# Patient Record
Sex: FEMALE | Race: WHITE | HISPANIC OR LATINO | Employment: OTHER | URBAN - METROPOLITAN AREA
[De-identification: names, ages, dates, MRNs, and addresses within clinical notes are randomized per-mention and may not be internally consistent; named-entity substitution may affect disease eponyms.]

---

## 2017-02-27 DIAGNOSIS — I10 ESSENTIAL HYPERTENSION: ICD-10-CM

## 2017-02-27 RX ORDER — LISINOPRIL 2.5 MG/1
TABLET ORAL
Qty: 90 TABLET | Refills: 0 | Status: SHIPPED | OUTPATIENT
Start: 2017-02-27 | End: 2017-06-03 | Stop reason: SDUPTHER

## 2017-05-10 ENCOUNTER — OFFICE VISIT (OUTPATIENT)
Dept: FAMILY MEDICINE | Facility: CLINIC | Age: 67
End: 2017-05-10
Payer: MEDICARE

## 2017-05-10 ENCOUNTER — HOSPITAL ENCOUNTER (OUTPATIENT)
Dept: RADIOLOGY | Facility: HOSPITAL | Age: 67
Discharge: HOME | End: 2017-05-10
Attending: FAMILY MEDICINE
Payer: MEDICARE

## 2017-05-10 VITALS
OXYGEN SATURATION: 97 % | DIASTOLIC BLOOD PRESSURE: 66 MMHG | BODY MASS INDEX: 25.4 KG/M2 | TEMPERATURE: 98 F | HEIGHT: 62 IN | WEIGHT: 138 LBS | HEART RATE: 70 BPM | SYSTOLIC BLOOD PRESSURE: 114 MMHG

## 2017-05-10 DIAGNOSIS — I10 ESSENTIAL HYPERTENSION: Primary | ICD-10-CM

## 2017-05-10 DIAGNOSIS — Z01.818 PRE-OP EVALUATION: ICD-10-CM

## 2017-05-10 LAB
BILIRUB UR QL STRIP: NEGATIVE
CAOX CRY UR QL COMP ASSIST: NORMAL
CLARITY UR REFRACT.AUTO: CLEAR
COLOR UR AUTO: YELLOW
GLUCOSE UR QL STRIP: NEGATIVE
HGB UR QL STRIP: ABNORMAL
KETONES UR QL STRIP: NEGATIVE
LEUKOCYTE ESTERASE UR QL STRIP: ABNORMAL
MICROSCOPIC COMMENT: NORMAL
NITRITE UR QL STRIP: NEGATIVE
PH UR STRIP: 5 [PH] (ref 5–8)
PROT UR QL STRIP: NEGATIVE
RBC #/AREA URNS AUTO: 2 /HPF (ref 0–4)
SP GR UR STRIP: 1.02 (ref 1–1.03)
SQUAMOUS #/AREA URNS AUTO: 1 /HPF
URN SPEC COLLECT METH UR: ABNORMAL
UROBILINOGEN UR STRIP-ACNC: 2 EU/DL
WBC #/AREA URNS AUTO: 2 /HPF (ref 0–5)

## 2017-05-10 PROCEDURE — 99499 UNLISTED E&M SERVICE: CPT | Mod: S$PBB,,, | Performed by: FAMILY MEDICINE

## 2017-05-10 PROCEDURE — 99214 OFFICE O/P EST MOD 30 MIN: CPT | Mod: S$PBB,,, | Performed by: FAMILY MEDICINE

## 2017-05-10 PROCEDURE — 71020 XR CHEST PA AND LATERAL: CPT | Mod: TC,PO

## 2017-05-10 PROCEDURE — 99999 PR PBB SHADOW E&M-EST. PATIENT-LVL III: CPT | Mod: PBBFAC,,, | Performed by: FAMILY MEDICINE

## 2017-05-10 PROCEDURE — 93010 ELECTROCARDIOGRAM REPORT: CPT | Mod: ,,, | Performed by: INTERNAL MEDICINE

## 2017-05-10 PROCEDURE — 71020 XR CHEST PA AND LATERAL: CPT | Mod: 26,,, | Performed by: RADIOLOGY

## 2017-05-10 PROCEDURE — 93005 ELECTROCARDIOGRAM TRACING: CPT | Mod: PBBFAC,PO | Performed by: FAMILY MEDICINE

## 2017-05-10 RX ORDER — KETOROLAC TROMETHAMINE 5 MG/ML
SOLUTION OPHTHALMIC
COMMUNITY
Start: 2017-05-03 | End: 2018-04-06

## 2017-05-10 RX ORDER — DUREZOL 0.5 MG/ML
EMULSION OPHTHALMIC
COMMUNITY
Start: 2017-05-03 | End: 2017-11-27

## 2017-05-10 RX ORDER — CIPROFLOXACIN HYDROCHLORIDE 3 MG/ML
SOLUTION/ DROPS OPHTHALMIC
COMMUNITY
Start: 2017-05-03 | End: 2017-06-15

## 2017-05-10 NOTE — PATIENT INSTRUCTIONS
Renae ejercicio para tener un corazón más perlita  Usted podría estar preguntándose qué debe hacer para mejorar la britt de callahan corazón. Si piensa en hacer ejercicio va por buen lyle. No necesita volverse un atleta, kaveh sí hacer rock cierta cantidad de ejercicio rápido y con energía para ayudar a fortalecer el corazón. Si le starks diagnosticado rock enfermedad del corazón, callahan médico le puede recomendar ejercicios para ayudar a estabilizar callahan enfermedad. Para que el ejercicio se convierta en un hábito, escoja actividades que ayo seguras y que le diviertan.     Consulte con callahan proveedor de atención médica antes de comenzar un programa de ejercicios.   ¿Por qué hacer ejercicio?     Renae ejercicio con rock persona amiga. Cuando la actividad es divertida, tendrá más probabilidades de no abandonar.   Hacer ejercicio en forma regular le ofrece muchos beneficios para la britt, tales otilio los siguientes:  · Mejorar los niveles de colesterol en la kristy, lo que ayuda a evitar aún más los problemas del corazón.  · Bajar la presión arterial lo que ayuda a evitar los ataques al cerebro y al corazón.  · Controlar la diabetes o disminuir el riesgo de desarrollar la enfermedad.  · Mejorar el funcionamiento del corazón y los pulmones.  · Alcanzar y mantener un peso saludable.  · Formar músculos más rachele y flexibles para mejorar callahan actividad.  · Prevenir caídas y fracturas al retardar la pérdida de masa de los huesos (osteoporosis).  · Manejar mejor el estrés.  Sugerencias para hacer ejercicio  Acostúmbrese al ejercicio poco a poco: Al principio póngase metas fáciles, luego vaya aumentándolas.  Renae ejercicio la mayoría de los días de la semana: Trate de hacer actividad física de nivel moderado a intenso por un total de 150 minutos o más a la semana. Intente hacer 40 minutos, jen a cuatro veces a la semana. Para lograr los mejores resultados, la actividad debe durar un promedio de 40 minutos. Ejemplos de actividad física de  intensidad moderada son caminar rock ulca en 15 minutos, o trabajar en el jardín de 30 a 45 minutos.  Aumente el nivel de actividad diaria: Junto con callahan programa de ejercicios, trate de tener rock mayor actividad carmencita el día. Camine en vez de ir en automóvil. Renae más trabajos en la casa o en el jardín.  Escoja rock o más actividades que le gusten: Caminar es rock de las cosas más fáciles de hacer. También puede tratar de nadar, montar en bicicleta o lu rock clase de gimnasia.  Deje de hacer el ejercicio y llame a callahan médico si:  · Tiene dolor en el pecho o siente mareo.  · Siente ardor, opresión, o pesadez en el pecho, el abbey, los hombros, la espalda o los brazos.  · Siente rock gran falta de aire.  · Le aumenta el dolor en los músculos o en las articulaciones.  · Tiene palpitaciones o latidos cardíacos irregulares.   Date Last Reviewed: 3/7/2014  © 2072-3589 [employer]. [address] [place] [address]. Todos los derechos reservados. Esta información no pretende sustituir la atención médica profesional. Sólo callahan médico puede diagnosticar y tratar un problema de britt.

## 2017-05-10 NOTE — MR AVS SNAPSHOT
[facility]   [place]  [place] [address]  Phone: [phone]  Fax: [phone]                  Amrita Yoder   5/10/2017 11:00 AM   Office Visit    Descripción:  Female : 1950   Personal Médico:  Quentin Hoff MD   Departamento:  [facility]           Razón de la eloise     Pre-op Exam     Shoulder Pain           Diagnósticos de Esta Visita        Comentarios    Essential hypertension    -  Primario     Pre-op evaluation                Lista de tareas           Citas próximas        Personal Médico Departamento Tfno del dpto    5/10/2017 11:45 AM LIA KENNER [facility] [phone]    5/10/2017 1:30 PM [facility] XR1 300 LB LIMIT [facility] [phone]    6/15/2017 10:20 AM Quentin Hoff MD [facility] [phone]      Metas (5 Years of Data)     Ninguna      Follow-Up and Disposition     Return in about 6 months (around 11/10/2017), or if symptoms worsen or fail to improve.    Follow-up and Disposition History      [facility]     [facility]fermeras - Asistencia   Enfermeras registradas de [facility] pueden ayudarle a reservar rock eloise, proveer educación para la britt, asesoría clínica, y otros servicios de asesoramiento.   Llame para oleg servicio gratuito a [phone].             Medicamentos           Mensaje sobre Medicamentos     Verificar los cambios y / o adiciones a callahan régimen de medicación son los mismos que discutir con callahan médico. Si cualquiera de estos cambios o adiciones son incorrectos, por favor notifique a callahan proveedor de atención médica.             Verifique que la siguiente lista de medicamentos es rock representación exacta de los medicamentos que está tomando actualmente. Si no hay ningunos reportados, la lista puede estar en garcias. Si no es correcta, por favor póngase en contacto con callahan proveedor de atención médica. Lleve esta lista con  "usted en constantino de emergencia.           Medicamentos Actuales     atorvastatin (LIPITOR) 20 MG tablet Take 1 tablet (20 mg total) by mouth nightly.    ciprofloxacin HCl (CILOXAN) 0.3 % ophthalmic solution     DUREZOL 0.05 % Drop ophthalmic solution     ketorolac 0.5% (ACULAR) 0.5 % Drop     lisinopril (PRINIVIL,ZESTRIL) 2.5 MG tablet TAKE ONE TABLET BY MOUTH ONCE DAILY    PATADAY 0.2 % Drop     RESTASIS 0.05 % ophthalmic emulsion            Información de referencia clínica           Marisol signos vitales kip     PS Pulso Temperatura [place] Peso SpO2    114/66 (BP Location: Right arm, Patient Position: Sitting, BP Method: Manual) 70 98.2 °F (36.8 °C) (Oral) 5' 2" (1.575 m) 62.6 kg (138 lb 0.1 oz) 97%    BMI (IMC)                   25.24 kg/m2           Blood Pressure          Most Recent Value    BP  114/66      Alergias     A partir del:  5/10/2017        No Known Allergies      Vacunas     Administradas en la fecha de la visita:  5/10/2017        None      Orders Placed During Today's Visit      Órdenes normales de esta visita    EKG 12-lead     Urinalysis     Exámenes/Procedimientos futuros Se espera el Vence    CBC auto differential  5/10/2017 5/10/2018    Comprehensive metabolic panel  5/10/2017 8/8/2017    X-Ray Chest PA And Lateral  5/10/2017 5/10/2018      Instrucciones      Renae ejercicio para tener un corazón más perlita  Usted podría estar preguntándose qué debe hacer para mejorar la britt de callahan corazón. Si piensa en hacer ejercicio va por buen lyle. No necesita volverse un atleta, kaveh sí hacer rock cierta cantidad de ejercicio rápido y con energía para ayudar a fortalecer el corazón. Si le starks diagnosticado rock enfermedad del corazón, callahan médico le puede recomendar ejercicios para ayudar a estabilizar callahan enfermedad. Para que el ejercicio se convierta en un hábito, escoja actividades que ayo seguras y que le diviertan.     Consulte con callahan proveedor de atención médica antes de comenzar un programa de " ejercicios.   ¿Por qué hacer ejercicio?     Renae ejercicio con rock persona amiga. Cuando la actividad es divertida, tendrá más probabilidades de no abandonar.   Hacer ejercicio en forma regular le ofrece muchos beneficios para la britt, tales otilio los siguientes:  · Mejorar los niveles de colesterol en la kristy, lo que ayuda a evitar aún más los problemas del corazón.  · Bajar la presión arterial lo que ayuda a evitar los ataques al cerebro y al corazón.  · Controlar la diabetes o disminuir el riesgo de desarrollar la enfermedad.  · Mejorar el funcionamiento del corazón y los pulmones.  · Alcanzar y mantener un peso saludable.  · Formar músculos más rachele y flexibles para mejorar callahan actividad.  · Prevenir caídas y fracturas al retardar la pérdida de masa de los huesos (osteoporosis).  · Manejar mejor el estrés.  Sugerencias para hacer ejercicio  Acostúmbrese al ejercicio poco a poco: Al principio póngase metas fáciles, luego vaya aumentándolas.  Renae ejercicio la mayoría de los días de la semana: Trate de hacer actividad física de nivel moderado a intenso por un total de 150 minutos o más a la semana. Intente hacer 40 minutos, jen a cuatro veces a la semana. Para lograr los mejores resultados, la actividad debe durar un promedio de 40 minutos. Ejemplos de actividad física de intensidad moderada son caminar rock luca en 15 minutos, o trabajar en el jardín de 30 a 45 minutos.  Aumente el nivel de actividad diaria: Junto con callahan programa de ejercicios, trate de tener rock mayor actividad carmencita el día. Camine en vez de ir en automóvil. Renae más trabajos en la casa o en el jardín.  Escoja rock o más actividades que le gusten: Caminar es rock de las cosas más fáciles de hacer. También puede tratar de nadar, montar en bicicleta o lu rock clase de gimnasia.  Deje de hacer el ejercicio y llame a callahan médico si:  · Tiene dolor en el pecho o siente mareo.  · Siente ardor, opresión, o pesadez en el pecho, el abbey, los  hombros, la espalda o los brazos.  · Siente rock gran falta de aire.  · Le aumenta el dolor en los músculos o en las articulaciones.  · Tiene palpitaciones o latidos cardíacos irregulares.   Date Last Reviewed: 3/7/2014  © 1640-9542 [employer]. [address] [place] [address]. Todos los derechos reservados. Esta información no pretende sustituir la atención médica profesional. Sólo callahan médico puede diagnosticar y tratar un problema de britt.             Language Assistance Services     ATTENTION: Language assistance services are available, free of charge. Please call [phone].      ATENCIÓN: Si habla español, tiene a callahan disposición servicios gratuitos de asistencia lingüística. Llame al [phone].     CHÚ Ý: N?u b?n nói Ti?ng Vi?t, có các d?ch v? h? tr? ngôn ng? mi?n phí dành cho b?n. G?i s? [phone].         [facility] cumple con las leyes federales aplicables de derechos civiles y no discrimina por motivos de angela, color, origen nacional, edad, discapacidad, o sexo.                 Amrita Beba   5/10/2017 11:00 AM   Office Visit    Description:  Female : 1950   Provider:  Quentin Hoff MD   Department:  [facility]           Reason for Visit     Pre-op Exam     Shoulder Pain           Diagnoses this Visit        Comments    Essential hypertension    -  Primary     Pre-op evaluation                To Do List           Future Appointments        Provider Department Dept Phone    5/10/2017 11:45 AM MICHELLE FITZGERALD [facility] [phone]    5/10/2017 1:30 PM [facility] XR1 300 LB LIMIT [facility] [phone]    6/15/2017 10:20 AM Quentin Hoff MD [facility] [phone]      Goals     None      Follow-Up and Disposition     Return in about 6 months (around 11/10/2017), or if symptoms worsen or fail to improve.    Follow-up and Disposition History      [facility]   "   [facility] Nurse Care Line - 24/7 Assistance  Unless otherwise directed by your provider, please contact [facility], our nurse care line that is available for 24/7 assistance.     Registered nurses in the [facility] provide: appointment scheduling, clinical advisement, health education, and other advisory services.  Call: [phone] (toll free)               Medications           Message regarding Medications     Verify the changes and/or additions to your medication regime listed below are the same as discussed with your clinician today.  If any of these changes or additions are incorrect, please notify your healthcare provider.             Verify that the below list of medications is an accurate representation of the medications you are currently taking.  If none reported, the list may be blank. If incorrect, please contact your healthcare provider. Carry this list with you in case of emergency.           Current Medications     atorvastatin (LIPITOR) 20 MG tablet Take 1 tablet (20 mg total) by mouth nightly.    ciprofloxacin HCl (CILOXAN) 0.3 % ophthalmic solution     DUREZOL 0.05 % Drop ophthalmic solution     ketorolac 0.5% (ACULAR) 0.5 % Drop     lisinopril (PRINIVIL,ZESTRIL) 2.5 MG tablet TAKE ONE TABLET BY MOUTH ONCE DAILY    PATADAY 0.2 % Drop     RESTASIS 0.05 % ophthalmic emulsion            Clinical Reference Information           Your Vitals Were     BP Pulse Temp Height Weight SpO2    114/66 (BP Location: Right arm, Patient Position: Sitting, BP Method: Manual) 70 98.2 °F (36.8 °C) (Oral) 5' 2" (1.575 m) 62.6 kg (138 lb 0.1 oz) 97%    BMI                   25.24 kg/m2           Blood Pressure          Most Recent Value    BP  114/66      Allergies as of 5/10/2017     No Known Allergies      Immunizations Administered on Date of Encounter - 5/10/2017     None      Orders Placed During Today's Visit      Normal Orders This Visit    EKG 12-lead     Urinalysis     Future " Labs/Procedures Expected by Expires    CBC auto differential  5/10/2017 5/10/2018    Comprehensive metabolic panel  5/10/2017 8/8/2017    X-Ray Chest PA And Lateral  5/10/2017 5/10/2018      Instructions      Renae ejercicio para tener un corazón más perlita  Usted podría estar preguntándose qué debe hacer para mejorar la britt de callahan corazón. Si piensa en hacer ejercicio va por buen lyle. No necesita volverse un atleta, kaveh sí hacer rock cierta cantidad de ejercicio rápido y con energía para ayudar a fortalecer el corazón. Si le starks diagnosticado rock enfermedad del corazón, callahan médico le puede recomendar ejercicios para ayudar a estabilizar callahan enfermedad. Para que el ejercicio se convierta en un hábito, escoja actividades que ayo seguras y que le diviertan.     Consulte con callahan proveedor de atención médica antes de comenzar un programa de ejercicios.   ¿Por qué hacer ejercicio?     Renae ejercicio con rock persona amiga. Cuando la actividad es divertida, tendrá más probabilidades de no abandonar.   Hacer ejercicio en forma regular le ofrece muchos beneficios para la britt, tales otilio los siguientes:  · Mejorar los niveles de colesterol en la kristy, lo que ayuda a evitar aún más los problemas del corazón.  · Bajar la presión arterial lo que ayuda a evitar los ataques al cerebro y al corazón.  · Controlar la diabetes o disminuir el riesgo de desarrollar la enfermedad.  · Mejorar el funcionamiento del corazón y los pulmones.  · Alcanzar y mantener un peso saludable.  · Formar músculos más rachele y flexibles para mejorar callahan actividad.  · Prevenir caídas y fracturas al retardar la pérdida de masa de los huesos (osteoporosis).  · Manejar mejor el estrés.  Sugerencias para hacer ejercicio  Acostúmbrese al ejercicio poco a poco: Al principio póngase metas fáciles, luego vaya aumentándolas.  Renae ejercicio la mayoría de los días de la semana: Trate de hacer actividad física de nivel moderado a intenso por un total de 150  minutos o más a la semana. Intente hacer 40 minutos, jen a cuatro veces a la semana. Para lograr los mejores resultados, la actividad debe durar un promedio de 40 minutos. Ejemplos de actividad física de intensidad moderada son caminar rock luca en 15 minutos, o trabajar en el jardín de 30 a 45 minutos.  Aumente el nivel de actividad diaria: Junto con callahan programa de ejercicios, trate de tener rock mayor actividad carmencita el día. Camine en vez de ir en automóvil. Renae más trabajos en la casa o en el jardín.  Escoja rokc o más actividades que le gusten: Caminar es rock de las cosas más fáciles de hacer. También puede tratar de nadar, montar en bicicleta o lu rock clase de gimnasia.  Deje de hacer el ejercicio y llame a callahan médico si:  · Tiene dolor en el pecho o siente mareo.  · Siente ardor, opresión, o pesadez en el pecho, el abbey, los hombros, la espalda o los brazos.  · Siente rock gran falta de aire.  · Le aumenta el dolor en los músculos o en las articulaciones.  · Tiene palpitaciones o latidos cardíacos irregulares.   Date Last Reviewed: 3/7/2014  © 3439-9322 [employer]. [address] [place] [address], [place] [address]. Todos los derechos reservados. Esta información no pretende sustituir la atención médica profesional. Sólo callahan médico puede diagnosticar y tratar un problema de britt.             Language Assistance Services     ATTENTION: Language assistance services are available, free of charge. Please call [phone].      ATENCIÓN: Si habla español, tiene a callahan disposición servicios gratuitos de asistencia lingüística. Llame al [phone].     JUNE Ý: N?u b?n nói Ti?ng Vi?t, có các d?ch v? h? tr? ngôn ng? mi?n phí dành cho b?n. G?i s? [phone].         [facility] complies with applicable Federal civil rights laws and does not discriminate on the basis of race, color, national origin, age, disability, or sex.

## 2017-05-10 NOTE — PROGRESS NOTES
Subjective:       Patient ID: Amrita Yoder is a 66 y.o. female.    Chief Complaint: Pre-op Exam and Shoulder Pain    HPI Comments: 66 years old female who came to the clinic for a preoperative evaluation.  Blood pressure today is stable.  No chest pain palpitations orthopnea or PND.    Shoulder Pain        Review of Systems   Constitutional: Negative.    HENT: Negative.    Eyes: Negative.    Respiratory: Negative.    Cardiovascular: Negative.  Negative for chest pain, palpitations and leg swelling.   Gastrointestinal: Negative.    Genitourinary: Negative.    Musculoskeletal: Negative.    Skin: Negative.    Neurological: Negative.    Psychiatric/Behavioral: Negative.        Objective:      Physical Exam   Constitutional: She is oriented to person, place, and time. She appears well-developed and well-nourished. No distress.   HENT:   Head: Normocephalic and atraumatic.   Right Ear: External ear normal.   Left Ear: External ear normal.   Nose: Nose normal.   Mouth/Throat: Oropharynx is clear and moist. No oropharyngeal exudate.   Eyes: Conjunctivae and EOM are normal. Pupils are equal, round, and reactive to light. Right eye exhibits no discharge. Left eye exhibits no discharge. No scleral icterus.   Neck: Normal range of motion. Neck supple. No JVD present. No tracheal deviation present. No thyromegaly present.   Cardiovascular: Normal rate, regular rhythm, normal heart sounds and intact distal pulses.  Exam reveals no gallop and no friction rub.    No murmur heard.  Pulmonary/Chest: Effort normal and breath sounds normal. No stridor. No respiratory distress. She has no wheezes. She has no rales. She exhibits no tenderness.   Abdominal: Soft. Bowel sounds are normal. She exhibits no distension and no mass. There is no tenderness. There is no rebound and no guarding.   Musculoskeletal: Normal range of motion. She exhibits no edema or tenderness.   Lymphadenopathy:     She has no cervical adenopathy.   Neurological:  She is alert and oriented to person, place, and time. She has normal reflexes. No cranial nerve deficit. She exhibits normal muscle tone. Coordination normal.   Skin: Skin is warm and dry. No rash noted. She is not diaphoretic. No erythema. No pallor.   Psychiatric: She has a normal mood and affect. Her behavior is normal. Judgment and thought content normal.       Assessment:       1. Essential hypertension    2. Pre-op evaluation        Plan:         Amrita was seen today for pre-op exam and shoulder pain.    Diagnoses and all orders for this visit:    Essential hypertension  -     Comprehensive metabolic panel; Future  -     Cancel: Urinalysis; Future  -     CBC auto differential; Future  -     EKG 12-lead  -     Urinalysis    Pre-op evaluation  -     Comprehensive metabolic panel; Future  -     Cancel: Urinalysis; Future  -     CBC auto differential; Future  -     EKG 12-lead  -     X-Ray Chest PA And Lateral; Future  -     Urinalysis

## 2017-06-03 DIAGNOSIS — I10 ESSENTIAL HYPERTENSION: ICD-10-CM

## 2017-06-05 RX ORDER — LISINOPRIL 2.5 MG/1
TABLET ORAL
Qty: 90 TABLET | Refills: 0 | Status: SHIPPED | OUTPATIENT
Start: 2017-06-05 | End: 2017-10-16 | Stop reason: SDUPTHER

## 2017-06-15 ENCOUNTER — OFFICE VISIT (OUTPATIENT)
Dept: FAMILY MEDICINE | Facility: CLINIC | Age: 67
End: 2017-06-15
Payer: MEDICARE

## 2017-06-15 VITALS
HEIGHT: 62 IN | SYSTOLIC BLOOD PRESSURE: 120 MMHG | WEIGHT: 138.88 LBS | DIASTOLIC BLOOD PRESSURE: 64 MMHG | HEART RATE: 70 BPM | BODY MASS INDEX: 25.55 KG/M2

## 2017-06-15 DIAGNOSIS — J02.9 PHARYNGITIS, UNSPECIFIED ETIOLOGY: ICD-10-CM

## 2017-06-15 DIAGNOSIS — J06.9 UPPER RESPIRATORY TRACT INFECTION, UNSPECIFIED TYPE: ICD-10-CM

## 2017-06-15 DIAGNOSIS — R49.0 HOARSENESS: ICD-10-CM

## 2017-06-15 DIAGNOSIS — Z01.818 PREOP EXAMINATION: Primary | ICD-10-CM

## 2017-06-15 PROCEDURE — 1159F MED LIST DOCD IN RCRD: CPT | Mod: ,,, | Performed by: FAMILY MEDICINE

## 2017-06-15 PROCEDURE — 99213 OFFICE O/P EST LOW 20 MIN: CPT | Mod: PBBFAC,PO | Performed by: FAMILY MEDICINE

## 2017-06-15 PROCEDURE — 99214 OFFICE O/P EST MOD 30 MIN: CPT | Mod: S$PBB,,, | Performed by: FAMILY MEDICINE

## 2017-06-15 PROCEDURE — 96372 THER/PROPH/DIAG INJ SC/IM: CPT | Mod: PBBFAC,PO

## 2017-06-15 PROCEDURE — 1126F AMNT PAIN NOTED NONE PRSNT: CPT | Mod: ,,, | Performed by: FAMILY MEDICINE

## 2017-06-15 PROCEDURE — 99999 PR PBB SHADOW E&M-EST. PATIENT-LVL III: CPT | Mod: PBBFAC,,, | Performed by: FAMILY MEDICINE

## 2017-06-15 RX ORDER — PROMETHAZINE HYDROCHLORIDE AND DEXTROMETHORPHAN HYDROBROMIDE 6.25; 15 MG/5ML; MG/5ML
5 SYRUP ORAL 3 TIMES DAILY PRN
Qty: 180 ML | Refills: 0 | Status: SHIPPED | OUTPATIENT
Start: 2017-06-15 | End: 2017-06-15 | Stop reason: CLARIF

## 2017-06-15 RX ORDER — BENZONATATE 200 MG/1
200 CAPSULE ORAL EVERY 6 HOURS
Qty: 30 CAPSULE | Refills: 0 | Status: SHIPPED | OUTPATIENT
Start: 2017-06-15 | End: 2017-06-25

## 2017-06-15 RX ORDER — TRIAMCINOLONE ACETONIDE 40 MG/ML
40 INJECTION, SUSPENSION INTRA-ARTICULAR; INTRAMUSCULAR
Status: COMPLETED | OUTPATIENT
Start: 2017-06-15 | End: 2017-06-15

## 2017-06-15 RX ORDER — PREDNISONE 20 MG/1
20 TABLET ORAL DAILY
Qty: 5 TABLET | Refills: 0 | Status: SHIPPED | OUTPATIENT
Start: 2017-06-15 | End: 2017-06-20

## 2017-06-15 RX ADMIN — TRIAMCINOLONE ACETONIDE 40 MG: 40 INJECTION, SUSPENSION INTRA-ARTICULAR; INTRAMUSCULAR at 11:06

## 2017-06-15 NOTE — PROGRESS NOTES
Subjective:       Patient ID: Amrita Yoder is a 67 y.o. female.    Chief Complaint: Follow-up    67 years old female came to the clinic for preoperative evaluation for eye surgery.  Patient also with sore throat associated with cough for the last week after eating avocate with lime.  Patient with episodic hoarseness.  Patient also reports fever sometimes.        Review of Systems   Constitutional: Positive for fever. Negative for chills.   HENT: Positive for sore throat and voice change.    Eyes: Negative.    Respiratory: Negative.    Cardiovascular: Negative.  Negative for chest pain, palpitations and leg swelling.   Gastrointestinal: Negative.    Endocrine: Negative for cold intolerance, heat intolerance, polydipsia, polyphagia and polyuria.   Genitourinary: Negative.    Musculoskeletal: Negative.    Skin: Negative.    Neurological: Negative.    Psychiatric/Behavioral: Negative.        Objective:      Physical Exam   Constitutional: She is oriented to person, place, and time. She appears well-developed and well-nourished. No distress.   HENT:   Head: Normocephalic and atraumatic.   Right Ear: External ear normal.   Left Ear: External ear normal.   Nose: Nose normal.   Mouth/Throat: Posterior oropharyngeal erythema present. No oropharyngeal exudate.   Eyes: Conjunctivae and EOM are normal. Pupils are equal, round, and reactive to light. Right eye exhibits no discharge. Left eye exhibits no discharge. No scleral icterus.   Neck: Normal range of motion. Neck supple. No JVD present. No tracheal deviation present. No thyromegaly present.   Cardiovascular: Normal rate, regular rhythm, normal heart sounds and intact distal pulses.  Exam reveals no gallop and no friction rub.    No murmur heard.  Pulmonary/Chest: Effort normal and breath sounds normal. No stridor. No respiratory distress. She has no wheezes. She has no rales. She exhibits no tenderness.   Abdominal: Soft. Bowel sounds are normal. She exhibits no  distension and no mass. There is no tenderness. There is no rebound and no guarding.   Musculoskeletal: Normal range of motion. She exhibits no edema or tenderness.   Lymphadenopathy:     She has no cervical adenopathy.   Neurological: She is alert and oriented to person, place, and time. She has normal reflexes. No cranial nerve deficit. She exhibits normal muscle tone. Coordination normal.   Skin: Skin is warm and dry. No rash noted. She is not diaphoretic. No erythema. No pallor.   Psychiatric: She has a normal mood and affect. Her behavior is normal. Judgment and thought content normal.       Assessment:       1. Preop examination    2. Pharyngitis, unspecified etiology    3. Hoarseness        Plan:         Amrita was seen today for follow-up.    Diagnoses and all orders for this visit:    Preop examination    Pharyngitis, unspecified etiology  -     predniSONE (DELTASONE) 20 MG tablet; Take 1 tablet (20 mg total) by mouth once daily.  -     promethazine-dextromethorphan (PROMETHAZINE-DM) 6.25-15 mg/5 mL Syrp; Take 5 mLs by mouth 3 (three) times daily as needed.    Hoarseness  -     triamcinolone acetonide injection 40 mg; Inject 1 mL (40 mg total) into the muscle one time.    Upper respiratory tract infection, unspecified type     patient hemodynamically stable for surgical procedure after respiratory processes is gone.

## 2017-07-07 ENCOUNTER — TELEPHONE (OUTPATIENT)
Dept: OBSTETRICS AND GYNECOLOGY | Facility: CLINIC | Age: 67
End: 2017-07-07

## 2017-07-07 NOTE — TELEPHONE ENCOUNTER
Left voicemail informing new patient appt is not available until 07/21/17. We can add her to the wait list if she would like. Need to get a little more information. Why is patient requesting to be seen sooner.

## 2017-07-07 NOTE — TELEPHONE ENCOUNTER
----- Message from Libby Lantigua sent at 7/6/2017 12:30 PM CDT -----  Contact: Self/[phone]  Patient said she would like to be seen sooner than 7/20/17. She is available on 7/10/17 after 2 pm and 7/12/17  open all day. Please advise

## 2017-10-16 DIAGNOSIS — I10 ESSENTIAL HYPERTENSION: ICD-10-CM

## 2017-10-16 RX ORDER — LISINOPRIL 2.5 MG/1
2.5 TABLET ORAL DAILY
Qty: 90 TABLET | Refills: 1 | Status: SHIPPED | OUTPATIENT
Start: 2017-10-16 | End: 2017-11-30 | Stop reason: SDUPTHER

## 2017-11-13 ENCOUNTER — TELEPHONE (OUTPATIENT)
Dept: FAMILY MEDICINE | Facility: CLINIC | Age: 67
End: 2017-11-13

## 2017-11-13 NOTE — TELEPHONE ENCOUNTER
Spoke with pt schedule appt with Dr. Olvera as per pt as medical questions and concerns. Pt voices understanding.

## 2017-11-13 NOTE — TELEPHONE ENCOUNTER
----- Message from Shaneka Daniel sent at 11/13/2017  9:48 AM CST -----  Contact: self, [phone]  Patient requests to be seen on 11/20, states it needs to be with her doctor only and on that day only. States she has questions for her doctor and also wants to have the flu shot, and it can not be on Tuesdays or Thursdays. Please advise.

## 2017-11-27 ENCOUNTER — OFFICE VISIT (OUTPATIENT)
Dept: FAMILY MEDICINE | Facility: CLINIC | Age: 67
End: 2017-11-27
Payer: MEDICAID

## 2017-11-27 VITALS
HEART RATE: 73 BPM | HEIGHT: 62 IN | DIASTOLIC BLOOD PRESSURE: 76 MMHG | BODY MASS INDEX: 27.59 KG/M2 | WEIGHT: 149.94 LBS | SYSTOLIC BLOOD PRESSURE: 120 MMHG

## 2017-11-27 DIAGNOSIS — Z12.31 ENCOUNTER FOR SCREENING MAMMOGRAM FOR MALIGNANT NEOPLASM OF BREAST: ICD-10-CM

## 2017-11-27 DIAGNOSIS — Z23 NEED FOR PROPHYLACTIC VACCINATION AND INOCULATION AGAINST INFLUENZA: ICD-10-CM

## 2017-11-27 DIAGNOSIS — Z78.0 ASYMPTOMATIC MENOPAUSE: ICD-10-CM

## 2017-11-27 DIAGNOSIS — E78.5 DYSLIPIDEMIA: ICD-10-CM

## 2017-11-27 DIAGNOSIS — Z12.11 SCREEN FOR COLON CANCER: ICD-10-CM

## 2017-11-27 DIAGNOSIS — Z23 NEED FOR VACCINATION AGAINST STREPTOCOCCUS PNEUMONIAE: ICD-10-CM

## 2017-11-27 DIAGNOSIS — Z00.00 ANNUAL PHYSICAL EXAM: Primary | ICD-10-CM

## 2017-11-27 PROCEDURE — 90472 IMMUNIZATION ADMIN EACH ADD: CPT | Mod: PBBFAC,PO

## 2017-11-27 PROCEDURE — 99397 PER PM REEVAL EST PAT 65+ YR: CPT | Mod: S$PBB,,, | Performed by: FAMILY MEDICINE

## 2017-11-27 PROCEDURE — 99499 UNLISTED E&M SERVICE: CPT | Mod: S$PBB,,, | Performed by: FAMILY MEDICINE

## 2017-11-27 PROCEDURE — 90670 PCV13 VACCINE IM: CPT | Mod: PBBFAC,PO

## 2017-11-27 PROCEDURE — 99999 PR PBB SHADOW E&M-EST. PATIENT-LVL III: CPT | Mod: PBBFAC,,, | Performed by: FAMILY MEDICINE

## 2017-11-27 PROCEDURE — 99213 OFFICE O/P EST LOW 20 MIN: CPT | Mod: PBBFAC,PO,25 | Performed by: FAMILY MEDICINE

## 2017-11-27 PROCEDURE — 90662 IIV NO PRSV INCREASED AG IM: CPT | Mod: PBBFAC,PO

## 2017-11-27 NOTE — PROGRESS NOTES
Subjective:       Patient ID: Amrita Yoder is a 67 y.o. female.    Chief Complaint: Follow-up    67 years old female who came to the clinic for for her physical examination.  Patient with good compliance with medical regimen.  No recent cholesterol check.  No chest pain palpitations orthopnea or PND.  Blood pressure today stable.  Patient was advised to increase her physical activity at least 30 minutes 5 times a week.      Review of Systems   Constitutional: Negative.    HENT: Negative.    Eyes: Negative.    Respiratory: Negative.    Cardiovascular: Negative.  Negative for chest pain, palpitations and leg swelling.   Gastrointestinal: Negative.    Genitourinary: Negative.    Musculoskeletal: Negative.    Skin: Negative.    Neurological: Negative.    Psychiatric/Behavioral: Negative.        Objective:      Physical Exam   Constitutional: She is oriented to person, place, and time. She appears well-developed and well-nourished. No distress.   HENT:   Head: Normocephalic and atraumatic.   Right Ear: External ear normal.   Left Ear: External ear normal.   Nose: Nose normal.   Mouth/Throat: Oropharynx is clear and moist. No oropharyngeal exudate.   Eyes: Conjunctivae and EOM are normal. Pupils are equal, round, and reactive to light. Right eye exhibits no discharge. Left eye exhibits no discharge. No scleral icterus.   Neck: Normal range of motion. Neck supple. No JVD present. No tracheal deviation present. No thyromegaly present.   Cardiovascular: Normal rate, regular rhythm, normal heart sounds and intact distal pulses.  Exam reveals no gallop and no friction rub.    No murmur heard.  Pulmonary/Chest: Effort normal and breath sounds normal. No stridor. No respiratory distress. She has no wheezes. She has no rales. She exhibits no tenderness.   Abdominal: Soft. Bowel sounds are normal. She exhibits no distension and no mass. There is no tenderness. There is no rebound and no guarding.   Musculoskeletal: Normal range  of motion. She exhibits no edema or tenderness.   Lymphadenopathy:     She has no cervical adenopathy.   Neurological: She is alert and oriented to person, place, and time. She has normal reflexes. No cranial nerve deficit. She exhibits normal muscle tone. Coordination normal.   Skin: Skin is warm and dry. No rash noted. She is not diaphoretic. No erythema. No pallor.   Psychiatric: She has a normal mood and affect. Her behavior is normal. Judgment and thought content normal.       Assessment:       1. Annual physical exam    2. Encounter for screening mammogram for malignant neoplasm of breast    3. Need for prophylactic vaccination and inoculation against influenza    4. Need for vaccination against Streptococcus pneumoniae    5. Dyslipidemia    6. Screen for colon cancer    7. Asymptomatic menopause        Plan:         Amrita was seen today for follow-up.    Diagnoses and all orders for this visit:    Annual physical exam  -     Comprehensive metabolic panel; Future  -     Lipid panel; Future  -     Urinalysis; Future  -     TSH; Future  -     CBC auto differential; Future    Encounter for screening mammogram for malignant neoplasm of breast  -     Mammo Digital Screening Bilat with CAD; Future    Need for prophylactic vaccination and inoculation against influenza  -     Influenza - High Dose (65+) (PF) (IM)    Need for vaccination against Streptococcus pneumoniae  -     Pneumococcal Conjugate Vaccine (13 Valent) (IM)    Dyslipidemia  -     Comprehensive metabolic panel; Future  -     Lipid panel; Future  -     Urinalysis; Future  -     TSH; Future  -     CBC auto differential; Future    Screen for colon cancer  -     Case request GI: COLONOSCOPY  -     Fecal Immunochemical Test (iFOBT)    Asymptomatic menopause  -     DXA Bone Density Spine And Hip; Future

## 2017-11-27 NOTE — PATIENT INSTRUCTIONS
Ejercicios aeróbicos para un corazón perlita  El ejercicio, además de aumentar el nivel de energía y aliviar el estrés, fortalece el músculo del corazón, reduce la presión arterial y el nivel de colesterol, y quema calorías.  Elija un ejercicio aeróbico     Recuerde que hacer aunque sea algo de actividad es mejor que no hacer nada.   Elija rock actividad que jhonathan trabajar al corazón y a los pulmones. El ejercicio aeróbico puede mejorar la manera en que el corazón y otros músculos usan el oxígeno. Para que le resulte más divertido, jhonathan el ejercicio con un amigo y escoja rock actividad que le guste. He aquí algunas ideas:  · Caminar  · Nadar  · Montar en bicicleta  · Subir escaleras  · Bailar  · Trotar  Jhonathan ejercicio con regularidad  Si no valderrama estado haciendo ejercicio con regularidad, pregunte yao a callahan médico si puede hacerlo, y si le da callahan autorización empiece poco a poco. Siga estos consejos:  · Comience por hacer ejercicio jen veces por semana entre leticia y kaiden minutos cada vez.  · Cuando se sienta cómodo, agregue algunos minutos cada sesión.  · Siga aumentando la frecuencia y duración de las sesiones de ejercicio hasta llegar a jen o cuatro veces por semana. Cada sesión debería durar unos 40 minutos en promedio e incluir actividad física de moderada a intensa.  · Si le starks dado nitroglicerina, asegúrese de llevarla cuando hace ejercicio.  · Si tiene un ataque de angina de pecho mientras está haciendo ejercicio, pare el ejercicio, tome la nitroglicerina y llame al médico.  Date Last Reviewed: 6/2/2016  © 2516-2912 [employer], [employer]. [address] [place] [address], [place] [address]. Todos los derechos reservados. Esta información no pretende sustituir la atención médica profesional. Sólo callahan médico puede diagnosticar y tratar un problema de britt.

## 2017-11-30 DIAGNOSIS — I10 ESSENTIAL HYPERTENSION: ICD-10-CM

## 2017-11-30 DIAGNOSIS — E78.5 DYSLIPIDEMIA: ICD-10-CM

## 2017-11-30 RX ORDER — ATORVASTATIN CALCIUM 20 MG/1
20 TABLET, FILM COATED ORAL NIGHTLY
Qty: 90 TABLET | Refills: 1 | Status: SHIPPED | OUTPATIENT
Start: 2017-11-30 | End: 2018-04-23 | Stop reason: SDUPTHER

## 2017-11-30 RX ORDER — LISINOPRIL 2.5 MG/1
2.5 TABLET ORAL DAILY
Qty: 90 TABLET | Refills: 1 | Status: SHIPPED | OUTPATIENT
Start: 2017-11-30 | End: 2018-04-06 | Stop reason: SDUPTHER

## 2017-12-11 ENCOUNTER — HOSPITAL ENCOUNTER (OUTPATIENT)
Dept: RADIOLOGY | Facility: HOSPITAL | Age: 67
Discharge: HOME | End: 2017-12-11
Attending: FAMILY MEDICINE
Payer: MEDICARE

## 2017-12-11 DIAGNOSIS — Z78.0 ASYMPTOMATIC MENOPAUSE: ICD-10-CM

## 2017-12-11 DIAGNOSIS — Z12.31 ENCOUNTER FOR SCREENING MAMMOGRAM FOR MALIGNANT NEOPLASM OF BREAST: ICD-10-CM

## 2017-12-11 PROCEDURE — 77080 DXA BONE DENSITY AXIAL: CPT | Mod: TC

## 2017-12-11 PROCEDURE — 77063 BREAST TOMOSYNTHESIS BI: CPT | Mod: 26,,, | Performed by: RADIOLOGY

## 2017-12-11 PROCEDURE — 77067 SCR MAMMO BI INCL CAD: CPT | Mod: 26,,, | Performed by: RADIOLOGY

## 2017-12-11 PROCEDURE — 77080 DXA BONE DENSITY AXIAL: CPT | Mod: 26,,, | Performed by: RADIOLOGY

## 2017-12-11 PROCEDURE — 77067 SCR MAMMO BI INCL CAD: CPT | Mod: TC

## 2018-04-06 ENCOUNTER — OFFICE VISIT (OUTPATIENT)
Dept: FAMILY MEDICINE | Facility: CLINIC | Age: 68
End: 2018-04-06
Payer: MEDICARE

## 2018-04-06 ENCOUNTER — HOSPITAL ENCOUNTER (OUTPATIENT)
Dept: RADIOLOGY | Facility: HOSPITAL | Age: 68
Discharge: HOME | End: 2018-04-06
Attending: FAMILY MEDICINE
Payer: MEDICARE

## 2018-04-06 VITALS
SYSTOLIC BLOOD PRESSURE: 130 MMHG | DIASTOLIC BLOOD PRESSURE: 84 MMHG | HEART RATE: 72 BPM | WEIGHT: 147.69 LBS | BODY MASS INDEX: 27.18 KG/M2 | HEIGHT: 62 IN

## 2018-04-06 DIAGNOSIS — M79.671 RIGHT FOOT PAIN: ICD-10-CM

## 2018-04-06 DIAGNOSIS — G89.29 CHRONIC RIGHT SHOULDER PAIN: ICD-10-CM

## 2018-04-06 DIAGNOSIS — J01.80 ACUTE NON-RECURRENT SINUSITIS OF OTHER SINUS: ICD-10-CM

## 2018-04-06 DIAGNOSIS — I10 ESSENTIAL HYPERTENSION: ICD-10-CM

## 2018-04-06 DIAGNOSIS — M25.511 CHRONIC RIGHT SHOULDER PAIN: ICD-10-CM

## 2018-04-06 DIAGNOSIS — J30.1 CHRONIC SEASONAL ALLERGIC RHINITIS DUE TO POLLEN: Primary | ICD-10-CM

## 2018-04-06 PROCEDURE — 73630 X-RAY EXAM OF FOOT: CPT | Mod: 26,RT,, | Performed by: RADIOLOGY

## 2018-04-06 PROCEDURE — 3079F DIAST BP 80-89 MM HG: CPT | Mod: CPTII,S$GLB,, | Performed by: FAMILY MEDICINE

## 2018-04-06 PROCEDURE — 3075F SYST BP GE 130 - 139MM HG: CPT | Mod: CPTII,S$GLB,, | Performed by: FAMILY MEDICINE

## 2018-04-06 PROCEDURE — 73030 X-RAY EXAM OF SHOULDER: CPT | Mod: TC,FY,RT

## 2018-04-06 PROCEDURE — 99214 OFFICE O/P EST MOD 30 MIN: CPT | Mod: S$GLB,,, | Performed by: FAMILY MEDICINE

## 2018-04-06 PROCEDURE — 73630 X-RAY EXAM OF FOOT: CPT | Mod: TC,FY,RT

## 2018-04-06 PROCEDURE — 99999 PR PBB SHADOW E&M-EST. PATIENT-LVL IV: CPT | Mod: PBBFAC,,, | Performed by: FAMILY MEDICINE

## 2018-04-06 PROCEDURE — 73030 X-RAY EXAM OF SHOULDER: CPT | Mod: 26,RT,, | Performed by: RADIOLOGY

## 2018-04-06 RX ORDER — MONTELUKAST SODIUM 10 MG/1
10 TABLET ORAL NIGHTLY
Qty: 30 TABLET | Refills: 0 | Status: SHIPPED | OUTPATIENT
Start: 2018-04-06 | End: 2018-05-06

## 2018-04-06 RX ORDER — FLUNISOLIDE 0.25 MG/ML
2 SOLUTION NASAL 2 TIMES DAILY
Qty: 25 ML | Refills: 0 | Status: SHIPPED | OUTPATIENT
Start: 2018-04-06 | End: 2019-11-05 | Stop reason: ALTCHOICE

## 2018-04-06 RX ORDER — GUAIFENESIN 600 MG/1
600 TABLET, EXTENDED RELEASE ORAL 2 TIMES DAILY
Qty: 20 TABLET | Refills: 0 | Status: SHIPPED | OUTPATIENT
Start: 2018-04-06 | End: 2018-04-16

## 2018-04-06 RX ORDER — PROMETHAZINE HYDROCHLORIDE AND DEXTROMETHORPHAN HYDROBROMIDE 6.25; 15 MG/5ML; MG/5ML
5 SYRUP ORAL 3 TIMES DAILY
Qty: 180 ML | Refills: 0 | Status: SHIPPED | OUTPATIENT
Start: 2018-04-06 | End: 2018-04-16

## 2018-04-06 RX ORDER — LISINOPRIL 2.5 MG/1
2.5 TABLET ORAL DAILY
Qty: 90 TABLET | Refills: 3 | Status: SHIPPED | OUTPATIENT
Start: 2018-04-06 | End: 2019-04-15 | Stop reason: SDUPTHER

## 2018-04-06 NOTE — PATIENT INSTRUCTIONS
"  Rinitis alérgica  La rinitis alérgica es rock reacción alérgica que afecta la nariz y, con frecuencia, los ojos. Se la suele conocer otilio alergias nasales. Es frecuente que las alergias nasales se deban a elementos que están presentes en el medioambiente y se respiran. Según a qué sea sensible, las alergias nasales pueden presentarse únicamente carmencita ciertas estaciones. O también pueden ocurrir carmencita todo el año. Los alérgenos comunes de interior incluyen los ácaros del polvo, el moho, las cucarachas y la caspa de las mascotas. Los alérgenos de exterior incluyen el polen de los árboles, los pastos y las hierbas.  Los síntomas son, por ejemplo, goteo nasal, congestión y comezón en la nariz. También incluyen estornudos, ojos rojos y con comezón, y areolas oscuras ("ojeras alérgicas") debajo de los ojos. Además, usted puede estar irritable y cansado. Las alergias rachele también pueden afectar la respiración y desencadenar orck afección llamada asma.  Se pueden realizar pruebas para detectar a qué alérgenos reacciona usted. Es posible que le remitan a un especialista en alergias para que le evalúe y le jhonathan pruebas.  Cuidados en callahan casa  Probablemente el proveedor de atención médica le recete medicamentos para ayudar a aliviar los síntomas de alergia. Siga las instrucciones para lu estos medicamentos.  Pida consejo al proveedor sobre cómo evitar las sustancias a las que es alérgico. Los siguientes son algunos consejos para cada tipo de alérgeno.  Caspa de mascota:  · No tenga mascotas con pelaje o plumas.  · Si no puede evitar tenerlas, manténgalas fuera del dormitorio y de los muebles tapizados.  Polen:  · Cuando el nivel de polen en el ambiente está alto, mantenga cerradas las ventanas de callahan casa y callahan automóvil. De ser posible, use en cambio el aire acondicionado.  · Use rock mascarilla con filtro cuando dxion el césped o trabaje en el jardín.  Ácaros del polvo en la casa:  · Lave la ropa de cama todas las " "semanas con [place] y detergente o séquela en un ambiente caliente.  · Cubra el colchón, el somier y las almohadas con fundas antialérgicas.  · Si es posible, duerma en un cuarto que no tenga tapete, jorge ni muebles tapizados.  Cucarachas:  · Guarde la comida en recipientes cerrados herméticamente.  · Saque la basura de callahan casa rápidamente.  · Arregle las filtraciones de agua.  Moho:  · Mantenga bajo el nivel de humedad usando un deshumidificador o el aparato de aire acondicionado. Mantenga el deshumidificador y el aire acondicionado limpios y sin moho.  · Limpie áreas que tengan moho con agua y blanqueador.  En general:  · Pase la aspiradora rock o dos veces por semana. Si es posible, use rock aspiradora que tenga un filtro de aire de yolis eficiencia para partículas ("HEPA", por saturnino siglas en inglés).  · No fume. Evite el humo de cigarrillo. Renetta humo es irritante y puede empeorar los síntomas.  Visitas de control  Programe rock visita de control según le indique el proveedor de atención médica o nuestro personal. Si le remitieron a un especialista en alergias, coordine renan eloise sin demoras.  Cuándo debe buscar atención médica  Llame enseguida a callahan proveedor de atención médica si se presenta cualquiera de las siguientes situaciones:  · Tos o silbidos al respirar  · Fiebre superior a 100.4° F (38° C)  · Continuidad de los síntomas, síntomas nuevos o empeoramiento de los síntomas  Llame al 911 de inmediato si presenta los siguientes síntomas:  · Dificultad para respirar  · Urticaria (bultos rojizos)  · Hinchazón grave de la meir o comezón intensa de los ojos o la boca   Date Last Reviewed: 4/26/2015  © 0131-6532 [employer], [employer]. [address] [place] [address]. Todos los derechos reservados. Esta información no pretende sustituir la atención médica profesional. Sólo callahan médico puede diagnosticar y tratar un problema de britt.        "

## 2018-04-06 NOTE — PROGRESS NOTES
Subjective:       Patient ID: Amrita Yoder is a 67 y.o. female.    Chief Complaint: Annual Exam    67 years old female came to the clinic for blood pressure check.  Blood pressure today stable.  No chest pain palpitations orthopnea or PND.  Patient with right shoulder and foot pain for the last couple of months.  The pain is 6 out of 10 of intensity on and off aggravated with activity and better with rest.  Patient with sinus congestion for the last week.  Patient with sinus pressure and postnasal drip.  No fevers or chills.  Patient reports significant seasonal ALLERGIES.      Review of Systems   Constitutional: Negative.  Negative for fever.   HENT: Positive for congestion, postnasal drip, rhinorrhea and sinus pressure.    Eyes: Negative.    Respiratory: Positive for cough.    Cardiovascular: Negative.  Negative for chest pain, palpitations and leg swelling.   Gastrointestinal: Negative.    Genitourinary: Negative.    Musculoskeletal: Negative.    Skin: Negative.    Neurological: Negative.    Psychiatric/Behavioral: Negative.        Objective:      Physical Exam   Constitutional: She is oriented to person, place, and time. She appears well-developed and well-nourished. No distress.   HENT:   Head: Normocephalic and atraumatic.   Right Ear: External ear normal.   Left Ear: External ear normal.   Nose: Rhinorrhea present. Right sinus exhibits maxillary sinus tenderness. Right sinus exhibits no frontal sinus tenderness. Left sinus exhibits maxillary sinus tenderness.   Mouth/Throat: Oropharynx is clear and moist. No oropharyngeal exudate.   Eyes: Conjunctivae and EOM are normal. Pupils are equal, round, and reactive to light. Right eye exhibits no discharge. Left eye exhibits no discharge. No scleral icterus.   Neck: Normal range of motion. Neck supple. No JVD present. No tracheal deviation present. No thyromegaly present.   Cardiovascular: Normal rate, regular rhythm, normal heart sounds and intact distal pulses.   Exam reveals no gallop and no friction rub.    No murmur heard.  Pulmonary/Chest: Effort normal and breath sounds normal. No stridor. No respiratory distress. She has no wheezes. She has no rales. She exhibits no tenderness.   Abdominal: Soft. Bowel sounds are normal. She exhibits no distension and no mass. There is no tenderness. There is no rebound and no guarding.   Musculoskeletal: Normal range of motion. She exhibits no edema or tenderness.   Lymphadenopathy:     She has no cervical adenopathy.   Neurological: She is alert and oriented to person, place, and time. She has normal reflexes. No cranial nerve deficit. She exhibits normal muscle tone. Coordination normal.   Skin: Skin is warm and dry. No rash noted. She is not diaphoretic. No erythema. No pallor.   Psychiatric: She has a normal mood and affect. Her behavior is normal. Judgment and thought content normal.       Assessment:       1. Chronic seasonal allergic rhinitis due to pollen    2. Essential hypertension    3. Acute non-recurrent sinusitis of other sinus    4. Chronic right shoulder pain    5. Right foot pain        Plan:         Amrita was seen today for annual exam.    Diagnoses and all orders for this visit:    Chronic seasonal allergic rhinitis due to pollen  -     flunisolide 25 mcg, 0.025%, (NASALIDE) 25 mcg (0.025 %) Spry; 2 sprays by Nasal route 2 (two) times daily.  -     montelukast (SINGULAIR) 10 mg tablet; Take 1 tablet (10 mg total) by mouth every evening.    Essential hypertension  -     lisinopril (PRINIVIL,ZESTRIL) 2.5 MG tablet; Take 1 tablet (2.5 mg total) by mouth once daily.  -     Comprehensive metabolic panel; Future  -     Urinalysis; Future  -     TSH; Future  -     CBC auto differential; Future  -     Lipid panel; Future    Acute non-recurrent sinusitis of other sinus  -     flunisolide 25 mcg, 0.025%, (NASALIDE) 25 mcg (0.025 %) Spry; 2 sprays by Nasal route 2 (two) times daily.  -     guaiFENesin (MUCINEX) 600 mg 12  hr tablet; Take 1 tablet (600 mg total) by mouth 2 (two) times daily.  -     promethazine-dextromethorphan (PROMETHAZINE-DM) 6.25-15 mg/5 mL Syrp; Take 5 mLs by mouth 3 (three) times daily.    Chronic right shoulder pain  -     Ambulatory referral to Orthopedics  -     X-ray Shoulder 2 or More Views Right; Future    Right foot pain  -     Ambulatory referral to Podiatry  -     X-Ray Foot Complete Right; Future    Continue monitoring blood pressure at home, low sodium diet.

## 2018-04-23 ENCOUNTER — OFFICE VISIT (OUTPATIENT)
Dept: PODIATRY | Facility: CLINIC | Age: 68
End: 2018-04-23
Payer: MEDICARE

## 2018-04-23 VITALS
DIASTOLIC BLOOD PRESSURE: 91 MMHG | SYSTOLIC BLOOD PRESSURE: 141 MMHG | HEART RATE: 61 BPM | BODY MASS INDEX: 27.05 KG/M2 | WEIGHT: 147 LBS | HEIGHT: 62 IN

## 2018-04-23 DIAGNOSIS — M54.16 LUMBAR BACK PAIN WITH RADICULOPATHY AFFECTING RIGHT LOWER EXTREMITY: ICD-10-CM

## 2018-04-23 DIAGNOSIS — E78.5 DYSLIPIDEMIA: ICD-10-CM

## 2018-04-23 DIAGNOSIS — M79.671 PAIN OF RIGHT HEEL: Primary | ICD-10-CM

## 2018-04-23 DIAGNOSIS — M72.2 PLANTAR FASCIITIS, RIGHT: ICD-10-CM

## 2018-04-23 PROCEDURE — 99999 PR PBB SHADOW E&M-EST. PATIENT-LVL III: CPT | Mod: PBBFAC,,, | Performed by: PODIATRIST

## 2018-04-23 PROCEDURE — 3077F SYST BP >= 140 MM HG: CPT | Mod: CPTII,S$GLB,, | Performed by: PODIATRIST

## 2018-04-23 PROCEDURE — 3080F DIAST BP >= 90 MM HG: CPT | Mod: CPTII,S$GLB,, | Performed by: PODIATRIST

## 2018-04-23 PROCEDURE — 99203 OFFICE O/P NEW LOW 30 MIN: CPT | Mod: S$GLB,,, | Performed by: PODIATRIST

## 2018-04-23 RX ORDER — MELOXICAM 15 MG/1
15 TABLET ORAL DAILY
Qty: 30 TABLET | Refills: 1 | Status: SHIPPED | OUTPATIENT
Start: 2018-04-23 | End: 2018-05-28 | Stop reason: SDUPTHER

## 2018-04-23 RX ORDER — ATORVASTATIN CALCIUM 20 MG/1
TABLET, FILM COATED ORAL
Qty: 90 TABLET | Refills: 1 | Status: SHIPPED | OUTPATIENT
Start: 2018-04-23 | End: 2019-04-15 | Stop reason: SDUPTHER

## 2018-04-23 RX ORDER — METHYLPREDNISOLONE 4 MG/1
TABLET ORAL
Qty: 1 PACKAGE | Refills: 0 | Status: SHIPPED | OUTPATIENT
Start: 2018-04-23 | End: 2018-05-14

## 2018-04-23 NOTE — LETTER
April 24, 2018      Quentin Hoff MD  [address] [facility]  [place] [address]           [facility]  [address] [facility] [place] [address]  Phone: [phone]          Patient: Amrita Yoder   MR Number: [id number]   YOB: 1950   Date of Visit: 4/23/2018       Dear Dr. Quentin Hoff:    Thank you for referring Amrita Yoder to me for evaluation. Attached you will find relevant portions of my assessment and plan of care.    If you have questions, please do not hesitate to call me. I look forward to following Amrita Yoder along with you.    Sincerely,    Vikas Dixon, DPAURELIANO    Enclosure  CC:  No Recipients    If you would like to receive this communication electronically, please contact [email or web address] or [phone] to request more information on [employer] access.    For providers and/or their staff who would like to refer a patient to [facility], please contact us through our one-stop-shop provider referral line, [facility], at [phone].    If you feel you have received this communication in error or would no longer like to receive these types of communications, please e-mail [email or web address]

## 2018-04-23 NOTE — PATIENT INSTRUCTIONS
Recommend Allegra or Dansco shoes      Bent-Knee Calf Stretch    This exercise is designed to stretch and strengthen your feet and ankles. Before beginning the exercise, read through all the instructions. While exercising, breathe normally and dont bounce. If you feel any pain, stop the exercise. If pain persists, inform your healthcare provider:  · Stand an arms length away from a wall. Place the palms of your hands on the wall. Step forward about 12 inches with your ______ foot.  · Keeping toes pointed forward and both heels on the floor, bend both knees and lean forward. Hold for ____30__ seconds. Relax.  · Repeat __3____ times. Do _2-3_____ sets a day.  Date Last Reviewed: 8/16/2015  © 5133-3469 [employer]. [address] [place] [address], [place] [address]. All rights reserved. This information is not intended as a substitute for professional medical care. Always follow your healthcare professional's instructions.

## 2018-04-23 NOTE — PROGRESS NOTES
"Subjective:      Patient ID: Amrita Yoder is a 67 y.o. female.    Chief Complaint: Heel Pain (right foot)    Amrita is a 67 y.o. female  has a past medical history of Hyperlipidemia and Hypertension. who presents to the podiatry clinic  with complaint of  right foot pain. Onset of the symptoms was several months ago. Precipitating event: none known. Current symptoms include: shooting pain up leg with sitting and with laying down . Aggravating factors: more pain with certain positions/ numbing pain. Symptoms have gradually worsened. Patient has had no prior foot problems. Evaluation to date: none. Treatment to date: none. Patients rates pain 10/10 on pain scale.    Vitals:    04/23/18 0928   BP: (!) 141/91   Pulse: 61   Weight: 66.7 kg (147 lb)   Height: 5' 2" (1.575 m)   PainSc:   9      Past Medical History:   Diagnosis Date    Hyperlipidemia     Hypertension        No past surgical history on file.    Family History   Problem Relation Age of Onset    Adopted: Yes       Social History     Social History    Marital status: Single     Spouse name: N/A    Number of children: N/A    Years of education: N/A     Social History Main Topics    Smoking status: Never Smoker    Smokeless tobacco: None    Alcohol use No    Drug use: No    Sexual activity: Not Asked     Other Topics Concern    None     Social History Narrative    None       Current Outpatient Prescriptions   Medication Sig Dispense Refill    flunisolide 25 mcg, 0.025%, (NASALIDE) 25 mcg (0.025 %) Spry 2 sprays by Nasal route 2 (two) times daily. 25 mL 0    lisinopril (PRINIVIL,ZESTRIL) 2.5 MG tablet Take 1 tablet (2.5 mg total) by mouth once daily. 90 tablet 3    montelukast (SINGULAIR) 10 mg tablet Take 1 tablet (10 mg total) by mouth every evening. 30 tablet 0    PATADAY 0.2 % Drop       RESTASIS 0.05 % ophthalmic emulsion       atorvastatin (LIPITOR) 20 MG tablet TAKE 1 TABLET BY MOUTH NIGHTLY 90 tablet 1    meloxicam (MOBIC) 15 MG " tablet Take 1 tablet (15 mg total) by mouth once daily. 30 tablet 1    methylPREDNISolone (MEDROL DOSEPACK) 4 mg tablet use as directed 1 Package 0     No current facility-administered medications for this visit.        Review of patient's allergies indicates:  No Known Allergies      Review of Systems   Constitution: Negative for chills, fever, weakness and malaise/fatigue.   Cardiovascular: Negative for chest pain, claudication and leg swelling.   Respiratory: Negative for cough and shortness of breath.    Skin: Negative for color change, itching, poor wound healing and rash.   Musculoskeletal: Positive for back pain. Negative for joint pain, muscle cramps and muscle weakness.   Gastrointestinal: Negative for nausea and vomiting.   Neurological: Negative for numbness and paresthesias.   Psychiatric/Behavioral: Negative for altered mental status.           Objective:      Physical Exam   Constitutional: She is oriented to person, place, and time. She appears well-developed and well-nourished. No distress.   Cardiovascular: Intact distal pulses.    Pulses:       Dorsalis pedis pulses are 2+ on the right side, and 2+ on the left side.        Posterior tibial pulses are 2+ on the right side, and 2+ on the left side.   Musculoskeletal: She exhibits tenderness.        Feet:    Pt with low arches, bunion and hammer toe deformity 2-5, deondre. Collapse with wb noted. RCSP 3 degrees, deondre. Equinus contracture noted, reduced with knee flexion, deondre   Neurological: She is alert and oriented to person, place, and time.   Skin: Skin is warm and dry. Capillary refill takes less than 2 seconds. She is not diaphoretic.     See media for resident biomechanical exam        Assessment:       Encounter Diagnoses   Name Primary?    Pain of right heel Yes    Plantar fasciitis, right     Lumbar back pain with radiculopathy affecting right lower extremity          Plan:       Amrita was seen today for heel pain.    Diagnoses and all  orders for this visit:    Pain of right heel  -     Cancel: X-Ray Calcaneus 2 View Right; Future    Plantar fasciitis, right  -     Cancel: X-Ray Calcaneus 2 View Right; Future    Lumbar back pain with radiculopathy affecting right lower extremity  -     Ambulatory Referral to Physical Medicine Rehab    Other orders  -     meloxicam (MOBIC) 15 MG tablet; Take 1 tablet (15 mg total) by mouth once daily.  -     methylPREDNISolone (MEDROL DOSEPACK) 4 mg tablet; use as directed      I counseled the patient on her conditions, their implications and medical management.  Pt educated on importance for stretching for gastroc equinus to reduce midfoot collapse and strain on plantar fascia  Recommend allegria shoes or ramirez addiction with sofsole low arch supports found on [employer] to support foot type and reduce pressure.  Recommend RICE therapy and NSAIDs with medrol dose pack to reduce inflammation and allow healing  Referral to PMR for radiculopathy  F/u as specified    Erlinda Alatorre, PGY2    I have personally taken the history and examined this patient and agree with the resident's note as stated as above.   Vikas MARLEYM, FACFAS

## 2018-05-08 ENCOUNTER — TELEPHONE (OUTPATIENT)
Dept: FAMILY MEDICINE | Facility: CLINIC | Age: 68
End: 2018-05-08

## 2018-05-08 NOTE — TELEPHONE ENCOUNTER
----- Message from Queenie Garcia sent at 5/8/2018  9:50 AM CDT -----  Contact: Self. [phone]  Patient would like to speak with you about getting an appointment to see Dr. Olvera to ask some questions before seeing Dr. Bell on 5/11/18 . Please advise.

## 2018-05-28 ENCOUNTER — OFFICE VISIT (OUTPATIENT)
Dept: PODIATRY | Facility: CLINIC | Age: 68
End: 2018-05-28
Payer: MEDICARE

## 2018-05-28 VITALS
WEIGHT: 147 LBS | HEIGHT: 62 IN | DIASTOLIC BLOOD PRESSURE: 86 MMHG | SYSTOLIC BLOOD PRESSURE: 132 MMHG | HEART RATE: 62 BPM | BODY MASS INDEX: 27.05 KG/M2

## 2018-05-28 DIAGNOSIS — M79.671 PAIN OF RIGHT HEEL: ICD-10-CM

## 2018-05-28 DIAGNOSIS — M72.2 PLANTAR FASCIITIS, RIGHT: Primary | ICD-10-CM

## 2018-05-28 DIAGNOSIS — M62.469 GASTROCNEMIUS EQUINUS, UNSPECIFIED LATERALITY: ICD-10-CM

## 2018-05-28 PROCEDURE — 99213 OFFICE O/P EST LOW 20 MIN: CPT | Mod: S$GLB,,, | Performed by: PODIATRIST

## 2018-05-28 PROCEDURE — 3075F SYST BP GE 130 - 139MM HG: CPT | Mod: CPTII,S$GLB,, | Performed by: PODIATRIST

## 2018-05-28 PROCEDURE — 99999 PR PBB SHADOW E&M-EST. PATIENT-LVL III: CPT | Mod: PBBFAC,,, | Performed by: PODIATRIST

## 2018-05-28 PROCEDURE — 3079F DIAST BP 80-89 MM HG: CPT | Mod: CPTII,S$GLB,, | Performed by: PODIATRIST

## 2018-05-28 RX ORDER — MELOXICAM 15 MG/1
15 TABLET ORAL DAILY
Qty: 30 TABLET | Refills: 1 | Status: SHIPPED | OUTPATIENT
Start: 2018-05-28 | End: 2019-03-29

## 2018-05-29 NOTE — PROGRESS NOTES
"Subjective:      Patient ID: Amrita Yoder is a 68 y.o. female.    Chief Complaint: Follow-up (RIGHT FOOT HEEL PAIN)    Amrita is a 68 y.o. female  has a past medical history of Hyperlipidemia and Hypertension. who presents to the podiatry clinic  with complaint of  right foot pain. Onset of the symptoms was several months ago. Precipitating event: none known. Current symptoms include: shooting pain up leg with sitting and with laying down . Aggravating factors: more pain with certain positions/ numbing pain. Symptoms have gradually worsened. Patient has had no prior foot problems. Evaluation to date: none. Treatment to date: none. Patients rates pain 10/10 on pain scale.    5/29/18: Relates improvement to right heel pain rates 2/10. States she has completed medrol dose pack. Requesting refill of mobic. States she has not changed shoegear.     Vitals:    05/28/18 1006   BP: 132/86   Pulse: 62   Weight: 66.7 kg (147 lb)   Height: 5' 2" (1.575 m)   PainSc: 0-No pain      Past Medical History:   Diagnosis Date    Hyperlipidemia     Hypertension        No past surgical history on file.    Family History   Problem Relation Age of Onset    Adopted: Yes       Social History     Social History    Marital status: Single     Spouse name: N/A    Number of children: N/A    Years of education: N/A     Social History Main Topics    Smoking status: Never Smoker    Smokeless tobacco: Not on file    Alcohol use No    Drug use: No    Sexual activity: Not on file     Other Topics Concern    Not on file     Social History Narrative    No narrative on file       Current Outpatient Prescriptions   Medication Sig Dispense Refill    atorvastatin (LIPITOR) 20 MG tablet TAKE 1 TABLET BY MOUTH NIGHTLY 90 tablet 1    flunisolide 25 mcg, 0.025%, (NASALIDE) 25 mcg (0.025 %) Spry 2 sprays by Nasal route 2 (two) times daily. 25 mL 0    lisinopril (PRINIVIL,ZESTRIL) 2.5 MG tablet Take 1 tablet (2.5 mg total) by mouth once daily. " 90 tablet 3    meloxicam (MOBIC) 15 MG tablet Take 1 tablet (15 mg total) by mouth once daily. 30 tablet 1    PATADAY 0.2 % Drop        No current facility-administered medications for this visit.        Review of patient's allergies indicates:  No Known Allergies      Review of Systems   Constitution: Negative for chills, fever, weakness and malaise/fatigue.   Cardiovascular: Negative for chest pain, claudication and leg swelling.   Respiratory: Negative for cough and shortness of breath.    Skin: Negative for color change, itching, poor wound healing and rash.   Musculoskeletal: Positive for back pain. Negative for joint pain, muscle cramps and muscle weakness.   Gastrointestinal: Negative for nausea and vomiting.   Neurological: Negative for numbness and paresthesias.   Psychiatric/Behavioral: Negative for altered mental status.           Objective:      Physical Exam   Constitutional: She is oriented to person, place, and time. She appears well-developed and well-nourished. No distress.   Cardiovascular: Intact distal pulses.    Pulses:       Dorsalis pedis pulses are 2+ on the right side, and 2+ on the left side.        Posterior tibial pulses are 2+ on the right side, and 2+ on the left side.   Musculoskeletal: She exhibits tenderness.        Feet:    Pt with low arches, bunion and hammer toe deformity 2-5, deondre. Collapse with wb noted. RCSP 3 degrees, deondre. Equinus contracture noted, reduced with knee flexion, deondre    Full biomechanical exam by resident will be in media tab of patient chart.     Neurological: She is alert and oriented to person, place, and time.   Skin: Skin is warm and dry. Capillary refill takes less than 2 seconds. She is not diaphoretic.     See media for resident biomechanical exam        Assessment:       Encounter Diagnoses   Name Primary?    Plantar fasciitis, right Yes    Pain of right heel     Gastrocnemius equinus, unspecified laterality          Plan:       Amrita was seen  today for follow-up.    Diagnoses and all orders for this visit:    Plantar fasciitis, right  -     Ambulatory Referral to Physical/Occupational Therapy    Pain of right heel  -     Ambulatory Referral to Physical/Occupational Therapy    Gastrocnemius equinus, unspecified laterality  -     Ambulatory Referral to Physical/Occupational Therapy    Other orders  -     meloxicam (MOBIC) 15 MG tablet; Take 1 tablet (15 mg total) by mouth once daily.      I counseled the patient on her conditions, their implications and medical management.  Pt educated on importance for stretching for gastroc equinus to reduce midfoot collapse and strain on plantar fascia  Recommend allegria shoes or ramirez addiction with sofsole low arch supports found on [employer] to support foot type and reduce pressure.  PT referral placed.   F/u prn    Shaina Duenas DPM PGY-3    I have personally taken the history and examined this patient and agree with the resident's note as stated as above.   Vikas Dixon DPM, FACFAS

## 2018-06-12 ENCOUNTER — OFFICE VISIT (OUTPATIENT)
Dept: INTERNAL MEDICINE | Facility: CLINIC | Age: 68
End: 2018-06-12
Payer: MEDICARE

## 2018-06-12 VITALS
OXYGEN SATURATION: 99 % | DIASTOLIC BLOOD PRESSURE: 84 MMHG | HEART RATE: 72 BPM | HEIGHT: 60 IN | BODY MASS INDEX: 28.48 KG/M2 | WEIGHT: 145.06 LBS | TEMPERATURE: 98 F | SYSTOLIC BLOOD PRESSURE: 126 MMHG

## 2018-06-12 DIAGNOSIS — J30.9 ALLERGIC RHINITIS, UNSPECIFIED SEASONALITY, UNSPECIFIED TRIGGER: Primary | ICD-10-CM

## 2018-06-12 DIAGNOSIS — I10 ESSENTIAL HYPERTENSION: ICD-10-CM

## 2018-06-12 PROCEDURE — 99999 PR PBB SHADOW E&M-EST. PATIENT-LVL III: CPT | Mod: PBBFAC,,, | Performed by: INTERNAL MEDICINE

## 2018-06-12 PROCEDURE — 3074F SYST BP LT 130 MM HG: CPT | Mod: CPTII,S$GLB,, | Performed by: INTERNAL MEDICINE

## 2018-06-12 PROCEDURE — 3079F DIAST BP 80-89 MM HG: CPT | Mod: CPTII,S$GLB,, | Performed by: INTERNAL MEDICINE

## 2018-06-12 PROCEDURE — 99213 OFFICE O/P EST LOW 20 MIN: CPT | Mod: S$GLB,,, | Performed by: INTERNAL MEDICINE

## 2018-06-12 RX ORDER — METHYLPREDNISOLONE 4 MG/1
TABLET ORAL
Qty: 1 PACKAGE | Refills: 0 | Status: SHIPPED | OUTPATIENT
Start: 2018-06-12 | End: 2018-06-21

## 2018-06-12 NOTE — PROGRESS NOTES
Subjective:       Patient ID: Amrita Yoder is a 68 y.o. female.    Chief Complaint: Sinusitis (x 1 week); Sore Throat; Otalgia; and Cough    HPI  Pt with 1 week of nasal congestion, coryza, clogged ears and nonprod cough.  No F/C, SOB  Review of Systems    Objective:      Physical Exam   Constitutional: She appears well-developed. No distress.   Nasal mucosa inflamed  TMs distended with serous fluid   HENT:   Head: Normocephalic.   Mouth/Throat: Oropharynx is clear and moist.   Eyes: EOM are normal.   Neck: Normal range of motion. No tracheal deviation present.   Cardiovascular: Normal rate, regular rhythm and intact distal pulses.    Pulmonary/Chest: Effort normal and breath sounds normal. No respiratory distress.   Abdominal: She exhibits no distension.   Musculoskeletal: Normal range of motion. She exhibits no edema.   Neurological: She is alert. No cranial nerve deficit. She exhibits normal muscle tone. Coordination normal.   Skin: Skin is warm and dry. No rash noted. She is not diaphoretic. No erythema.   Psychiatric: She has a normal mood and affect. Her behavior is normal.   Vitals reviewed.      Assessment:       1. Allergic rhinitis, unspecified seasonality, unspecified trigger    2. Essential hypertension        Plan:       Amrita was seen today for sinusitis, sore throat, otalgia and cough.    Diagnoses and all orders for this visit:    Allergic rhinitis, unspecified seasonality, unspecified trigger  -     methylPREDNISolone (MEDROL DOSEPACK) 4 mg tablet; use as directed    Essential hypertension   Well-cont      Follow-up if symptoms worsen or fail to improve.

## 2018-06-21 ENCOUNTER — OFFICE VISIT (OUTPATIENT)
Dept: FAMILY MEDICINE | Facility: CLINIC | Age: 68
End: 2018-06-21
Payer: MEDICARE

## 2018-06-21 VITALS
BODY MASS INDEX: 28.57 KG/M2 | WEIGHT: 145.5 LBS | HEART RATE: 95 BPM | SYSTOLIC BLOOD PRESSURE: 100 MMHG | HEIGHT: 60 IN | OXYGEN SATURATION: 95 % | DIASTOLIC BLOOD PRESSURE: 80 MMHG

## 2018-06-21 DIAGNOSIS — M20.11 HALLUX VALGUS (ACQUIRED), RIGHT FOOT: ICD-10-CM

## 2018-06-21 DIAGNOSIS — M77.31 CALCANEAL SPUR OF FOOT, RIGHT: ICD-10-CM

## 2018-06-21 DIAGNOSIS — I10 ESSENTIAL HYPERTENSION: Primary | ICD-10-CM

## 2018-06-21 DIAGNOSIS — J06.9 UPPER RESPIRATORY TRACT INFECTION, UNSPECIFIED TYPE: ICD-10-CM

## 2018-06-21 DIAGNOSIS — M75.31 CALCIFIC TENDINITIS OF RIGHT SHOULDER REGION: ICD-10-CM

## 2018-06-21 PROCEDURE — 3074F SYST BP LT 130 MM HG: CPT | Mod: CPTII,S$GLB,, | Performed by: FAMILY MEDICINE

## 2018-06-21 PROCEDURE — 99214 OFFICE O/P EST MOD 30 MIN: CPT | Mod: S$GLB,,, | Performed by: FAMILY MEDICINE

## 2018-06-21 PROCEDURE — 99999 PR PBB SHADOW E&M-EST. PATIENT-LVL III: CPT | Mod: PBBFAC,,, | Performed by: FAMILY MEDICINE

## 2018-06-21 PROCEDURE — 3079F DIAST BP 80-89 MM HG: CPT | Mod: CPTII,S$GLB,, | Performed by: FAMILY MEDICINE

## 2018-06-21 RX ORDER — PROMETHAZINE HYDROCHLORIDE AND DEXTROMETHORPHAN HYDROBROMIDE 6.25; 15 MG/5ML; MG/5ML
5 SYRUP ORAL 3 TIMES DAILY PRN
Qty: 180 ML | Refills: 0 | Status: SHIPPED | OUTPATIENT
Start: 2018-06-21 | End: 2020-12-23 | Stop reason: SDUPTHER

## 2018-06-21 NOTE — PROGRESS NOTES
Subjective:       Patient ID: Amrita Yoder is a 68 y.o. female.    Chief Complaint: Follow-up (results of blood work)    68 years old female who came to the clinic with right shoulder and foot pain for the last couple of months.  The pain is 3/10 of intensity on and off aggravated with activity and better with rest.  Blood pressure today stable .  No chest pain, palpitation orthopnea or PND.  Patient with cough and congestion for the last week.  No fever or chills.      Review of Systems   Constitutional: Negative.    HENT: Positive for congestion.    Eyes: Negative.    Respiratory: Positive for cough.    Cardiovascular: Negative.  Negative for chest pain, palpitations and leg swelling.   Gastrointestinal: Negative.    Genitourinary: Negative.    Musculoskeletal: Positive for arthralgias.   Skin: Negative.    Neurological: Negative.    Psychiatric/Behavioral: Negative.        Objective:      Physical Exam   Constitutional: She is oriented to person, place, and time. She appears well-developed and well-nourished. No distress.   HENT:   Head: Normocephalic and atraumatic.   Right Ear: External ear normal.   Left Ear: External ear normal.   Nose: Rhinorrhea present.   Mouth/Throat: Oropharynx is clear and moist. No oropharyngeal exudate.   Eyes: Conjunctivae and EOM are normal. Pupils are equal, round, and reactive to light. Right eye exhibits no discharge. Left eye exhibits no discharge. No scleral icterus.   Neck: Normal range of motion. Neck supple. No JVD present. No tracheal deviation present. No thyromegaly present.   Cardiovascular: Normal rate, regular rhythm, normal heart sounds and intact distal pulses.  Exam reveals no gallop and no friction rub.    No murmur heard.  Pulmonary/Chest: Effort normal and breath sounds normal. No stridor. No respiratory distress. She has no wheezes. She has no rales. She exhibits no tenderness.   Abdominal: Soft. Bowel sounds are normal. She exhibits no distension and no  mass. There is no tenderness. There is no rebound and no guarding.   Musculoskeletal: Normal range of motion. She exhibits no edema.        Right shoulder: She exhibits pain.        Right foot: There is tenderness.        Feet:    Lymphadenopathy:     She has no cervical adenopathy.   Neurological: She is alert and oriented to person, place, and time. She has normal reflexes. No cranial nerve deficit. She exhibits normal muscle tone. Coordination normal.   Skin: Skin is warm and dry. No rash noted. She is not diaphoretic. No erythema. No pallor.   Psychiatric: She has a normal mood and affect. Her behavior is normal. Judgment and thought content normal.       Assessment:       1. Essential hypertension    2. Calcaneal spur of foot, right    3. Hallux valgus (acquired), right foot    4. Calcific tendinitis of right shoulder region    5. Upper respiratory tract infection, unspecified type        Plan:         Amrita was seen today for follow-up.    Diagnoses and all orders for this visit:    Essential hypertension    Calcaneal spur of foot, right  -     Ambulatory Referral to Physical/Occupational Therapy    Hallux valgus (acquired), right foot  -     Ambulatory Referral to Physical/Occupational Therapy    Calcific tendinitis of right shoulder region  -     Ambulatory Referral to Physical/Occupational Therapy    Upper respiratory tract infection, unspecified type  -     promethazine-dextromethorphan (PROMETHAZINE-DM) 6.25-15 mg/5 mL Syrp; Take 5 mLs by mouth 3 (three) times daily as needed. for up to 10 days    Continue monitoring blood pressure at home, low sodium diet.

## 2018-06-21 NOTE — PATIENT INSTRUCTIONS
¿Qué son los juanetes?  El hueso de la base del dedo raphael del pie se conecta al hueso anterior en la articulación basal del dedo. Normalmente, los dos huesos siguen belia a otro lynda en línea recta, y el dedo raphael está orientado derecho hacia el frente. Brian a veces oleg dedo comienza a torcerse hacia los dedos pequeños, lo cual empuja la articulación hacia afuera y forma rock protuberancia. Esta protuberancia se llama juanete, y puede ser leve, moderada o grave.     Un juanete leve o moderado consiste en rock pequeña protuberancia en la base del dedo raphael. El juanete se forma cuando el dedo raphael del pie se tuerce hacia el siva dedo, forzando así hacia afuera la articulación basal del dedo raphael.    Síntomas de un juanete  A menudo, los juanetes producen dolor e inflamación alrededor de la articulación basal del dedo raphael, con enrojecimiento y aumento de temperatura de la piel en la jacky afectada. Si el dedo raphael hace presión bajo el siva dedo, puede formarse un callo doloroso en la parte superior del siva dedo. En algunos casos, los juanetes no producen síntomas, brian dificultan el buen ajuste de los zapatos.  Lo que puede hacerse  · Use zapatos cómodos. Usar zapatos con suficiente espacio para los dedos y que ayo de tacón bajo ayudará a que el juanete no empeore o cause dolor.  · Pregunte a callahan médico sobre las almohadillas y los insertos para prevenir los callos y acolchar el juanete.  · Hable con callahan médico sobre la cirugía de juanetes y si ésta es apropiada para usted.  Nota:  Los pies tienden a crecer con la edad. [place] quiere decir que de vez en cuando usted podría tener que cambiar de talla de zapatos para asegurarse de que saturnino pies calcen cómodamente.   Date Last Reviewed: 9/10/2015  © 9912-0170 [employer] [address] [place] [address], [place] [address]. Todos los derechos reservados. Esta información no pretende sustituir la atención médica profesional. Sólo callahan médico puede  diagnosticar y tratar un problema de britt.        Espolón En El Talón [Heel Spur]    La aponeurosis o fascia plantar es rock capa espesa de tejido fibroso que recubre los huesos de la planta del pie y les da soporte para mantener rock posición arqueada. La fascitis plantar consiste en rock inflamación dolorosa de la fascia plantar.  Un espolón calcáneo es un crecimiento del hueso en el lugar donde la fascia plantar se conecta con el hueso del talón (hueso calcáneo). El espolón calcáneo en sí mismo no produce dolor, kaveh callahan presencia es rock señal de fascitis plantar, la cual puede ser la causa de callahan dolor en el pie. No hay ningún tratamiento específico para los espolones calcáneos. El tratamiento se orienta a curar la causa subyacente: la fascitis plantar.  La fascitis plantar puede desarrollarse gradualmente o de repente, y suele afectar a un solo pie a la vez. El dolor en el talón puede ser muy jc, otilio si tuviera un cuchillo clavado en el pie, y puede ocurrir después de hacer ejercicio, correr rock larga distancia, subir escaleras, permanecer de pie carmencita un jonathan período, o ravindra al sentarse después de deidre estado parado.  Entre los factores de riesgo de la fascitis plantar se encuentran la artritis, diabetes, obesidad, aumento reciente de peso, pies planos o pies con un arco demasiado alto. El uso de tacones altos, zapatos flojos o zapatos con poco soporte del arco también aumentan el riesgo.    El dolor de pie a causa de la fascitis plantar suele ser peor por la mañana y mejora al caminar. Al final del día es posible que tenga un dolor sordo en el pie (un dolor no jc o localizado). El tratamiento requiere reposo a corto plazo y control de la inflamación. Es posible que los síntomas tarden hasta 9 meses en desaparecer con las medidas descritas a continuación. En algunas ocasiones, poco frecuentes, podría ser necesario administrar rock inyección de esteroides en el pie o llevar a cabo rock operación  quirúrgica.  Cuidados En [place]  1. Si tiene sobrepeso, trate de perder peso para ayudar a curar la fascitis.  2. Use zapatos con buen soporte en el arco y buena amortiguación del impacto al caminar. Cambie los zapatos deportivos por unos nuevos cuando estén demasiado usados. No corra ni camine descalzo.  3. El uso de plantillas internas es rock parte importante del tratamiento para proporcionar un soporte óptimo del arco del pie. Aunque es posible comprar plantillas prefabricadas de bajo costo en los comercios, son más eficaces las plantillas hechas a la medida por un podólogo (un especialista de los pies).   4. Las férulas para uso nocturno (proporcionadas por el podólogo) mantienen el talón estirado mientras duerme a fin de evitar el dolor por la mañana.  5. Evite las actividades que esfuercen el pie afectado, otilio correr, permanecer parado o caminando demasiado tiempo, practicar deportes de contacto, etc.   6. Renae ejercicios de estiramiento del pie al levantarse por la mañana y antes de practicar deportes. Flexione suavemente el tobillo de forma que la punta del pie se mueva hacia la rodilla.   7. La aplicación de hielo puede ayudar a controlar el dolor del talón. Aplique rock compresa de hielo (cubitos de hielo en rock bolsa de plástico envuelta en rock toalla) sobre el talón carmencita 10-20 minutos otilio medida preventiva tras rock intensificación aguda de los síntomas. Puede repetir la aplicación de hielo cada 1-2 horas según lo necesite.  8. Puede usar acetaminofén (Tylenol) o ibuprofeno (Motrin o Advil) para controlar el dolor, a menos que le hayan recetado otro medicamento. [NOTA: Si tiene rock enfermedad hepática o renal crónica, o ha tenido alguna vez rock úlcera estomacal o sangrado gastrointestinal, consulte con callahan médico antes de lu estos medicamentos.]  Programe rock VISITA DE CONTROL con callahan médico o con un podólogo (un especialista de los pies) según le indique nuestro personal médico. Llame para  programar rock eloise si el dolor empeora o si no nota ningún alivio después de unas semanas de tratamiento en la casa. Es posible que necesite plantillas internas, rock férula para uso nocturno o rock bota especial.  Date Last Reviewed: 11/23/2015  © 7039-4457 [employer] [address] [place] [address], [place] [address]. Todos los derechos reservados. Esta información no pretende sustituir la atención médica profesional. Sólo callahan médico puede diagnosticar y tratar un problema de britt.

## 2018-07-11 ENCOUNTER — CLINICAL SUPPORT (OUTPATIENT)
Dept: REHABILITATION | Facility: HOSPITAL | Age: 68
End: 2018-07-11
Attending: PODIATRIST
Payer: MEDICARE

## 2018-07-11 DIAGNOSIS — M62.81 MUSCLE WEAKNESS: ICD-10-CM

## 2018-07-11 DIAGNOSIS — M25.673 DECREASED RANGE OF MOTION OF ANKLE: ICD-10-CM

## 2018-07-11 DIAGNOSIS — M79.671 RIGHT FOOT PAIN: ICD-10-CM

## 2018-07-11 PROCEDURE — G8979 MOBILITY GOAL STATUS: HCPCS | Mod: CK,PN

## 2018-07-11 PROCEDURE — 97161 PT EVAL LOW COMPLEX 20 MIN: CPT | Mod: PN

## 2018-07-11 PROCEDURE — G8978 MOBILITY CURRENT STATUS: HCPCS | Mod: CK,PN

## 2018-07-11 PROCEDURE — 97110 THERAPEUTIC EXERCISES: CPT | Mod: PN

## 2018-07-11 NOTE — PATIENT INSTRUCTIONS
Gastroc, Sitting (Passive)        Sit with strap or towel around ball of foot. Gently pull toward body. Hold 30 seconds.     Repeat 3 times per session. Do 2 sessions per day.    © 4426-0020 [employer] All Rights Reserved      Copyright © I. All rights reserved.       SEATED CALF STRETCH - SOLEUS        While sitting, use a towel or other strap looped around your foot. Gently pull your ankle back until a stretch is felt along the back of your lower leg.     Your knee should be slightly bent the entire time.    Hold 30 seconds.     Repeat 3 times per session. Do 2 sessions per day.    © 8354-6511 [employer] All Rights Reserved          Piriformis Stretch        Cross foot on opposite knee and pull leg towards opposite shoulder until a gentle stretch is felt across the buttock.  Do not stretch aggressively.    Hold 30 seconds.     Repeat 3 times per session. Do 2 sessions per day.    © 3290-2275 [employer] All Rights Reserved    Plantar Fascia Frozen Bottle Roll        Use a frozen water bottle (plastic, no glass).  In sitting or standing roll the bottom of your foot with moderate pressure. Use as much pressure as you can tolerate without discomfort.     Perform for 2 minutes. Do 2 sessions per day.    © 9820-1095 [employer] All Rights Reserved

## 2018-07-11 NOTE — PLAN OF CARE
"[facility]  Physical Therapy Initial Evaluation    Name: Amrita [place]oder  Clinic Number: [id number]    Therapy Diagnosis:   Encounter Diagnoses   Name Primary?    Right foot pain     Decreased range of motion of ankle     Muscle weakness      Physician: Vikas Dixon DPM    Physician Orders: PT Eval and Treat   Medical Diagnosis from Referral: Plantar fasciitis, right; Pain of right heel; Gastrocnemius equinus  Evaluation Date: 7/11/2018  Authorization Period Expiration: 08/28/2018  Plan of Care Expiration: 7/11/18 - 9/11/18  Visit # / Visits authorized: 1/ 12    Time In: 0820  Time Out: 0900  Total Billable Time: 40 minutes (LCE-1, TE-1)    Precautions: Standard and HTN    Subjective     Date of onset: May 2017    History of current condition - Amrita reports: has been having heel pain for 8-9 months. Pt states she is "walking funny." Has swelling in her R foot and ankle with prolonged standing and walking. Was told by MD she has a heel spur. Difficulty ascending stairs.       Past Medical History:   Diagnosis Date    Hyperlipidemia     Hypertension      Amrita Yoder  has no past surgical history on file.    Amrita has a current medication list which includes the following prescription(s): atorvastatin, flunisolide 25 mcg (0.025%), lisinopril, meloxicam, and pataday.    Review of patient's allergies indicates:  No Known Allergies     Imaging, bone scan films: There is a hallux valgus of the great toe associated with degenerative changes of the 1st metatarsophalangeal joint.  There are no acute fractures or osteoblastic or lytic lesions.  Soft tissues are unremarkable.  There is a small plantar calcaneal spur.    Prior Therapy: No  Social History: lives with their family  Occupation: retired  Prior Level of Function: Independent  Current Level of Function: Independent    Pain:  Current 0/10, worst 8/10, best 0/10   Location: right feet   Description: Sharp  Aggravating " "Factors: Standing and Walking  Easing Factors: pain medication and rest    Pts goals: decrease pain, "walking better"    Objective     Posture: sitting: slumped with forward head and rounded shoulders      Hip Right Left Pain/Dysfunction with Movement    MMT MMT    Flexion 4/5 4/5    Extension 4-/5 4-/5    Abduction 4/5 4/5       Knee Right Left Pain/Dysfunction with Movement    MMT MMT    Flexion 4+/5 4+/5    Extension 4/5 4+/5      Ankle Right  Left  Pain/Dysfunction with Movement    AROM MMT AROM MMT    Plantarflexion 50 4/5 56 4/5    Dorsiflexion 4 4/5 0 4+/5    DF @ 90 6 5/5 8 5/5    Inversion 30 5/5 33 5/5    Eversion 11 5/5* 13 5/5 Pain in lateral ankle   *denotes pain        CMS Impairment/Limitation/Restriction for FOTO Foot Survey    Therapist reviewed FOTO scores for Amrita Yoder on 7/11/2018.   FOTO documents entered into [employer] - see Media section.    Limitation Score: 55%  Category: Mobility    Current : CK = at least 40% but < 60% impaired, limited or restricted  Goal: CK = at least 40% but < 60% impaired, limited or restricted       TREATMENT   Treatment Time In: 0850  Treatment Time Out: 0900  Total Treatment time separate from Evaluation time:10    Amrita received therapeutic exercises to develop flexibility for 10 minutes including:  Piriformis, gastroc and soleus stretching, and self-plantar fascia MFR    Home Exercises and Patient Education Provided    Education provided re:   -    Written Home Exercises Provided: Yes.  Exercises were reviewed and Amrita was able to demonstrate them prior to the end of the session.   Pt received a written copy of exercises to perform at home. Amrita demonstrated good  understanding of the education provided.     See EMR under patient instructions for exercises given.   Assessment   Amrita is a 68 y.o. female referred to outpatient Physical Therapy with a medical diagnosis of Plantar fasciitis, right; Pain of right heel; Gastrocnemius equinus. Pt " presents to PT with pain, decreased right ankle ROM, decreased strength, poor posture, impaired balance and gait, and functional deficits with walking. Pt would benefit from skilled PT consisting of manual therapy including STM/MFR right ankle and foot, PROM, stretching, mobs; therapeutic exercise including LE strengthening/strethcing, postural education, balance and gait training and modalities prn to address limitations and increase functional mobility.      Pt prognosis is Good.   Pt will benefit from skilled outpatient Physical Therapy to address the deficits stated above and in the chart below, provide pt/family education, and to maximize pt's level of independence.     Plan of care discussed with patient: Yes  Pt's spiritual, cultural and educational needs considered and patient is agreeable to the plan of care and goals as stated below:     Anticipated Barriers for therapy: none    Medical Necessity is demonstrated by the following  History  Co-morbidities and personal factors that may impact the plan of care Co-morbidities:   -Arthritis, Back pain, High Blood Pressure, Sleep dysfunction, Visual  Impairment    Personal Factors:   no deficits     high   Examination  Body Structures and Functions, activity limitations and participation restrictions that may impact the plan of care Body Regions:   head  neck  back  lower extremities  upper extremities  trunk    Body Systems:    gross symmetry  ROM  strength  balance  gait  transfers    Participation Restrictions:   None stated    Activity limitations:   Learning and applying knowledge  no deficits    General Tasks and Commands  no deficits    Communication  no deficits    Mobility  walking    Self care  no deficits    Domestic Life  shopping  cooking  doing house work (cleaning house, washing dishes, laundry)    Interactions/Relationships  no deficits    Life Areas  no deficits    Community and Social Life  no deficits         high   Clinical Presentation stable  and uncomplicated low   Decision Making/ Complexity Score: low     Goals:    Short Term Goals (4 Weeks): 8/11/18  1. Pt will be compliant with HEP to supplement PT in restoring pain free walking.  2. Pt will walk with </= 4/10 pain to promote return to physical activity.  3. Pt will perform LE MMTs at 4/5 to promote functional strength for physical activities.  4. Pt will improve R ankle DF AROM to 10 deg with knee extended and flexed to improve ankle mobility for physical activity.     Long Term Goals (8 Weeks): 9/11/18  1. Pt will improve FOTO score to </=44% limited to improve perceived limitation with walking/running.  2. Pt will walk without pain to promote return to normal activity.  3. Pt will perform ankle MMTs at >/=4+/5 to promote functional strength for physical activities.     Plan     Plan of care Certification: 7/11/2018 to 9/11/18.    Outpatient Physical Therapy 2 times weekly for 8 weeks to include the following interventions: Gait Training, Manual Therapy, Moist Heat/ Ice, Neuromuscular Re-ed, Patient Education, Therapeutic Activites and Therapeutic Exercise.     Laura Sheldon, PT

## 2018-07-12 PROBLEM — M62.81 MUSCLE WEAKNESS: Status: ACTIVE | Noted: 2018-07-12

## 2018-07-12 PROBLEM — M25.673 DECREASED RANGE OF MOTION OF ANKLE: Status: ACTIVE | Noted: 2018-07-12

## 2018-07-12 PROBLEM — M79.671 RIGHT FOOT PAIN: Status: ACTIVE | Noted: 2018-07-12

## 2018-07-18 ENCOUNTER — CLINICAL SUPPORT (OUTPATIENT)
Dept: REHABILITATION | Facility: HOSPITAL | Age: 68
End: 2018-07-18
Attending: PODIATRIST
Payer: MEDICARE

## 2018-07-18 DIAGNOSIS — M62.81 MUSCLE WEAKNESS: ICD-10-CM

## 2018-07-18 DIAGNOSIS — M25.673 DECREASED RANGE OF MOTION OF ANKLE: ICD-10-CM

## 2018-07-18 DIAGNOSIS — M79.671 RIGHT FOOT PAIN: ICD-10-CM

## 2018-07-18 PROCEDURE — 97140 MANUAL THERAPY 1/> REGIONS: CPT | Mod: PN

## 2018-07-18 PROCEDURE — 97110 THERAPEUTIC EXERCISES: CPT | Mod: PN

## 2018-07-18 NOTE — PROGRESS NOTES
"TIME RECORD    Date:  07/18/2018    Start Time:  10:15  Stop Time:  11:00    PROCEDURES:    TIMED  Procedure Min.   MT 25   TE 20                 UNTIMED  Procedure Min.             Total Timed Minutes:  45  Total Timed Units:  3  Total Untimed Units:  0   Charges Billed/# of units:  1TE, 2 MT      Progress/Current Status    Subjective:     Patient ID: Amrita Yoder is a 68 y.o. female.  Diagnosis:   1. Right foot pain     2. Decreased range of motion of ankle     3. Muscle weakness       Pain: Pt agreeable to  PT session today. She reports she has been having some R foot/ ankle pain and the sandals she wears are " not the best"     Objective:   Pt arrived to session with no assistive device. She initiated session with manual therapy to R foot / ankle in supine consisting of: STM to medial/ lateral malleolus, STM to R interosseus mm, manual gastroc st, HFL st. Pt then instructed on and completed all therex as per logged below 1:1 w/ PTA.     Date 7/18/18   Visit 2 of 10   Cost  Total 85.24  168.12   Gcode 2 / 5       Bike NEXT?   MT (prn) 20 min    gastroc stretch 30"x3 R   Ankle tband   4 directions 2x10   BAPS (seated) L2 2x10 R   Towel crunch next   Towel inv/ev next   Ankle AROM 30   //bars    Heel raise    BAPS    SLS    Seen by OSWALD 1/6         Assessment:     Pt reports decreased pain and soreness in R ankle / foot following manual therapy. Verbal instruction on postural awareness in standing and with proper heel strike (minimal due to foot wear)    Patient Education/Response:     Cont HEP     Plans and Goals:   Cont PT as per POC, monitor response to session.   Goals:   Short Term Goals (4 Weeks): 8/11/18  1. Pt will be compliant with HEP to supplement PT in restoring pain free walking.  2. Pt will walk with </= 4/10 pain to promote return to physical activity.  3. Pt will perform LE MMTs at 4/5 to promote functional strength for physical activities.  4. Pt will improve R ankle DF AROM to 10 deg with knee " extended and flexed to improve ankle mobility for physical activity.     Long Term Goals (8 Weeks): 9/11/18  1. Pt will improve FOTO score to </=44% limited to improve perceived limitation with walking/running.  2. Pt will walk without pain to promote return to normal activity.  3. Pt will perform ankle MMTs at >/=4+/5 to promote functional strength for physical activities.      Plan of care Certification: 7/11/2018 to 9/11/18.

## 2018-07-25 ENCOUNTER — CLINICAL SUPPORT (OUTPATIENT)
Dept: REHABILITATION | Facility: HOSPITAL | Age: 68
End: 2018-07-25
Attending: PODIATRIST
Payer: MEDICARE

## 2018-07-25 DIAGNOSIS — M62.81 MUSCLE WEAKNESS: ICD-10-CM

## 2018-07-25 DIAGNOSIS — M25.673 DECREASED RANGE OF MOTION OF ANKLE: ICD-10-CM

## 2018-07-25 DIAGNOSIS — M79.671 RIGHT FOOT PAIN: ICD-10-CM

## 2018-07-25 PROCEDURE — 97110 THERAPEUTIC EXERCISES: CPT | Mod: PN

## 2018-07-25 PROCEDURE — 97140 MANUAL THERAPY 1/> REGIONS: CPT | Mod: PN

## 2018-07-25 NOTE — PROGRESS NOTES
"DAILY TREATMENT NOTE    DATE: 7/25/2018    Start Time:  0900  Stop Time:  0950    PROCEDURES:    TIMED  Procedure Min.   MT 15   TE 30   Supervised TE 5             UNTIMED  Procedure Min.             Total Timed Minutes:  45  Total Timed Units:  3  Total Untimed Units:  0  Charges Billed/# of units:  3 (2TE, 1MT)      Progress/Current Status    Subjective:     Patient ID: Amrita Yoder is a 68 y.o. female.  Diagnosis:   1. Right foot pain     2. Decreased range of motion of ankle     3. Muscle weakness       Pain: 3 /10  Pt stated that she was already told to wear sneaker to PT but it was so hot she needed to wear sandals.    Objective:     She initiated session with supervised TE on bike x 5' f/b  manual therapy to R foot / ankle in supine consisting of: STM to medial/ lateral malleolus, STM to R interosseus mm, grade 2 mobs inferiorly to 2nd metatarsal on the 1st f/b STM/MFR to plantar fascia. Pt then instructed on and completed all therex as per logged below 1:1 w/ PT x 30'.     Date 7/25/18 7/18/18   Visit 3 of 10 2 of 10   Cost  Total 88.1  256.22 85.24  168.12   Gcode 3/5 2 / 5        Bike 5'  NEXT?   MT (prn) 15' 20 min    gastroc stretch 30"x3 R 30"x3 R   Ankle tband   4 directions 2x10 RTB 2x10   BAPS (seated) L2 2x10 R L2 2x10 R   Towel crunch 1' R next   Towel inv/ev 1' R next   Ankle AROM HEP  30   //bars     Heel raise --    BAPS --    SLS --    Seen by KIAH AKERS 1/6       Assessment:     Pt has R PF tightness as well as medial heel pain.  She responded well to MT and reported reduced pain upon exit when walking out.     Patient Education/Response:     CONT HEP and wear more appropriate sneakers to therapy session to be able to progress to more standing TE.    Plans and Goals:     Cont PT as per POC, monitor response to session.   Goals:   Short Term Goals (4 Weeks): 8/11/18  1. Pt will be compliant with HEP to supplement PT in restoring pain free walking.  2. Pt will walk with </= 4/10 pain to promote " return to physical activity.  3. Pt will perform LE MMTs at 4/5 to promote functional strength for physical activities.  4. Pt will improve R ankle DF AROM to 10 deg with knee extended and flexed to improve ankle mobility for physical activity.     Long Term Goals (8 Weeks): 9/11/18  1. Pt will improve FOTO score to </=44% limited to improve perceived limitation with walking/running.  2. Pt will walk without pain to promote return to normal activity.  3. Pt will perform ankle MMTs at >/=4+/5 to promote functional strength for physical activities.      Plan of care Certification: 7/11/2018 to 9/11/18.

## 2018-07-25 NOTE — PROGRESS NOTES
"  Physical Therapy Daily Treatment Note     Name: Amrita [place]  Clinic Number: [id number]    Therapy Diagnosis:   Encounter Diagnoses   Name Primary?    Right foot pain     Decreased range of motion of ankle     Muscle weakness      Physician: Vikas Dixon DPM    Visit Date: 7/25/2018  Physician Orders: PT Eval and Treat   Medical Diagnosis from Referral: Plantar fasciitis, right; Pain of right heel; Gastrocnemius equinus  Evaluation Date: 7/11/2018  Authorization Period Expiration: 08/28/2018  Plan of Care Expiration: 7/11/18 - 9/11/18  Visit # / Visits authorized: ***/ 12     Time In: ***  Time Out: ***    PROCEDURES:    TIMED  Procedure Min.   MT ***   TE ***                 UNTIMED  Procedure Min.             Total Timed Minutes:  ***  Total Timed Units:  ***  Total Untimed Units:  ***  Total Billable Time: ***  Charges Billed/# of units: ***       Precautions: Standard and HTN    Subjective     Pt reports: ***.  She {Actions; was/was not:55289} compliant with home exercise program.  Response to previous treatment: ***  Functional change: ***    Pain: {0-10:22871::"0"}/10  Location: {RIGHT/LEFT/BILATERAL:35690} {LOCATION ON BODY:13893}     Objective     Amrita received therapeutic exercises to develop {AMB PT PROGRESS OBJECTIVE:66148} for *** minutes including:  ***    Date 7/25/18 7/18/18   Visit 3/12 2 of 10   FOTO 3/10    Gcode 3/10    Cost  Total  85.24  168.12        Bike  NEXT?   MT (prn)  20 min    gastroc stretch  30"x3 R   Ankle tband   4 directions  2x10   BAPS (seated)  L2 2x10 R   Towel crunch  next   Towel inv/ev  next   Ankle AROM  30   //bars     Heel raise     BAPS     SLS     Seen by KATIE AKERS 1/6       Amrita received the following manual therapy techniques: {AMB PT PROGRESS MANUAL THERAPY:51707} were applied to the: *** for *** minutes, including:  ***    Amrita participated in neuromuscular re-education activities to improve: {AMB PT PROGRESS NEURO RE-ED:31347} for *** minutes. " "The following activities were included:  ***    Amrita participated in dynamic functional therapeutic activities to improve functional performance for ***  minutes, including:  ***    Amrita participated in gait training to improve functional mobility and safety for ***  minutes, including:  ***    Amrita received the following direct contact modalities after being cleared for contraindications: {AMB PT PROGRESS DIRECT CONTACT MODES:08211}    Amrita received the following supervised modalities after being cleared for contradictions: {AMB PT SUPERVISED MODES:87583}    Amrita received hot pack for *** minutes to ***.    Amrita received cold pack for *** minutes to ***.      Home Exercises Provided and Patient Education Provided     Education provided:   - ***    Written Home Exercises Provided: {Blank single:23136::"yes","Patient instructed to cont prior HEP"}.  Exercises were reviewed and Amrita was able to demonstrate them prior to the end of the session.  Amrita demonstrated {Desc; good/fair/poor:58130} understanding of the education provided.     See EMR under {Blank single:10603::"Media","Patient Instructions"} for exercises provided {Blank single:16838::"7/25/2018","prior visit"}.    Assessment     ***  Amrita {IS/IS NOT:48599} progressing well towards her goals.   Pt prognosis is {REHAB PROGNOSIS OHS:60695}.     Pt will continue to benefit from skilled outpatient physical therapy to address the deficits listed in the problem list box on initial evaluation, provide pt/family education and to maximize pt's level of independence in the home and community environment.     Pt's spiritual, cultural and educational needs considered and pt agreeable to plan of care and goals.    Anticipated barriers to physical therapy: ***    Goals:     Short Term Goals (4 Weeks): 8/11/18  1. Pt will be compliant with HEP to supplement PT in restoring pain free walking.  2. Pt will walk with </= 4/10 pain to promote " return to physical activity.  3. Pt will perform LE MMTs at 4/5 to promote functional strength for physical activities.  4. Pt will improve R ankle DF AROM to 10 deg with knee extended and flexed to improve ankle mobility for physical activity.     Long Term Goals (8 Weeks): 9/11/18  1. Pt will improve FOTO score to </=44% limited to improve perceived limitation with walking/running.  2. Pt will walk without pain to promote return to normal activity.  3. Pt will perform ankle MMTs at >/=4+/5 to promote functional strength for physical activities.     Plan     ***    Laura Sheldon, PT

## 2018-08-01 ENCOUNTER — CLINICAL SUPPORT (OUTPATIENT)
Dept: REHABILITATION | Facility: HOSPITAL | Age: 68
End: 2018-08-01
Attending: PODIATRIST
Payer: MEDICARE

## 2018-08-01 DIAGNOSIS — M62.81 MUSCLE WEAKNESS: ICD-10-CM

## 2018-08-01 DIAGNOSIS — M25.673 DECREASED RANGE OF MOTION OF ANKLE: ICD-10-CM

## 2018-08-01 DIAGNOSIS — M79.671 RIGHT FOOT PAIN: ICD-10-CM

## 2018-08-01 PROCEDURE — 97140 MANUAL THERAPY 1/> REGIONS: CPT | Mod: PN

## 2018-08-01 NOTE — PROGRESS NOTES
"  Physical Therapy Daily Treatment Note     Name: Amrita [place]  Clinic Number: [id number]    Therapy Diagnosis:   Encounter Diagnoses   Name Primary?    Right foot pain     Decreased range of motion of ankle     Muscle weakness      Physician: Vikas Dioxn DPM    Visit Date: 8/1/2018  Physician Orders: PT Eval and Treat   Medical Diagnosis from Referral: Plantar fasciitis, right; Pain of right heel; Gastrocnemius equinus  Evaluation Date: 7/11/2018  Authorization Period Expiration: 08/28/2018  Plan of Care Expiration: 7/11/18 - 9/11/18  Visit # / Visits authorized: 4/ 12     Time In: 0846  Time Out: 0920    PROCEDURES:    TIMED  Procedure Min.   TE Supervised 34 NC                 UNTIMED  Procedure Min.             Total Timed Minutes:  0  Total Timed Units:  0  Total Untimed Units:  0  Total Billable Time: 0  Charges Billed/# of units: NC       Precautions: Standard and HTN      Subjective     Pt reports: she is doing "okay" today  She was compliant with home exercise program.  Response to previous treatment: no adverse reactions  Functional change: no change    Pain: 3/10  Location: right feet      Objective     Amrita received therapeutic exercises to develop strength, endurance, ROM and flexibility for 34 minutes including:  Supervised TE per log below    Date 8/1/18   Visit 4   FOTO NEXT   Gcode 4/10   Cost  Total 27.46  283.68       Bike 5'   MT (prn) CK   gastroc stretch 30"x3 R   Ankle tband   4 directions RTB 3x10   BAPS (seated) L2 3x10   Towel crunch 1' R   Towel inv/ev 1' R   Seen by KV     Home Exercises Provided and Patient Education Provided       Written Home Exercises Provided: Patient instructed to cont prior HEP.  Exercises were reviewed and Amrita was able to demonstrate them prior to the end of the session.  Amrita demonstrated good  understanding of the education provided.     See EMR under Patient Instructions for exercises provided prior visit.    Assessment     Pt tolerated " treatment well with no c/o increased pain or discomfort.    Amrita is progressing well towards her goals.   Pt prognosis is Good.     Pt will continue to benefit from skilled outpatient physical therapy to address the deficits listed in the problem list box on initial evaluation, provide pt/family education and to maximize pt's level of independence in the home and community environment.     Pt's spiritual, cultural and educational needs considered and pt agreeable to plan of care and goals.    Anticipated barriers to physical therapy: none    Goals:     Short Term Goals (4 Weeks): 8/11/18  1. Pt will be compliant with HEP to supplement PT in restoring pain free walking.  2. Pt will walk with </= 4/10 pain to promote return to physical activity.  3. Pt will perform LE MMTs at 4/5 to promote functional strength for physical activities.  4. Pt will improve R ankle DF AROM to 10 deg with knee extended and flexed to improve ankle mobility for physical activity.     Long Term Goals (8 Weeks): 9/11/18  1. Pt will improve FOTO score to </=44% limited to improve perceived limitation with walking/running.  2. Pt will walk without pain to promote return to normal activity.  3. Pt will perform ankle MMTs at >/=4+/5 to promote functional strength for physical activities.       Plan     Cont POC.    Laura Sheldon, PT

## 2018-08-01 NOTE — PROGRESS NOTES
"DAILY TREATMENT NOTE    DATE: 8/1/2018    Start Time:  9:20 am   Stop Time:  9:35 am     PROCEDURES:    TIMED  Procedure Min.   MT 15'                      UNTIMED  Procedure Min.             Total Timed Minutes:  15'  Total Timed Units:  1  Total Untimed Units:  0  Charges Billed/# of units:  1 MT      Progress/Current Status    Subjective:     Patient ID: Amrita Yoder is a 68 y.o. female.  Diagnosis:   1. Right foot pain     2. Decreased range of motion of ankle     3. Muscle weakness       Pain:   See note by Laura Sheldon, PT    Objective:     Pt received MT consisting of STM to (R) medial/ lateral malleolus,  STM to (R) plantar fascia region, STM to (R) gastroc region x 15'.     Assessment:     Soft tissue restrictions noted along (R) plantar fascia region and (R) gastroc region during MT. Pt reported that MT felt good and stated that (R) foot was feeling "ok" at the end of therapy session.     Patient Education/Response:     See note by Laura Sheldon PT    Plans and Goals:     See note by Laura Luciano PT   "

## 2018-08-01 NOTE — PROGRESS NOTES
"  Physical Therapy Daily Treatment Note     Name: Amrita [place]  Clinic Number: [id number]    Therapy Diagnosis:   Encounter Diagnoses   Name Primary?    Right foot pain     Decreased range of motion of ankle     Muscle weakness      Physician: Vikas Dixon DPM    Visit Date: 8/1/2018  Physician Orders: PT Eval and Treat   Medical Diagnosis from Referral: Plantar fasciitis, right; Pain of right heel; Gastrocnemius equinus  Evaluation Date: 7/11/2018  Authorization Period Expiration: 08/28/2018  Plan of Care Expiration: 7/11/18 - 9/11/18  Visit # / Visits authorized: 4/ 12     Time In: ***  Time Out: ***    PROCEDURES:    TIMED  Procedure Min.   MT ***   TE ***                 UNTIMED  Procedure Min.             Total Timed Minutes:  ***  Total Timed Units:  ***  Total Untimed Units:  ***  Total Billable Time: ***  Charges Billed/# of units: ***       Precautions: Standard and HTN      Subjective     Pt reports: ***.  She {Actions; was/was not:41569} compliant with home exercise program.  Response to previous treatment: ***  Functional change: ***    Pain: {0-10:64500::"0"}/10  Location: {RIGHT/LEFT/BILATERAL:50915} {LOCATION ON BODY:64157}     Objective     Amrita received therapeutic exercises to develop {AMB PT PROGRESS OBJECTIVE:62340} for *** minutes including:  ***    Date 8/1/18 7/25/18 7/18/18   Visit NEXT 3 of 10 2 of 10   Cost  Total  88.1  256.22 85.24  168.12   Gcode 4/10 3/5 2 / 5         Bike  5'  NEXT?   MT (prn)  15' 20 min    gastroc stretch  30"x3 R 30"x3 R   Ankle tband   4 directions  2x10 RTB 2x10   BAPS (seated)  L2 2x10 R L2 2x10 R   Towel crunch  1' R next   Towel inv/ev  1' R next   Ankle AROM  HEP  30   //bars      Heel raise  --    BAPS  --    SLS  --    Seen by KATIE AKERS 1/6       Amrita received the following manual therapy techniques: {AMB PT PROGRESS MANUAL THERAPY:66470} were applied to the: *** for *** minutes, including:  ***    Amrita participated in neuromuscular " "re-education activities to improve: {AMB PT PROGRESS NEURO RE-ED:66880} for *** minutes. The following activities were included:  ***    Amrita participated in dynamic functional therapeutic activities to improve functional performance for ***  minutes, including:  ***    Amrita participated in gait training to improve functional mobility and safety for ***  minutes, including:  ***    Amrita received the following direct contact modalities after being cleared for contraindications: {AMB PT PROGRESS DIRECT CONTACT MODES:02469}    Amrita received the following supervised modalities after being cleared for contradictions: {AMB PT SUPERVISED MODES:88420}    Amrita received hot pack for *** minutes to ***.    Amrita received cold pack for *** minutes to ***.      Home Exercises Provided and Patient Education Provided     Education provided:   - ***    Written Home Exercises Provided: {Blank single:35848::"yes","Patient instructed to cont prior HEP"}.  Exercises were reviewed and Amrita was able to demonstrate them prior to the end of the session.  Amrita demonstrated {Desc; good/fair/poor:17649} understanding of the education provided.     See EMR under {Blank single:84541::"Media","Patient Instructions"} for exercises provided {Blank single:78911::"8/1/2018","prior visit"}.    Assessment     ***  Amrita {IS/IS NOT:70293} progressing well towards her goals.   Pt prognosis is {REHAB PROGNOSIS OHS:42618}.     Pt will continue to benefit from skilled outpatient physical therapy to address the deficits listed in the problem list box on initial evaluation, provide pt/family education and to maximize pt's level of independence in the home and community environment.     Pt's spiritual, cultural and educational needs considered and pt agreeable to plan of care and goals.    Anticipated barriers to physical therapy: ***    Goals:     Short Term Goals (4 Weeks): 8/11/18  1. Pt will be compliant with HEP to " supplement PT in restoring pain free walking.  2. Pt will walk with </= 4/10 pain to promote return to physical activity.  3. Pt will perform LE MMTs at 4/5 to promote functional strength for physical activities.  4. Pt will improve R ankle DF AROM to 10 deg with knee extended and flexed to improve ankle mobility for physical activity.     Long Term Goals (8 Weeks): 9/11/18  1. Pt will improve FOTO score to </=44% limited to improve perceived limitation with walking/running.  2. Pt will walk without pain to promote return to normal activity.  3. Pt will perform ankle MMTs at >/=4+/5 to promote functional strength for physical activities.       Plan     ***    Laura Sheldon, PT

## 2018-08-28 ENCOUNTER — DOCUMENTATION ONLY (OUTPATIENT)
Dept: REHABILITATION | Facility: HOSPITAL | Age: 68
End: 2018-08-28

## 2018-08-28 DIAGNOSIS — M62.81 MUSCLE WEAKNESS: ICD-10-CM

## 2018-08-28 DIAGNOSIS — M79.671 RIGHT FOOT PAIN: ICD-10-CM

## 2018-08-28 DIAGNOSIS — M25.673 DECREASED RANGE OF MOTION OF ANKLE: ICD-10-CM

## 2018-08-28 NOTE — PROGRESS NOTES
Face to Face PTA Conference performed with Charisma Cartagena PTA, Anette Haywood PTA, Darryl Bernal PTA Brent Bangura PTA regarding patient's current status, overall progress, and plan of care    Laura Sheldon, PT     Face to face meeting completed with Laura Sheldon PT regarding current status and progress of   Amrita Yoder .  Darryl Bernal, PTA    Face to face meeting completed with Laura Sheldon PT regarding current status and progress of   Amrita Yoder .  Tara Weathers, PTA     Face to face meeting completed with Laura Sheldon PT regarding current status and progress of   Amrita Yoder .  Brent Bangura, PTA      Face to face meeting completed with Laura Sheldon PT regarding current status and progress of   Amrita Yoder .  Anette Haywood, PTA

## 2018-09-26 ENCOUNTER — OFFICE VISIT (OUTPATIENT)
Dept: FAMILY MEDICINE | Facility: CLINIC | Age: 68
End: 2018-09-26
Payer: MEDICARE

## 2018-09-26 VITALS
WEIGHT: 147.06 LBS | OXYGEN SATURATION: 97 % | SYSTOLIC BLOOD PRESSURE: 130 MMHG | DIASTOLIC BLOOD PRESSURE: 90 MMHG | BODY MASS INDEX: 28.87 KG/M2 | HEIGHT: 60 IN | HEART RATE: 63 BPM

## 2018-09-26 DIAGNOSIS — Z23 NEED FOR INFLUENZA VACCINATION: ICD-10-CM

## 2018-09-26 DIAGNOSIS — M20.11 HALLUX VALGUS (ACQUIRED), RIGHT FOOT: ICD-10-CM

## 2018-09-26 DIAGNOSIS — G56.02 CARPAL TUNNEL SYNDROME OF LEFT WRIST: Primary | ICD-10-CM

## 2018-09-26 DIAGNOSIS — I10 ESSENTIAL HYPERTENSION: ICD-10-CM

## 2018-09-26 PROCEDURE — 1101F PT FALLS ASSESS-DOCD LE1/YR: CPT | Mod: CPTII,,, | Performed by: FAMILY MEDICINE

## 2018-09-26 PROCEDURE — 99999 PR PBB SHADOW E&M-EST. PATIENT-LVL IV: CPT | Mod: PBBFAC,,, | Performed by: FAMILY MEDICINE

## 2018-09-26 PROCEDURE — 3075F SYST BP GE 130 - 139MM HG: CPT | Mod: CPTII,,, | Performed by: FAMILY MEDICINE

## 2018-09-26 PROCEDURE — 99214 OFFICE O/P EST MOD 30 MIN: CPT | Mod: PBBFAC,PO | Performed by: FAMILY MEDICINE

## 2018-09-26 PROCEDURE — 3080F DIAST BP >= 90 MM HG: CPT | Mod: CPTII,,, | Performed by: FAMILY MEDICINE

## 2018-09-26 PROCEDURE — 90662 IIV NO PRSV INCREASED AG IM: CPT | Mod: PBBFAC,PO

## 2018-09-26 PROCEDURE — 99214 OFFICE O/P EST MOD 30 MIN: CPT | Mod: S$PBB,,, | Performed by: FAMILY MEDICINE

## 2018-09-26 NOTE — PROGRESS NOTES
Subjective:       Patient ID: Amrita Yoder is a 68 y.o. female.    Chief Complaint: Hand Pain (both hands)    68 years old female came to the clinic with left  wrist pain for the last couple years.  The pain is 6/10 of intensity on and off aggravated with activity and better with rest.  Patient with numbness and tingling.  Patient also with bilateral trigger fingers.  Blood pressure today is borderline.  No chest pain, palpitation, orthopnea or PND.  Patient requests referral to podiatric service for right foot pain.        Review of Systems   Constitutional: Negative.    HENT: Negative.    Eyes: Negative.    Respiratory: Negative.    Cardiovascular: Negative.  Negative for chest pain, palpitations and leg swelling.   Gastrointestinal: Negative.    Genitourinary: Negative.    Musculoskeletal: Positive for arthralgias.   Skin: Negative.    Neurological: Positive for numbness.   Psychiatric/Behavioral: Negative.        Objective:      Physical Exam   Constitutional: She is oriented to person, place, and time. She appears well-developed and well-nourished. No distress.   HENT:   Head: Normocephalic and atraumatic.   Right Ear: External ear normal.   Left Ear: External ear normal.   Nose: Nose normal.   Mouth/Throat: Oropharynx is clear and moist. No oropharyngeal exudate.   Eyes: Conjunctivae and EOM are normal. Pupils are equal, round, and reactive to light. Right eye exhibits no discharge. Left eye exhibits no discharge. No scleral icterus.   Neck: Normal range of motion. Neck supple. No JVD present. No tracheal deviation present. No thyromegaly present.   Cardiovascular: Normal rate, regular rhythm, normal heart sounds and intact distal pulses. Exam reveals no gallop and no friction rub.   No murmur heard.  Pulmonary/Chest: Effort normal and breath sounds normal. No stridor. No respiratory distress. She has no wheezes. She has no rales. She exhibits no tenderness.   Abdominal: Soft. Bowel sounds are normal. She  exhibits no distension and no mass. There is no tenderness. There is no rebound and no guarding.   Musculoskeletal: She exhibits no edema.        Left wrist: She exhibits tenderness.        Left hand: She exhibits decreased range of motion and tenderness.        Right foot: There is tenderness.   Lymphadenopathy:     She has no cervical adenopathy.   Neurological: She is alert and oriented to person, place, and time. She has normal reflexes. No cranial nerve deficit. She exhibits normal muscle tone. Coordination normal.   Skin: Skin is warm and dry. No rash noted. She is not diaphoretic. No erythema. No pallor.   Psychiatric: She has a normal mood and affect. Her behavior is normal. Judgment and thought content normal.       Assessment:       1. Carpal tunnel syndrome of left wrist    2. Trigger finger of both hands    3. Hallux valgus (acquired), right foot    4. Need for influenza vaccination    5. Essential hypertension        Plan:         Amrita was seen today for hand pain.    Diagnoses and all orders for this visit:    Carpal tunnel syndrome of left wrist  -     Ambulatory referral to Hand Surgery    Trigger finger of both hands  -     Ambulatory referral to Hand Surgery    Hallux valgus (acquired), right foot  -     Ambulatory referral to Podiatry    Need for influenza vaccination  -     Influenza - High Dose (65+) (PF) (IM)    Essential hypertension    Continue monitoring blood pressure at home, low sodium diet.

## 2018-09-26 NOTE — PROGRESS NOTES
68 years old female came to the clinic with left  wrist pain for the last couple years.  The pain is 6/10 of intensity on and off aggravated with activity and better with rest.  Patient with numbness and tingling.  Patient also with bilateral trigger fingers.  Blood pressure today is borderline.  No chest pain, palpitation, orthopnea or PND.  Patient requests referral to podiatric service for right foot pain.

## 2018-10-02 ENCOUNTER — TELEPHONE (OUTPATIENT)
Dept: ORTHOPEDICS | Facility: CLINIC | Age: 68
End: 2018-10-02

## 2018-10-02 DIAGNOSIS — M79.642 BILATERAL HAND PAIN: Primary | ICD-10-CM

## 2018-10-02 DIAGNOSIS — M79.641 BILATERAL HAND PAIN: Primary | ICD-10-CM

## 2018-10-02 NOTE — TELEPHONE ENCOUNTER
Left voicemail for patient to call RE:  1- needs Xray 1 hour prior to appt on 10/16 w/Dr Winkler - bilateral hands    2- Calling to offer sooner appt w/LYNSEY Hernandez, LYNSEY Brown, or Dr Mahmood.     3- Patient has seen Dr Gentile in past - 3 years ago - does she want to see him again - and is patient aware that this appt is NOT in [place]?    4- Does patient need an  for appt?    Left message to ask for Janel.

## 2018-10-03 ENCOUNTER — TELEPHONE (OUTPATIENT)
Dept: ORTHOPEDICS | Facility: CLINIC | Age: 68
End: 2018-10-03

## 2018-10-05 ENCOUNTER — HOSPITAL ENCOUNTER (OUTPATIENT)
Dept: RADIOLOGY | Facility: OTHER | Age: 68
Discharge: HOME | End: 2018-10-05
Attending: ORTHOPAEDIC SURGERY
Payer: MEDICARE

## 2018-10-05 DIAGNOSIS — M79.642 BILATERAL HAND PAIN: ICD-10-CM

## 2018-10-05 DIAGNOSIS — M79.641 BILATERAL HAND PAIN: ICD-10-CM

## 2018-10-05 PROCEDURE — 73130 X-RAY EXAM OF HAND: CPT | Mod: 26,RT,, | Performed by: RADIOLOGY

## 2018-10-05 PROCEDURE — 73130 X-RAY EXAM OF HAND: CPT | Mod: 26,LT,, | Performed by: RADIOLOGY

## 2018-10-05 PROCEDURE — 73130 X-RAY EXAM OF HAND: CPT | Mod: 50,TC,FY

## 2018-10-12 ENCOUNTER — OFFICE VISIT (OUTPATIENT)
Dept: ORTHOPEDICS | Facility: CLINIC | Age: 68
End: 2018-10-12
Payer: MEDICARE

## 2018-10-12 VITALS
BODY MASS INDEX: 28.86 KG/M2 | SYSTOLIC BLOOD PRESSURE: 126 MMHG | WEIGHT: 147 LBS | HEART RATE: 70 BPM | DIASTOLIC BLOOD PRESSURE: 86 MMHG | HEIGHT: 60 IN

## 2018-10-12 DIAGNOSIS — M25.641 DECREASED RANGE OF MOTION OF FINGERS OF BOTH HANDS: ICD-10-CM

## 2018-10-12 DIAGNOSIS — M79.642 BILATERAL HAND PAIN: ICD-10-CM

## 2018-10-12 DIAGNOSIS — M25.642 DECREASED RANGE OF MOTION OF FINGERS OF BOTH HANDS: ICD-10-CM

## 2018-10-12 DIAGNOSIS — M79.641 BILATERAL HAND PAIN: ICD-10-CM

## 2018-10-12 DIAGNOSIS — M19.049 ARTHRITIS OF HAND: ICD-10-CM

## 2018-10-12 DIAGNOSIS — G56.03 BILATERAL CARPAL TUNNEL SYNDROME: Primary | ICD-10-CM

## 2018-10-12 DIAGNOSIS — M65.30 TRIGGER FINGER, UNSPECIFIED FINGER, UNSPECIFIED LATERALITY: ICD-10-CM

## 2018-10-12 PROCEDURE — 99204 OFFICE O/P NEW MOD 45 MIN: CPT | Mod: S$PBB,,, | Performed by: PHYSICIAN ASSISTANT

## 2018-10-12 PROCEDURE — 3079F DIAST BP 80-89 MM HG: CPT | Mod: CPTII,,, | Performed by: PHYSICIAN ASSISTANT

## 2018-10-12 PROCEDURE — 99214 OFFICE O/P EST MOD 30 MIN: CPT | Mod: PBBFAC | Performed by: PHYSICIAN ASSISTANT

## 2018-10-12 PROCEDURE — 1101F PT FALLS ASSESS-DOCD LE1/YR: CPT | Mod: CPTII,,, | Performed by: PHYSICIAN ASSISTANT

## 2018-10-12 PROCEDURE — 3074F SYST BP LT 130 MM HG: CPT | Mod: CPTII,,, | Performed by: PHYSICIAN ASSISTANT

## 2018-10-12 PROCEDURE — 99999 PR PBB SHADOW E&M-EST. PATIENT-LVL IV: CPT | Mod: PBBFAC,,, | Performed by: PHYSICIAN ASSISTANT

## 2018-10-12 NOTE — H&P (VIEW-ONLY)
Subjective:      Patient ID: Amrita Yoder is a 68 y.o. female.    Chief Complaint: Pain of the Left Hand and Pain of the Right Hand      HPI  Amrita Yoder is a right hand dominant 68 y.o. female presenting today for bilateral hand pain. She states she has seen 2-3 hand doctors in the past for the problems in her hands, last seen by Dr. Marie about 1.5 yrs ago. She has previously been told she had carpal tunnel and arthritis of the hands.     She has numbness and tingling of bl hands, this is worse at night, occasionally awakens her at night. She does wear braces bilaterally at night which gives her some relief. She believes she has had a previous nerve test, about three years ago.     She notes pain and locking of bl long fingers and left index. She sometimes needs to use the other hand to unlock the fingers. This makes it difficult to remove the wrist braces.    She has generalized pain in both hands in multiple joints. She is unable to make a full composite fist with either hand, worse on the left.     She has tried an injection in the hand in the past, unsure which location. However, she does not wish to receive more injections, she states it caused increased blood pressure following injection.    Review of patient's allergies indicates:  No Known Allergies      Current Outpatient Medications   Medication Sig Dispense Refill    atorvastatin (LIPITOR) 20 MG tablet TAKE 1 TABLET BY MOUTH NIGHTLY 90 tablet 1    lisinopril (PRINIVIL,ZESTRIL) 2.5 MG tablet Take 1 tablet (2.5 mg total) by mouth once daily. 90 tablet 3    meloxicam (MOBIC) 15 MG tablet Take 1 tablet (15 mg total) by mouth once daily. 30 tablet 1    PATADAY 0.2 % Drop       flunisolide 25 mcg, 0.025%, (NASALIDE) 25 mcg (0.025 %) Spry 2 sprays by Nasal route 2 (two) times daily. 25 mL 0     No current facility-administered medications for this visit.        Past Medical History:   Diagnosis Date    Hyperlipidemia     Hypertension         History reviewed. No pertinent surgical history.    Review of Systems:  Constitutional: Negative for chills and fever.   Respiratory: Negative for cough and shortness of breath.    Gastrointestinal: Negative for nausea and vomiting.   Skin: Negative for rash.   Neurological: Negative for dizziness and headaches.   Psychiatric/Behavioral: Negative for depression.   MSK as in HPI       OBJECTIVE:     PHYSICAL EXAM:  /86 (BP Location: Left arm, Patient Position: Sitting, BP Method: Medium (Automatic))   Pulse 70   Ht 5' (1.524 m)   Wt 66.7 kg (147 lb)   LMP  (LMP Unknown)   BMI 28.71 kg/m²     GEN:  NAD, well-developed, well-groomed.  NEURO: Awake, alert, and oriented. Normal attention and concentration.    PSYCH: Normal mood and affect. Behavior is normal.  HEENT: No cervical lymphadenopathy noted.  CARDIOVASCULAR: Radial pulses 2+ bilaterally. No LE edema noted.  PULMONARY: Breath sounds normal. No respiratory distress.  SKIN: Intact, no rashes.      MSK:     RUE:  Good active ROM of the wrist and fingers, slight decreased full flexion of fingers. AIN/PIN/Radial/Median/Ulnar Nerves assessed in isolation without deficit. Radial & Ulnar arteries palpated 2+. Capillary Refill <3s. Positive tinels at the wrist.     LUE:  Good active ROM of the wrist, she is unable to make a composite fist with the hand. ttp over the A1 pulley of the long and ring fingers with notable triggering of the long.  AIN/PIN/Radial/Median/Ulnar Nerves assessed in isolation without deficit. Radial & Ulnar arteries palpated 2+. Capillary Refill <3s. Positive tinels at the wrist and at the elbow. Positive durkans.      RADIOGRAPHS:  Xray bl hands 10/5/18  FINDINGS:  Skeletal structures in both hands are intact with satisfactory overall alignment.  Osteoarthritis is present at multiple joints with narrowing and spurs.  These include the 1st CMC joint left wrist, left 5th MCP joint, and most of the IP joints of the digits.  Findings  are more prominent at the DIP joints.  DIP joint right little finger is fused.  No erosive change or focal soft tissue swelling is detected.  Comments: I have personally reviewed the imaging and I agree with the above radiologist's report.    ASSESSMENT/PLAN:       ICD-10-CM ICD-9-CM   1. Bilateral carpal tunnel syndrome G56.03 354.0   2. Decreased range of motion of fingers of both hands M25.641 719.54    M25.642    3. Bilateral hand pain M79.641 729.5    M79.642    4. Arthritis of hand M19.049 716.94   5. Trigger finger, unspecified finger, unspecified laterality M65.30 727.03       Orders Placed This Encounter    Ambulatory Referral to Physical/Occupational Therapy    EMG W/ ULTRASOUND AND NERVE CONDUCTION TEST 2 Extremities        Plan:   -EMG ordered  -OT ordered- work on ROM, pain, include ROM measurements   -continue bl bracing at night for CTS   -pt given splints for bl long TF and left index TF- use at night   -compound cream ordered  -paraffin recommended   -RTC after EMG       The patient indicates understanding of these issues and agrees to the plan.    Stephanie Castillo PA-C  Hand Clinic   [facility] [place]

## 2018-10-12 NOTE — PATIENT INSTRUCTIONS
Current diagnoses: Carpal tunnel bilaterally, trigger fingers of both middle fingers and left index, arthritis of both hands    EMG/nerve test ordered and scheduled- this will confirm carpal tunnel and inform us of the severity, it will help to make sure there is not compression of other nerves     occupational therapy ordered- they will work on your finger motion and pain     Continue wearing wrist braces at night. Finger splints were also given for both middle and the left index fingers- wear these at night.    Compound cream was ordered- you can use this as needed for hand pain a few times daily, they will call you then mail this to the home     Paraffin wax recommended for pain- see info sheet       We will see you back in clinic after the nerve test to review the results and discuss treatment options.

## 2018-10-12 NOTE — LETTER
October 12, 2018      Quentin Hoff MD  [address] [address]           [facility]  [address]  [place] [address]  Phone: [phone]          Patient: Amrita Yoder   MR Number: [id number]   YOB: 1950   Date of Visit: 10/12/2018       Dear Dr. Quentin Hfof:    Thank you for referring Amrita Yoder to me for evaluation. Attached you will find relevant portions of my assessment and plan of care.    If you have questions, please do not hesitate to call me. I look forward to following Amrita Yoder along with you.    Sincerely,    Stephanie Castillo PA-C    Enclosure  CC:  No Recipients    If you would like to receive this communication electronically, please contact [email or web address] or [phone] to request more information on "[employer] Link access.    For providers and/or their staff who would like to refer a patient to [facility], please contact us through our one-stop-shop provider referral line, [facility], at [phone].    If you feel you have received this communication in error or would no longer like to receive these types of communications, please e-mail [email or web address]

## 2018-10-19 ENCOUNTER — PROCEDURE VISIT (OUTPATIENT)
Dept: NEUROLOGY | Facility: CLINIC | Age: 68
End: 2018-10-19
Payer: MEDICARE

## 2018-10-19 DIAGNOSIS — G56.03 BILATERAL CARPAL TUNNEL SYNDROME: ICD-10-CM

## 2018-10-19 PROCEDURE — 95886 MUSC TEST DONE W/N TEST COMP: CPT | Mod: PBBFAC,PO | Performed by: NEUROMUSCULOSKELETAL MEDICINE & OMM

## 2018-10-19 PROCEDURE — 95910 NRV CNDJ TEST 7-8 STUDIES: CPT | Mod: 26,S$PBB,, | Performed by: NEUROMUSCULOSKELETAL MEDICINE & OMM

## 2018-10-19 PROCEDURE — 95910 NRV CNDJ TEST 7-8 STUDIES: CPT | Mod: PBBFAC,PO | Performed by: NEUROMUSCULOSKELETAL MEDICINE & OMM

## 2018-10-19 PROCEDURE — 95886 MUSC TEST DONE W/N TEST COMP: CPT | Mod: 26,S$PBB,, | Performed by: NEUROMUSCULOSKELETAL MEDICINE & OMM

## 2018-10-23 ENCOUNTER — OFFICE VISIT (OUTPATIENT)
Dept: ORTHOPEDICS | Facility: CLINIC | Age: 68
End: 2018-10-23
Payer: MEDICARE

## 2018-10-23 VITALS
WEIGHT: 147 LBS | HEIGHT: 60 IN | HEART RATE: 61 BPM | DIASTOLIC BLOOD PRESSURE: 96 MMHG | BODY MASS INDEX: 28.86 KG/M2 | SYSTOLIC BLOOD PRESSURE: 174 MMHG

## 2018-10-23 DIAGNOSIS — G56.03 BILATERAL CARPAL TUNNEL SYNDROME: Primary | ICD-10-CM

## 2018-10-23 PROCEDURE — 99999 PR PBB SHADOW E&M-EST. PATIENT-LVL III: CPT | Mod: PBBFAC,,, | Performed by: ORTHOPAEDIC SURGERY

## 2018-10-23 PROCEDURE — 99214 OFFICE O/P EST MOD 30 MIN: CPT | Mod: S$PBB,,, | Performed by: ORTHOPAEDIC SURGERY

## 2018-10-23 PROCEDURE — 3080F DIAST BP >= 90 MM HG: CPT | Mod: CPTII,,, | Performed by: ORTHOPAEDIC SURGERY

## 2018-10-23 PROCEDURE — 3077F SYST BP >= 140 MM HG: CPT | Mod: CPTII,,, | Performed by: ORTHOPAEDIC SURGERY

## 2018-10-23 PROCEDURE — 1101F PT FALLS ASSESS-DOCD LE1/YR: CPT | Mod: CPTII,,, | Performed by: ORTHOPAEDIC SURGERY

## 2018-10-23 PROCEDURE — 99213 OFFICE O/P EST LOW 20 MIN: CPT | Mod: PBBFAC | Performed by: ORTHOPAEDIC SURGERY

## 2018-10-23 NOTE — PROGRESS NOTES
The patient was seen to go over the EMG results. The patient is still having numbness, tingling, triggering symptoms.  Pt has not started OT yet- she starts the 29th. Left hand bothers her the most. Right hand also has stiff fingers She drops everything on right ( dropped pressure cooker and burned arm) pt uses paraffin to help arthritis pain.     The EMG is positive for Carpal Tunnel Syndrome. It is not severe in nature.    Plan: We discussed there results of the EMG in detail. WE discussed conservative treatment as well as surgical treatment.  The Patient is interested in surgery at this time. We discussed trigger finger long finger and index DIP joint and OA surgery.    I have explained the risks, benefits, and alternatives of the procedure to the patient in great detail. The patient voices understanding and all questions have been answered. The patient agrees with to proceed as planned. Consents were performed in clinic.

## 2018-10-27 DIAGNOSIS — M19.042 ARTHRITIS OF FINGER OF LEFT HAND: ICD-10-CM

## 2018-10-27 DIAGNOSIS — M65.332 TRIGGER FINGER, LEFT MIDDLE FINGER: Primary | ICD-10-CM

## 2018-10-29 ENCOUNTER — OFFICE VISIT (OUTPATIENT)
Dept: PODIATRY | Facility: CLINIC | Age: 68
End: 2018-10-29
Payer: MEDICARE

## 2018-10-29 VITALS
BODY MASS INDEX: 28.86 KG/M2 | SYSTOLIC BLOOD PRESSURE: 154 MMHG | HEART RATE: 66 BPM | DIASTOLIC BLOOD PRESSURE: 99 MMHG | HEIGHT: 60 IN | WEIGHT: 147 LBS

## 2018-10-29 DIAGNOSIS — M54.16 LUMBAR BACK PAIN WITH RADICULOPATHY AFFECTING RIGHT LOWER EXTREMITY: ICD-10-CM

## 2018-10-29 DIAGNOSIS — G89.29 CHRONIC PAIN OF RIGHT HEEL: ICD-10-CM

## 2018-10-29 DIAGNOSIS — M72.2 PLANTAR FASCIITIS, RIGHT: Primary | ICD-10-CM

## 2018-10-29 DIAGNOSIS — M79.671 CHRONIC PAIN OF RIGHT HEEL: ICD-10-CM

## 2018-10-29 DIAGNOSIS — M62.461 GASTROCNEMIUS EQUINUS, RIGHT: ICD-10-CM

## 2018-10-29 PROCEDURE — 1101F PT FALLS ASSESS-DOCD LE1/YR: CPT | Mod: CPTII,,, | Performed by: PODIATRIST

## 2018-10-29 PROCEDURE — 99213 OFFICE O/P EST LOW 20 MIN: CPT | Mod: PBBFAC,PN | Performed by: PODIATRIST

## 2018-10-29 PROCEDURE — 3077F SYST BP >= 140 MM HG: CPT | Mod: CPTII,,, | Performed by: PODIATRIST

## 2018-10-29 PROCEDURE — 99999 PR PBB SHADOW E&M-EST. PATIENT-LVL III: CPT | Mod: PBBFAC,,, | Performed by: PODIATRIST

## 2018-10-29 PROCEDURE — 3080F DIAST BP >= 90 MM HG: CPT | Mod: CPTII,,, | Performed by: PODIATRIST

## 2018-10-29 PROCEDURE — 99213 OFFICE O/P EST LOW 20 MIN: CPT | Mod: S$PBB,,, | Performed by: PODIATRIST

## 2018-10-29 NOTE — PATIENT INSTRUCTIONS
Stop taking meloxicam due to scheduled surgery on 11/2/18. May resume at discretion of your hand surgeon.

## 2018-10-29 NOTE — LETTER
October 30, 2018      Quentin Hoff MD  [address]  [place] [address]           [facility]  [address]  [place] [address]  Phone: [phone]  Fax: [phone]          Patient: Amrita Yoder   MR Number: [id number]   YOB: 1950   Date of Visit: 10/29/2018       Dear Dr. Quentin Hoff:    Thank you for referring Amrita Yoder to me for evaluation. Attached you will find relevant portions of my assessment and plan of care.    If you have questions, please do not hesitate to call me. I look forward to following Amrita Yoder along with you.    Sincerely,    Vikas Dixon, DPAURELIANO    Enclosure  CC:  No Recipients    If you would like to receive this communication electronically, please contact [email or web address] or [phone] to request more information on [employer] Link access.    For providers and/or their staff who would like to refer a patient to [facility], please contact us through our one-stop-shop provider referral line, [facility], at [phone].    If you feel you have received this communication in error or would no longer like to receive these types of communications, please e-mail [email or web address]

## 2018-10-30 NOTE — PROGRESS NOTES
Subjective:      Patient ID: Amrita Yoder is a 68 y.o. female.    Chief Complaint: Follow-up (right foot )    Amrita is a 68 y.o. female  has a past medical history of Hyperlipidemia and Hypertension. who presents to the podiatry clinic  with complaint of  right foot pain. Onset of the symptoms was several months ago. Precipitating event: none known. Current symptoms include: shooting pain up leg with sitting and with laying down . Aggravating factors: more pain with certain positions/ numbing pain. Symptoms have gradually worsened. Patient has had no prior foot problems. Evaluation to date: none. Treatment to date: none. Patients rates pain 10/10 on pain scale.    5/29/18: Relates improvement to right heel pain rates 2/10. States she has completed medrol dose pack. Requesting refill of mobic. States she has not changed shoegear.     10/29/18: Presents complaining of worsening right heel pain. Says it did not go away. Stopped PT because she feels it had stopped working. Wearing high heeled platform shoes. Flats aggravate her pain. Denies trauma. Pending trigger finger release later this week and carpal tunnel release in January 2019. Also complains of persistent low back pain with symptoms radiating down right lower extremity when she walks or stands.    Vitals:    10/29/18 1108   BP: (!) 154/99   Pulse: 66   Weight: 66.7 kg (147 lb)   Height: 5' (1.524 m)   PainSc:   9      Past Medical History:   Diagnosis Date    Hyperlipidemia     Hypertension        History reviewed. No pertinent surgical history.    Family History   Adopted: Yes       Social History     Socioeconomic History    Marital status: Single     Spouse name: None    Number of children: None    Years of education: None    Highest education level: None   Social Needs    Financial resource strain: None    Food insecurity - worry: None    Food insecurity - inability: None    Transportation needs - medical: None    Transportation needs -  non-medical: None   Occupational History    None   Tobacco Use    Smoking status: Never Smoker   Substance and Sexual Activity    Alcohol use: No    Drug use: No    Sexual activity: None   Other Topics Concern    None   Social History Narrative    None       Current Outpatient Medications   Medication Sig Dispense Refill    atorvastatin (LIPITOR) 20 MG tablet TAKE 1 TABLET BY MOUTH NIGHTLY 90 tablet 1    flunisolide 25 mcg, 0.025%, (NASALIDE) 25 mcg (0.025 %) Spry 2 sprays by Nasal route 2 (two) times daily. 25 mL 0    lisinopril (PRINIVIL,ZESTRIL) 2.5 MG tablet Take 1 tablet (2.5 mg total) by mouth once daily. 90 tablet 3    meloxicam (MOBIC) 15 MG tablet Take 1 tablet (15 mg total) by mouth once daily. 30 tablet 1    PATADAY 0.2 % Drop        No current facility-administered medications for this visit.        Review of patient's allergies indicates:  No Known Allergies      Review of Systems   Constitution: Negative for chills, fever, weakness and malaise/fatigue.   Cardiovascular: Negative for chest pain, claudication and leg swelling.   Respiratory: Negative for cough and shortness of breath.    Skin: Negative for color change, itching, poor wound healing and rash.   Musculoskeletal: Positive for back pain and muscle weakness. Negative for joint pain and muscle cramps.   Gastrointestinal: Negative for nausea and vomiting.   Neurological: Positive for paresthesias. Negative for numbness.   Psychiatric/Behavioral: Negative for altered mental status.           Objective:      Physical Exam   Constitutional: She is oriented to person, place, and time. She appears well-developed and well-nourished. No distress.   Cardiovascular: Intact distal pulses.   Pulses:       Dorsalis pedis pulses are 2+ on the right side, and 2+ on the left side.        Posterior tibial pulses are 2+ on the right side, and 2+ on the left side.   Musculoskeletal: She exhibits tenderness.        Feet:    Mild collapse of the arch  with weightbearing bilateral foot. RCSP 2-3 degrees, deondre. Equinus contracture noted, reduced with knee flexion, deondre         Neurological: She is alert and oriented to person, place, and time.   Skin: Skin is warm and dry. Capillary refill takes less than 2 seconds. She is not diaphoretic.     See media for resident biomechanical exam        Assessment:       Encounter Diagnoses   Name Primary?    Plantar fasciitis, right Yes    Chronic pain of right heel     Lumbar back pain with radiculopathy affecting right lower extremity     Gastrocnemius equinus, right          Plan:       Amrita was seen today for follow-up.    Diagnoses and all orders for this visit:    Plantar fasciitis, right    Chronic pain of right heel    Lumbar back pain with radiculopathy affecting right lower extremity  -     Ambulatory Referral to Neurosurgery    Gastrocnemius equinus, right      I counseled the patient on her conditions, their implications and medical management.    Discussed continued conservative care such as further PT, steroid injections, custom orthotics, shoe gear modifications vs surgical intervention.    Rest and ice as discussed.    Avoid NSAIDs until after her surgery.    RTC 2-3 weeks following her hand surgery this week.

## 2018-10-31 ENCOUNTER — ANESTHESIA EVENT (OUTPATIENT)
Dept: SURGERY | Facility: OTHER | Age: 68
End: 2018-10-31
Payer: MEDICARE

## 2018-10-31 ENCOUNTER — HOSPITAL ENCOUNTER (OUTPATIENT)
Dept: PREADMISSION TESTING | Facility: OTHER | Age: 68
Discharge: HOME | End: 2018-10-31
Attending: ORTHOPAEDIC SURGERY
Payer: MEDICARE

## 2018-10-31 VITALS
TEMPERATURE: 99 F | WEIGHT: 142 LBS | SYSTOLIC BLOOD PRESSURE: 143 MMHG | HEIGHT: 60 IN | HEART RATE: 62 BPM | BODY MASS INDEX: 27.88 KG/M2 | OXYGEN SATURATION: 95 % | DIASTOLIC BLOOD PRESSURE: 85 MMHG

## 2018-10-31 RX ORDER — LIDOCAINE HYDROCHLORIDE 10 MG/ML
0.5 INJECTION, SOLUTION EPIDURAL; INFILTRATION; INTRACAUDAL; PERINEURAL ONCE
Status: CANCELLED | OUTPATIENT
Start: 2018-10-31 | End: 2018-10-31

## 2018-10-31 RX ORDER — SODIUM CHLORIDE, SODIUM LACTATE, POTASSIUM CHLORIDE, CALCIUM CHLORIDE 600; 310; 30; 20 MG/100ML; MG/100ML; MG/100ML; MG/100ML
INJECTION, SOLUTION INTRAVENOUS CONTINUOUS
Status: CANCELLED | OUTPATIENT
Start: 2018-10-31

## 2018-10-31 NOTE — ANESTHESIA PREPROCEDURE EVALUATION
10/31/2018  Amrita Yoder is a 68 y.o., female.    Anesthesia Evaluation    I have reviewed the Patient Summary Reports.    I have reviewed the Nursing Notes.   I have reviewed the Medications.     Review of Systems  Anesthesia Hx:  Neg history of prior surgery. Denies Family Hx of Anesthesia complications.   Denies Personal Hx of Anesthesia complications.   Social:  Non-Smoker    Hematology/Oncology:  Hematology Normal   Oncology Normal     EENT/Dental:EENT/Dental Normal   Cardiovascular:   Hypertension hyperlipidemia    Pulmonary:  Pulmonary Normal    Renal/:  Renal/ Normal     Hepatic/GI:  Hepatic/GI Normal    Musculoskeletal:   Arthritis     Neurological:   Neuromuscular Disease, (CTS)    Endocrine:  Endocrine Normal    Dermatological:  Skin Normal    Psych:  Psychiatric Normal           Physical Exam  General:  Well nourished      Dental:  Dental Findings: In tact        Mental Status:  Mental Status Findings:  Cooperative, Alert and Oriented         Anesthesia Plan  Type of Anesthesia, risks & benefits discussed:  Anesthesia Type:  general  Patient's Preference:   Intra-op Monitoring Plan: standard ASA monitors  Intra-op Monitoring Plan Comments:   Post Op Pain Control Plan:   Post Op Pain Control Plan Comments:   Induction:    Beta Blocker:         Informed Consent: Patient understands risks and agrees with Anesthesia plan.  Questions answered. Anesthesia consent signed with patient.  ASA Score: 2     Day of Surgery Review of History & Physical:    H&P update referred to the surgeon.     Anesthesia Plan Notes: No labs    IV propofol for local injection by surgeon          Ready For Surgery From Anesthesia Perspective.

## 2018-10-31 NOTE — DISCHARGE INSTRUCTIONS
PRE-ADMIT TESTING -  [phone]    [address]  [place] [facility]          Your surgery has been scheduled at [facility]. We are pleased to have the opportunity to serve you. For Further Information please call [phone].    On the day of surgery please report to the Information Desk on the 1st floor.    · CONTACT YOUR PHYSICIAN'S OFFICE THE DAY PRIOR TO YOUR SURGERY TO OBTAIN YOUR ARRIVAL TIME.     · The evening before surgery do not eat anything after 9 p.m. ( this includes hard candy, chewing gum and mints).  You may only have GATORADE, POWERADE AND WATER  from 9 p.m. until you leave your home.   DO NOT DRINK ANY LIQUIDS ON THE WAY TO THE HOSPITAL.      SPECIAL MEDICATION INSTRUCTIONS: TAKE medications checked off by the Anesthesiologist on your Medication List.    Angiogram Patients: Take medications as instructed by your physician, including aspirin.     Surgery Patients:    If you take ASPIRIN - Your PHYSICIAN/SURGEON will need to inform you IF/OR when you need to stop taking aspirin prior to your surgery.     Do Not take any medications containing IBUPROFEN.  Do Not Wear any make-up or dark nail polish   (especially eye make-up) to surgery. If you come to surgery with makeup on you will be required to remove the makeup or nail polish.    Do not shave your surgical area at least 5 days prior to your surgery. The surgical prep will be performed at the hospital according to Infection Control regulations.    Leave all valuables at home.   Do Not wear any jewelry or watches, including any metal in body piercings.  Contact Lens must be removed before surgery. Either do not wear the contact lens or bring a case and solution for storage.  Please bring a container for eyeglasses or dentures as required.  Bring any paperwork your physician has provided, such as consent forms,  history and physicals, doctor's orders, etc.   Bring comfortable clothes that are loose fitting to wear upon  discharge. Take into consideration the type of surgery being performed.  Maintain your diet as advised per your physician the day prior to surgery.      Adequate rest the night before surgery is advised.   Park in the Parking lot behind the hospital or in the [facility] across the street from the parking lot. Parking is complimentary.  If you will be discharged the same day as your procedure, please arrange for a responsible adult to drive you home or to accompany you if traveling by taxi.   YOU WILL NOT BE PERMITTED TO DRIVE OR TO LEAVE THE HOSPITAL ALONE AFTER SURGERY.   It is strongly recommended that you arrange for someone to remain with you for the first 24 hrs following your surgery.       Thank you for your cooperation.  The Staff of [facility].        Bathing Instructions                                                                 Please shower the evening before and morning of your procedure with    ANTIBACTERIAL SOAP. ( DIAL, etc )  Concentrate on the surgical area   for at least 3 minutes and rinse completely. Dry off as usual.   Do not use any deodorant, powder, body lotions, perfume, after shave or    cologne.

## 2018-11-01 ENCOUNTER — TELEPHONE (OUTPATIENT)
Dept: ORTHOPEDICS | Facility: CLINIC | Age: 68
End: 2018-11-01

## 2018-11-01 DIAGNOSIS — M65.332 TRIGGER MIDDLE FINGER OF LEFT HAND: ICD-10-CM

## 2018-11-01 DIAGNOSIS — M19.049 OSTEOARTHRITIS OF FINGER, UNSPECIFIED LATERALITY: Primary | ICD-10-CM

## 2018-11-01 RX ORDER — MUPIROCIN 20 MG/G
OINTMENT TOPICAL
Status: CANCELLED | OUTPATIENT
Start: 2018-11-01

## 2018-11-01 RX ORDER — SODIUM CHLORIDE 9 MG/ML
INJECTION, SOLUTION INTRAVENOUS CONTINUOUS
Status: CANCELLED | OUTPATIENT
Start: 2018-11-01

## 2018-11-01 RX ORDER — OXYCODONE AND ACETAMINOPHEN 5; 325 MG/1; MG/1
1 TABLET ORAL EVERY 4 HOURS PRN
Qty: 50 TABLET | Refills: 0 | Status: SHIPPED | OUTPATIENT
Start: 2018-11-01 | End: 2019-05-13

## 2018-11-01 RX ORDER — LIDOCAINE HYDROCHLORIDE 10 MG/ML
1 INJECTION, SOLUTION EPIDURAL; INFILTRATION; INTRACAUDAL; PERINEURAL ONCE
Status: CANCELLED | OUTPATIENT
Start: 2018-11-01 | End: 2018-11-01

## 2018-11-01 RX ORDER — ONDANSETRON 4 MG/1
4 TABLET, ORALLY DISINTEGRATING ORAL EVERY 6 HOURS PRN
Qty: 30 TABLET | Refills: 0 | Status: SHIPPED | OUTPATIENT
Start: 2018-11-01 | End: 2018-11-16

## 2018-11-01 NOTE — TELEPHONE ENCOUNTER
----- Message from Joselyn Martinez sent at 11/1/2018 12:31 PM CDT -----  Contact: GYPSY LACKEY [[id number]]            Name of Who is Calling: GYPSY LACKEY [[id number]]    What is the request in detail: pt is returning call regarding surgery time. Please call     Can the clinic reply by [facility]: no    What Number to Call Back [facility] not [facility]: [phone]

## 2018-11-01 NOTE — TELEPHONE ENCOUNTER
Amrita Yoder notified of arrival time 0700 for surgery on 11/2/18 with Dr. KARTIK Winkler at [facility]. Post Op appointment made, slip in mail.

## 2018-11-02 ENCOUNTER — HOSPITAL ENCOUNTER (OUTPATIENT)
Facility: OTHER | Age: 68
Discharge: HOME | End: 2018-11-02
Attending: ORTHOPAEDIC SURGERY | Admitting: ORTHOPAEDIC SURGERY
Payer: MEDICARE

## 2018-11-02 ENCOUNTER — ANESTHESIA (OUTPATIENT)
Dept: SURGERY | Facility: OTHER | Age: 68
End: 2018-11-02
Payer: MEDICARE

## 2018-11-02 VITALS
OXYGEN SATURATION: 96 % | DIASTOLIC BLOOD PRESSURE: 86 MMHG | WEIGHT: 142 LBS | BODY MASS INDEX: 27.88 KG/M2 | TEMPERATURE: 99 F | SYSTOLIC BLOOD PRESSURE: 141 MMHG | HEART RATE: 55 BPM | HEIGHT: 60 IN | RESPIRATION RATE: 16 BRPM

## 2018-11-02 DIAGNOSIS — M65.332 TRIGGER MIDDLE FINGER OF LEFT HAND: Primary | ICD-10-CM

## 2018-11-02 DIAGNOSIS — M19.049 OSTEOARTHRITIS OF FINGER, UNSPECIFIED LATERALITY: ICD-10-CM

## 2018-11-02 PROCEDURE — 36000708 HC OR TIME LEV III 1ST 15 MIN: Performed by: ORTHOPAEDIC SURGERY

## 2018-11-02 PROCEDURE — C1713 ANCHOR/SCREW BN/BN,TIS/BN: HCPCS | Performed by: ORTHOPAEDIC SURGERY

## 2018-11-02 PROCEDURE — 26860 FUSION OF FINGER JOINT: CPT | Mod: F1,,, | Performed by: ORTHOPAEDIC SURGERY

## 2018-11-02 PROCEDURE — 37000008 HC ANESTHESIA 1ST 15 MINUTES: Performed by: ORTHOPAEDIC SURGERY

## 2018-11-02 PROCEDURE — 27201423 OPTIME MED/SURG SUP & DEVICES STERILE SUPPLY: Performed by: ORTHOPAEDIC SURGERY

## 2018-11-02 PROCEDURE — 63600175 PHARM REV CODE 636 W HCPCS: Performed by: ORTHOPAEDIC SURGERY

## 2018-11-02 PROCEDURE — 63600175 PHARM REV CODE 636 W HCPCS: Performed by: NURSE ANESTHETIST, CERTIFIED REGISTERED

## 2018-11-02 PROCEDURE — 37000009 HC ANESTHESIA EA ADD 15 MINS: Performed by: ORTHOPAEDIC SURGERY

## 2018-11-02 PROCEDURE — C1769 GUIDE WIRE: HCPCS | Performed by: ORTHOPAEDIC SURGERY

## 2018-11-02 PROCEDURE — 63600175 PHARM REV CODE 636 W HCPCS: Performed by: ANESTHESIOLOGY

## 2018-11-02 PROCEDURE — 71000016 HC POSTOP RECOV ADDL HR: Performed by: ORTHOPAEDIC SURGERY

## 2018-11-02 PROCEDURE — 25000003 PHARM REV CODE 250: Performed by: ORTHOPAEDIC SURGERY

## 2018-11-02 PROCEDURE — 26055 INCISE FINGER TENDON SHEATH: CPT | Mod: 51,F2,, | Performed by: ORTHOPAEDIC SURGERY

## 2018-11-02 PROCEDURE — 36000709 HC OR TIME LEV III EA ADD 15 MIN: Performed by: ORTHOPAEDIC SURGERY

## 2018-11-02 PROCEDURE — 71000015 HC POSTOP RECOV 1ST HR: Performed by: ORTHOPAEDIC SURGERY

## 2018-11-02 PROCEDURE — 25000003 PHARM REV CODE 250: Performed by: ANESTHESIOLOGY

## 2018-11-02 DEVICE — GUIDEWIRE ORTHO .062 X 6 IN: Type: IMPLANTABLE DEVICE | Site: HAND | Status: FUNCTIONAL

## 2018-11-02 DEVICE — SCREW BONE 24MM ACUTRAK: Type: IMPLANTABLE DEVICE | Site: HAND | Status: FUNCTIONAL

## 2018-11-02 RX ORDER — MUPIROCIN 20 MG/G
1 OINTMENT TOPICAL 2 TIMES DAILY
Status: DISCONTINUED | OUTPATIENT
Start: 2018-11-02 | End: 2018-11-02 | Stop reason: HOSPADM

## 2018-11-02 RX ORDER — SODIUM CHLORIDE, SODIUM LACTATE, POTASSIUM CHLORIDE, CALCIUM CHLORIDE 600; 310; 30; 20 MG/100ML; MG/100ML; MG/100ML; MG/100ML
INJECTION, SOLUTION INTRAVENOUS CONTINUOUS
Status: DISCONTINUED | OUTPATIENT
Start: 2018-11-02 | End: 2018-11-02 | Stop reason: HOSPADM

## 2018-11-02 RX ORDER — MUPIROCIN 20 MG/G
OINTMENT TOPICAL
Status: DISCONTINUED | OUTPATIENT
Start: 2018-11-02 | End: 2018-11-02 | Stop reason: HOSPADM

## 2018-11-02 RX ORDER — HYDROMORPHONE HYDROCHLORIDE 2 MG/ML
0.2 INJECTION, SOLUTION INTRAMUSCULAR; INTRAVENOUS; SUBCUTANEOUS EVERY 5 MIN PRN
Status: DISCONTINUED | OUTPATIENT
Start: 2018-11-02 | End: 2018-11-02 | Stop reason: HOSPADM

## 2018-11-02 RX ORDER — SODIUM CHLORIDE 0.9 % (FLUSH) 0.9 %
5 SYRINGE (ML) INJECTION
Status: DISCONTINUED | OUTPATIENT
Start: 2018-11-02 | End: 2018-11-02 | Stop reason: HOSPADM

## 2018-11-02 RX ORDER — SODIUM CHLORIDE 9 MG/ML
INJECTION, SOLUTION INTRAVENOUS CONTINUOUS
Status: DISCONTINUED | OUTPATIENT
Start: 2018-11-02 | End: 2018-11-02 | Stop reason: HOSPADM

## 2018-11-02 RX ORDER — SODIUM CHLORIDE 0.9 % (FLUSH) 0.9 %
3 SYRINGE (ML) INJECTION
Status: DISCONTINUED | OUTPATIENT
Start: 2018-11-02 | End: 2018-11-02 | Stop reason: HOSPADM

## 2018-11-02 RX ORDER — PHENYLEPHRINE HYDROCHLORIDE 10 MG/ML
INJECTION INTRAVENOUS
Status: DISCONTINUED | OUTPATIENT
Start: 2018-11-02 | End: 2018-11-02

## 2018-11-02 RX ORDER — PROPOFOL 10 MG/ML
VIAL (ML) INTRAVENOUS CONTINUOUS PRN
Status: DISCONTINUED | OUTPATIENT
Start: 2018-11-02 | End: 2018-11-02

## 2018-11-02 RX ORDER — ROPIVACAINE HYDROCHLORIDE 5 MG/ML
INJECTION, SOLUTION EPIDURAL; INFILTRATION; PERINEURAL
Status: COMPLETED | OUTPATIENT
Start: 2018-11-02 | End: 2018-11-02

## 2018-11-02 RX ORDER — ONDANSETRON 2 MG/ML
4 INJECTION INTRAMUSCULAR; INTRAVENOUS EVERY 12 HOURS PRN
Status: DISCONTINUED | OUTPATIENT
Start: 2018-11-02 | End: 2018-11-02 | Stop reason: HOSPADM

## 2018-11-02 RX ORDER — LIDOCAINE HYDROCHLORIDE 10 MG/ML
0.5 INJECTION, SOLUTION EPIDURAL; INFILTRATION; INTRACAUDAL; PERINEURAL ONCE
Status: DISCONTINUED | OUTPATIENT
Start: 2018-11-02 | End: 2018-11-02 | Stop reason: HOSPADM

## 2018-11-02 RX ORDER — LIDOCAINE HCL/PF 100 MG/5ML
SYRINGE (ML) INTRAVENOUS
Status: DISCONTINUED | OUTPATIENT
Start: 2018-11-02 | End: 2018-11-02

## 2018-11-02 RX ORDER — HYDROCODONE BITARTRATE AND ACETAMINOPHEN 5; 325 MG/1; MG/1
1 TABLET ORAL EVERY 4 HOURS PRN
Status: DISCONTINUED | OUTPATIENT
Start: 2018-11-02 | End: 2018-11-02 | Stop reason: HOSPADM

## 2018-11-02 RX ORDER — OXYCODONE HYDROCHLORIDE 5 MG/1
5 TABLET ORAL
Status: DISCONTINUED | OUTPATIENT
Start: 2018-11-02 | End: 2018-11-02 | Stop reason: HOSPADM

## 2018-11-02 RX ORDER — LIDOCAINE HYDROCHLORIDE 10 MG/ML
1 INJECTION, SOLUTION EPIDURAL; INFILTRATION; INTRACAUDAL; PERINEURAL ONCE
Status: DISCONTINUED | OUTPATIENT
Start: 2018-11-02 | End: 2018-11-02 | Stop reason: HOSPADM

## 2018-11-02 RX ORDER — FENTANYL CITRATE 50 UG/ML
100 INJECTION, SOLUTION INTRAMUSCULAR; INTRAVENOUS EVERY 5 MIN PRN
Status: COMPLETED | OUTPATIENT
Start: 2018-11-02 | End: 2018-11-02

## 2018-11-02 RX ORDER — PROPOFOL 10 MG/ML
VIAL (ML) INTRAVENOUS
Status: DISCONTINUED | OUTPATIENT
Start: 2018-11-02 | End: 2018-11-02

## 2018-11-02 RX ORDER — CEFAZOLIN SODIUM 1 G/3ML
2 INJECTION, POWDER, FOR SOLUTION INTRAMUSCULAR; INTRAVENOUS
Status: COMPLETED | OUTPATIENT
Start: 2018-11-02 | End: 2018-11-02

## 2018-11-02 RX ORDER — MIDAZOLAM HYDROCHLORIDE 1 MG/ML
2 INJECTION INTRAMUSCULAR; INTRAVENOUS
Status: COMPLETED | OUTPATIENT
Start: 2018-11-02 | End: 2018-11-02

## 2018-11-02 RX ORDER — LIDOCAINE HCL/EPINEPHRINE/PF 2%-1:200K
VIAL (ML) INJECTION
Status: COMPLETED | OUTPATIENT
Start: 2018-11-02 | End: 2018-11-02

## 2018-11-02 RX ADMIN — PHENYLEPHRINE HYDROCHLORIDE 100 MCG: 10 INJECTION INTRAVENOUS at 09:11

## 2018-11-02 RX ADMIN — SODIUM CHLORIDE, SODIUM LACTATE, POTASSIUM CHLORIDE, AND CALCIUM CHLORIDE: 600; 310; 30; 20 INJECTION, SOLUTION INTRAVENOUS at 08:11

## 2018-11-02 RX ADMIN — MIDAZOLAM HYDROCHLORIDE 2 MG: 1 INJECTION, SOLUTION INTRAMUSCULAR; INTRAVENOUS at 09:11

## 2018-11-02 RX ADMIN — FENTANYL CITRATE 100 MCG: 50 INJECTION, SOLUTION INTRAMUSCULAR; INTRAVENOUS at 09:11

## 2018-11-02 RX ADMIN — MUPIROCIN: 20 OINTMENT TOPICAL at 08:11

## 2018-11-02 RX ADMIN — PHENYLEPHRINE HYDROCHLORIDE 200 MCG: 10 INJECTION INTRAVENOUS at 10:11

## 2018-11-02 RX ADMIN — PROPOFOL 30 MG: 10 INJECTION, EMULSION INTRAVENOUS at 09:11

## 2018-11-02 RX ADMIN — PROPOFOL 75 MCG/KG/MIN: 10 INJECTION, EMULSION INTRAVENOUS at 09:11

## 2018-11-02 RX ADMIN — LIDOCAINE HYDROCHLORIDE,EPINEPHRINE BITARTRATE 20 ML: 20; .005 INJECTION, SOLUTION EPIDURAL; INFILTRATION; INTRACAUDAL; PERINEURAL at 09:11

## 2018-11-02 RX ADMIN — LIDOCAINE HYDROCHLORIDE 25 MG: 20 INJECTION, SOLUTION INTRAVENOUS at 09:11

## 2018-11-02 RX ADMIN — ROPIVACAINE HYDROCHLORIDE 20 ML: 5 INJECTION, SOLUTION EPIDURAL; INFILTRATION; PERINEURAL at 09:11

## 2018-11-02 RX ADMIN — CEFAZOLIN 2 G: 330 INJECTION, POWDER, FOR SOLUTION INTRAMUSCULAR; INTRAVENOUS at 09:11

## 2018-11-02 NOTE — ANESTHESIA POSTPROCEDURE EVALUATION
Anesthesia Post Evaluation    Patient: Amrita Yoder    Procedure(s) Performed: Procedure(s) (LRB):  RELEASE, TRIGGER FINGER left long (Left)  FUSION, JOINT, FINGER left index (Left)    Final Anesthesia Type: general  Patient location during evaluation: OPS  Patient participation: Yes- Able to Participate  Level of consciousness: awake and alert  Post-procedure vital signs: reviewed and stable  Pain management: adequate  Airway patency: patent  PONV status at discharge: No PONV  Anesthetic complications: no      Cardiovascular status: blood pressure returned to baseline  Respiratory status: unassisted, spontaneous ventilation and room air  Hydration status: euvolemic  Follow-up not needed.        Visit Vitals  BP (!) 177/76 (BP Location: Right arm, Patient Position: Lying)   Pulse (!) 56   Temp 37 °C (98.6 °F) (Oral)   Resp 18   Ht 5' (1.524 m)   Wt 64.4 kg (141 lb 15.6 oz)   LMP  (LMP Unknown)   SpO2 97%   Breastfeeding? No   BMI 27.73 kg/m²       Pain/Juan Score: Pain Assessment Performed: Yes (11/2/2018  8:00 AM)  Presence of Pain: denies (11/2/2018  8:00 AM)

## 2018-11-02 NOTE — INTERVAL H&P NOTE
The patient has been examined and the H&P has been reviewed:    I concur with the findings and changes have been noted since the H&P was written: We discussed there results of the EMG in detail. WE discussed conservative treatment as well as surgical treatment. The Patient is interested in surgery at this time. We discussed trigger finger long finger and index DIP joint and OA surgery. I have explained the risks, benefits, and alternatives of the procedure to the patient in great detail. The patient voices understanding and all questions have been answered. The patient agrees with to proceed as planned. Consents were performed in clinic.    Anesthesia/Surgery risks, benefits and alternative options discussed and understood by patient/family.          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

## 2018-11-02 NOTE — DISCHARGE INSTRUCTIONS
Anesthesia: Monitored Anesthesia Care (MAC)    Anesthesia Safety  · Have an adult family member or friend drive you home after the procedure.  · For the first 24 hours after your surgery:  ¨ Do not drive or use heavy equipment.  ¨ Do not make important decisions or sign documents.  ¨ Avoid alcohol.  ¨ Have someone stay with you, if possible. They can watch for problems and help keep you safe.    PLEASE FOLLOW ANY OTHER INSTRUCTIONS PROVIDED TO YOU BY DR. GERONIMO!

## 2018-11-02 NOTE — PLAN OF CARE
Amrita MAX Beba has met all discharge criteria from Phase II. Vital Signs are stable, ambulating  without difficulty. Discharge instructions given, patient verbalized understanding. Discharged from facility via wheelchair in stable condition.

## 2018-11-02 NOTE — BRIEF OP NOTE
[facility]  Brief Operative Note     SUMMARY     Surgery Date: 11/2/2018     Surgeon(s) and Role:     * Zoe Winkler MD - Primary    Assisting Surgeon: None    Pre-op Diagnosis:  Trigger finger, left middle finger [M65.332]  Arthritis of finger of left hand [M19.042]    Post-op Diagnosis:  Post-Op Diagnosis Codes:     * Trigger finger, left middle finger [M65.332]     * Arthritis of finger of left hand [M19.042]    Procedure(s) (LRB):  RELEASE, TRIGGER FINGER left long (Left)  FUSION, JOINT, FINGER left index (Left)    Anesthesia: Local MAC    Description of the findings of the procedure: see op note    Findings/Key Components: see op note    Estimated Blood Loss: minimal        Specimens:   Specimen (12h ago, onward)    None          Discharge Note    SUMMARY     Admit Date: 11/2/2018    Discharge Date and Time:  11/02/2018 10:38 AM    Hospital Course (synopsis of major diagnoses, care, treatment, and services provided during the course of the hospital stay): Patient presented for above procedure.  Tolerated it well and was discharged home POD0 after voiding, tolerating diet, ambulating, pain controlled.  Discharge instructions, follow-up appointment, and med rec are below.       Final Diagnosis: Post-Op Diagnosis Codes:     * Trigger finger, left middle finger [M65.332]     * Arthritis of finger of left hand [M19.042]    Disposition: Home or Self Care    Follow Up/Patient Instructions:     Medications:  Reconciled Home Medications:      Medication List      CONTINUE taking these medications    atorvastatin 20 MG tablet  Commonly known as:  LIPITOR  TAKE 1 TABLET BY MOUTH NIGHTLY     flunisolide 25 mcg (0.025%) 25 mcg (0.025 %) Spry  Commonly known as:  NASALIDE  2 sprays by Nasal route 2 (two) times daily.     HAIR,SKIN AND NAILS ORAL  Take by mouth.     lisinopril 2.5 MG tablet  Commonly known as:  PRINIVIL,ZESTRIL  Take 1 tablet (2.5 mg total) by mouth once daily.     meloxicam 15 MG  tablet  Commonly known as:  MOBIC  Take 1 tablet (15 mg total) by mouth once daily.     multivitamin capsule  Take 1 capsule by mouth once daily.     ondansetron 4 MG Tbdl  Commonly known as:  ZOFRAN-ODT  Dissolve 1 tablet (4 mg total) by mouth every 6 (six) hours as needed.     oxyCODONE-acetaminophen 5-325 mg per tablet  Commonly known as:  PERCOCET  [place] tableta por vía oral cada 4 horas según sea necesario para el dolor.  (Take 1 tablet by mouth every 4 (four) hours as needed for Pain.)     PATADAY 0.2 % Drop  Generic drug:  olopatadine          Discharge Procedure Orders   Diet general     Call MD for:  temperature >100.4     Call MD for:  persistent nausea and vomiting     Call MD for:  severe uncontrolled pain     Call MD for:  difficulty breathing, headache or visual disturbances     Call MD for:  redness, tenderness, or signs of infection (pain, swelling, redness, odor or green/yellow discharge around incision site)     Call MD for:  hives     Call MD for:  persistent dizziness or light-headedness     Call MD for:  extreme fatigue     No driving, operating heavy equipment or signing legal documents while taking pain medication.     Keep surgical extremity elevated     Leave dressing on - Keep it clean, dry, and intact until clinic visit     Follow-up Information     Zoe Winkler MD In 2 weeks.    Specialties:  Hand Surgery, Orthopedic Surgery  Why:  For suture removal, For wound re-check  Contact information:  [address]  [address]  [facility]  [facility] [address]  [phone]

## 2018-11-02 NOTE — ANESTHESIA PROCEDURE NOTES
Ax block    Patient location during procedure: holding area    Reason for block: primary anesthetic   Diagnosis: (Trigger finger, left middle finger (M65.332))   Start time: 11/2/2018 8:52 AM  Timeout: 11/2/2018 8:52 AM   End time: 11/2/2018 9:01 AM  Staffing  Anesthesiologist: Carmina Yoder MD  Performed: anesthesiologist   Preanesthetic Checklist  Completed: patient identified, site marked, surgical consent, pre-op evaluation, timeout performed, IV checked, risks and benefits discussed and monitors and equipment checked  Peripheral Block  Patient position: supine  Prep: ChloraPrep  Patient monitoring: heart rate, cardiac monitor, continuous pulse ox and frequent blood pressure checks  Block type: axillary  Laterality: left  Injection technique: single shot  Needle  Needle type: Stimuplex   Needle gauge: 22 G  Needle length: 3.5 in  Needle localization: nerve stimulator and ultrasound guidance   -ultrasound image captured on disc.  Assessment  Injection assessment: negative aspiration, negative parasthesia and local visualized surrounding nerve  Paresthesia pain: none  Heart rate change: no  Slow fractionated injection: yes  Additional Notes  Ropivicaine 0.5% 20cc + Lidocaine 2% with epi 20cc  25cc in sheath  5cc on musculocutaneous n  10cc skin wheal

## 2018-11-06 NOTE — OP NOTE
DATE OF PROCEDURE:  11/02/2018    SERVICE:  Orthopedics.    ATTENDING SURGEON:  Zoe Winkler M.D.    ASSISTANT:  LYNSEY Mendieta    PREOPERATIVE DIAGNOSES:  1.  Left index finger DIP arthritis.  2.  Left long finger, trigger finger.    POSTOPERATIVE DIAGNOSES:  1.  Left index finger DIP arthritis.  2.  Left long finger, trigger finger.    PROCEDURES PERFORMED:  1.  Left index finger DIP fusion with Acumed screw.  2.  Left long finger trigger finger release.    ANESTHESIA:  Regional MAC.    FLUIDS:  Lactated Ringer's.    BLOOD LOSS:  None.  No blood was given.    TOURNIQUET TIME:  Less than 30 minutes.    PACKS AND DRAINS:  None.    IMPLANTS:  Acumed finger fusion screws 24.    COMPLICATIONS:  None.    INDICATIONS FOR PROCEDURE:  Ms. Yoder is a 68-year-old female not only she had   a locking left trigger finger that was very painful and bothersome to her, she   also had significant pain in her DIP joint of her index finger on the same hand   after much discussion with the patient of treatment options for that finger, she   requested DIP fusion.  Risk and benefits were explained to the patient in   clinic.  Consents were signed.    PROCEDURE IN DETAIL:  After the correct site was marked with the patient's   participation in the holding area, the patient underwent regional anesthesia,   was brought to the Operating Room, placed in supine position, underwent MAC   anesthesia.  A well-padded nonsterile tourniquet was placed on the left upper   extremity.  The left upper extremity was prepped and draped in normal sterile   fashion.  A timeout was conducted for the correct site, procedure, and patient   to be indicated.  IV antibiotics were given to the patient preoperatively.    Incision was marked out on the volar surface of the left long finger directly   over the A1 pulley and a T-type incision was marked out on the dorsal aspect of   the DIP joint on the index finger.  The arm was exsanguinated with an  Esmarch.    Tourniquet was insufflated to 250 mmHg.  Anesthesia was first turned to the left   long finger.  Careful skin incision was made in a longitudinal fashion.  Blunt   dissection was made down to the A1 pulley.  The A1 pulley was sharply incised   first with a #15 blade knife and then it was completely released with Littler   scissors proximally and distally all the while protecting the neurovascular   bundle.  Once that was completed, the tendon was retracted out of the tendon   sheath.  Finger was placed through range of motion and showed that it no longer   triggered as well as passively placed through range of motion.  The area was   irrigated with copious amounts of normal saline, nylon closed the skin.  Our   attention was then turned to the dorsal aspect of the index finger.  The T-type   incision was made.  The extensor tendon was released.  Joint of the DIP joint   was dislocated using the Acumed, cup and cone reamer guide set.  The K-wire was   placed down center-center of the middle phalanx.  The cone reamer was first   utilized and then the K-wire was introduced into the distal phalanx and the cup   reamer was then used.  It was a size 10.  Once that was completed, the reamer   and K wire removed.  The drill was placed from the distal phalanx into the DIP   joint and into the middle phalanx, measured to be a size 24.  This was then   removed.  The joint was held in reduction.  The finger fusion screw was then   placed distally across the DIP joint and into the middle phalanx until it was   buried in the bone.  Multiple x-rays were taken at this time to show that it was   compressed DIP joint as well as that the screw was in good placement.  The area   was irrigated with copious amounts of normal saline, nylon closed the skin.    Sterile dressing was applied.  Tourniquet was deflated.  Brisk capillary refill   ensued.  The patient was placed in a well-padded splint.  Tolerated the   procedure  well, was brought to recovery area in stable condition.    POSTOPERATIVE PLAN FOR THIS PATIENT:  Keep the dressing clean, dry and intact.    We will see her back at 2 weeks.  Sutures are to be removed.  She will continue   immobilization of the index finger until the fusion is matured.      LES/HN  dd: 11/05/2018 18:56:56 (CST)  td: 11/06/2018 00:06:06 (CST)  Doc ID   #[id number]  Job ID #[id number]    CC:

## 2018-11-07 ENCOUNTER — TELEPHONE (OUTPATIENT)
Dept: ORTHOPEDICS | Facility: CLINIC | Age: 68
End: 2018-11-07

## 2018-11-07 NOTE — TELEPHONE ENCOUNTER
----- Message from Angle Link sent at 11/7/2018  9:13 AM CST -----  Contact: pt            Name of Who is Calling: GYPSY LACKEY [[id number]]      What is the request in detail: pt calling in regards to thumb be swollen after procedure... Pt thinks cast is to tight..... Please advise      no      What [facility] Back [facility]: [phone]

## 2018-11-07 NOTE — TELEPHONE ENCOUNTER
VM left for pt to call office, may need to Loosen ACE wrap, make sure to elevate. May need appt for splint change today.

## 2018-11-07 NOTE — TELEPHONE ENCOUNTER
----- Message from Kaela Reed sent at 11/7/2018 12:23 PM CST -----  Contact: Pt  Name of Who is Calling: GYPSY LACKEY [[id number]]    What is the request in detail: Pt is returning Alexus's call, please advise.    Can the clinic reply by [facility]: No     What [facility] Back [facility]: [phone]

## 2018-11-09 ENCOUNTER — OFFICE VISIT (OUTPATIENT)
Dept: ORTHOPEDICS | Facility: CLINIC | Age: 68
End: 2018-11-09
Payer: MEDICARE

## 2018-11-09 ENCOUNTER — DOCUMENTATION ONLY (OUTPATIENT)
Dept: ORTHOPEDICS | Facility: CLINIC | Age: 68
End: 2018-11-09

## 2018-11-09 DIAGNOSIS — Z98.890 POST-OPERATIVE STATE: Primary | ICD-10-CM

## 2018-11-09 PROCEDURE — 99999 PR PBB SHADOW E&M-EST. PATIENT-LVL I: CPT | Mod: PBBFAC,,, | Performed by: PHYSICIAN ASSISTANT

## 2018-11-09 PROCEDURE — 99024 POSTOP FOLLOW-UP VISIT: CPT | Mod: S$GLB,,, | Performed by: PHYSICIAN ASSISTANT

## 2018-11-09 NOTE — PROGRESS NOTES
Ms. Yoder is here today for a post-operative visit.  She is 7 days status post Left index finger DIP fusion with Acumed screw and Left long finger trigger finger release by Dr. Winkler on 11/2/18. She reports that the splint feels too tight. She states she has been elevating the arm and taking pain medication as needed.       Physical exam:    Splint in place. Mild edema of exposed fingers. The outer ACE wrap was loosened. She reports improvement of symptoms after this was done. The ACE was re-applied looser.     Assessment: status post Left index finger DIP fusion with Acumed screw and Left long finger trigger finger release by Dr. Winkler on 11/2/18    Plan:  Diagnoses and all orders for this visit:    Post-operative state    -splint loosened  -RTC regularly scheduled post op visit       Stephanie Castillo PA-C  [facility]   [facility]  [place]

## 2018-11-16 ENCOUNTER — OFFICE VISIT (OUTPATIENT)
Dept: ORTHOPEDICS | Facility: CLINIC | Age: 68
End: 2018-11-16
Payer: MEDICARE

## 2018-11-16 VITALS
DIASTOLIC BLOOD PRESSURE: 87 MMHG | SYSTOLIC BLOOD PRESSURE: 147 MMHG | HEIGHT: 60 IN | HEART RATE: 60 BPM | WEIGHT: 141 LBS | BODY MASS INDEX: 27.68 KG/M2

## 2018-11-16 DIAGNOSIS — Z98.1 STATUS POST FINGER JOINT FUSION: ICD-10-CM

## 2018-11-16 DIAGNOSIS — Z98.890 S/P TRIGGER FINGER RELEASE: Primary | ICD-10-CM

## 2018-11-16 PROCEDURE — 99999 PR PBB SHADOW E&M-EST. PATIENT-LVL III: CPT | Mod: PBBFAC,,, | Performed by: PHYSICIAN ASSISTANT

## 2018-11-16 PROCEDURE — 99024 POSTOP FOLLOW-UP VISIT: CPT | Mod: S$GLB,,, | Performed by: PHYSICIAN ASSISTANT

## 2018-11-16 NOTE — PROGRESS NOTES
Ms. Yoder is here today for a post-operative visit.  She is 14 days status post Left index finger DIP fusion with Acumed screw and left long finger trigger finger release by Dr. Winkler on 11/2/18. She reports that she is doing well.  Pain is 0/10. Pain is minimal.  She is taking pain medication.  She denies fever, chills, and sweats since the time of the surgery.     Physical exam:    Vitals:    11/16/18 1300   BP: (!) 147/87   Pulse: 60   Weight: 64 kg (141 lb)   Height: 5' (1.524 m)   PainSc: 0-No pain   PainLoc: Finger     Vital signs are stable, patient is afebrile.  Patient is well dressed and well groomed, no acute distress.  Alert and oriented to person, place, and time.  Post op dressing taken down.  Incisions are clean, dry and intact.  There is no erythema or exudate.  There is no sign of any infection. She is NVI. Sutures removed without difficulty. There is stiffness of the fingers, including the long which was included in the post op splint.    Assessment:  status post Left index finger DIP fusion with Acumed screw and left long finger trigger finger release by Dr. Winkler on 11/2/18    Plan:  Amrita was seen today for pain.    Diagnoses and all orders for this visit:    S/P trigger finger release  -     Ambulatory Referral to Physical/Occupational Therapy    Status post finger joint fusion  -     X-Ray Finger 2 View or More Views; Future  -     Ambulatory Referral to Physical/Occupational Therapy      - PO instruction reviewed and provided to patient  -full time splinting of left index DIP   -OT ordered  -Tylenol prn pain  -RTC 4 wks with left index xray       Stephanie Castillo PA-C  [facility]   [facility]  [place], [place]

## 2018-12-04 ENCOUNTER — CLINICAL SUPPORT (OUTPATIENT)
Dept: REHABILITATION | Facility: HOSPITAL | Age: 68
End: 2018-12-04
Payer: MEDICARE

## 2018-12-04 DIAGNOSIS — M25.642 FINGER STIFFNESS, LEFT: ICD-10-CM

## 2018-12-04 DIAGNOSIS — M79.645 PAIN IN LEFT FINGER(S): ICD-10-CM

## 2018-12-04 PROCEDURE — 97760 ORTHOTIC MGMT&TRAING 1ST ENC: CPT | Mod: PN

## 2018-12-04 PROCEDURE — 97165 OT EVAL LOW COMPLEX 30 MIN: CPT | Mod: PN

## 2018-12-04 PROCEDURE — G8987 SELF CARE CURRENT STATUS: HCPCS | Mod: CK,PN

## 2018-12-04 PROCEDURE — 97110 THERAPEUTIC EXERCISES: CPT | Mod: PN

## 2018-12-04 PROCEDURE — G8988 SELF CARE GOAL STATUS: HCPCS | Mod: CJ,PN

## 2018-12-04 NOTE — PATIENT INSTRUCTIONS
[facility] EXERCISE PROGRAM  [phone]  MISTY THAPA, OTR/L, CHT  OCCUPATIONAL THERAPIST, CERTIFIED HAND THERAPIST    Complete massage for 2-3 minutes 4 times a day:      Complete 10 repetitions of each exercise 4 times a day: Keep splint on for exercises      .

## 2018-12-07 ENCOUNTER — CLINICAL SUPPORT (OUTPATIENT)
Dept: REHABILITATION | Facility: HOSPITAL | Age: 68
End: 2018-12-07
Payer: MEDICARE

## 2018-12-07 DIAGNOSIS — M25.642 FINGER STIFFNESS, LEFT: ICD-10-CM

## 2018-12-07 DIAGNOSIS — M79.645 PAIN IN LEFT FINGER(S): ICD-10-CM

## 2018-12-07 PROCEDURE — 97140 MANUAL THERAPY 1/> REGIONS: CPT | Mod: PN

## 2018-12-07 PROCEDURE — 97110 THERAPEUTIC EXERCISES: CPT | Mod: PN

## 2018-12-07 NOTE — PROGRESS NOTES
"      Occupational Therapy Daily Treatment Note      Name: Amrita Yoder  Clinic Number: [id number]     Medical Diagnosis: Left IF DIP fusion with Acumend screw and Left LF trigger finger release  Date of Surgery: 11/02/2018  Surgical Procedure:  Left IF DIP fusion with Acumend screw and Left LF trigger finger release  Therapy Diagnosis:        Encounter Diagnoses   Name Primary?    Pain in left finger(s)      Finger stiffness, left        Precautions: Standard     Physician: Stephanie Castillo PA-C via Dr. Winkler  Physician Orders: eval and treat- Long finger stiffness, full time splinting of IF DIP until bony maturation has occured  Date of Return to MD: 12/14/2018 @ 1200PM for Xray followed by Office visit     Evaluation Date: 12/4/2018  Plan of Care Certification Period: 12/4/2018-01/11/2019     Visit #: / Visits authorized: 2/12  Insurance Authorization period Expiration: 02/02/2019     Time In: 855 am  Time Out: 950 am  Total Billable Time: 45 minutes  Charges for this Visit: MTx1, TEx2      Subjective     Pt reports: "I'm having pain on top of my hand."  she was compliant with home exercise program given last session.   Response to previous treatment: good  Functional change: none    Pain: 5/10  Location: left long, index finger, dorsum of hand    Objective     Observation/Appearance: Pt with finger gutter orthosis to left IF to immobilize DIP joint only. Pt continues to hold L IF in hyperextension position of PIP.  Well healing scar sites.     Edema. Measured in centimeters.    12/4/2018     Left   Index:       P1 7.0    PIP 6.0   P2 5.8    DIP 6.0   P3 5.3   Long:       P1 6.8    PIP 6.2   P2 5.5    DIP 5.0   P3 4.5            Hand ROM. Measured in degrees.    12/4/2018     Left     AROM   Index: MP  60              PIP     0+/0              DIP NT              SANTIAGO           Long:  MP 72              PIP 11              DIP 0              SANTIAGO 83         Ring:   MP 72              PIP 72              DIP " 20              SANTIAGO 164         Small:  MP 62               PIP 60               DIP 25              SANTIAGO 147       Strength (Dynamometer) and Pinch Strength (Pinch Gauge)  Measured in pounds.    12/4/2018 12/4/2018     Left Right   Rung II Deferred due to protocol Deferred due to protocol   Key Pinch       3pt Pinch       2pt Pinch          SensationCMS Impairment/Limitation/Restriction for FOTO Survey  Therapist reviewed FOTO scores for Amrita Yoder.  FOTO documents entered into [employer] - see Media section.     Intake Limitation Score: 60% - 12/4/2018  Category: Self Care     Current : CK = at least 40% but < 60% impaired, limited or restricted  Goal: CJ = at least 20% but < 40% impaired, limited or restricted  Discharge: TBD         TREATMENT:    Amrita received the following supervised modalities after being cleared for contradictions for 10 minutes:   -Patient received MH  to Left hand to increase blood flow, circulation and tissue elasticity prior to therex        Amrita received the following direct contact modalities after being cleared for contraindications for 0 minutes:  -none today     Amrita received the following manual therapy techniques for 10 minutes:   -Performed RM to left digits/hand/wrist to decrease edema, improve joint mobility, decrease pain and improve lymphatic drainage to increase hand function.   -Performed scar massage to healed areas and around non-healed incision site to decrease adhesions and improve tensile glide.       Amrita received therapeutic exercises for 10 minutes including:  -  *All theres with DIP gutter orthosis on*      AROM  PIP blocking (IF and LF)  Wave, straight fist    X 10 reps each    Finger abd/add X 10 reps   Finger extension X 10 reps   isospheres X 2 min           Home Exercises and Education Provided     Education provided: Cont HEP and splint wear  - Progress towards goals     Written Home Exercises Provided: Patient instructed to cont prior  HEP.  Exercises were reviewed and Amrita was able to demonstrate them prior to the end of the session.  Amrita demonstrated good  understanding of the education provided.   .   See EMR under Patient Instructions for exercises provided prior visit.     Assessment     Amrita Yoder is a 68 y.o. female referred to outpatient occupational/hand therapy and presents with a medical diagnosis of Left IF DIP fusion with Acumend screw and Left LF trigger finger release. Pt reports good tolerance of splint wear with no issues.  Incision site clean, dry and healing.  Pt reports increased pain on dorsum of hand.  Pt noted to hold L LF in hypertension which could be contributing to pain.  Pt instructed to relax LLF with slight bend at PIP jt.  Pt verbalized understanding.  Edema decreasing in digits, however, joint stiffness remains in MF joints and LF PIP/MP.       Amrita is progressing well towards her goals and there are no updates to goals at this time. Pt prognosis continues as Good. Pt will continue to benefit from skilled outpatient occupational therapy to address the deficits listed in the problem list on initial evaluation, provide pt/family education and to maximize pt's level of independence in the home and community environment.     Anticipated barriers to continued occupational therapy: none    Pt's spiritual, cultural and educational needs considered and pt agreeable to plan of care and goals.    Goals     Long Term Goals (LTGs); to be met by discharge.  LTG #1: Pt will report a pain level of 2 out of 10 with ADLs  LTG #2: Pt will demo improved FOTO score by 20 points.   LTG #3: Pt will return to prior level of function for ADLs and household management.      Short Term Goals (STGs); to be met within 4 weeks (01/04/2019).  STG #1a: Pt will report 5 out of 10 pain level with ADLs.  STG #2a: Pt will report/demo [place] with fastening clothing manipulators.  STG #2b: Pt will report/demo [place] with  using knife and fork to cut food.  STG #3a: Pt will demonstrate independence with issued HEP.   STG #3b: Pt will demo improved Left IF PIP Flexion by 30 degrees needed to aid with bimanual grasping skills  STG#3c: Pt. Will demo improved Left LF SANTIAGO by 50+ degrees need to aid with in hand manipulation skills.    Plan     Continue skilled occupational therapy with individualized plan of care 2 times weekly for 8 weeks during the certification period from 12/4/2018 to 02/02/2019    Discussed Plan of Care with patient: Yes  Updates/Grading for next session: none

## 2018-12-10 ENCOUNTER — CLINICAL SUPPORT (OUTPATIENT)
Dept: REHABILITATION | Facility: HOSPITAL | Age: 68
End: 2018-12-10
Payer: MEDICARE

## 2018-12-10 DIAGNOSIS — M79.645 PAIN IN LEFT FINGER(S): ICD-10-CM

## 2018-12-10 DIAGNOSIS — M25.642 FINGER STIFFNESS, LEFT: ICD-10-CM

## 2018-12-10 PROCEDURE — 97110 THERAPEUTIC EXERCISES: CPT | Mod: PN

## 2018-12-10 PROCEDURE — 97140 MANUAL THERAPY 1/> REGIONS: CPT | Mod: PN

## 2018-12-10 NOTE — PROGRESS NOTES
"      Occupational Therapy Daily Treatment Note      Name: Amrita Yoder  Clinic Number: [id number]     Medical Diagnosis: Left IF DIP fusion with Acumend screw and Left LF trigger finger release  Date of Surgery: 11/02/2018  Surgical Procedure:  Left IF DIP fusion with Acumend screw and Left LF trigger finger release  Therapy Diagnosis:        Encounter Diagnoses   Name Primary?    Pain in left finger(s)      Finger stiffness, left        Precautions: Standard     Physician: Stephanie Castillo PA-C via Dr. Winkler  Physician Orders: eval and treat- Long finger stiffness, full time splinting of IF DIP until bony maturation has occured  Date of Return to MD: 12/14/2018 @ 1200PM for Xray followed by Office visit     Evaluation Date: 12/4/2018  Plan of Care Certification Period: 12/4/2018-01/11/2019     Visit #: / Visits authorized: 3/12  Insurance Authorization period Expiration: 02/02/2019     Time In: 9:00 am  Time Out: 9:50 am  Total Billable Time: 40 minutes  Charges for this Visit: MTx1, TEx1      Subjective     Pt reports: "my hand is pretty sensitive."  she was compliant with home exercise program given last session.   Response to previous treatment: good  Functional change: none    Pain: 5/10  Location: left long, index finger, dorsum of hand    Objective     Observation/Appearance: Pt with finger gutter orthosis to left IF to immobilize DIP joint only. Pt continues to hold L IF in hyperextension position of PIP.  Well healing scar sites.     Edema. Measured in centimeters.    12/4/2018     Left   Index:       P1 7.0    PIP 6.0   P2 5.8    DIP 6.0   P3 5.3   Long:       P1 6.8    PIP 6.2   P2 5.5    DIP 5.0   P3 4.5            Hand ROM. Measured in degrees.    12/4/2018     Left     AROM   Index: MP  60              PIP     0+/0              DIP NT              SANTIAGO           Long:  MP 72              PIP 11              DIP 0              SANTIAGO 83         Ring:   MP 72              PIP 72              DIP 20 " "             SANTIAGO 164         Small:  MP 62               PIP 60               DIP 25              SANTIAGO 147       Strength (Dynamometer) and Pinch Strength (Pinch Gauge)  Measured in pounds.    12/4/2018 12/4/2018     Left Right   Rung II Deferred due to protocol Deferred due to protocol   Key Pinch       3pt Pinch       2pt Pinch          SensationCMS Impairment/Limitation/Restriction for FOTO Survey  Therapist reviewed FOTO scores for Amrita Yoder.  FOTO documents entered into [employer] - see Media section.     Intake Limitation Score: 60% - 12/4/2018  Category: Self Care     Current : CK = at least 40% but < 60% impaired, limited or restricted  Goal: CJ = at least 20% but < 40% impaired, limited or restricted  Discharge: TBD         TREATMENT:    **Pt not wearing orthosis, and did not bring it with her today**  Amrita received the following supervised modalities after being cleared for contradictions for 10 minutes:   -Patient received MH  to Left hand to increase blood flow, circulation and tissue elasticity prior to therex        Amrita received the following direct contact modalities after being cleared for contraindications for 0 minutes:  -none today     Amrita received the following manual therapy techniques for 10 minutes:   -Performed RM to left digits/hand/wrist to decrease edema, improve joint mobility, decrease pain and improve lymphatic drainage to increase hand function.   -Performed scar massage to healed areas and around non-healed incision site to decrease adhesions and improve tensile glide.       Amrita received therapeutic exercises for 25 minutes including: (15 min 1:1)  -  *All theres with DIP gutter orthosis on*      AROM  PIP blocking (IF and LF)  Wave, straight fist    X 10 reps each (did not bring orthosis today)   Finger abd/add X 10 reps   Finger extension X 10 reps   isospheres X 2 min (performed 1'30")   Gentle gripping/flexion with med dowel 10 reps as tolerated       Home " Exercises and Education Provided     Education provided: Cont HEP and splint wear; reviewed importance of orthosis wear  - Progress towards goals     Written Home Exercises Provided: Patient instructed to cont prior HEP.  Exercises were reviewed and Amrita was able to demonstrate them prior to the end of the session.  Amrita demonstrated good  understanding of the education provided.   .   See EMR under Patient Instructions for exercises provided prior visit.     Assessment     Amrita Yoder is a 68 y.o. female referred to outpatient occupational/hand therapy and presents with a medical diagnosis of Left IF DIP fusion with Acumend screw and Left LF trigger finger release. Pt reports good tolerance of splint wear with no issues.  Incision site clean, dry and healing.  Cont with pain and sensitivity, especially on palm and dorsum of IF.  Pt noted to still hold L LF in hypertension which could be contributing to pain.  Cont to encourage pt to relax LLF with slight bend at PIP jt.  Pt verbalized understanding.  Edema decreasing in digits, however, joint stiffness remains in IF joints and LF PIP/MP.       Amrita is progressing well towards her goals and there are no updates to goals at this time. Pt prognosis continues as Good. Pt will continue to benefit from skilled outpatient occupational therapy to address the deficits listed in the problem list on initial evaluation, provide pt/family education and to maximize pt's level of independence in the home and community environment.     Anticipated barriers to continued occupational therapy: none    Pt's spiritual, cultural and educational needs considered and pt agreeable to plan of care and goals.    Goals     Long Term Goals (LTGs); to be met by discharge.  LTG #1: Pt will report a pain level of 2 out of 10 with ADLs  LTG #2: Pt will demo improved FOTO score by 20 points.   LTG #3: Pt will return to prior level of function for ADLs and household management.       Short Term Goals (STGs); to be met within 4 weeks (01/04/2019).  STG #1a: Pt will report 5 out of 10 pain level with ADLs.  STG #2a: Pt will report/demo [place] with fastening clothing manipulators.  STG #2b: Pt will report/demo [place] with using knife and fork to cut food.  STG #3a: Pt will demonstrate independence with issued HEP.   STG #3b: Pt will demo improved Left IF PIP Flexion by 30 degrees needed to aid with bimanual grasping skills  STG#3c: Pt. Will demo improved Left LF SANTIAGO by 50+ degrees need to aid with in hand manipulation skills.    Plan     Continue skilled occupational therapy with individualized plan of care 2 times weekly for 8 weeks during the certification period from 12/4/2018 to 02/02/2019    Discussed Plan of Care with patient: Yes  Updates/Grading for next session: none

## 2018-12-14 ENCOUNTER — OFFICE VISIT (OUTPATIENT)
Dept: ORTHOPEDICS | Facility: CLINIC | Age: 68
End: 2018-12-14
Payer: MEDICARE

## 2018-12-14 ENCOUNTER — HOSPITAL ENCOUNTER (OUTPATIENT)
Dept: RADIOLOGY | Facility: OTHER | Age: 68
Discharge: HOME | End: 2018-12-14
Attending: PHYSICIAN ASSISTANT
Payer: MEDICARE

## 2018-12-14 VITALS
HEIGHT: 60 IN | HEART RATE: 62 BPM | SYSTOLIC BLOOD PRESSURE: 156 MMHG | BODY MASS INDEX: 27.68 KG/M2 | WEIGHT: 141 LBS | DIASTOLIC BLOOD PRESSURE: 90 MMHG

## 2018-12-14 DIAGNOSIS — Z98.1 STATUS POST FINGER JOINT FUSION: ICD-10-CM

## 2018-12-14 DIAGNOSIS — Z98.890 POST-OPERATIVE STATE: Primary | ICD-10-CM

## 2018-12-14 PROCEDURE — 99024 POSTOP FOLLOW-UP VISIT: CPT | Mod: S$GLB,,, | Performed by: PHYSICIAN ASSISTANT

## 2018-12-14 PROCEDURE — 99999 PR PBB SHADOW E&M-EST. PATIENT-LVL III: CPT | Mod: PBBFAC,,, | Performed by: PHYSICIAN ASSISTANT

## 2018-12-14 PROCEDURE — 73140 X-RAY EXAM OF FINGER(S): CPT | Mod: 26,,, | Performed by: RADIOLOGY

## 2018-12-14 PROCEDURE — 73140 X-RAY EXAM OF FINGER(S): CPT | Mod: TC,FY

## 2018-12-14 NOTE — PROGRESS NOTES
Ms. Yoder is here today for a post-operative visit.  She is 6 weeks status post Left index finger DIP fusion with Acumed screw and left long finger trigger finger release by Dr. Winkler on 11/2/18. She reports that she is doing okay. She has been attending therapy. She is not doing home scar massage, she is working on her motion some. She remains stiff.  Pain is 6/10. She denies fever, chills, and sweats since the time of the surgery.     Physical exam:    Vitals:    12/14/18 1308   BP: (!) 156/90   Pulse: 62   Weight: 64 kg (141 lb)   Height: 5' (1.524 m)   PainSc:   6   PainLoc: Finger     Vital signs are stable, patient is afebrile.  Patient is well dressed and well groomed, no acute distress.  Alert and oriented to person, place, and time.   Incisions are clean, dry and intact, well healed. She is very sensitive over the trigger finger incision.  There is no erythema or exudate.  There is no sign of any infection. She is NVI. She has decreased flexion of the index and long fingers.    Assessment:  status post Left index finger DIP fusion with Acumed screw and left long finger trigger finger release by Dr. Winkler on 11/2/18    Plan:  Amrita was seen today for pain.    Diagnoses and all orders for this visit:    Post-operative state  -     X-Ray Finger 2 View or More Views; Future      - PO instruction reviewed and provided to patient  -continue OT, she needs to be more diligent with HEP including scar massage   -can begin slowly weaning from the DIP splint   -RTC 6 wks with left index xray       Stephanie Castillo PA-C  [facility]   [facility]  [place], [place]

## 2018-12-17 ENCOUNTER — CLINICAL SUPPORT (OUTPATIENT)
Dept: REHABILITATION | Facility: HOSPITAL | Age: 68
End: 2018-12-17
Payer: MEDICARE

## 2018-12-17 DIAGNOSIS — M25.642 FINGER STIFFNESS, LEFT: ICD-10-CM

## 2018-12-17 DIAGNOSIS — M79.645 PAIN IN LEFT FINGER(S): ICD-10-CM

## 2018-12-17 PROCEDURE — 97140 MANUAL THERAPY 1/> REGIONS: CPT | Mod: PN

## 2018-12-17 PROCEDURE — 97110 THERAPEUTIC EXERCISES: CPT | Mod: PN

## 2018-12-17 PROCEDURE — 97022 WHIRLPOOL THERAPY: CPT | Mod: PN

## 2018-12-17 NOTE — PROGRESS NOTES
"      Occupational Therapy Daily Treatment Note      Name: Amrita Yoder  Clinic Number: [id number]     Medical Diagnosis: Left IF DIP fusion with Acumend screw and Left LF trigger finger release  Date of Surgery: 11/02/2018  Surgical Procedure:  Left IF DIP fusion with Acumend screw and Left LF trigger finger release  Therapy Diagnosis:        Encounter Diagnoses   Name Primary?    Pain in left finger(s)      Finger stiffness, left        Precautions: Standard     Physician: Stephanie Castillo PA-C via Dr. Winkler  Physician Orders:Cont OT, begin weaning from DIP splint per PA Note on 12/14/2018  Date of Return to MD: 1/25/2019     Evaluation Date: 12/4/2018  Plan of Care Certification Period: 12/4/2018-01/11/2019     Visit #: / Visits authorized: 4/12  Insurance Authorization period Expiration: 02/02/2019     Time In: 1000am  Time Out: 1055am  Total Billable Time: 55 minutes        Subjective     Pt reports: " I've been massaging it 2-3 hours at home and it feels better" Pt. States she has been working on the scar massage, soft tissue massage around the forearm, and really working on getting the RF/SF down to the palm.   she was compliant with home exercise program given last session.   Response to previous treatment: good  Functional change: none    Pain: 5/10  Location: left long, index finger, dorsum of hand    Objective     Observation/Appearance: Pt weaning from splint- not wearing this visit. Pt continues to hold L IF in hyperextension position of PIP.  Well healing scar sites.     Edema. Measured in centimeters.    12/4/2018     Left   Index:       P1 7.0    PIP 6.0   P2 5.8    DIP 6.0   P3 5.3   Long:       P1 6.8    PIP 6.2   P2 5.5    DIP 5.0   P3 4.5            Hand ROM. Measured in degrees.    12/4/2018 12/17/2018     Left Left      AROM AROM   Index: MP  60 65 (+5)              PIP     0+/0 10 (+10)              DIP NT NT              SANTIAGO             Long:  MP 72 78              PIP 11 30            " "  DIP 0 25              SANTIAGO 83 133 (+50)          Ring:   MP 72               PIP 72               DIP 20               SANTIAGO 164 WFL          Small:  MP 62                PIP 60                DIP 25               SANTIAGO 147 WFL       Strength (Dynamometer) and Pinch Strength (Pinch Gauge)  Measured in pounds.    12/4/2018 12/4/2018     Left Right   Rung II Deferred due to protocol Deferred due to protocol   Key Pinch       3pt Pinch       2pt Pinch          SensationCMS Impairment/Limitation/Restriction for FOTO Survey  Therapist reviewed FOTO scores for Amrita Yoder.  FOTO documents entered into [employer] - see Media section.     Intake Limitation Score: 60% - 12/4/2018  Category: Self Care     Current : CK = at least 40% but < 60% impaired, limited or restricted  Goal: CJ = at least 20% but < 40% impaired, limited or restricted  Discharge: TBD         TREATMENT:      Amrita received the following supervised modalities after being cleared for contradictions for 15 minutes:   -Fluidotherapy: To left hand, continuous air, 110 deg, air speed 100 to decrease pain, edema & scar tissue and increased tissue extensibility.         Amrita received the following manual therapy techniques for 10 minutes:   -Performed RM to left digits/hand/wrist to decrease edema, improve joint mobility, decrease pain and improve lymphatic drainage to increase hand function.   -Performed scar massage to healed areas and around non-healed incision site to decrease adhesions and improve tensile glide.   - Passive range of motion stretches into flexion - PIP of IF, DIP and PIP of LF      Amrita received therapeutic exercises for 25 minutes including:   -       AROM  PIP blocking (IF and LF)  Wave, straight fist    X 10 reps each    Finger abd/add X 10 reps   Finger extension X 10 reps   isospheres X 2 min    Gentle gripping/flexion with med dowel X 2 min with liam strap (1/2" black liam strap)   Towel scrunches X 2 min   Light gripping " with pink sponge X 10 squeezes   Cotton ball p/u IF and LF to palm X 1 container       Home Exercises and Education Provided     Education provided: Cont HEP, only do 10 sponge squeezes every 2 hours. Work on towel scrunches, use of liam strap for IF and LF. Practice cotton ball p/u at home. Ok to stretch PIP joints into flexion- pt verbalized understanding. - sent pt home with liam strap.   - Progress towards goals     Written Home Exercises Provided: Patient instructed to cont prior HEP. 4-6xday  Exercises were reviewed and Amrita was able to demonstrate them prior to the end of the session.  Amrita demonstrated good  understanding of the education provided.   .   See EMR under Patient Instructions for exercises provided prior visit.     Assessment     Amrita Yoder is a 68 y.o. female referred to outpatient occupational/hand therapy and presents with a medical diagnosis of Left IF DIP fusion with Acumend screw and Left LF trigger finger release.  AROM with improvements in both digits seen. Pt reports she has been massaging the hand a lot. She reports she is trying to move it more. She has been not wearing the splint per the PA visit on the 14th.  Cont with pain and sensitivity, especially on palm and dorsum of IF.  Pt noted to still hold L LF in hypertension which could be contributing to pain.  Cont to encourage pt to relax LLF with slight bend at PIP jt.   Initiated fluidotherapy this date to increase active motion with heat. Provided light resistance pink sponge to squeeze at home- cautioned not to over do it. Pt. Painful with passive stretching but allows therapist to perform.       Amrita is progressing well towards her goals and there are no updates to goals at this time. Pt prognosis continues as Good. Pt will continue to benefit from skilled outpatient occupational therapy to address the deficits listed in the problem list on initial evaluation, provide pt/family education and to maximize pt's  level of independence in the home and community environment.     Anticipated barriers to continued occupational therapy: none    Pt's spiritual, cultural and educational needs considered and pt agreeable to plan of care and goals.    Goals     Long Term Goals (LTGs); to be met by discharge.  LTG #1: Pt will report a pain level of 2 out of 10 with ADLs  LTG #2: Pt will demo improved FOTO score by 20 points.   LTG #3: Pt will return to prior level of function for ADLs and household management.      Short Term Goals (STGs); to be met within 4 weeks (01/04/2019).  STG #1a: Pt will report 5 out of 10 pain level with ADLs.  STG #2a: Pt will report/demo [place] with fastening clothing manipulators.  STG #2b: Pt will report/demo [place] with using knife and fork to cut food.  STG #3a: Pt will demonstrate independence with issued HEP.   STG #3b: Pt will demo improved Left IF PIP Flexion by 30 degrees needed to aid with bimanual grasping skills  STG#3c: Pt. Will demo improved Left LF SANTIAGO by 50+ degrees need to aid with in hand manipulation skills.    Plan     Continue skilled occupational therapy with individualized plan of care 2 times weekly for 8 weeks during the certification period from 12/4/2018 to 02/02/2019    Discussed Plan of Care with patient: Yes  Updates/Grading for next session: none    Brit Haider, OT

## 2018-12-21 ENCOUNTER — CLINICAL SUPPORT (OUTPATIENT)
Dept: REHABILITATION | Facility: HOSPITAL | Age: 68
End: 2018-12-21
Payer: MEDICARE

## 2018-12-21 DIAGNOSIS — M25.642 FINGER STIFFNESS, LEFT: ICD-10-CM

## 2018-12-21 DIAGNOSIS — M79.645 PAIN IN LEFT FINGER(S): ICD-10-CM

## 2018-12-21 PROCEDURE — 97035 APP MDLTY 1+ULTRASOUND EA 15: CPT | Mod: PN

## 2018-12-21 PROCEDURE — 97022 WHIRLPOOL THERAPY: CPT | Mod: PN

## 2018-12-21 PROCEDURE — 97140 MANUAL THERAPY 1/> REGIONS: CPT | Mod: PN

## 2018-12-21 PROCEDURE — 97110 THERAPEUTIC EXERCISES: CPT | Mod: PN

## 2018-12-21 NOTE — PROGRESS NOTES
"          Occupational Therapy Daily Treatment Note      Name: Amrita Yoder  Clinic Number: [id number]     Medical Diagnosis: Left IF DIP fusion with Acumend screw and Left LF trigger finger release  Date of Surgery: 11/02/2018  Surgical Procedure:  Left IF DIP fusion with Acumend screw and Left LF trigger finger release  Therapy Diagnosis:        Encounter Diagnoses   Name Primary?    Pain in left finger(s)      Finger stiffness, left        Precautions: Standard     Physician: Stephanie Castillo PA-C via Dr. Winkler  Physician Orders:Cont OT, begin weaning from DIP splint per PA Note on 12/14/2018  Date of Return to MD: 1/25/2019     Evaluation Date: 12/4/2018  Plan of Care Certification Period: 12/4/2018-01/11/2019     Visit #: / Visits authorized: 5/12 (FOTO next session)  Insurance Authorization period Expiration: 02/02/2019     Time In: 1000 am  Time Out: 1055 am  Total Billable Time: 55 minutes        Subjective     Pt reports: " I feel like it's getting stuck in my scar when I bend."  she was compliant with home exercise program given last session.   Response to previous treatment: good  Functional change: none    Pain: 5/10  Location: left long, index finger, dorsum of hand    Objective     Observation/Appearance: Pt weaning from splint- not wearing this visit. Pt continues to hold L IF in hyperextension position of PIP.  Well healing scar sites.     Edema. Measured in centimeters.    12/4/2018     Left   Index:       P1 7.0    PIP 6.0   P2 5.8    DIP 6.0   P3 5.3   Long:       P1 6.8    PIP 6.2   P2 5.5    DIP 5.0   P3 4.5            Hand ROM. Measured in degrees.    12/4/2018 12/17/2018     Left Left      AROM AROM   Index: MP  60 65 (+5)              PIP     0+/0 10 (+10)              DIP NT NT              SANTIAGO             Long:  MP 72 78              PIP 11 30              DIP 0 25              SANTIAGO 83 133 (+50)          Ring:   MP 72               PIP 72               DIP 20               SANTIAGO 164 WFL " "         Small:  MP 62                PIP 60                DIP 25               SANTIAOG 147 WFL       Strength (Dynamometer) and Pinch Strength (Pinch Gauge)  Measured in pounds.    12/4/2018 12/4/2018     Left Right   Rung II Deferred due to protocol Deferred due to protocol   Key Pinch       3pt Pinch       2pt Pinch          SensationCMS Impairment/Limitation/Restriction for FOTO Survey  Therapist reviewed FOTO scores for Amrita Yoder.  FOTO documents entered into [employer] - see Media section.     Intake Limitation Score: 60% - 12/4/2018  Category: Self Care     Current : CK = at least 40% but < 60% impaired, limited or restricted  Goal: CJ = at least 20% but < 40% impaired, limited or restricted  Discharge: TBD         TREATMENT:      Amrita received the following supervised modalities after being cleared for contradictions for 15 minutes:   -Fluidotherapy: To left hand, continuous air, 110 deg, air speed 100 to decrease pain, edema & scar tissue and increased tissue extensibility.    Amrita received Ultrasound 3 Mhz, cont, @ .8 w/cm2 x 8 min to palm for scar mgmt.     Amrita received the following manual therapy techniques for 15 minutes:   -Performed RM to left digits/hand/wrist to decrease edema, improve joint mobility, decrease pain and improve lymphatic drainage to increase hand function.   -Performed scar massage to healed areas and around non-healed incision site to decrease adhesions and improve tensile glide.   - Passive range of motion stretches into flexion - PIP of IF, DIP and PIP of LF      Amrita received therapeutic exercises for 17 minutes including:   -       AROM  PIP blocking (IF and LF)  Wave, straight fist    X 10 reps each    Finger abd/add X 10 reps   Finger extension X 10 reps   isospheres X 2 min    Gentle gripping/flexion with med dowel X 2 min with liam strap (1/2" black liam strap)   Towel scrunches X 2 min   Light gripping with pink sponge X 10 squeezes   Cotton ball p/u " IF and LF to palm X 1 container       Home Exercises and Education Provided     Education provided: Cont HEP, only do 10 sponge squeezes every 2 hours. Work on towel scrunches, use of liam strap for IF and LF. Practice cotton ball p/u at home. Ok to stretch PIP joints into flexion- pt verbalized understanding. - sent pt home with liam strap.   - Progress towards goals     Written Home Exercises Provided: Patient instructed to cont prior HEP. 4-6xday  Exercises were reviewed and Amrita was able to demonstrate them prior to the end of the session.  Amrtia demonstrated good  understanding of the education provided.   .   See EMR under Patient Instructions for exercises provided prior visit.     Assessment     Amrita Yoder is a 68 y.o. female referred to outpatient occupational/hand therapy and presents with a medical diagnosis of Left IF DIP fusion with Acumend screw and Left LF trigger finger release.  Pt responding well to MT with P/AROM improvement noted afterwards.  She cont with pain and sensitivity, especially on palm and dorsum of IF.  Pt noted to still hold L LF in hypertension which could be contributing to pain.  Cont to encourage pt to relax LLF with slight bend at PIP jt.   Initiated ultrasound this date to mobilize scar tissue in palm. Tolerated without complaints.        Amrita is progressing well towards her goals and there are no updates to goals at this time. Pt prognosis continues as Good. Pt will continue to benefit from skilled outpatient occupational therapy to address the deficits listed in the problem list on initial evaluation, provide pt/family education and to maximize pt's level of independence in the home and community environment.     Anticipated barriers to continued occupational therapy: none    Pt's spiritual, cultural and educational needs considered and pt agreeable to plan of care and goals.    Goals     Long Term Goals (LTGs); to be met by discharge.  LTG #1: Pt will  report a pain level of 2 out of 10 with ADLs  LTG #2: Pt will demo improved FOTO score by 20 points.   LTG #3: Pt will return to prior level of function for ADLs and household management.      Short Term Goals (STGs); to be met within 4 weeks (01/04/2019).  STG #1a: Pt will report 5 out of 10 pain level with ADLs.  STG #2a: Pt will report/demo [place] with fastening clothing manipulators.  STG #2b: Pt will report/demo [place] with using knife and fork to cut food.  STG #3a: Pt will demonstrate independence with issued HEP.   STG #3b: Pt will demo improved Left IF PIP Flexion by 30 degrees needed to aid with bimanual grasping skills  STG#3c: Pt. Will demo improved Left LF SANTIAGO by 50+ degrees need to aid with in hand manipulation skills.    Plan     Continue skilled occupational therapy with individualized plan of care 2 times weekly for 8 weeks during the certification period from 12/4/2018 to 02/02/2019    Discussed Plan of Care with patient: Yes  Updates/Grading for next session: none    ALEKSANDAR Milton/ALYCIA

## 2018-12-28 ENCOUNTER — CLINICAL SUPPORT (OUTPATIENT)
Dept: REHABILITATION | Facility: HOSPITAL | Age: 68
End: 2018-12-28
Payer: MEDICARE

## 2018-12-28 DIAGNOSIS — M79.645 PAIN IN LEFT FINGER(S): ICD-10-CM

## 2018-12-28 DIAGNOSIS — M25.642 FINGER STIFFNESS, LEFT: ICD-10-CM

## 2018-12-28 PROCEDURE — 97140 MANUAL THERAPY 1/> REGIONS: CPT | Mod: PN

## 2018-12-28 PROCEDURE — 97110 THERAPEUTIC EXERCISES: CPT | Mod: PN

## 2018-12-28 PROCEDURE — 97035 APP MDLTY 1+ULTRASOUND EA 15: CPT | Mod: PN

## 2018-12-28 PROCEDURE — 97022 WHIRLPOOL THERAPY: CPT | Mod: PN

## 2019-01-02 ENCOUNTER — CLINICAL SUPPORT (OUTPATIENT)
Dept: REHABILITATION | Facility: HOSPITAL | Age: 69
End: 2019-01-02
Payer: MEDICARE

## 2019-01-02 DIAGNOSIS — M79.645 PAIN IN LEFT FINGER(S): ICD-10-CM

## 2019-01-02 DIAGNOSIS — M25.642 FINGER STIFFNESS, LEFT: ICD-10-CM

## 2019-01-02 PROCEDURE — 97110 THERAPEUTIC EXERCISES: CPT | Mod: PN

## 2019-01-02 PROCEDURE — 97018 PARAFFIN BATH THERAPY: CPT | Mod: PN

## 2019-01-02 PROCEDURE — 97140 MANUAL THERAPY 1/> REGIONS: CPT | Mod: PN

## 2019-01-02 PROCEDURE — 97022 WHIRLPOOL THERAPY: CPT | Mod: PN

## 2019-01-02 NOTE — PROGRESS NOTES
"      Occupational Therapy Daily Treatment Note      Name: Amrita Yoder  Clinic Number: [id number]     Medical Diagnosis: Left IF DIP fusion with Acumend screw and Left LF trigger finger release  Date of Surgery: 11/02/2018  Surgical Procedure:  Left IF DIP fusion with Acumend screw and Left LF trigger finger release  Therapy Diagnosis:        Encounter Diagnoses   Name Primary?    Pain in left finger(s)      Finger stiffness, left        Precautions: Standard     Physician: Stephanie Castillo PA-C via Dr. Winkler  Physician Orders:Cont OT, begin weaning from DIP splint per PA Note on 12/14/2018  Date of Return to MD: 1/25/2019     Evaluation Date: 12/4/2018  Plan of Care Certification Period: 12/4/2018-01/11/2019     Visit #: / Visits authorized: 7/12   Insurance Authorization period Expiration: 02/02/2019     Time In: 1030 am  Time Out: 1130am  Total Billable Time: 45 minutes        Subjective     Pt reports: "My right shoulder is hurting so bad I can barely do anything with the right hand- I couldn't put the strap because the shoulder hurt too bad"  she was compliant with home exercise program given last session.   Response to previous treatment: good  Functional change: none    Pain: 5/10  Location: left long, index finger, dorsum of hand    Objective                     Hand ROM. Measured in degrees.    12/4/2018 12/17/2018 12/28/2018     Left Left  Left     AROM AROM    Index: MP  60 65 (+5) 80 (+15)              PIP     0+/0 10 (+10) 30 (+20)              DIP NT NT NT              SANTIAGO               Long:  MP 72 78 83 (+5)              PIP 11 30 45 (+15)              DIP 0 25 37 (+12)              SANTIAGO 83 133 (+50)                Strength (Dynamometer) and Pinch Strength (Pinch Gauge)  Measured in pounds.    12/4/2018 12/4/2018     Left Right   Rung II Deferred due to protocol Deferred due to protocol   Key Pinch       3pt Pinch       2pt Pinch          SensationCMS Impairment/Limitation/Restriction for " "FOTO Survey  Therapist reviewed FOTO scores for Amrita Yoder.  FOTO documents entered into [employer] - see Media section.     6th visit Limitation Score: 39% - 12/28/2018  Category: Self Care     Current : CK = at least 40% but < 60% impaired, limited or restricted  Goal: CJ = at least 20% but < 40% impaired, limited or restricted  Discharge: TBD         TREATMENT:      Amrita received the following supervised modalities after being cleared for contradictions for 15 minutes:   -Fluidotherapy: To left hand, continuous air, 110 deg, air speed 100 to decrease pain, edema & scar tissue and increased tissue extensibility.  - 5 min moist heat pack post treatment with low load progressive stretch to straight fist with elastic wrap ( given to pt to take home)    Amrita received direct modalitiesUltrasound 3 Mhz, cont, @ .8 w/cm2 x 8 min to palm for scar mgmt.     Amrita received the following manual therapy techniques for 15 minutes:   -Performed RM to left digits/hand/wrist to decrease edema, improve joint mobility, decrease pain and improve lymphatic drainage to increase hand function.   -Performed scar massage to healed areas and around non-healed incision site to decrease adhesions and improve tensile glide.   - Passive range of motion stretches into flexion - PIP of IF, DIP and PIP of LF      Amrita received therapeutic exercises for 22 minutes including:   -       AROM  PIP blocking (IF and LF)  Wave, straight fist    X 10 reps each    Finger abd/add X 10 reps   Finger extension X 10 reps   isospheres X 2 min    Gentle gripping/flexion with small dowel X 2 min with liam strap (1/2" black liam strap)   Towel scrunches X 2 min   Yellow theraputty squeezes X 2 min   Cotton ball p/u IF and LF to palm X 1 container       Home Exercises and Education Provided     Education provided: Cont HEP, only do 10 sponge squeezes every 2 hours. Work on towel scrunches, use of liam strap for IF and LF. Practice cotton ball " "p/u at home. Ok to stretch PIP joints into flexion- pt verbalized understanding. - sent pt home with liam saucedo.    - Progress towards goals     Written Home Exercises Provided: Patient instructed to cont prior HEP. 4-6xday  Exercises were reviewed and Amrita was able to demonstrate them prior to the end of the session.  Amrita demonstrated good  understanding of the education provided.   .   See EMR under Patient Instructions for exercises provided prior visit.     Assessment     Amrita Yoder is a 68 y.o. female referred to outpatient occupational/hand therapy and presents with a medical diagnosis of Left IF DIP fusion with Acumend screw and Left LF trigger finger release.  Pt responding well to MT with P/AROM improvement noted afterwards, however pt in significant pain during MT.  Completed ultrasound this date to mobilize scar tissue in palm. Cont light low load stretch to straight fist with 2" elastic band x 5 min with moist heat applied for stretch. Continued to educate pt on importance of HEP AROM 4xday to work on motion. Pt. States she is performing yet continues with significant stiffness in the Index and Long fingers actively. Added yellow theraputty squeezes to increase functional closing of hand and strength in hand this date.       Amrita is progressing well towards her goals and there are no updates to goals at this time. Pt prognosis continues as Good. Pt will continue to benefit from skilled outpatient occupational therapy to address the deficits listed in the problem list on initial evaluation, provide pt/family education and to maximize pt's level of independence in the home and community environment.     Anticipated barriers to continued occupational therapy: none    Pt's spiritual, cultural and educational needs considered and pt agreeable to plan of care and goals.    Goals     Long Term Goals (LTGs); to be met by discharge.  LTG #1: Pt will report a pain level of 2 out of 10 with " ADLs  LTG #2: Pt will demo improved FOTO score by 20 points.   LTG #3: Pt will return to prior level of function for ADLs and household management.      Short Term Goals (STGs); to be met within 4 weeks (01/04/2019).  STG #1a: Pt will report 5 out of 10 pain level with ADLs.  STG #2a: Pt will report/demo [place] with fastening clothing manipulators.  STG #2b: Pt will report/demo [place] with using knife and fork to cut food.  STG #3a: Pt will demonstrate independence with issued HEP.   STG #3b: Pt will demo improved Left IF PIP Flexion by 30 degrees needed to aid with bimanual grasping skills  STG#3c: Pt. Will demo improved Left LF SANTIAGO by 50+ degrees need to aid with in hand manipulation skills.    Plan     Continue skilled occupational therapy with individualized plan of care 2 times weekly for 8 weeks during the certification period from 12/4/2018 to 02/02/2019      Updates/Grading for next session: cont to progress as able    Brit Haider OT

## 2019-01-08 ENCOUNTER — CLINICAL SUPPORT (OUTPATIENT)
Dept: REHABILITATION | Facility: HOSPITAL | Age: 69
End: 2019-01-08
Payer: MEDICARE

## 2019-01-08 DIAGNOSIS — M79.645 PAIN IN LEFT FINGER(S): ICD-10-CM

## 2019-01-08 DIAGNOSIS — M25.642 FINGER STIFFNESS, LEFT: ICD-10-CM

## 2019-01-08 PROCEDURE — 97140 MANUAL THERAPY 1/> REGIONS: CPT | Mod: PN

## 2019-01-08 PROCEDURE — 97110 THERAPEUTIC EXERCISES: CPT | Mod: PN

## 2019-01-08 PROCEDURE — 97022 WHIRLPOOL THERAPY: CPT | Mod: PN

## 2019-01-08 NOTE — PROGRESS NOTES
"      Occupational Therapy Daily Treatment Note      Name: Amrita Yoder  Clinic Number: [id number]     Medical Diagnosis: Left IF DIP fusion with Acumend screw and Left LF trigger finger release  Date of Surgery: 11/02/2018  Surgical Procedure:  Left IF DIP fusion with Acumend screw and Left LF trigger finger release  Therapy Diagnosis:        Encounter Diagnoses   Name Primary?    Pain in left finger(s)      Finger stiffness, left        Precautions: Standard     Physician: Stephanie Castillo PA-C via Dr. Winkler  Physician Orders:Cont OT, begin weaning from DIP splint per PA Note on 12/14/2018  Date of Return to MD: 1/25/2019     Evaluation Date: 12/4/2018  Plan of Care Certification Period: 12/4/2018-01/11/2019     Visit #: / Visits authorized: 8/12   Insurance Authorization period Expiration: 02/02/2019     Time In: 930 am  Time Out: 1030am  Total Billable Time: 45 minutes        Subjective     Pt reports: "I  Work it and stretch it and massage it and 2 hours later it is so painful and stiff again"  she was compliant with home exercise program given last session.   Response to previous treatment: good  Functional change: none    Pain: 5/10  Location: left long, index finger, dorsum of hand    Objective                     Hand ROM. Measured in degrees.    12/4/2018 12/17/2018 12/28/2018 01/08/2019     Left Left  Left Left     AROM AROM     Index: MP  60 65 (+5) 80 (+15) 85 (+5)              PIP     0+/0 10 (+10) 30 (+20) 43 (+13)              DIP NT NT NT NT              SANTIAGO                 Long:  MP 72 78 83 (+5) 90 (+7)              PIP 11 30 45 (+15) 46 (+1)              DIP 0 25 37 (+12) 35 (-2)              SANTIAGO 83 133 (+50)                  Strength (Dynamometer) and Pinch Strength (Pinch Gauge)  Measured in pounds.    12/4/2018 12/4/2018     Left Right   Rung II Deferred due to protocol Deferred due to protocol   Key Pinch       3pt Pinch       2pt Pinch          Sensation[employer] " "Impairment/Limitation/Restriction for FOTO Survey  Therapist reviewed FOTO scores for Amrita Yoder.  FOTO documents entered into EPIC - see Media section.     6th visit Limitation Score: 39% - 12/28/2018  Category: Self Care     Current : CK = at least 40% but < 60% impaired, limited or restricted  Goal: CJ = at least 20% but < 40% impaired, limited or restricted  Discharge: TBD         TREATMENT:      Amrita received the following supervised modalities after being cleared for contradictions for 10 minutes:   -Fluidotherapy: To left hand, continuous air, 110 deg, air speed 100 to decrease pain, edema & scar tissue and increased tissue extensibility.- IF and LF liam strapped together with black 1/2"liam strap.   - 5 min moist heat pack post treatment with low load progressive stretch to straight fist with elastic wrap- NT    Amrita received direct modalitiesUltrasound 3 Mhz, cont, @ .8 w/cm2 x 8 min to palm for scar mgmt.     Amrita received the following manual therapy techniques for 15 minutes:   -Performed scar massage to healed areas and around non-healed incision site to decrease adhesions and improve tensile glide.   - Passive range of motion stretches into flexion - PIP of IF, DIP and PIP of LF      Amrita received therapeutic exercises for 22 minutes including:   -       AROM  PIP blocking (IF and LF)  Wave, straight fist    X 10 reps each    Finger abd/add X 10 reps   Finger extension X 10 reps   isospheres X 2 min    Gentle gripping/flexion with small dowel X 2 min with liam strap (1/2" black liam strap)   Towel scrunches X 2 min NT   Yellow theraputty squeezes X 2 min   Cotton ball p/u IF and LF to palm X 1 container       Home Exercises and Education Provided     Education provided: Cont HEP, only do 10 sponge squeezes every 2 hours. Work on towel scrunches, use of liam strap for IF and LF. Practice cotton ball p/u at home. Ok to stretch PIP joints into flexion- pt verbalized " understanding. - sent pt home with liam saucedo.    - Progress towards goals     Written Home Exercises Provided: Patient instructed to cont prior HEP. 4-6xday  Exercises were reviewed and Amrita was able to demonstrate them prior to the end of the session.  Amrita demonstrated good  understanding of the education provided.   .   See EMR under Patient Instructions for exercises provided prior visit.     Assessment     Amrita Yoder is a 68 y.o. female referred to outpatient occupational/hand therapy and presents with a medical diagnosis of Left IF DIP fusion with Acumend screw and Left LF trigger finger release.  Reassessment with increases in IF motion but very minimal increase in LF. Pt. States the tip of the finger isn't hurting like before. States she is trying to use it all the time for her housework and massaging it to get it to work. Cont education on importance of specific exercises for AROM. Pt. Verbalized understanding. Pt. continues with pain in both fingers during manual therapy.  Completed ultrasound this date to mobilize scar tissue in palm. Cont yellow theraputty squeezes to increase functional closing of hand and strength in hand this date.       Amrita is progressing well towards her goals and there are no updates to goals at this time. Pt prognosis continues as Good. Pt will continue to benefit from skilled outpatient occupational therapy to address the deficits listed in the problem list on initial evaluation, provide pt/family education and to maximize pt's level of independence in the home and community environment.     Anticipated barriers to continued occupational therapy: none    Pt's spiritual, cultural and educational needs considered and pt agreeable to plan of care and goals.    Goals     Long Term Goals (LTGs); to be met by discharge.  LTG #1: Pt will report a pain level of 2 out of 10 with ADLs  LTG #2: Pt will demo improved FOTO score by 20 points.   LTG #3: Pt will return to  prior level of function for ADLs and household management.      Short Term Goals (STGs); to be met within 4 weeks (01/04/2019).  STG #1a: Pt will report 5 out of 10 pain level with ADLs.  STG #2a: Pt will report/demo [place] with fastening clothing manipulators.  STG #2b: Pt will report/demo [place] with using knife and fork to cut food.  STG #3a: Pt will demonstrate independence with issued HEP.   STG #3b: Pt will demo improved Left IF PIP Flexion by 30 degrees needed to aid with bimanual grasping skills  STG#3c: Pt. Will demo improved Left LF SANTIAGO by 50+ degrees need to aid with in hand manipulation skills.    Plan     Continue skilled occupational therapy with individualized plan of care 2 times weekly for 8 weeks during the certification period from 12/4/2018 to 02/02/2019      Updates/Grading for next session: cont to progress as able    Brit Haider, OT

## 2019-01-09 ENCOUNTER — CLINICAL SUPPORT (OUTPATIENT)
Dept: REHABILITATION | Facility: HOSPITAL | Age: 69
End: 2019-01-09
Payer: MEDICARE

## 2019-01-09 DIAGNOSIS — M25.642 FINGER STIFFNESS, LEFT: ICD-10-CM

## 2019-01-09 DIAGNOSIS — M79.645 PAIN IN LEFT FINGER(S): ICD-10-CM

## 2019-01-09 PROCEDURE — 97035 APP MDLTY 1+ULTRASOUND EA 15: CPT | Mod: PN

## 2019-01-09 PROCEDURE — 97140 MANUAL THERAPY 1/> REGIONS: CPT | Mod: PN

## 2019-01-09 PROCEDURE — 97110 THERAPEUTIC EXERCISES: CPT | Mod: PN

## 2019-01-09 PROCEDURE — 97022 WHIRLPOOL THERAPY: CPT | Mod: PN

## 2019-01-09 NOTE — PROGRESS NOTES
"      Occupational Therapy Daily Treatment Note      Name: Amrita Yoder  Clinic Number: [id number]     Medical Diagnosis: Left IF DIP fusion with Acumend screw and Left LF trigger finger release  Date of Surgery: 11/02/2018  Surgical Procedure:  Left IF DIP fusion with Acumend screw and Left LF trigger finger release  Therapy Diagnosis:        Encounter Diagnoses   Name Primary?    Pain in left finger(s)      Finger stiffness, left        Precautions: Standard     Physician: Stephanie Castillo PA-C via Dr. Winkler  Physician Orders:Cont OT, begin weaning from DIP splint per PA Note on 12/14/2018  Date of Return to MD: 1/25/2019     Evaluation Date: 12/4/2018  Plan of Care Certification Period: 01/11/2019-03/01/2019     Visit #: / Visits authorized: 9/12   Insurance Authorization period Expiration: 02/02/2019     Time In: 930 am  Time Out: 1030am  Total Billable Time: 45 minutes        Subjective     Pt reports: "It feels ok today- I feel like it is moving more"   she was compliant with home exercise program given last session.   Response to previous treatment: good  Functional change: none    Pain: 5/10  Location: left long, index finger, dorsum of hand    Objective                     Hand ROM. Measured in degrees.    12/4/2018 12/17/2018 12/28/2018 01/08/2019     Left Left  Left Left     AROM AROM     Index: MP  60 65 (+5) 80 (+15) 85 (+5)              PIP     0+/0 10 (+10) 30 (+20) 43 (+13)              DIP NT NT NT NT              SANTIAGO                 Long:  MP 72 78 83 (+5) 90 (+7)              PIP 11 30 45 (+15) 46 (+1)              DIP 0 25 37 (+12) 35 (-2)              SANTIAGO 83 133 (+50)                  Strength (Dynamometer) and Pinch Strength (Pinch Gauge)  Measured in pounds.    12/4/2018 12/4/2018     Left Right   Rung II Deferred due to protocol Deferred due to protocol   Key Pinch       3pt Pinch       2pt Pinch          SensationCMS Impairment/Limitation/Restriction for FOTO Survey  Therapist " "reviewed FOTO scores for Amrita Yoder.  FOTO documents entered into EPIC - see Media section.     6th visit Limitation Score: 39% - 12/28/2018  Category: Self Care     Current : CK = at least 40% but < 60% impaired, limited or restricted  Goal: CJ = at least 20% but < 40% impaired, limited or restricted  Discharge: TBD         TREATMENT:      Amrita received the following supervised modalities after being cleared for contradictions for 15 minutes:   -Fluidotherapy: To left hand, continuous air, 110 deg, air speed 100 to decrease pain, edema & scar tissue and increased tissue extensibility.- IF and LF liam strapped together with black 1/2"liam strap.   - 5 min moist heat pack post treatment with low load progressive stretch to straight fist with elastic wrap    Amrita received direct modalitiesUltrasound 3 Mhz, cont, @ .8 w/cm2 x 8 min to palm for scar mgmt.     Amrita received the following manual therapy techniques for 15 minutes:   -Performed scar massage to healed areas and around non-healed incision site to decrease adhesions and improve tensile glide.   - Passive range of motion stretches into flexion - PIP of IF, DIP and PIP of LF      Amrita received therapeutic exercises for 22 minutes including:   -       AROM  PIP blocking (IF and LF)  Wave, straight fist  P/H straight fist    X 10 reps each    isospheres X 2 min    Gentle gripping/flexion with small dowel X 2 min with liam strap (1/2" black liam strap)   Towel scrunches X 2 min NT   Yellow theraputty squeezes X 2 min   Cotton ball p/u IF and LF to palm X 1 container       Home Exercises and Education Provided     Education provided: Cont HEP, only do 10 sponge squeezes every 2 hours. Work on towel scrunches, use of liam strap for IF and LF. Practice cotton ball p/u at home. Ok to stretch PIP joints into flexion- pt verbalized understanding. - sent pt home with liam strap.  Provided yellow theraputty for home use for squeezing.   - " Progress towards goals     Written Home Exercises Provided: Patient instructed to cont prior HEP. 4-6xday  Exercises were reviewed and Amrita was able to demonstrate them prior to the end of the session.  Amrita demonstrated good  understanding of the education provided.   .   See EMR under Patient Instructions for exercises provided prior visit.     Assessment     Amrita Yoder is a 68 y.o. female referred to outpatient occupational/hand therapy and presents with a medical diagnosis of Left IF DIP fusion with Acumend screw and Left LF trigger finger release.  Reassessment with increases in IF motion but very minimal increase in LF. Pt. States the tip of the finger isn't hurting like before. States she is trying to use it all the time for her housework and massaging it to get it to work. Cont education on importance of specific exercises for AROM. Pt. Verbalized understanding. Pt. continues with pain in both fingers during manual therapy.  Completed ultrasound this date to mobilize scar tissue in palm. Cont yellow theraputty squeezes to increase functional closing of hand and strength in hand this date. Pt. Presents with elastic strap D-ring from home to use for straight fist low load long progression stretch- demo'd how to apply and did trial with moist heat at end of session today with good participation.       Amrita is progressing well towards her goals and there are no updates to goals at this time. Pt prognosis continues as Good. Pt will continue to benefit from skilled outpatient occupational therapy to address the deficits listed in the problem list on initial evaluation, provide pt/family education and to maximize pt's level of independence in the home and community environment.     Anticipated barriers to continued occupational therapy: none    Pt's spiritual, cultural and educational needs considered and pt agreeable to plan of care and goals.    Goals     Long Term Goals (LTGs); to be met by  discharge. All progressing  LTG #1: Pt will report a pain level of 2 out of 10 with ADLs  LTG #2: Pt will demo improved FOTO score by 20 points.   LTG #3: Pt will return to prior level of function for ADLs and household management.      Short Term Goals (STGs); to be met within 4 weeks (01/04/2019).  STG #1a: Pt will report 5 out of 10 pain level with ADLs. Progressing  STG #2a: Pt will report/demo [place] with fastening clothing manipulators. Progressing  STG #2b: Pt will report/demo [place] with using knife and fork to cut food. Progressing  STG #3a: Pt will demonstrate independence with issued HEP. Progressing  STG #3b: Pt will demo improved Left IF PIP Flexion by 30 degrees needed to aid with bimanual grasping skills Met- cont goal  STG#3c: Pt. Will demo improved Left LF SANTIAGO by 50+ degrees need to aid with in hand manipulation skills.- met- cont goal    Plan     Continue skilled occupational therapy with individualized plan of care 2 times weekly for 8 weeks during the certification period from 01/11/2019 to 03/01/2019      Updates/Grading for next session: cont to progress as able    Brit Haider, OT

## 2019-01-09 NOTE — PLAN OF CARE
"  Outpatient Therapy Updated Plan of Care     Visit Date: 1/9/2019  Name: Amrita Yoder  Clinic Number: [id number]     Medical Diagnosis: Left IF DIP fusion with Acumend screw and Left LF trigger finger release  Date of Surgery: 11/02/2018  Surgical Procedure:  Left IF DIP fusion with Acumend screw and Left LF trigger finger release  Therapy Diagnosis:           Encounter Diagnoses   Name Primary?    Pain in left finger(s)      Finger stiffness, left        Precautions: Standard     Physician: Stephanie Castillo PA-C via Dr. Winkler  Physician Orders:Cont OT,  Date of Return to MD: 1/25/2019     Evaluation Date: 12/4/2018  Plan of Care Certification Period: 01/11/2019-03/01/2019     Visit #: / Visits authorized: 9/12           Subjective     Update: " It is moving more today- I try all day to use it for everything"    Objective     Update: Hand ROM. Measured in degrees.    12/4/2018 12/17/2018 12/28/2018 01/08/2019     Left Left  Left Left     AROM AROM       Index: MP  60 65 (+5) 80 (+15) 85 (+5)              PIP     0+/0 10 (+10) 30 (+20) 43 (+13)              DIP NT NT NT NT              SANTIAGO                       Long:  MP 72 78 83 (+5) 90 (+7)              PIP 11 30 45 (+15) 46 (+1)              DIP 0 25 37 (+12) 35 (-2)              SANTIAGO 83 133 (+50)  165 (+32) 171 (+6)                                  Assessment     Update:  Amrita Yoder is a 68 y.o. female referred to outpatient occupational/hand therapy and presents with a medical diagnosis of Left IF DIP fusion with Acumend screw and Left LF trigger finger release.  Reassessment with increases in IF motion but very minimal increase in LF. Pt. States the tip of the finger isn't hurting like before. States she is trying to use it all the time for her housework and massaging it to get it to work. Cont education on importance of specific exercises for AROM. Pt. Verbalized understanding. Pt. continues with pain in both fingers during manual therapy.  " Completed ultrasound this date to mobilize scar tissue in palm. Cont yellow theraputty squeezes to increase functional closing of hand and strength in hand this date. Pt. Presents with elastic strap D-ring from home to use for straight fist low load long progression stretch- demo'd how to apply and did trial with moist heat at end of session today with good participation.         Amrita is progressing well towards her goals and there are no updates to goals at this time. Pt prognosis continues as Good. Pt will continue to benefit from skilled outpatient occupational therapy to address the deficits listed in the problem list on initial evaluation, provide pt/family education and to maximize pt's level of independence in the home and community environment.      Anticipated barriers to continued occupational therapy: none     Pt's spiritual, cultural and educational needs considered and pt agreeable to plan of care and goals.      Previous Short Term Goals Status:  Progressing- 2 met  New Short Term Goals Status:   Cont unmet and updated goals  Long Term Goal Status:   continue per initial plan of care.  Reasons for Recertification of Therapy:   Cont stiffness, weakness, and decreased functional use    Plan     Updated Certification Period: 01/11/2019 to 03/01/2019  Recommended Treatment Plan: 2 times per week for 8 weeks: Electrical Stimulation NMES, Fluidotherapy, Manual Therapy, Moist Heat/ Ice, Neuromuscular Re-ed, Orthotic Management and Training, Paraffin, Patient Education, Self Care, Therapeutic Activites, Therapeutic Exercise and Ultrasound      Brit Haider, OT  1/9/2019      I CERTIFY THE NEED FOR THESE SERVICES FURNISHED UNDER THIS PLAN OF TREATMENT AND WHILE UNDER MY CARE    Physician's comments:        Physician's Signature: ___________________________________________________

## 2019-01-10 ENCOUNTER — TELEPHONE (OUTPATIENT)
Dept: ORTHOPEDICS | Facility: CLINIC | Age: 69
End: 2019-01-10

## 2019-01-10 NOTE — TELEPHONE ENCOUNTER
----- Message from Charles De La Paz sent at 1/10/2019  9:49 AM CST -----  Contact: Lesvia-Patient daughter  Name of Who is Calling: Lesvia-Patient daughter       What is the request in detail: called in regards to possibly getting patient appointment 1/245/18 moved to a later time that day if possible due to not being able to make it for original time. Also if there is no availability open for later that date, patient wouldn't mind scheduling to another date as long as her f/u appointment is scheduled right after her XR is performed. A call back is requested. Please advise.        Can the clinic reply by [facility]: NO       What Number to Call Back if not in [facility]: [phone]

## 2019-01-14 ENCOUNTER — CLINICAL SUPPORT (OUTPATIENT)
Dept: REHABILITATION | Facility: HOSPITAL | Age: 69
End: 2019-01-14
Payer: MEDICARE

## 2019-01-14 DIAGNOSIS — M25.642 FINGER STIFFNESS, LEFT: ICD-10-CM

## 2019-01-14 DIAGNOSIS — M79.645 PAIN IN LEFT FINGER(S): ICD-10-CM

## 2019-01-14 PROCEDURE — 97022 WHIRLPOOL THERAPY: CPT | Mod: PN

## 2019-01-14 PROCEDURE — 97110 THERAPEUTIC EXERCISES: CPT | Mod: PN

## 2019-01-14 PROCEDURE — 97140 MANUAL THERAPY 1/> REGIONS: CPT | Mod: PN

## 2019-01-14 PROCEDURE — G8987 SELF CARE CURRENT STATUS: HCPCS | Mod: CK,PN

## 2019-01-14 PROCEDURE — 97035 APP MDLTY 1+ULTRASOUND EA 15: CPT | Mod: PN

## 2019-01-14 PROCEDURE — G8988 SELF CARE GOAL STATUS: HCPCS | Mod: CJ,PN

## 2019-01-14 NOTE — PROGRESS NOTES
"  Occupational Therapy Daily Treatment Note / Medicare Progress Note     Name: Amrita Yoder  Clinic Number: [id number]     Medical Diagnosis: Left IF DIP fusion with Acumend screw and Left LF trigger finger release  Date of Surgery: 11/02/2018  Surgical Procedure:  Left IF DIP fusion with Acumend screw and Left LF trigger finger release  Therapy Diagnosis:        Encounter Diagnoses   Name Primary?    Pain in left finger(s)      Finger stiffness, left        Precautions: Standard     Physician: Stephanie aCstillo PA-C via Dr. Winkler  Physician Orders:Cont OT, begin weaning from DIP splint per PA Note on 12/14/2018  Date of Return to MD: 1/25/2019     Evaluation Date: 12/4/2018  Plan of Care Certification Period: 01/11/2019-03/01/2019     Visit #: / Visits authorized: 10/12   Insurance Authorization period Expiration: 02/02/2019     Time In: 1035 am  Time Out: 1135am  Total Billable Time: 60 minutes        Subjective     Pt reports: " I have been using the putty and also trying to  the steering wheel and I think it is helping"   she was compliant with home exercise program given last session.   Response to previous treatment: good  Functional change: I'm using the hand now to wash my hair and driving.     Pain: 5/10  Location: left long, index finger, dorsum of hand    Objective           Hand ROM. Measured in degrees.    12/4/2018 12/17/2018 12/28/2018 01/08/2019 01/14/2019     Left Left  Left Left Left     AROM AROM      Index: MP  60 65 (+5) 80 (+15) 85 (+5) 85 (=)              PIP     0+/0 10 (+10) 30 (+20) 43 (+13) 57 (+14)              DIP NT NT NT NT               SANTIAGO                   Long:  MP 72 78 83 (+5) 90 (+7) 90 (=)              PIP 11 30 45 (+15) 46 (+1) 55 (+9)              DIP 0 25 37 (+12) 35 (-2) 37 (+2)              SANTIAGO 83 133 (+50)                    Strength (Dynamometer) and Pinch Strength (Pinch Gauge)  Measured in pounds.    12/4/2018 12/4/2018     Left Right   Rung II Deferred " "due to protocol Deferred due to protocol   Key Pinch       3pt Pinch       2pt Pinch          SensationCMS Impairment/Limitation/Restriction for FOTO hand Survey  Therapist reviewed FOTO scores for Amrita Yoder.  FOTO documents entered into [employer] - see Media section.     10th visit Limitation Score: 52% - 01/14/2019    Category: Self Care     Current : CK = at least 40% but < 60% impaired, limited or restricted  Goal: CJ = at least 20% but < 40% impaired, limited or restricted  Discharge: TBD         TREATMENT:      Amrita received the following supervised modalities after being cleared for contradictions for 15 minutes:   -Fluidotherapy: To left hand, continuous air, 110 deg, air speed 100 to decrease pain, edema & scar tissue and increased tissue extensibility.-    - - NT: 5 min moist heat pack post treatment with low load progressive stretch to straight fist with elastic wrap    Amrita received direct modalitiesUltrasound 3 Mhz, cont, @ .8 w/cm2 x 8 min to palm for scar mgmt.     Amrita received the following manual therapy techniques for 15 minutes:   -Performed scar massage to healed areas and around non-healed incision site to decrease adhesions and improve tensile glide.   - Passive range of motion stretches into flexion - PIP of IF, DIP and PIP of LF      Amrita received therapeutic exercises for 22 minutes including:   -       AROM  PIP blocking (IF and LF)  Wave, straight fist  P/H straight fist    X 10 reps each    isospheres X 2 min    Gentle gripping/flexion with small dowel X 2 min with liam strap (1/2" black liam strap)   Yellow theraputty squeezes X 2 min   Cotton ball p/u IF and LF to palm X 1 container       Home Exercises and Education Provided     Education provided: Cont HEP, only do 10 sponge squeezes every 2 hours. Work on towel scrunches, use of liam strap for IF and LF. Practice cotton ball p/u at home. Ok to stretch PIP joints into flexion- pt verbalized understanding. - sent " pt home with liam saucedo.  Provided yellow theraputty for home use for squeezing.   - Progress towards goals     Written Home Exercises Provided: Patient instructed to cont prior HEP. 4-6xday  Exercises were reviewed and Amrita was able to demonstrate them prior to the end of the session.  Amrita demonstrated good  understanding of the education provided.   .   See EMR under Patient Instructions for exercises provided prior visit.     Assessment     Amrita Yoder is a 68 y.o. female referred to outpatient occupational/hand therapy and presents with a medical diagnosis of Left IF DIP fusion with Acumend screw and Left LF trigger finger release.  Reassessment with increases in IF motion and Increase in PIP flexion of LF. Pt. States the tip of the finger isn't hurting like before. States she is trying to use it all the time for her housework and massaging it to get it to work. Cont education on importance of specific exercises for AROM. Pt. Verbalized understanding. Pt. continues with pain in both fingers during manual therapy.  Completed ultrasound this date to mobilize scar tissue in palm. Cont yellow theraputty squeezes to increase functional closing of hand and strength in hand this date. Pt. States she still isn't using the hand for many bimanual tasks. She reports she still have pain when she tries to pull her boots on with the left hand. She reports she isn't using the IF or LF to do buttons and she cannot fasten her bra using the left hand. Encouraged pt to attempt to use to do dressing and manipulators with the use of left hand and IF.       Amrita is progressing well towards her goals and there are no updates to goals at this time. Pt prognosis continues as Good. Pt will continue to benefit from skilled outpatient occupational therapy to address the deficits listed in the problem list on initial evaluation, provide pt/family education and to maximize pt's level of independence in the home and  community environment.     Anticipated barriers to continued occupational therapy: none    Pt's spiritual, cultural and educational needs considered and pt agreeable to plan of care and goals.    Goals     Long Term Goals (LTGs); to be met by discharge. All progressing  LTG #1: Pt will report a pain level of 2 out of 10 with ADLs  LTG #2: Pt will demo improved FOTO score by 20 points.   LTG #3: Pt will return to prior level of function for ADLs and household management.      Short Term Goals (STGs); to be met within 4 weeks (01/04/2019).  STG #1a: Pt will report 5 out of 10 pain level with ADLs. Progressing  STG #2a: Pt will report/demo [place] with fastening clothing manipulators. Progressing  STG #2b: Pt will report/demo [place] with using knife and fork to cut food. Progressing  STG #3a: Pt will demonstrate independence with issued HEP. Progressing  STG #3b: Pt will demo improved Left IF PIP Flexion by 30 degrees needed to aid with bimanual grasping skills Met- cont goal  STG#3c: Pt. Will demo improved Left LF SANTIAGO by 50+ degrees need to aid with in hand manipulation skills.- met- cont goal    Plan     Continue skilled occupational therapy with individualized plan of care 2 times weekly for 8 weeks during the certification period from 01/11/2019 to 03/01/2019      Updates/Grading for next session: cont to progress as able    Brit Haider OT

## 2019-01-16 ENCOUNTER — CLINICAL SUPPORT (OUTPATIENT)
Dept: REHABILITATION | Facility: HOSPITAL | Age: 69
End: 2019-01-16
Payer: MEDICARE

## 2019-01-16 DIAGNOSIS — M79.645 PAIN IN LEFT FINGER(S): ICD-10-CM

## 2019-01-16 DIAGNOSIS — M25.642 FINGER STIFFNESS, LEFT: ICD-10-CM

## 2019-01-16 PROCEDURE — 97022 WHIRLPOOL THERAPY: CPT | Mod: PN

## 2019-01-16 PROCEDURE — 97110 THERAPEUTIC EXERCISES: CPT | Mod: PN

## 2019-01-16 PROCEDURE — 97140 MANUAL THERAPY 1/> REGIONS: CPT | Mod: PN

## 2019-01-16 PROCEDURE — 97035 APP MDLTY 1+ULTRASOUND EA 15: CPT | Mod: PN

## 2019-01-16 NOTE — PROGRESS NOTES
"  Occupational Therapy Daily Treatment Note      Name: Amrita Yoder  Clinic Number: [id number]     Medical Diagnosis: Left IF DIP fusion with Acumend screw and Left LF trigger finger release  Date of Surgery: 11/02/2018  Surgical Procedure:  Left IF DIP fusion with Acumend screw and Left LF trigger finger release  Therapy Diagnosis:        Encounter Diagnoses   Name Primary?    Pain in left finger(s)      Finger stiffness, left        Precautions: Standard     Physician: Stephanie Castillo PA-C via Dr. Winkler  Physician Orders:Cont OT, begin weaning from DIP splint per PA Note on 12/14/2018  Date of Return to MD: 1/25/2019     Evaluation Date: 12/4/2018  Plan of Care Certification Period: 01/11/2019-03/01/2019     Visit #: / Visits authorized: 11/12   Insurance Authorization period Expiration: 02/02/2019     Time In: 1025 am  Time Out: 1125am  Total Billable Time: 60 minutes        Subjective     Pt reports: " Last night I peeled potatoes and I didn't even think about my hand I just used my hand like normal"   she was compliant with home exercise program given last session.   Response to previous treatment: good  Functional change: I'm using the hand now to wash my hair and driving.     Pain: 5/10  Location: left long, index finger, dorsum of hand    Objective           Hand ROM. Measured in degrees.    12/4/2018 12/17/2018 12/28/2018 01/08/2019 01/14/2019     Left Left  Left Left Left     AROM AROM      Index: MP  60 65 (+5) 80 (+15) 85 (+5) 85 (=)              PIP     0+/0 10 (+10) 30 (+20) 43 (+13) 57 (+14)              DIP NT NT NT NT               SANTIAGO                   Long:  MP 72 78 83 (+5) 90 (+7) 90 (=)              PIP 11 30 45 (+15) 46 (+1) 55 (+9)              DIP 0 25 37 (+12) 35 (-2) 37 (+2)              SANTIAGO 83 133 (+50)                    Strength (Dynamometer) and Pinch Strength (Pinch Gauge)  Measured in pounds.    12/4/2018 12/4/2018     Left Right   Rung II Deferred due to protocol " Deferred due to protocol   Key Pinch       3pt Pinch       2pt Pinch          SensationCMS Impairment/Limitation/Restriction for FOTO hand Survey  Therapist reviewed FOTO scores for Amrita Yoder.  FOTO documents entered into EP[employer] - see Media section.     10th visit Limitation Score: 52% - 01/14/2019    Category: Self Care     Current : CK = at least 40% but < 60% impaired, limited or restricted  Goal: CJ = at least 20% but < 40% impaired, limited or restricted  Discharge: TBD         TREATMENT:      Amrita received the following supervised modalities after being cleared for contradictions for 15 minutes:   -Fluidotherapy: To left hand, continuous air, 110 deg, air speed 100 to decrease pain, edema & scar tissue and increased tissue extensibility.-        Amrita received direct modalitiesUltrasound 3 Mhz, cont, @ .8 w/cm2 x 8 min to palm for scar mgmt.     Amrita received the following manual therapy techniques for 15 minutes:   -Performed scar massage to healed areas and around non-healed incision site to decrease adhesions and improve tensile glide.   - Passive range of motion stretches into flexion - PIP of IF, DIP and PIP of LF      Amrita received therapeutic exercises for 22 minutes including:   -       AROM  PIP blocking (IF and LF)  Wave, straight fist  P/H straight fist    X 10 reps each    Small button manipulation Off self ( shirt on table) button and unbutton x 1 trial   Opening small container  Open and close to deposit beads using tip to tip pinch with IF and thumb x ~ 20 beads   pom pom  p/u LF to palm X 3 containers large pom poms   Yellow putty X 10 squeezes  X 5 pancake/bloom  X 3 logs 2 pt pinch  X 3 logs 3 pt pinch       Home Exercises and Education Provided     Education provided: Cont HEP, only do 10 sponge squeezes every 2 hours. Work on towel scrunches, use of liam strap for IF and LF. Practice cotton ball p/u at home. Ok to stretch PIP joints into flexion- pt verbalized  understanding. - sent pt home with liam saucedo.  Provided yellow theraputty for home use for squeezing, EDC ext, and pinch with 2pt and 3 pt pinches.   - Progress towards goals     Written Home Exercises Provided: yes.   Exercises were reviewed and Amrita was able to demonstrate them prior to the end of the session.  Amrita demonstrated good  understanding of the education provided.   .   See EMR under Patient Instructions for exercises provided prior visit.     Assessment     Pt. Reports increase use of left hand for peeling potatoes last night. She reports she had pain after but she was able to peel about 9-10 potatoes. Added small button manipulation off self this date and picking up objects with just the IF and thumb today.       Amrita is progressing well towards her goals and there are no updates to goals at this time. Pt prognosis continues as Good. Pt will continue to benefit from skilled outpatient occupational therapy to address the deficits listed in the problem list on initial evaluation, provide pt/family education and to maximize pt's level of independence in the home and community environment.     Anticipated barriers to continued occupational therapy: none    Pt's spiritual, cultural and educational needs considered and pt agreeable to plan of care and goals.    Goals     Long Term Goals (LTGs); to be met by discharge. All progressing  LTG #1: Pt will report a pain level of 2 out of 10 with ADLs  LTG #2: Pt will demo improved FOTO score by 20 points.   LTG #3: Pt will return to prior level of function for ADLs and household management.      Short Term Goals (STGs); to be met within 4 weeks (01/04/2019).  STG #1a: Pt will report 5 out of 10 pain level with ADLs. Progressing  STG #2a: Pt will report/demo [place] with fastening clothing manipulators. Progressing  STG #2b: Pt will report/demo [place] with using knife and fork to cut food. Progressing  STG #3a: Pt will demonstrate  independence with issued HEP. Progressing  STG #3b: Pt will demo improved Left IF PIP Flexion by 30 degrees needed to aid with bimanual grasping skills Met- cont goal  STG#3c: Pt. Will demo improved Left LF SANTIAGO by 50+ degrees need to aid with in hand manipulation skills.- met- cont goal    Plan     Continue skilled occupational therapy with individualized plan of care 2 times weekly for 8 weeks during the certification period from 01/11/2019 to 03/01/2019      Updates/Grading for next session: cont to progress as able    Brit Haider OT

## 2019-01-16 NOTE — PATIENT INSTRUCTIONS
[facility]  OCCUPATIONAL THERAPY  HOME EXERCISE PROGRAM     Complete the following strengthening program 1x/day.        x 20 squeezes    X 5-10 x     x 3-5 logs    _                        MISTY Braon, OTR/L, CHT  Certified Hand Therapist  Occupational Therapist

## 2019-01-21 ENCOUNTER — CLINICAL SUPPORT (OUTPATIENT)
Dept: REHABILITATION | Facility: HOSPITAL | Age: 69
End: 2019-01-21
Payer: MEDICARE

## 2019-01-21 DIAGNOSIS — M79.645 PAIN IN LEFT FINGER(S): ICD-10-CM

## 2019-01-21 DIAGNOSIS — M25.642 FINGER STIFFNESS, LEFT: ICD-10-CM

## 2019-01-21 PROCEDURE — 97035 APP MDLTY 1+ULTRASOUND EA 15: CPT | Mod: PN

## 2019-01-21 PROCEDURE — 97140 MANUAL THERAPY 1/> REGIONS: CPT | Mod: PN

## 2019-01-21 PROCEDURE — 97022 WHIRLPOOL THERAPY: CPT | Mod: PN

## 2019-01-21 PROCEDURE — 97110 THERAPEUTIC EXERCISES: CPT | Mod: PN

## 2019-01-21 NOTE — PROGRESS NOTES
"  Occupational Therapy Daily Treatment Note      Name: Amrita Yoder  Clinic Number: [id number]     Medical Diagnosis: Left IF DIP fusion with Acumend screw and Left LF trigger finger release  Date of Surgery: 11/02/2018  Surgical Procedure:  Left IF DIP fusion with Acumend screw and Left LF trigger finger release  Therapy Diagnosis:        Encounter Diagnoses   Name Primary?    Pain in left finger(s)      Finger stiffness, left        Precautions: Standard     Physician: Stephanie Castillo PA-C via Dr. Winkler  Physician Orders:Cont OT, begin weaning from DIP splint per PA Note on 12/14/2018  Date of Return to MD: 1/25/2019     Evaluation Date: 12/4/2018  Plan of Care Certification Period: 01/11/2019-03/01/2019     Visit #: / Visits authorized: 12/24  Insurance Authorization period Expiration: 02/18/2019     Time In: 1000am  Time Out: 1055am  Total Billable Time: 55 minutes        Subjective     Pt reports: "  It's so cold- I feel so tight. I cleaned my apartment and I could really tell I was using my hand but then for about an hour I couldn't move my hand it hurt. I'm having electric shocks in the long finger. The index finger does not bother me to much, it is the other one that is my problem. My shoulder is still really bothering me"   she was compliant with home exercise program given last session.   Response to previous treatment: good  Functional change: I'm using the hand now to wash my hair and driving.     Pain: 5/10  Location: left long, index finger, dorsum of hand    Objective           Hand ROM. Measured in degrees.    12/4/2018 12/17/2018 12/28/2018 01/08/2019 01/14/2019 01/21/2019     Left Left  Left Left Left Left     AROM AROM       Index: MP  60 65 (+5) 80 (+15) 85 (+5) 85 (=) 87              PIP     0+/0 10 (+10) 30 (+20) 43 (+13) 57 (+14) 57              DIP NT NT NT NT NT NT              SANTIAGO                     Long:  MP 72 78 83 (+5) 90 (+7) 90 (=) 90 (=)              PIP 11 30 45 (+15) 46 " (+1) 55 (+9) 55 (=)              DIP 0 25 37 (+12) 35 (-2) 37 (+2) 37 (=)              SANTIAGO 83 133 (+50)                      Strength (Dynamometer) and Pinch Strength (Pinch Gauge)  Measured in pounds.    01/21/2019 01/21/2019     Right Left   Rung II 35 12   Lambert Pinch  12 7    3pt Pinch  9 4    2pt Pinch  9 4       SensationCMS Impairment/Limitation/Restriction for FOTO hand Survey  Therapist reviewed FOTO scores for Amrita Yoder.  FOTO documents entered into [employer] - see Media section.     10th visit Limitation Score: 52% - 01/14/2019    Category: Self Care     Current : CK = at least 40% but < 60% impaired, limited or restricted  Goal: CJ = at least 20% but < 40% impaired, limited or restricted  Discharge: TBD         TREATMENT:      Amrita received the following supervised modalities after being cleared for contradictions for 10 minutes:   -Fluidotherapy: To left hand, continuous air, 110 deg, air speed 100 to decrease pain, edema & scar tissue and increased tissue extensibility.-        Amrita received direct modalitiesUltrasound 3 Mhz, cont, @ .8 w/cm2 x 8 min to palm for scar mgmt, Included volar IF and LF.     Amrita received the following manual therapy techniques for 20 minutes:   -Performed scar massage to healed areas and around non-healed incision site to decrease adhesions and improve tensile glide.   - Passive range of motion stretches into flexion - PIP of IF, DIP and PIP of LF, intrinsic stretches LF.       Amrita received therapeutic exercises for 17 minutes including:   -       AROM  PIP blocking (IF and LF)  Wave, straight fist  P/H straight fist for IF, composite for LF    X 10 reps each    Small button manipulation NT Off self ( shirt on table) button and unbutton x 1 trial   Opening small container NT Open and close to deposit beads using tip to tip pinch with IF and thumb x ~ 20 beads   pom pom  p/u alt LF to palm, IF to palm X 1/3 containers large pom poms- unable to perform  all due to pain today   Yellow putty @ home today X 10 squeezes  X 5 pancake/bloom  X 3 logs 2 pt pinch  X 3 logs 3 pt pinch       Home Exercises and Education Provided     Education provided: Cont HEP, only do 10 sponge squeezes every 2 hours. Work on towel scrunches, use of liam strap for IF and LF. Practice cotton ball p/u at home. Ok to stretch PIP joints into flexion- pt verbalized understanding. - sent pt home with liam strap.  Provided yellow theraputty for home use for squeezing, EDC ext, and pinch with 2pt and 3 pt pinches.   - Progress towards goals     Written Home Exercises Provided: yes.   Exercises were reviewed and Amrita was able to demonstrate them prior to the end of the session.  Amrita demonstrated good  understanding of the education provided.   .   See EMR under Patient Instructions for exercises provided prior visit.     Assessment     Pt. Reports increase pain today. More pain during therex today. No change in AROM with reassessment today.  and pinch with deficits noted. Pt. performed decreased amount of therex today due to pain.       Amrita is progressing well towards her goals and there are no updates to goals at this time. Pt prognosis continues as Good. Pt will continue to benefit from skilled outpatient occupational therapy to address the deficits listed in the problem list on initial evaluation, provide pt/family education and to maximize pt's level of independence in the home and community environment.     Anticipated barriers to continued occupational therapy: none    Pt's spiritual, cultural and educational needs considered and pt agreeable to plan of care and goals.    Goals     Long Term Goals (LTGs); to be met by discharge. All progressing  LTG #1: Pt will report a pain level of 2 out of 10 with ADLs  LTG #2: Pt will demo improved FOTO score by 20 points.   LTG #3: Pt will return to prior level of function for ADLs and household management.      Short Term Goals  (STGs); to be met within 4 weeks (01/04/2019).  STG #1a: Pt will report 5 out of 10 pain level with ADLs. Progressing  STG #2a: Pt will report/demo [place] with fastening clothing manipulators. Progressing  STG #2b: Pt will report/demo [place] with using knife and fork to cut food. Progressing  STG #3a: Pt will demonstrate independence with issued HEP. Progressing  STG #3b: Pt will demo improved Left IF PIP Flexion by 30 degrees needed to aid with bimanual grasping skills Met- cont goal  STG#3c: Pt. Will demo improved Left LF SANTIAGO by 50+ degrees need to aid with in hand manipulation skills.- met- cont goal    Plan     Continue skilled occupational therapy with individualized plan of care 2 times weekly for 8 weeks during the certification period from 01/11/2019 to 03/01/2019      Updates/Grading for next session: cont to progress as able    Brit Haider, OT

## 2019-01-22 ENCOUNTER — DOCUMENTATION ONLY (OUTPATIENT)
Dept: REHABILITATION | Facility: HOSPITAL | Age: 69
End: 2019-01-22

## 2019-01-22 NOTE — PROGRESS NOTES
OCCUPATIONAL THERAPY RETURN TO DOCTOR PROGRESS NOTE            Patient: Amrita Yoder  MRN: [id number]  Date of Evaluation: 12/04/2018  Referring Physician:  Stephanie MENON via Dr. Jose Raul Anton MD visit: 01/24/2019  Primary Diagnosis: Left IF DIP fusion with Acumend screw and Left LF trigger finger release  DOS:11/02/2018  Certification Period: 01/11/2019-03/01/2019  Precautions:  No IF DIP flexion        TOTAL VISITS:    12    Pt is 11.5 weeks post op. She continues with difficulty with full AROM. She has strength deficits. She is receiving ultrasound and she has theraputty HEP. We work on manual stretching in clinic as well as at home she has a strap to stretch the PIPs into flexion.  Despite education on importance of specific exercises to perform I am unsure of how often she is doing the specific range of motion exercises vs just using for normal use. She has a liam strap for the IF/LF to encourage use for normal activities. She tends to keep the IF pointed straight and not include during functional activities.  She reports she has been using the hand more during functional tasks such as mopping, cooking, and dressing.     She is having a lot of pain in the right shoulder at this time as well.     Objective:   Hand ROM. Measured in degrees.    12/17/2018 12/28/2018 01/08/2019 01/14/2019 01/21/2019     Left  Left Left Left Left     AROM           Index: MP  65 (+5) 80 (+15) 85 (+5) 85 (=) 87 (+2)              PIP     10 (+10) 30 (+20) 43 (+13) 57 (+14) 57 (=)              DIP NT NT NT NT NT              SANTIAGO                           Long:  MP 78 83 (+5) 90 (+7) 90 (=) 90 (=)              PIP 30 45 (+15) 46 (+1) 55 (+9) 55 (=)              DIP 25 37 (+12) 35 (-2) 37 (+2) 37 (=)              SANTIAGO 133 (+50)                             Strength (Dynamometer) and Pinch Strength (Pinch Gauge)  Measured in pounds.    01/21/2019 01/21/2019     Right Left   Rung II 35 12   Lambert Pinch  12 7    3pt Pinch  9 4     2pt Pinch  9 4             Brit Haider OTALIYAH, OTR/L, CHT   Occupational therapist, Certified Hand Therapist  [facility]  [phone] phone  [phone] fax

## 2019-01-23 ENCOUNTER — TELEPHONE (OUTPATIENT)
Dept: FAMILY MEDICINE | Facility: CLINIC | Age: 69
End: 2019-01-23

## 2019-01-23 ENCOUNTER — HOSPITAL ENCOUNTER (OUTPATIENT)
Dept: RADIOLOGY | Facility: HOSPITAL | Age: 69
Discharge: HOME | End: 2019-01-23
Attending: PHYSICIAN ASSISTANT
Payer: MEDICARE

## 2019-01-23 ENCOUNTER — CLINICAL SUPPORT (OUTPATIENT)
Dept: REHABILITATION | Facility: HOSPITAL | Age: 69
End: 2019-01-23
Payer: MEDICARE

## 2019-01-23 DIAGNOSIS — M25.642 FINGER STIFFNESS, LEFT: ICD-10-CM

## 2019-01-23 DIAGNOSIS — Z98.890 POST-OPERATIVE STATE: ICD-10-CM

## 2019-01-23 DIAGNOSIS — Z12.31 SCREENING MAMMOGRAM, ENCOUNTER FOR: Primary | ICD-10-CM

## 2019-01-23 DIAGNOSIS — M79.645 PAIN IN LEFT FINGER(S): ICD-10-CM

## 2019-01-23 PROCEDURE — 97110 THERAPEUTIC EXERCISES: CPT | Mod: PN

## 2019-01-23 PROCEDURE — 73130 XR HAND COMPLETE 3 VIEW LEFT: ICD-10-PCS | Mod: 26,LT,, | Performed by: RADIOLOGY

## 2019-01-23 PROCEDURE — 97140 MANUAL THERAPY 1/> REGIONS: CPT | Mod: PN

## 2019-01-23 PROCEDURE — 73130 X-RAY EXAM OF HAND: CPT | Mod: TC,FY,PO,LT

## 2019-01-23 PROCEDURE — 73130 X-RAY EXAM OF HAND: CPT | Mod: 26,LT,, | Performed by: RADIOLOGY

## 2019-01-23 PROCEDURE — 97022 WHIRLPOOL THERAPY: CPT | Mod: PN

## 2019-01-23 NOTE — PROGRESS NOTES
"  Occupational Therapy Daily Treatment Note      Name: Amrita Yoder  Clinic Number: [id number]     Medical Diagnosis: Left IF DIP fusion with Acumend screw and Left LF trigger finger release  Date of Surgery: 11/02/2018  Surgical Procedure:  Left IF DIP fusion with Acumend screw and Left LF trigger finger release  Therapy Diagnosis:        Encounter Diagnoses   Name Primary?    Pain in left finger(s)      Finger stiffness, left        Precautions: Standard     Physician: Stephanie Castillo PA-C via Dr. Winkler  Physician Orders:Cont OT, begin weaning from DIP splint per PA Note on 12/14/2018  Date of Return to MD: 1/25/2019     Evaluation Date: 12/4/2018  Plan of Care Certification Period: 01/11/2019-03/01/2019     Visit #: / Visits authorized: 13/24  Insurance Authorization period Expiration: 02/18/2019     Time In: 1000am  Time Out: 1055am  Total Billable Time: 25 minutes        Subjective     Pt reports: "  It's not feeling too bad"   she was compliant with home exercise program given last session.   Response to previous treatment: good  Functional change: I'm using the hand now to wash my hair and driving.     Pain: 5/10  Location: left long, index finger, dorsum of hand    Objective           Hand ROM. Measured in degrees.    12/4/2018 12/17/2018 12/28/2018 01/08/2019 01/14/2019 01/21/2019     Left Left  Left Left Left Left     AROM AROM       Index: MP  60 65 (+5) 80 (+15) 85 (+5) 85 (=) 87              PIP     0+/0 10 (+10) 30 (+20) 43 (+13) 57 (+14) 57              DIP NT NT NT NT NT NT              SANT[place]O                     Long:  MP 72 78 83 (+5) 90 (+7) 90 (=) 90 (=)              PIP 11 30 45 (+15) 46 (+1) 55 (+9) 55 (=)              DIP 0 25 37 (+12) 35 (-2) 37 (+2) 37 (=)              SANT[place]O 83 133 (+50)                      Strength (Dynamometer) and Pinch Strength (Pinch Gauge)  Measured in pounds.    01/21/2019 01/21/2019     Right Left   Rung II 35 12   Lambert Pinch  12 7    3pt Pinch  9 4    2pt " Pinch  9 4       SensationCMS Impairment/Limitation/Restriction for FOTO hand Survey  Therapist reviewed FOTO scores for Amrita Yoder.  FOTO documents entered into [employer] - see Media section.     10th visit Limitation Score: 52% - 01/14/2019    Category: Self Care     Current : CK = at least 40% but < 60% impaired, limited or restricted  Goal: CJ = at least 20% but < 40% impaired, limited or restricted  Discharge: TBD         TREATMENT:      Amrita received the following supervised modalities after being cleared for contradictions for 10 minutes:   -Fluidotherapy: To left hand, continuous air, 110 deg, air speed 100 to decrease pain, edema & scar tissue and increased tissue extensibility.-        Amrita received direct modalitiesUltrasound 3 Mhz, cont, @ .8 w/cm2 x 8 min to palm for scar mgmt, Included volar IF and LF.     Amrita received the following manual therapy techniques for 15 minutes:   -Performed scar massage to healed areas and around non-healed incision site to decrease adhesions and improve tensile glide.   - Passive range of motion stretches into flexion - PIP of IF, DIP and PIP of LF, intrinsic stretches LF.       Amrita received therapeutic exercises for 22 minutes including: (10 min 1:1, 1NC)  -       AROM  PIP blocking (IF and LF)  Wave, straight fist  P/H straight fist for IF, composite for LF    X 10 reps each    Small button manipulation  Off self ( shirt on table) button and unbutton x 1 trial   Opening small container NT Open and close to deposit beads using tip to tip pinch with IF and thumb x ~ 20 beads   pom pom  p/u alt LF to palm, IF to palm X 1/3 containers large pom poms- unable to perform all due to pain today   Yellow putty @ home today X 10 squeezes  X 5 pancake/bloom  X 3 logs 2 pt pinch  X 3 logs 3 pt pinch       Home Exercises and Education Provided     Education provided: Cont HEP, cont to only do 10 sponge squeezes every 2 hours. Work on towel scrunches, use of liam  strap for IF and LF.Ok to stretch PIP joints into flexion- pt verbalized understanding. - sent pt home with liam saucedo.    - Progress towards goals     Written Home Exercises Provided: yes.   Exercises were reviewed and Amrita was able to demonstrate them prior to the end of the session.  Amrita demonstrated good  understanding of the education provided.   .   See EMR under Patient Instructions for exercises provided prior visit.     Assessment     Pt.decreased pain today. Fairly good tolerance of tx.  Cont with pain during therex. Pt. performed decreased amount of therex today due to pain. Cont to be limited ROM of fingers. Unable to make fist. Pt reports increase in functional activity participation with L hand, but unsure of full compliance with HEP.      Amrita is progressing well towards her goals and there are no updates to goals at this time. Pt prognosis continues as Good. Pt will continue to benefit from skilled outpatient occupational therapy to address the deficits listed in the problem list on initial evaluation, provide pt/family education and to maximize pt's level of independence in the home and community environment.     Anticipated barriers to continued occupational therapy: none    Pt's spiritual, cultural and educational needs considered and pt agreeable to plan of care and goals.    Goals     Long Term Goals (LTGs); to be met by discharge. All progressing  LTG #1: Pt will report a pain level of 2 out of 10 with ADLs  LTG #2: Pt will demo improved FOTO score by 20 points.   LTG #3: Pt will return to prior level of function for ADLs and household management.      Short Term Goals (STGs); to be met within 4 weeks (01/04/2019).  STG #1a: Pt will report 5 out of 10 pain level with ADLs. Progressing  STG #2a: Pt will report/demo [place] with fastening clothing manipulators. Progressing  STG #2b: Pt will report/demo [place] with using knife and fork to cut food. Progressing  STG #3a: Pt  will demonstrate independence with issued HEP. Progressing  STG #3b: Pt will demo improved Left IF PIP Flexion by 30 degrees needed to aid with bimanual grasping skills Met- cont goal  STG#3c: Pt. Will demo improved Left LF [place] by 50+ degrees need to aid with in hand manipulation skills.- met- cont goal    Plan     Continue skilled occupational therapy with individualized plan of care 2 times weekly for 8 weeks during the certification period from 01/11/2019 to 03/01/2019      Updates/Grading for next session: cont to progress as able    Paula Hernandez OT

## 2019-01-24 DIAGNOSIS — Z12.11 ENCOUNTER FOR FECAL IMMUNOCHEMICAL TEST SCREENING: Primary | ICD-10-CM

## 2019-01-25 ENCOUNTER — OFFICE VISIT (OUTPATIENT)
Dept: ORTHOPEDICS | Facility: CLINIC | Age: 69
End: 2019-01-25
Payer: MEDICARE

## 2019-01-25 VITALS
HEIGHT: 60 IN | SYSTOLIC BLOOD PRESSURE: 115 MMHG | WEIGHT: 141 LBS | HEART RATE: 69 BPM | BODY MASS INDEX: 27.68 KG/M2 | DIASTOLIC BLOOD PRESSURE: 87 MMHG

## 2019-01-25 DIAGNOSIS — Z98.890 POST-OPERATIVE STATE: Primary | ICD-10-CM

## 2019-01-25 PROCEDURE — 99024 POSTOP FOLLOW-UP VISIT: CPT | Mod: S$GLB,,, | Performed by: PHYSICIAN ASSISTANT

## 2019-01-25 PROCEDURE — 99999 PR PBB SHADOW E&M-EST. PATIENT-LVL III: ICD-10-PCS | Mod: PBBFAC,,, | Performed by: PHYSICIAN ASSISTANT

## 2019-01-25 PROCEDURE — 99999 PR PBB SHADOW E&M-EST. PATIENT-LVL III: CPT | Mod: PBBFAC,,, | Performed by: PHYSICIAN ASSISTANT

## 2019-01-25 PROCEDURE — 99024 PR POST-OP FOLLOW-UP VISIT: ICD-10-PCS | Mod: S$GLB,,, | Performed by: PHYSICIAN ASSISTANT

## 2019-01-25 NOTE — PROGRESS NOTES
Ms. Yoder is here today for a post-operative visit.  She is 12 weeks status post Left index finger DIP fusion with Acumed screw and left long finger trigger finger release by Dr. Winkler on 11/2/18. She reports that she is doing okay. The index finger is doing well at the fusion site. She notes decreased full flexion of the long finger. She has been attending therapy and working on this however it remains stiff. She denies fever, chills, and sweats since the time of the surgery.     Physical exam:    Vitals:    01/25/19 1316   BP: 115/87   Pulse: 69   Weight: 64 kg (141 lb)   Height: 5' (1.524 m)   PainSc:   4     Vital signs are stable, patient is afebrile.  Patient is well dressed and well groomed, no acute distress.  Alert and oriented to person, place, and time.   Incisions are clean, dry and intact, well healed. There is no erythema or exudate.  There is no sign of any infection. She is NVI. She has decreased flexion of the long finger, mostly at the PIP joint.    Assessment:  status post Left index finger DIP fusion with Acumed screw and left long finger trigger finger release by Dr. Winkler on 11/2/18    Plan:  Amrita was seen today for pain.    Diagnoses and all orders for this visit:    Post-operative state      - PO instruction reviewed and provided to patient  -continue OT/HEP  -RTC with Dr. Winkler for evaluation of the long finger. She does have arthritis.    Stephanie Castlilo PA-C  [facility]   [facility]  [place], [place]

## 2019-01-28 ENCOUNTER — HOSPITAL ENCOUNTER (OUTPATIENT)
Dept: RADIOLOGY | Facility: HOSPITAL | Age: 69
Discharge: HOME | End: 2019-01-28
Attending: FAMILY MEDICINE
Payer: MEDICARE

## 2019-01-28 ENCOUNTER — TELEPHONE (OUTPATIENT)
Dept: REHABILITATION | Facility: HOSPITAL | Age: 69
End: 2019-01-28

## 2019-01-28 DIAGNOSIS — Z12.31 SCREENING MAMMOGRAM, ENCOUNTER FOR: ICD-10-CM

## 2019-01-28 PROCEDURE — 77067 SCR MAMMO BI INCL CAD: CPT | Mod: TC

## 2019-01-28 PROCEDURE — 77063 BREAST TOMOSYNTHESIS BI: CPT | Mod: 26,,, | Performed by: RADIOLOGY

## 2019-01-28 PROCEDURE — 77063 MAMMO DIGITAL SCREENING BILAT WITH TOMOSYNTHESIS_CAD: ICD-10-PCS | Mod: 26,,, | Performed by: RADIOLOGY

## 2019-01-28 PROCEDURE — 77067 SCR MAMMO BI INCL CAD: CPT | Mod: 26,,, | Performed by: RADIOLOGY

## 2019-01-28 PROCEDURE — 77067 MAMMO DIGITAL SCREENING BILAT WITH TOMOSYNTHESIS_CAD: ICD-10-PCS | Mod: 26,,, | Performed by: RADIOLOGY

## 2019-01-28 NOTE — TELEPHONE ENCOUNTER
Called pt RE: Missed visit today. Pt. States she  Called and left message to cancel appt today due to having her mammogram appt today. Pt. Confirmed Wed @ 10 am for OT.   MISTY Baron, OTR/L, CHT   Occupational therapist, Certified Hand Therapist

## 2019-02-06 ENCOUNTER — CLINICAL SUPPORT (OUTPATIENT)
Dept: REHABILITATION | Facility: HOSPITAL | Age: 69
End: 2019-02-06
Payer: MEDICARE

## 2019-02-06 DIAGNOSIS — M25.642 FINGER STIFFNESS, LEFT: ICD-10-CM

## 2019-02-06 DIAGNOSIS — M79.645 PAIN IN LEFT FINGER(S): ICD-10-CM

## 2019-02-06 PROCEDURE — 97110 THERAPEUTIC EXERCISES: CPT | Mod: PN

## 2019-02-06 PROCEDURE — 97018 PARAFFIN BATH THERAPY: CPT | Mod: PN

## 2019-02-06 NOTE — PROGRESS NOTES
Occupational Therapy Daily Treatment Note      Name: Amrita Yoder  Clinic Number: [id number]     Medical Diagnosis: Left IF DIP fusion with Acumend screw and Left LF trigger finger release  Date of Surgery: 11/02/2018  Surgical Procedure:  Left IF DIP fusion with Acumend screw and Left LF trigger finger release  Therapy Diagnosis:        Encounter Diagnoses   Name Primary?    Pain in left finger(s)      Finger stiffness, left        Precautions: Standard     Physician: Stephanie Castillo PA-C via Dr. Winkler  Physician Orders:Cont OT, begin weaning from DIP splint per PA Note on 12/14/2018  Date of Return to MD: 1/25/2019     Evaluation Date: 12/4/2018  Plan of Care Certification Period: 01/11/2019-03/01/2019     Visit #: / Visits authorized: 14/24  Insurance Authorization period Expiration: 02/18/2019     Time In: 1000am  Time Out: 1055am  Total Billable Time: 45 minutes        Subjective     Pt reports: Pt. Expresses frustration over her visit with the PA last week. She reports the PA said the doctor may have to open her hand again. She is very frustrated with the process and anxious about what the doctor may say. She sees the doctor tomorrow to discuss further.    she was compliant with home exercise program given last session.   Response to previous treatment: good  Functional change: I'm using the hand for everything. I remember you saying use the hand so I do with the buttons, with the laundry, with everything    Pain: 5/10  Location: left long, index finger, dorsum of hand    Objective           Hand ROM. Measured in degrees.    12/4/2018 12/17/2018 12/28/2018 01/08/2019 01/14/2019 01/21/2019 02/06/2019 02/06/2019     Left Left  Left Left Left Left Left AROM Left PROM     AROM AROM         Index: MP  60 65 (+5) 80 (+15) 85 (+5) 85 (=) 87 90 95              PIP     0+/0 10 (+10) 30 (+20) 43 (+13) 57 (+14) 57 58 65              DIP NT NT NT NT NT NT NT NT              SANTIAGO                         Long:  MP  72 78 83 (+5) 90 (+7) 90 (=) 90 (=) 90 95              PIP 11 30 45 (+15) 46 (+1) 55 (+9) 55 (=) 58 85              DIP 0 25 37 (+12) 35 (-2) 37 (+2) 37 (=) 37 50              SANTIAGO 83 133 (+50)                          Strength (Dynamometer) and Pinch Strength (Pinch Gauge)  Measured in pounds.    01/21/2019 01/21/2019 02/06/2019     Right Left Left   Rung II 35 12 20 (+8)   Lambert Pinch  12 7  9 (+2)   3pt Pinch  9 4  9 (+5)   2pt Pinch  9 4  4 (=)      SensationCMS Impairment/Limitation/Restriction for FOTO hand Survey  Therapist reviewed FOTO scores for Amrita Yoder.  FOTO documents entered into [email or web address] - see Media section.     10th visit Limitation Score: 52% - 01/14/2019    Category: Self Care     Current : CK = at least 40% but < 60% impaired, limited or restricted  Goal: CJ = at least 20% but < 40% impaired, limited or restricted  Discharge: TBD         TREATMENT:      Amrita received the following supervised modalities after being cleared for contradictions for 10 minutes:   -Paraffin with Moist heat: To left hand, continuous air, 110 deg, air speed 100 to decrease pain, edema & scar tissue and increased tissue extensibility.-        Amrita received direct modalitiesUltrasound 3 Mhz, cont, @ .8 w/cm2 x 8 min to palm for scar mgmt, Included volar IF and LF.     Amrita received the following manual therapy techniques for 15 minutes:   -Performed scar massage to healed areas and around non-healed incision site to decrease adhesions and improve tensile glide.   - Passive range of motion stretches into flexion - PIP of IF, DIP and PIP of LF, intrinsic stretches LF.       Amrita received therapeutic exercises for 22 minutes including:   -       AROM  PIP blocking (IF and LF)  Wave, straight fist  P/H straight fist for IF, composite for LF    X 10 reps each    Small button manipulation NT Off self ( shirt on table) button and unbutton x 1 trial   Opening small container NT Open and close to deposit beads  using tip to tip pinch with IF and thumb x ~ 20 beads   pom pom  p/u alt LF to palm, IF to palm X 3 containers large pom poms-    Yellow putty  X 10 squeezes  X 5 pancake/bloom  X 3 logs 2 pt pinch  X 3 logs 3 pt pinch       Home Exercises and Education Provided     Education provided: Cont HEP, use of liam strap for IF and LF.Ok to stretch PIP joints into flexion- pt verbalized understanding. - sent pt home with liam strap.  Cont theraputty HEP  - Progress towards goals     Written Home Exercises Provided: Patient instructed to cont prior HEP.   Exercises were reviewed and Amrita was able to demonstrate them prior to the end of the session.  Amrita demonstrated good  understanding of the education provided.   .   See EMR under Patient Instructions for exercises provided 01/16/2019.     Assessment     Pt. Has made good progress since starting therapy. She states the PA told her there was arthritis so that is why she cannot bend her finger.   Pt. Expresses frustration that the doctor may need to do another surgery. Pt. States she has been using her hand as much as possible for cooking, cleaning, and self care. She demonstrates good passive motion > active motion- demonstrating lack of range of motion not likely from joint contracture. Cont use of ultrasound to scar. Pt. Also complaining of right shoulder pain limiting her use of the right hand. She states she is scared to hold her grandson because the pain in the shoulder may cause her to drop him.     Amrita is progressing well towards her goals and there are no updates to goals at this time. Pt prognosis continues as Good. Pt will continue to benefit from skilled outpatient occupational therapy to address the deficits listed in the problem list on initial evaluation, provide pt/family education and to maximize pt's level of independence in the home and community environment.     Anticipated barriers to continued occupational therapy: none    Pt's spiritual,  cultural and educational needs considered and pt agreeable to plan of care and goals.    Goals     Long Term Goals (LTGs); to be met by discharge. All progressing  LTG #1: Pt will report a pain level of 2 out of 10 with ADLs  LTG #2: Pt will demo improved FOTO score by 20 points.   LTG #3: Pt will return to prior level of function for ADLs and household management.      Short Term Goals (STGs); to be met within 4 weeks (01/04/2019).  STG #1a: Pt will report 5 out of 10 pain level with ADLs. Progressing  STG #2a: Pt will report/demo [place] with fastening clothing manipulators. MET  STG #2b: Pt will report/demo [place] with using knife and fork to cut food. MET  STG #3a: Pt will demonstrate independence with issued HEP. Progressing  STG #3b: Pt will demo improved Left IF PIP Flexion by 30 degrees needed to aid with bimanual grasping skills Met- cont goal  STG#3c: Pt. Will demo improved Left LF [place] by 50+ degrees need to aid with in hand manipulation skills.- met- cont goal    Plan     Continue skilled occupational therapy with individualized plan of care 2 times weekly for 8 weeks during the certification period from 01/11/2019 to 03/01/2019      Updates/Grading for next session: cont to progress as able    Brit Haider OT

## 2019-02-07 ENCOUNTER — HOSPITAL ENCOUNTER (OUTPATIENT)
Dept: RADIOLOGY | Facility: OTHER | Age: 69
Discharge: HOME | End: 2019-02-07
Attending: ORTHOPAEDIC SURGERY
Payer: MEDICARE

## 2019-02-07 ENCOUNTER — OFFICE VISIT (OUTPATIENT)
Dept: ORTHOPEDICS | Facility: CLINIC | Age: 69
End: 2019-02-07
Payer: MEDICARE

## 2019-02-07 ENCOUNTER — TELEPHONE (OUTPATIENT)
Dept: PAIN MEDICINE | Facility: CLINIC | Age: 69
End: 2019-02-07

## 2019-02-07 VITALS
DIASTOLIC BLOOD PRESSURE: 87 MMHG | HEART RATE: 73 BPM | BODY MASS INDEX: 27.68 KG/M2 | WEIGHT: 141 LBS | HEIGHT: 60 IN | SYSTOLIC BLOOD PRESSURE: 147 MMHG

## 2019-02-07 DIAGNOSIS — M25.511 RIGHT SHOULDER PAIN, UNSPECIFIED CHRONICITY: Primary | ICD-10-CM

## 2019-02-07 DIAGNOSIS — Z98.890 POST-OPERATIVE STATE: Primary | ICD-10-CM

## 2019-02-07 DIAGNOSIS — M25.511 RIGHT SHOULDER PAIN, UNSPECIFIED CHRONICITY: ICD-10-CM

## 2019-02-07 PROCEDURE — 99999 PR PBB SHADOW E&M-EST. PATIENT-LVL III: CPT | Mod: PBBFAC,,, | Performed by: ORTHOPAEDIC SURGERY

## 2019-02-07 PROCEDURE — 73030 X-RAY EXAM OF SHOULDER: CPT | Mod: 26,RT,, | Performed by: RADIOLOGY

## 2019-02-07 PROCEDURE — 3079F DIAST BP 80-89 MM HG: CPT | Mod: CPTII,S$GLB,, | Performed by: ORTHOPAEDIC SURGERY

## 2019-02-07 PROCEDURE — 3077F SYST BP >= 140 MM HG: CPT | Mod: CPTII,S$GLB,, | Performed by: ORTHOPAEDIC SURGERY

## 2019-02-07 PROCEDURE — 3077F PR MOST RECENT SYSTOLIC BLOOD PRESSURE >= 140 MM HG: ICD-10-PCS | Mod: CPTII,S$GLB,, | Performed by: ORTHOPAEDIC SURGERY

## 2019-02-07 PROCEDURE — 1101F PR PT FALLS ASSESS DOC 0-1 FALLS W/OUT INJ PAST YR: ICD-10-PCS | Mod: CPTII,S$GLB,, | Performed by: ORTHOPAEDIC SURGERY

## 2019-02-07 PROCEDURE — 73030 XR SHOULDER TRAUMA 3 VIEW RIGHT: ICD-10-PCS | Mod: 26,RT,, | Performed by: RADIOLOGY

## 2019-02-07 PROCEDURE — 3079F PR MOST RECENT DIASTOLIC BLOOD PRESSURE 80-89 MM HG: ICD-10-PCS | Mod: CPTII,S$GLB,, | Performed by: ORTHOPAEDIC SURGERY

## 2019-02-07 PROCEDURE — 73030 X-RAY EXAM OF SHOULDER: CPT | Mod: TC,FY,RT

## 2019-02-07 PROCEDURE — 1101F PT FALLS ASSESS-DOCD LE1/YR: CPT | Mod: CPTII,S$GLB,, | Performed by: ORTHOPAEDIC SURGERY

## 2019-02-07 PROCEDURE — 99999 PR PBB SHADOW E&M-EST. PATIENT-LVL III: ICD-10-PCS | Mod: PBBFAC,,, | Performed by: ORTHOPAEDIC SURGERY

## 2019-02-07 PROCEDURE — 99213 OFFICE O/P EST LOW 20 MIN: CPT | Mod: S$GLB,,, | Performed by: ORTHOPAEDIC SURGERY

## 2019-02-07 PROCEDURE — 99213 PR OFFICE/OUTPT VISIT, EST, LEVL III, 20-29 MIN: ICD-10-PCS | Mod: S$GLB,,, | Performed by: ORTHOPAEDIC SURGERY

## 2019-02-07 NOTE — TELEPHONE ENCOUNTER
----- Message from Alexus Galloway LPN sent at 2/7/2019 10:52 AM CST -----  Good morning, I have this patient needing an injection before therapy w/Laney. Does Dr Jean do the blocks?

## 2019-02-08 ENCOUNTER — OFFICE VISIT (OUTPATIENT)
Dept: ORTHOPEDICS | Facility: CLINIC | Age: 69
End: 2019-02-08
Payer: MEDICARE

## 2019-02-08 VITALS
DIASTOLIC BLOOD PRESSURE: 81 MMHG | HEIGHT: 60 IN | SYSTOLIC BLOOD PRESSURE: 113 MMHG | HEART RATE: 71 BPM | WEIGHT: 141.13 LBS | BODY MASS INDEX: 27.71 KG/M2

## 2019-02-08 DIAGNOSIS — G89.29 CHRONIC RIGHT SHOULDER PAIN: Primary | ICD-10-CM

## 2019-02-08 DIAGNOSIS — M25.511 CHRONIC RIGHT SHOULDER PAIN: Primary | ICD-10-CM

## 2019-02-08 PROCEDURE — 99213 OFFICE O/P EST LOW 20 MIN: CPT | Mod: S$GLB,,, | Performed by: PHYSICIAN ASSISTANT

## 2019-02-08 PROCEDURE — 99999 PR PBB SHADOW E&M-EST. PATIENT-LVL III: CPT | Mod: PBBFAC,,, | Performed by: PHYSICIAN ASSISTANT

## 2019-02-08 PROCEDURE — 99213 PR OFFICE/OUTPT VISIT, EST, LEVL III, 20-29 MIN: ICD-10-PCS | Mod: S$GLB,,, | Performed by: PHYSICIAN ASSISTANT

## 2019-02-08 PROCEDURE — 1101F PT FALLS ASSESS-DOCD LE1/YR: CPT | Mod: CPTII,S$GLB,, | Performed by: PHYSICIAN ASSISTANT

## 2019-02-08 PROCEDURE — 3079F DIAST BP 80-89 MM HG: CPT | Mod: CPTII,S$GLB,, | Performed by: PHYSICIAN ASSISTANT

## 2019-02-08 PROCEDURE — 3074F PR MOST RECENT SYSTOLIC BLOOD PRESSURE < 130 MM HG: ICD-10-PCS | Mod: CPTII,S$GLB,, | Performed by: PHYSICIAN ASSISTANT

## 2019-02-08 PROCEDURE — 3074F SYST BP LT 130 MM HG: CPT | Mod: CPTII,S$GLB,, | Performed by: PHYSICIAN ASSISTANT

## 2019-02-08 PROCEDURE — 99999 PR PBB SHADOW E&M-EST. PATIENT-LVL III: ICD-10-PCS | Mod: PBBFAC,,, | Performed by: PHYSICIAN ASSISTANT

## 2019-02-08 PROCEDURE — 1101F PR PT FALLS ASSESS DOC 0-1 FALLS W/OUT INJ PAST YR: ICD-10-PCS | Mod: CPTII,S$GLB,, | Performed by: PHYSICIAN ASSISTANT

## 2019-02-08 PROCEDURE — 3079F PR MOST RECENT DIASTOLIC BLOOD PRESSURE 80-89 MM HG: ICD-10-PCS | Mod: CPTII,S$GLB,, | Performed by: PHYSICIAN ASSISTANT

## 2019-02-08 NOTE — PROGRESS NOTES
Subjective:      Patient ID: Amrita Yoder is a 68 y.o. female.    Chief Complaint: Pain of the Right Shoulder      HPI  Amrita Yoder is a 68 y.o. female presenting today for right shoulder pain.  Present at least few months. She denies injury. She has increased pain with motion. She has not tried anything. She has a past hx of calcific tendinitis.    She is status post Left index finger DIP fusion with Acumed screw and left long finger trigger finger release by Dr. Winkler on 11/2/18. Currently in therapy for this.    Review of patient's allergies indicates:  No Known Allergies      Current Outpatient Medications   Medication Sig Dispense Refill    atorvastatin (LIPITOR) 20 MG tablet TAKE 1 TABLET BY MOUTH NIGHTLY 90 tablet 1    flunisolide 25 mcg, 0.025%, (NASALIDE) 25 mcg (0.025 %) Spry 2 sprays by Nasal route 2 (two) times daily. 25 mL 0    lisinopril (PRINIVIL,ZESTRIL) 2.5 MG tablet Take 1 tablet (2.5 mg total) by mouth once daily. 90 tablet 3    meloxicam (MOBIC) 15 MG tablet Take 1 tablet (15 mg total) by mouth once daily. 30 tablet 1    multivitamin capsule Take 1 capsule by mouth once daily.      PATADAY 0.2 % Drop       oxyCODONE-acetaminophen (PERCOCET) 5-325 mg per tablet Take 1 tablet by mouth every 4 (four) hours as needed for Pain. 50 tablet 0     No current facility-administered medications for this visit.        Past Medical History:   Diagnosis Date    Hyperlipidemia     Hypertension        Past Surgical History:   Procedure Laterality Date    CATARACT EXTRACTION W/  INTRAOCULAR LENS IMPLANT Bilateral     FUSION, JOINT, FINGER left index Left 11/2/2018    [facility]    RELEASE, TRIGGER FINGER left long Left 11/2/2018    Performed by Zoe Winkler MD at [facility] OR       Review of Systems:  Constitutional: Negative for chills and fever.   Respiratory: Negative for cough and shortness of breath.    Gastrointestinal: Negative for nausea  and vomiting.   Skin: Negative for rash.   Neurological: Negative for dizziness and headaches.   Psychiatric/Behavioral: Negative for depression.   MSK as in HPI       OBJECTIVE:     PHYSICAL EXAM:  /81   Pulse 71   Ht 5' (1.524 m)   Wt 64 kg (141 lb 1.5 oz)   LMP  (LMP Unknown)   BMI 27.56 kg/m²     GEN:  NAD, well-developed, well-groomed.  NEURO: Awake, alert, and oriented. Normal attention and concentration.    PSYCH: Normal mood and affect. Behavior is normal.  HEENT: No cervical lymphadenopathy noted.  CARDIOVASCULAR: Radial pulses 2+ bilaterally. No LE edema noted.  PULMONARY: Breath sounds normal. No respiratory distress.  SKIN: Intact, no rashes.      MSK:   RUE:  Good active ROM of the wrist and fingers. Fair shoulder motion. She has pain with full motion. she is ttp over the anterior shoulder. AIN/PIN/Radial/Median/Ulnar Nerves assessed in isolation without deficit. Radial & Ulnar arteries palpated 2+. Capillary Refill <3s.      RADIOGRAPHS:  Xray right shoulder 2/7/19  FINDINGS:  Bones: No fracture.  No lytic or blastic lesion.    Joints: No evidence for glenohumeral dislocation.  Mild acromioclavicular arthrosis noted.    Soft tissues: Rotator cuff calcific tendinitis no longer evident.  Comments: I have personally reviewed the imaging and I agree with the above radiologist's report.    ASSESSMENT/PLAN:       ICD-10-CM ICD-9-CM   1. Chronic right shoulder pain M25.511 719.41    G89.29 338.29       Orders Placed This Encounter    Ambulatory Referral to Physical/Occupational Therapy      Plan:   -pt wishes to proceed with physical therapy for the shoulder. She declines injection today.   -RTC 6 wks         The patient indicates understanding of these issues and agrees to the plan.    Stephanie Castillo PA-C  [facility]   [facility]  [place], [place]

## 2019-02-11 ENCOUNTER — CLINICAL SUPPORT (OUTPATIENT)
Dept: REHABILITATION | Facility: HOSPITAL | Age: 69
End: 2019-02-11
Payer: MEDICARE

## 2019-02-11 DIAGNOSIS — M25.642 FINGER STIFFNESS, LEFT: ICD-10-CM

## 2019-02-11 DIAGNOSIS — M79.645 PAIN IN LEFT FINGER(S): ICD-10-CM

## 2019-02-11 PROCEDURE — 97018 PARAFFIN BATH THERAPY: CPT | Mod: PN,59

## 2019-02-11 PROCEDURE — 97110 THERAPEUTIC EXERCISES: CPT | Mod: PN

## 2019-02-11 PROCEDURE — 97140 MANUAL THERAPY 1/> REGIONS: CPT | Mod: PN

## 2019-02-11 NOTE — PROGRESS NOTES
Occupational Therapy Daily Treatment Note      Name: Amrita Yoder  Clinic Number: [id number]     Medical Diagnosis: Left IF DIP fusion with Acumend screw and Left LF trigger finger release  Date of Surgery: 11/02/2018  Surgical Procedure:  Left IF DIP fusion with Acumend screw and Left LF trigger finger release  Therapy Diagnosis:        Encounter Diagnoses   Name Primary?    Pain in left finger(s)      Finger stiffness, left        Precautions: Standard     Physician: Stephanie Castillo PA-C via Dr. Winkler  Physician Orders:Cont OT, begin weaning from DIP splint per PA Note on 12/14/2018  Date of Return to MD: 1/25/2019     Evaluation Date: 12/4/2018  Plan of Care Certification Period: 01/11/2019-03/01/2019     Visit #: / Visits authorized: 15/24  Insurance Authorization period Expiration: 02/18/2019     Time In: 1000am  Time Out: 1055am  Total Billable Time: 45 minutes        Subjective     Pt reports: She is confused about what they are going to do with putting the shot in her hand at pain management.  she was compliant with home exercise program given last session.   Response to previous treatment: good  Functional change: I'm using the hand for everything. I remember you saying use the hand so I do with the buttons, with the laundry, with everything    Pain: 3/10  Location: left long, index finger, with passive motion    Objective           Hand ROM. Measured in degrees.    12/4/2018 12/17/2018 12/28/2018 01/08/2019 01/14/2019 01/21/2019 02/06/2019 02/06/2019     Left Left  Left Left Left Left Left AROM Left PROM     AROM AROM         Index: MP  60 65 (+5) 80 (+15) 85 (+5) 85 (=) 87 90 95              PIP     0+/0 10 (+10) 30 (+20) 43 (+13) 57 (+14) 57 58 65              DIP NT NT NT NT NT NT NT NT              SANTIAGO                         Long:  MP 72 78 83 (+5) 90 (+7) 90 (=) 90 (=) 90 95              PIP 11 30 45 (+15) 46 (+1) 55 (+9) 55 (=) 58 85              DIP 0 25 37 (+12) 35 (-2) 37 (+2) 37 (=) 37  50              SANTIAGO 83 133 (+50)                          Strength (Dynamometer) and Pinch Strength (Pinch Gauge)  Measured in pounds.    01/21/2019 01/21/2019 02/06/2019     Right Left Left   Rung II 35 12 20 (+8)   Lambert Pinch  12 7  9 (+2)   3pt Pinch  9 4  9 (+5)   2pt Pinch  9 4  4 (=)      Sensation[employer] Impairment/Limitation/Restriction for FOTO hand Survey  Therapist reviewed FOTO scores for Amrita Yoder.  FOTO documents entered into [employer] - see Media section.     10th visit Limitation Score: 52% - 01/14/2019    Category: Self Care     Current : CK = at least 40% but < 60% impaired, limited or restricted  Goal: CJ = at least 20% but < 40% impaired, limited or restricted  Discharge: TBD         TREATMENT:      Amrita received the following supervised modalities after being cleared for contradictions for 10 minutes:   -Paraffin with Moist heat: To left hand, continuous air, 110 deg, air speed 100 to decrease pain, edema & scar tissue and increased tissue extensibility.-        Amrita received direct modalitiesUltrasound 3 Mhz, cont, @ .8 w/cm2 x 8 min to palm for scar mgmt, Included volar IF and LF.     Amrita received the following manual therapy techniques for 15 minutes:   -Performed scar massage to healed areas and around non-healed incision site to decrease adhesions and improve tensile glide.   - Passive range of motion stretches into flexion - PIP of IF, DIP and PIP of LF, intrinsic stretches LF.       Amrita received therapeutic exercises for 22 minutes including:   -       AROM  PIP blocking (IF and LF)  Wave, straight fist  P/H straight fist for IF, composite for LF    X 10 reps each    pom pom  p/u alt LF to palm, IF to palm X 3 containers large pom poms-    Peach putty  X 10 squeezes  X 5 pancake/bloom  X 3 logs 2 pt pinch  X 3 logs 3 pt pinch   Digi flex  x 5# green x 20 IF and LF       Home Exercises and Education Provided     Education provided: Cont HEP, use of liam strap for IF  and LF.Ok to stretch PIP joints into flexion- pt verbalized understanding. - sent pt home with liam saucedo.  Cont theraputty HEP  - Progress towards goals     Written Home Exercises Provided: Patient instructed to cont prior HEP.   Exercises were reviewed and Amrita was able to demonstrate them prior to the end of the session.  Amrita demonstrated good  understanding of the education provided.   .   See EMR under Patient Instructions for exercises provided 01/16/2019.     Assessment     Pt. Has made good progress since starting therapy. She continues with good passive motion. She does not have increase in pain with scar massage. Passive ROM is painful at end range. She allows therapist to passively range her and she is able to achieve near full passive motion. She does not demonstrate intrinsic tightness upon objective evaluation. Increased resistance to putty this date with good participation. Added digi flex for strengthening.     Amrita is progressing well towards her goals and there are no updates to goals at this time. Pt prognosis continues as Good. Pt will continue to benefit from skilled outpatient occupational therapy to address the deficits listed in the problem list on initial evaluation, provide pt/family education and to maximize pt's level of independence in the home and community environment.     Anticipated barriers to continued occupational therapy: none    Pt's spiritual, cultural and educational needs considered and pt agreeable to plan of care and goals.    Goals     Long Term Goals (LTGs); to be met by discharge. All progressing  LTG #1: Pt will report a pain level of 2 out of 10 with ADLs  LTG #2: Pt will demo improved FOTO score by 20 points.   LTG #3: Pt will return to prior level of function for ADLs and household management.      Short Term Goals (STGs); to be met within 4 weeks (01/04/2019).  STG #1a: Pt will report 5 out of 10 pain level with ADLs. Progressing  STG #2a: Pt will  report/demo [place] with fastening clothing manipulators. MET  STG #2b: Pt will report/demo [place] with using knife and fork to cut food. MET  STG #3a: Pt will demonstrate independence with issued HEP. Progressing  STG #3b: Pt will demo improved Left IF PIP Flexion by 30 degrees needed to aid with bimanual grasping skills Met- cont goal  STG#3c: Pt. Will demo improved Left LF SANTIAGO by 50+ degrees need to aid with in hand manipulation skills.- met- cont goal    Plan     Continue skilled occupational therapy with individualized plan of care 2 times weekly for 8 weeks during the certification period from 01/11/2019 to 03/01/2019      Updates/Grading for next session: cont to progress as able    Brit Haider OT

## 2019-02-13 ENCOUNTER — CLINICAL SUPPORT (OUTPATIENT)
Dept: REHABILITATION | Facility: HOSPITAL | Age: 69
End: 2019-02-13
Payer: MEDICARE

## 2019-02-13 DIAGNOSIS — M79.645 PAIN IN LEFT FINGER(S): ICD-10-CM

## 2019-02-13 DIAGNOSIS — M25.642 FINGER STIFFNESS, LEFT: ICD-10-CM

## 2019-02-13 PROCEDURE — 97022 WHIRLPOOL THERAPY: CPT | Mod: PN

## 2019-02-13 PROCEDURE — 97110 THERAPEUTIC EXERCISES: CPT | Mod: PN

## 2019-02-13 PROCEDURE — 97140 MANUAL THERAPY 1/> REGIONS: CPT | Mod: PN

## 2019-02-13 NOTE — PROGRESS NOTES
"  Occupational Therapy Daily Treatment Note      Name: Amrita Yoder  Clinic Number: [id number]     Medical Diagnosis: Left IF DIP fusion with Acumend screw and Left LF trigger finger release  Date of Surgery: 11/02/2018  Surgical Procedure:  Left IF DIP fusion with Acumend screw and Left LF trigger finger release  Therapy Diagnosis:        Encounter Diagnoses   Name Primary?    Pain in left finger(s)      Finger stiffness, left        Precautions: Standard     Physician: Stephanie Castillo PA-C via Dr. Winkler  Physician Orders:Cont OT, begin weaning from DIP splint per PA Note on 12/14/2018  Date of Return to MD: 1/25/2019     Evaluation Date: 12/4/2018  Plan of Care Certification Period: 01/11/2019-03/01/2019     Visit #: / Visits authorized: 16/24  Insurance Authorization period Expiration: 02/18/2019     Time In: 1 pm  Time Out: 2pm  Total Billable Time: 45 minutes        Subjective     Pt reports:  " I think it's the arthrtitis" Pt. Reports increase in pain in bilateral basilar joints and right ring finger dip.   she was compliant with home exercise program given last session.   Response to previous treatment: good  Functional change: I'm using the hand for everything. I remember you saying use the hand so I do with the buttons, with the laundry, with everything    Pain: 3/10  Location: left long, index finger, with passive motion    Objective           Hand ROM. Measured in degrees.    12/4/2018 12/17/2018 12/28/2018 01/08/2019 01/14/2019 01/21/2019 02/06/2019 02/06/2019     Left Left  Left Left Left Left Left AROM Left PROM     AROM AROM         Index: MP  60 65 (+5) 80 (+15) 85 (+5) 85 (=) 87 90 95              PIP     0+/0 10 (+10) 30 (+20) 43 (+13) 57 (+14) 57 58 65              DIP NT NT NT NT NT NT NT NT              SANTIAGO                         Long:  MP 72 78 83 (+5) 90 (+7) 90 (=) 90 (=) 90 95              PIP 11 30 45 (+15) 46 (+1) 55 (+9) 55 (=) 58 85              DIP 0 25 37 (+12) 35 (-2) 37 (+2) " 37 (=) 37 50              SANTIAGO 83 133 (+50)                          Strength (Dynamometer) and Pinch Strength (Pinch Gauge)  Measured in pounds.    01/21/2019 01/21/2019 02/06/2019     Right Left Left   Rung II 35 12 20 (+8)   Lambert Pinch  12 7  9 (+2)   3pt Pinch  9 4  9 (+5)   2pt Pinch  9 4  4 (=)      SensationCMS Impairment/Limitation/Restriction for FOTO hand Survey  Therapist reviewed FOTO scores for Amrita Yoder.  FOTO documents entered into [employer] - see Media section.     10th visit Limitation Score: 52% - 01/14/2019    Category: Self Care     Current : CK = at least 40% but < 60% impaired, limited or restricted  Goal: CJ = at least 20% but < 40% impaired, limited or restricted  Discharge: TBD         TREATMENT:      Amrita received the following supervised modalities after being cleared for contradictions for 10 minutes:   -Fluidotherapy (PARAFFIN NOT AVAILABLE THIS DATE) To left hand, continuous air, 110 deg, air speed 100 to decrease pain, edema & scar tissue and increased tissue extensibility.-        Amrita received direct modalitiesUltrasound 3 Mhz, cont, @ .8 w/cm2 x 8 min to palm for scar mgmt, Included volar IF and LF.     Amrita received the following manual therapy techniques for 15 minutes:   -Performed scar massage to healed areas and around non-healed incision site to decrease adhesions and improve tensile glide.   - Passive range of motion stretches into flexion - PIP of IF, DIP and PIP of LF, intrinsic stretches LF.       Amrita received therapeutic exercises for 22 minutes including:   -       AROM  PIP blocking (IF and LF)  Wave, straight fist  P/H straight fist for IF, composite for LF    X 10 reps each    pom pom  p/u alt LF to palm, IF to palm X 3 containers large pom poms-    Peach putty  X 10 squeezes  X 5 pancake/bloom  X 3 logs 2 pt pinch  X 3 logs 3 pt pinch   Digi flex  x 5# green x 20 IF and LF       Home Exercises and Education Provided     Education provided: Cont  HEP, use of liam strap for IF and LF.Ok to stretch PIP joints into flexion- pt verbalized understanding. - sent pt home with liam strap.  Cont theraputty HEP- with addition of medium resistance putty to soft resistance yellow putty at home.   - Progress towards goals     Written Home Exercises Provided: Patient instructed to cont prior HEP.   Exercises were reviewed and Amrita was able to demonstrate them prior to the end of the session.  Amrita demonstrated good  understanding of the education provided.   .   See EMR under Patient Instructions for exercises provided 01/16/2019.     Assessment     She continues with good passive motion. She does not have increase in pain with scar massage. Passive ROM is painful at end range. She allows therapist to passively range her and she is able to achieve near full passive motion. She does not demonstrate intrinsic tightness upon objective evaluation. Continued increased resistance to putty this date with good participation. Pt. Reports she likes using the more resistive putty.     Amrita is progressing well towards her goals and there are no updates to goals at this time. Pt prognosis continues as Good. Pt will continue to benefit from skilled outpatient occupational therapy to address the deficits listed in the problem list on initial evaluation, provide pt/family education and to maximize pt's level of independence in the home and community environment.     Anticipated barriers to continued occupational therapy: none    Pt's spiritual, cultural and educational needs considered and pt agreeable to plan of care and goals.    Goals     Long Term Goals (LTGs); to be met by discharge. All progressing  LTG #1: Pt will report a pain level of 2 out of 10 with ADLs  LTG #2: Pt will demo improved FOTO score by 20 points.   LTG #3: Pt will return to prior level of function for ADLs and household management.      Short Term Goals (STGs); to be met within 4 weeks  (01/04/2019).  STG #1a: Pt will report 5 out of 10 pain level with ADLs. Progressing  STG #2a: Pt will report/demo [place] with fastening clothing manipulators. MET  STG #2b: Pt will report/demo [place] with using knife and fork to cut food. MET  STG #3a: Pt will demonstrate independence with issued HEP. Progressing  STG #3b: Pt will demo improved Left IF PIP Flexion by 30 degrees needed to aid with bimanual grasping skills Met- cont goal  STG#3c: Pt. Will demo improved Left LF SANTIAGO by 50+ degrees need to aid with in hand manipulation skills.- met- cont goal    Plan     Continue skilled occupational therapy with individualized plan of care 2 times weekly for 8 weeks during the certification period from 01/11/2019 to 03/01/2019      Updates/Grading for next session: cont to progress as able    Brit Haider OT

## 2019-02-14 ENCOUNTER — LAB VISIT (OUTPATIENT)
Dept: LAB | Facility: HOSPITAL | Age: 69
End: 2019-02-14
Attending: FAMILY MEDICINE
Payer: MEDICARE

## 2019-02-14 DIAGNOSIS — Z12.11 ENCOUNTER FOR FECAL IMMUNOCHEMICAL TEST SCREENING: ICD-10-CM

## 2019-02-14 PROCEDURE — 82274 ASSAY TEST FOR BLOOD FECAL: CPT

## 2019-02-14 NOTE — PROGRESS NOTES
CHIEF COMPLAINT:  Left middle finger, not able to bend.    HISTORY OF PRESENT ILLNESS:  Ms. Yoder is a 68-year-old female.  She has   undergone surgery 11/02/2018 for a left index DIP fusion with Acumed screw and   left long finger trigger finger release. The main complaint is with the trigger   finger.  She has scarring there, and she just is unable to bend it fully   actively.  She is able to passively have it bent down, but it is very painful to   her.  She has been in therapy, but it is involving her index, long and ring   now.  She is just not able to make a full fist.  She is tender to palpation over   the scar.  She is very sensitive over the scar; however, there is no locking or   popping.  It is not actively triggering.  The swelling is minimal.  In   addition, the patient also complains of right shoulder pain.  She states it is   very, very painful to her and this has been going on for some time.  After   evaluation with the patient, the patient's daughter present as well as bringing   Laney Weller, [facility] lead [facility] to evaluate the   patient, we do feel like there are some things that we could do to improve her   range of motion and therapy specifically by having the patient see Pain   Management prior to therapy for a block and then to continue.  The patient just   does not want to move her fingers because she states it is painful and all that   is doing is getting her hand stiff.  Therefore, we will get this set up with   Pain Management where she will have an injection prior to the therapy.  As the   patient started to discuss her shoulder pain, we do not have radiographs    ordered; this is a new complaint.  We did discuss with the patient and the   patient's daughter, we will set her up for a new visit to focus on the shoulder.    At this time, this is focusing on her hand and postoperative recovery.  The   patient understands and will get that set up.      LES/HN  dd:  02/13/2019 08:43:42 (CST)  td: 02/14/2019 01:47:17 (CST)  Doc ID   #[id number]  Job ID #[id number]    CC:

## 2019-02-16 LAB — HEMOCCULT STL QL IA: NEGATIVE

## 2019-02-20 ENCOUNTER — CLINICAL SUPPORT (OUTPATIENT)
Dept: REHABILITATION | Facility: HOSPITAL | Age: 69
End: 2019-02-20
Attending: PHYSICIAN ASSISTANT
Payer: MEDICARE

## 2019-02-20 ENCOUNTER — CLINICAL SUPPORT (OUTPATIENT)
Dept: REHABILITATION | Facility: HOSPITAL | Age: 69
End: 2019-02-20
Payer: MEDICARE

## 2019-02-20 DIAGNOSIS — M79.645 PAIN IN LEFT FINGER(S): ICD-10-CM

## 2019-02-20 DIAGNOSIS — M25.611 DECREASED ROM OF RIGHT SHOULDER: ICD-10-CM

## 2019-02-20 DIAGNOSIS — Z78.9 DECREASED ACTIVITIES OF DAILY LIVING (ADL): ICD-10-CM

## 2019-02-20 DIAGNOSIS — M25.642 FINGER STIFFNESS, LEFT: ICD-10-CM

## 2019-02-20 DIAGNOSIS — R53.1 DECREASED STRENGTH: ICD-10-CM

## 2019-02-20 PROCEDURE — 97110 THERAPEUTIC EXERCISES: CPT | Mod: PN

## 2019-02-20 PROCEDURE — 97161 PT EVAL LOW COMPLEX 20 MIN: CPT | Mod: PN

## 2019-02-20 PROCEDURE — G8979 MOBILITY GOAL STATUS: HCPCS | Mod: CJ,PN

## 2019-02-20 PROCEDURE — 97035 APP MDLTY 1+ULTRASOUND EA 15: CPT | Mod: PN

## 2019-02-20 PROCEDURE — 97140 MANUAL THERAPY 1/> REGIONS: CPT | Mod: PN

## 2019-02-20 PROCEDURE — G8984 CARRY CURRENT STATUS: HCPCS | Mod: CL,PN

## 2019-02-20 NOTE — PROGRESS NOTES
"  Occupational Therapy Daily Treatment Note      Name: Amrita Yoder  Clinic Number: [id number]     Medical Diagnosis: Left IF DIP fusion with Acumend screw and Left LF trigger finger release  Date of Surgery: 11/02/2018  Surgical Procedure:  Left IF DIP fusion with Acumend screw and Left LF trigger finger release  Therapy Diagnosis:        Encounter Diagnoses   Name Primary?    Pain in left finger(s)      Finger stiffness, left        Precautions: Standard     Physician: Stephanie Castillo PA-C via Dr. Winkler  Physician Orders:Cont OT, begin weaning from DIP splint per PA Note on 12/14/2018  Date of Return to MD: 1/25/2019     Evaluation Date: 12/4/2018  Plan of Care Certification Period: 01/11/2019-03/01/2019     Visit #: / Visits authorized: 17/24  Insurance Authorization period Expiration: 02/18/2019     Time In: 815am  Time Out: 9am  Total Billable Time: 45 minutes        Subjective     Pt reports:  "I feel like it is moving more. I didn't get the shot, there was a mix up"   she was compliant with home exercise program given last session.   Response to previous treatment: good  Functional change: I'm using the hand for everything. I remember you saying use the hand so I do with the buttons, with the laundry, with everything    Pain: 3/10  Location: left long, index finger, with passive motion    Objective           Hand ROM. Measured in degrees.    12/4/2018 12/17/2018 12/28/2018 01/08/2019 01/14/2019 01/21/2019 02/06/2019 02/06/2019 02/06/2019 02/06/2019     Left Left  Left Left Left Left Left AROM Left PROM L AROM L PROM     AROM AROM           Index: MP  60 65 (+5) 80 (+15) 85 (+5) 85 (=) 87 90 95 90 (=) 95 (=)              PIP     0+/0 10 (+10) 30 (+20) 43 (+13) 57 (+14) 57 58 65 60 (+2) 65 (=)              DIP NT NT NT NT NT NT NT NT NT NT              SANTIAGO                             Long:  MP 72 78 83 (+5) 90 (+7) 90 (=) 90 (=) 90 95 91 (+1) 95 (=)              PIP 11 30 45 (+15) 46 (+1) 55 (+9) 55 (=) " 58 85 64 (+6) 89 (+4)              DIP 0 25 37 (+12) 35 (-2) 37 (+2) 37 (=) 37 50 35 (-2) 55 (+5)              SANTIAGO 83 133 (+50)                              Strength (Dynamometer) and Pinch Strength (Pinch Gauge)  Measured in pounds.    01/21/2019 01/21/2019 02/06/2019     Right Left Left   Rung II 35 12 20 (+8)   Lambert Pinch  12 7  9 (+2)   3pt Pinch  9 4  9 (+5)   2pt Pinch  9 4  4 (=)      SensationCMS Impairment/Limitation/Restriction for FOTO hand Survey  Therapist reviewed FOTO scores for Amrita Yoder.  FOTO documents entered into [employer] - see Media section.     10th visit Limitation Score: 52% - 01/14/2019    Category: Self Care     Current : CK = at least 40% but < 60% impaired, limited or restricted  Goal: CJ = at least 20% but < 40% impaired, limited or restricted  Discharge: TBD         TREATMENT:      Amrita received the following supervised modalities after being cleared for contradictions for 10 minutes:   -paraffin with moist heat pack To left hand, continuous air, 110 deg, air speed 100 to decrease pain, edema & scar tissue and increased tissue extensibility.-        Amrita received direct modalitiesUltrasound 3 Mhz, cont, @ .8 w/cm2 x 8 min to palm for scar mgmt, Included volar IF and LF.     Amrita received the following manual therapy techniques for 15 minutes:   -Performed scar massage to healed areas and around non-healed incision site to decrease adhesions and improve tensile glide.   - Passive range of motion stretches into flexion - PIP of IF, DIP and PIP of LF, intrinsic stretches LF.       Amrita received therapeutic exercises for  12 minutes including:   -       AROM  PIP blocking (IF and LF)  Wave, straight fist  P/H straight fist for IF, composite for LF    X 10 reps each    pom pom  p/u alt LF to palm, IF to palm X 3 containers large pom poms-    Peach putty  NOT PERFORMED THIS TREATMENT  x 10 squeezes  X 5 pancake/bloom  X 3 logs 2 pt pinch  X 3 logs 3 pt pinch   Digi flex   x 5# green x 20 IF and LF       Home Exercises and Education Provided     Education provided: Cont HEP, use of liam strap for IF and LF.Ok to stretch PIP joints into flexion- pt verbalized understanding. - sent pt home with liam strap.  Cont theraputty HEP- with addition of medium resistance putty to soft resistance yellow putty at home.   - Progress towards goals     Written Home Exercises Provided: Patient instructed to cont prior HEP.   Exercises were reviewed and Amrita was able to demonstrate them prior to the end of the session.  Amrita demonstrated good  understanding of the education provided.   .   See EMR under Patient Instructions for exercises provided 01/16/2019.     Assessment     She continues with good passive motion. AROM and PROM with slight improvement noted this date.  She does not have increase in pain with scar massage. Passive ROM is painful at end range. She allows therapist to passively range her and she is able to achieve near full passive motion. She does not demonstrate intrinsic tightness upon objective evaluation. Did not perform putty this date due to     Amrita is progressing well towards her goals and there are no updates to goals at this time. Pt prognosis continues as Good. Pt will continue to benefit from skilled outpatient occupational therapy to address the deficits listed in the problem list on initial evaluation, provide pt/family education and to maximize pt's level of independence in the home and community environment.     Anticipated barriers to continued occupational therapy: none    Pt's spiritual, cultural and educational needs considered and pt agreeable to plan of care and goals.    Goals     Long Term Goals (LTGs); to be met by discharge. All progressing  LTG #1: Pt will report a pain level of 2 out of 10 with ADLs  LTG #2: Pt will demo improved FOTO score by 20 points.   LTG #3: Pt will return to prior level of function for ADLs and household management.       Short Term Goals (STGs); to be met within 4 weeks (01/04/2019).  STG #1a: Pt will report 5 out of 10 pain level with ADLs. Progressing  STG #2a: Pt will report/demo [place] with fastening clothing manipulators. MET  STG #2b: Pt will report/demo [place] with using knife and fork to cut food. MET  STG #3a: Pt will demonstrate independence with issued HEP. Progressing  STG #3b: Pt will demo improved Left IF PIP Flexion by 30 degrees needed to aid with bimanual grasping skills Met- cont goal  STG#3c: Pt. Will demo improved Left LF SANTIAGO by 50+ degrees need to aid with in hand manipulation skills.- met- cont goal    Plan     Continue skilled occupational therapy with individualized plan of care 2 times weekly for 8 weeks during the certification period from 01/11/2019 to 03/01/2019      Updates/Grading for next session: cont to progress as able    Brit Haider, OT

## 2019-02-20 NOTE — PLAN OF CARE
[facility]  Physical Therapy Initial Evaluation    Name: Amrita Yoder  Clinic Number: [id number]    Therapy Diagnosis:   Encounter Diagnoses   Name Primary?    Decreased ROM of right shoulder     Decreased strength     Decreased activities of daily living (ADL)      Physician: Stephanie Castillo PA-C    Physician Orders: PT Eval and Treat: R shoulder pain, 2x/wk x 6 weeks, Modalities prn  Medical Diagnosis from Referral: M25.511,G89.29 (ICD-10-CM) - Chronic right shoulder pain  Evaluation Date: 2/20/2019  Authorization Period Expiration: 02/08/2020  Plan of Care Expiration: 4/17/19  Visit # / Visits authorized: 1/1  FOTO: 1/5  Cap Visit: 113.20  Cap Total: 113.20    Time In: 9:10 am  Time Out: 10:00 am  Total Billable Time: 50 minutes    Precautions: HTN, hyperlipidemia    Subjective   Date of onset: 2/2018  History of current condition - Amrita reports: her anterior R shoulder pain started about a year ago. Also about 6 months ago she would feel R should popping or cracking that increases her pain. Her pain starts from the top and front of her R shoulder that goes down her R upper arm to around the cubital fossa or back of upper arm to elbow. Pain is getting worse overall, pain with laying on her R shoulder, lifting and carrying activities with her R shoulder, and ADLs such as filling a cup of water up or turning the light off with her R hand. She feels numbness sometimes in her right hand just the finger tips as of now. Has history of B carpal tunnel and a trigger point release on her L index finger that she is receiving OT for currently.     Medical History:   Past Medical History:   Diagnosis Date    Hyperlipidemia     Hypertension        Surgical History:   Amrita Yoder  has a past surgical history that includes Cataract extraction w/  intraocular lens implant (Bilateral) and Trigger finger release (Left, 11/2/2018).    Medications:   Amrita has a current medication list  which includes the following prescription(s): atorvastatin, flunisolide 25 mcg (0.025%), lisinopril, meloxicam, multivitamin, oxycodone-acetaminophen, and pataday.    Allergies:   Review of patient's allergies indicates:  No Known Allergies     Imaging, X-ray: 2/7/19  Bones: No fracture.  No lytic or blastic lesion.  Joints: No evidence for glenohumeral dislocation.  Mild acromioclavicular arthrosis noted.  Soft tissues: Rotator cuff calcific tendinitis no longer evident.    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, messages, and other activities  Prior Level of Function: fully independent with all activities before a year ago  Current Level of Function: limited in lifting, carrying, sleeping on R side  Pts goals: to be able to use my right arm again    Pain:  Current 8/10, worst 8/10, best 4/10   Location: right shoulder   Description: Aching, Numb and Sharp  Aggravating Factors: Touching and Lifting  Easing Factors: injection    Objective     Posture: increased cervico-thoracic kyphosis  Sensation: light touch intact  Palpation: +TTP at   Resting scapular assessment: depressed R scapular with ER    * = Shoulder pain with testing  AROM: CERVICAL   Flexion 100%   Extension 75%   Right side bending 75%*, neck pain   Left side bending 75%*, neck pain   Right rotation 75%   Left rotation 75%     Shoulder  Right   Left  Pain/Dysfunction with Movement    AROM PROM MMT AROM PROM MMT    flexion 33 40 2/5 WFL WFL 4/5    extension 30 33 3+/5 WFL WFL 4/5    abduction 33 80 2/5 WFL WFL 4/5    adduction 7 20 3+/5 WFL WFL 5/5    Internal rotation 55 65 4-/5 WFL WFL 4/5 IR at 45 degrees abd   ER at 45° abd 10 45 NT WFL WFL NT    ER at 0° abd 35 55 4/5 WFL WFL 4/5      Shoulder Tests:   AC Compression Test = very tender with hand placement, unable to demo true positive at the moment  Cross-Body Adduction Test: positive on R  Resisted Extension Test: not  tested  Neer's Test = unable to passively flexion past 90 degrees - unable to properly test  Speed's Test = positive for R shoulder pain  Osullivan Temo Test = unable to get into testing position  O'Artis's Test = not tested  Empty Can Test = positive on R  Drop Arm Test = not tested  Subscapularis Lift off Sign = not tested  Clunk Test = not tested    CMS Impairment/Limitation/Restriction for FOTO Shoulder Survey    Therapist reviewed FOTO scores for Amrita Yoder on 2/20/2019.   FOTO documents entered into [employer] - see Media section.    Limitation Score: 62%  Category: Carrying  Predicted: 38%    Current : CL = least 60% but < 80% impaired, limited or restricted  Goal: CJ = at least 20% but < 40% impaired, limited or restricted       TREATMENT   Treatment Time In: 9:50 am  Treatment Time Out: 10:00 am  Total Treatment time separate from Evaluation: 10 minutes    Amrita received therapeutic exercises to develop strength, endurance, ROM, flexibility and posture for 10 minutes including:    Shoulder isometrics 5x for abd, ext  Scapular retractions 5x5''    Home Exercises and Patient Education Provided    Education provided:   - proper lifting and carrying mechanics  - avoiding sharp concordant pain in mobility    Written Home Exercises Provided: yes.  Exercises were reviewed and Amrita was able to demonstrate them prior to the end of the session.  Amrita demonstrated good  understanding of the education provided.     See EMR under Media for exercises provided 2/20/2019.    Assessment   Amrita is a 68 y.o. female referred to outpatient Physical Therapy with a medical diagnosis of Chronic right shoulder pain presenting to PT at [facility]. Pt currently presents with R shoulder and upper arm pain, decreased cervical ROM, decreased BUE strength, poor posture, and functional deficits with lifting and carrying activities. Pt would benefit from skilled PT consisting of muscular  skeletal stretching/strengthening, manual therapy, neuro muscular re-education, and modalities prn to address limitations and increase functional mobility.    Pt prognosis is Fair.   Pt will benefit from skilled outpatient Physical Therapy to address the deficits stated above and in the chart below, provide pt/family education, and to maximize pt's level of independence.     Plan of care discussed with patient: Yes  Pt's spiritual, cultural and educational needs considered and patient is agreeable to the plan of care and goals as stated below:     Anticipated Barriers for therapy: chronic R shoulder pain    Medical Necessity is demonstrated by the following  History  Co-morbidities and personal factors that may impact the plan of care Co-morbidities:   arthritis, HTN    Personal Factors:   no deficits     moderate   Examination  Body Structures and Functions, activity limitations and participation restrictions that may impact the plan of care Body Regions:   head  neck  back  upper extremities  trunk    Body Systems:    gross symmetry  ROM  strength  gross coordinated movement  balance  gait  transfers  transitions  motor control    Participation Restrictions:   Helping out with famiy and playing with grandkids    Activity limitations:   Learning and applying knowledge  no deficits    General Tasks and Commands  no deficits    Communication  no deficits    Mobility  lifting and carrying objects  fine hand use (grasping/picking up)  driving (bike, car, motorcycle)    Self care  washing oneself (bathing, drying, washing hands)  caring for body parts (brushing teeth, shaving, grooming)  dressing  eating  drinking    Domestic Life  shopping  cooking  doing house work (cleaning house, washing dishes, laundry)  assisting others    Interactions/Relationships  no deficits    Life Areas  no deficits    Community and Social Life  no deficits         high   Clinical Presentation stable and uncomplicated low   Decision Making/  Complexity Score: low       GOALS: Short Term Goals: 4 weeks  1.Report decreased R shoulder pain </= 4/10 with R UE movements below 90 degrees to increase tolerance for ADLs and increased QoL.  2. Increase R shoulder PROM by 90 degrees flexion/abduction for increased functional mobility.  3. Increased strength by 1/3 MMT grade in R UE to increase tolerance for ADL and work activities.  4. Pt to tolerate HEP to improve ROM and independence with ADL's    Long Term Goals: 8 weeks  1.Report decreased R shoulder pain </= 2 /10 with R UE AROM to increase tolerance for ADLs and increased QoL.  2.Increase R shoulder AROM to 90 degrees for increased functional mobility and independent ADLs with decreased pain.  3.Increase strength to >/= 4/5 in BUE to increase tolerance for ADL and work activities.  4. Pt goal: to be able to use my right arm again  5. Pt will have improved gcode of CJ (20-40% limited) on FOTO shoulder in order to demonstrate true functional improvement.    Plan   Plan of care Certification: 2/20/2019 to 4/17/19.    Outpatient Physical Therapy 2 times weekly for 8 weeks to include the following interventions: Aquatic Therapy, Cervical/Lumbar Traction, Electrical Stimulation IFC/Russian, Gait Training, Manual Therapy, Moist Heat/ Ice, Neuromuscular Re-ed, Orthotic Management and Training, Patient Education, Self Care, Therapeutic Activites and Therapeutic Exercise.     Mike Lira, PT

## 2019-02-22 PROBLEM — Z78.9 DECREASED ACTIVITIES OF DAILY LIVING (ADL): Status: ACTIVE | Noted: 2019-02-22

## 2019-02-22 PROBLEM — R53.1 DECREASED STRENGTH: Status: ACTIVE | Noted: 2019-02-22

## 2019-02-22 PROBLEM — M25.611 DECREASED ROM OF RIGHT SHOULDER: Status: ACTIVE | Noted: 2019-02-22

## 2019-02-25 ENCOUNTER — PROCEDURE VISIT (OUTPATIENT)
Dept: PAIN MEDICINE | Facility: CLINIC | Age: 69
End: 2019-02-25
Attending: ANESTHESIOLOGY
Payer: MEDICARE

## 2019-02-25 ENCOUNTER — CLINICAL SUPPORT (OUTPATIENT)
Dept: REHABILITATION | Facility: HOSPITAL | Age: 69
End: 2019-02-25
Payer: MEDICARE

## 2019-02-25 VITALS
DIASTOLIC BLOOD PRESSURE: 77 MMHG | HEART RATE: 56 BPM | SYSTOLIC BLOOD PRESSURE: 125 MMHG | WEIGHT: 143.75 LBS | HEIGHT: 60 IN | TEMPERATURE: 98 F | BODY MASS INDEX: 28.22 KG/M2

## 2019-02-25 DIAGNOSIS — M25.642 FINGER STIFFNESS, LEFT: ICD-10-CM

## 2019-02-25 DIAGNOSIS — M65.331 TRIGGER MIDDLE FINGER OF RIGHT HAND: ICD-10-CM

## 2019-02-25 DIAGNOSIS — M65.339 TRIGGER MIDDLE FINGER, UNSPECIFIED LATERALITY: Primary | ICD-10-CM

## 2019-02-25 DIAGNOSIS — M19.049 OSTEOARTHRITIS OF FINGER, UNSPECIFIED LATERALITY: ICD-10-CM

## 2019-02-25 DIAGNOSIS — R53.1 DECREASED STRENGTH: ICD-10-CM

## 2019-02-25 DIAGNOSIS — M25.673 DECREASED RANGE OF MOTION OF ANKLE, UNSPECIFIED LATERALITY: ICD-10-CM

## 2019-02-25 PROCEDURE — 97035 APP MDLTY 1+ULTRASOUND EA 15: CPT

## 2019-02-25 PROCEDURE — 99499 NO LOS: ICD-10-PCS | Mod: S$GLB,,, | Performed by: ANESTHESIOLOGY

## 2019-02-25 PROCEDURE — 99499 UNLISTED E&M SERVICE: CPT | Mod: S$GLB,,, | Performed by: ANESTHESIOLOGY

## 2019-02-25 PROCEDURE — 64450 PR NERVE BLOCK INJ, ANES/STEROID, OTHER PERIPHERAL: ICD-10-PCS | Mod: LT,S$GLB,, | Performed by: ANESTHESIOLOGY

## 2019-02-25 PROCEDURE — 64450 NJX AA&/STRD OTHER PN/BRANCH: CPT | Mod: LT,S$GLB,, | Performed by: ANESTHESIOLOGY

## 2019-02-25 PROCEDURE — 76942 PR U/S GUIDANCE FOR NEEDLE GUIDANCE: ICD-10-PCS | Mod: S$GLB,,, | Performed by: ANESTHESIOLOGY

## 2019-02-25 PROCEDURE — 97110 THERAPEUTIC EXERCISES: CPT

## 2019-02-25 PROCEDURE — 97140 MANUAL THERAPY 1/> REGIONS: CPT

## 2019-02-25 PROCEDURE — 76942 ECHO GUIDE FOR BIOPSY: CPT | Mod: S$GLB,,, | Performed by: ANESTHESIOLOGY

## 2019-02-25 RX ORDER — BUPIVACAINE HYDROCHLORIDE 2.5 MG/ML
5 INJECTION, SOLUTION EPIDURAL; INFILTRATION; INTRACAUDAL ONCE
Status: DISCONTINUED | OUTPATIENT
Start: 2019-02-25 | End: 2020-10-21

## 2019-02-25 RX ORDER — LIDOCAINE HYDROCHLORIDE 10 MG/ML
5 INJECTION INFILTRATION; PERINEURAL
Status: DISCONTINUED | OUTPATIENT
Start: 2019-02-25 | End: 2020-10-21

## 2019-02-25 NOTE — PROGRESS NOTES
HPI  Patient presenting for left forearm block from orthopedics, prior to PT for trigger finger and left hand pain in the first three digits and palm.  No recent health changes.     Patient is [place]-speaking and has an  her with her in the room.  The      No health changes since previous encounter    PMHx, PSHx, Allergies, Medications reviewed in epic    ROS negative except pain complaints in HPI    OBJECTIVE:    /77   Pulse (!) 56   Temp 98.4 °F (36.9 °C)   Ht 5' (1.524 m)   Wt 65.2 kg (143 lb 11.8 oz)   LMP  (LMP Unknown)   BMI 28.07 kg/m²     PHYSICAL EXAMINATION:    GENERAL: Well appearing, in no acute distress, alert and oriented x3.  PSYCH:  Mood and affect appropriate.  SKIN: Skin color, texture, turgor normal, no rashes or lesions.  CV: RRR with palpation of the radial artery.  PULM: No evidence of respiratory difficulty, symmetric chest rise. Clear to auscultation.  NEURO: Cranial nerves grossly intact.    Plan:    Procedure Note:  radial and median nerve block under ultrasound                               Surgeon: Kaela Zaragoza M.D.    Assistant: None    Pre-Op Diagnosis: neuralgia/neuritis left radial and median nerve    Post-Op Diagnosis: Same    EBL: None    Complications: None    Specimens: None    Description of procedure:    After written consent was obtained, patient placed in supine position.  The area over the radial nerve was prepped in usual sterile fashion using chlorhexidine.  A sterile ultrasound cover was applied, and the probe was placed over the radial nerve proximal to the incision on the lateral aspect of the forearm approximately 2 cm above the wrist, and was visualized directly.  This was followed by advancement of a 27-gauge needle using in out of plane technique with the needle location being visualized in real time to pass into the area of the radial nerve.  Once encountered, after negative aspiration, and no paresthesias,  A mixture of 5 mL of 0.25%  bupivacaine +5 mL of 1% lidocaine was injected with dispersion of the medication into the area of the nerve being visualized directly using the ultrasound probe, confirming that the medication was deposited in the correct area, 5 mL near the radial nerve and 5 mL near the median nerve.   Needle was withdrawn and a sterile band-aid applied to the skin.      Patient tolerated the procedure well, and was reporting improvement of pain symptoms after the injection.  She was discharged from the clinic in stable condition.        Kaela Zaragoza  02/25/2019

## 2019-02-25 NOTE — PROGRESS NOTES
"  Occupational Therapy Daily Treatment Note    Date of note : 2/25/2019   Name: Amrita Yoder  Clinic Number: [id number]     Medical Diagnosis: Left IF DIP fusion with Acumend screw and Left LF trigger finger release  Date of Surgery: 11/02/2018  Surgical Procedure:  Left IF DIP fusion with Acumend screw and Left LF trigger finger release  Therapy Diagnosis:        Encounter Diagnoses   Name Primary?    Pain in left finger(s)      Finger stiffness, left        Precautions: Standard     Physician: Stephanie Castillo PA-C via Dr. Winkler  Physician Orders:Cont OT, begin weaning from DIP splint per PA Note on 12/14/2018  Date of Return to MD: 1/25/2019     Evaluation Date: 12/4/2018  Plan of Care Certification Period: 01/11/2019-03/01/2019     Visit #: / Visits authorized: 18/24  Insurance Authorization period Expiration: 02/18/2019     Time In: 1115am  Time Out:12  Total Billable Time: 45 minutes        Subjective     Pt reports:  "I think the shot helped, I can still feel some in my middle and index finger.  I need to get this hand back so I can have CTR on Right"    she was compliant with home exercise program given last session.   Response to previous treatment: good  Functional change: I'm using the hand for everything. I remember you saying use the hand so I do with the buttons, with the laundry, with everything    Pain: 3/10  Location: left long, index finger, with passive motion    Objective           Hand ROM. Measured in degrees.    12/4/2018 12/17/2018 12/28/2018 01/08/2019 01/14/2019 01/21/2019 02/06/2019 02/06/2019 02/06/2019 02/06/2019     Left Left  Left Left Left Left Left AROM Left PROM L AROM L PROM     AROM AROM           Index: MP  60 65 (+5) 80 (+15) 85 (+5) 85 (=) 87 90 95 90 (=) 95 (=)              PIP     0+/0 10 (+10) 30 (+20) 43 (+13) 57 (+14) 57 58 65 60 (+2) 65 (=)              DIP NT NT NT NT NT NT NT NT NT NT              SANTIAGO                             Long:  MP 72 78 83 (+5) 90 (+7) 90 " (=) 90 (=) 90 95 91 (+1) 95 (=)              PIP 11 30 45 (+15) 46 (+1) 55 (+9) 55 (=) 58 85 64 (+6) 89 (+4)              DIP 0 25 37 (+12) 35 (-2) 37 (+2) 37 (=) 37 50 35 (-2) 55 (+5)              SANTIAGO 83 133 (+50)                             2/25/2016 following wrist nerve block:    Able to obtain Passive PIP flexion of index 92-94*, Middle finger 95-96* , DIP 50*   Place and hold remains limited for full flat/composite fist, Able to obtain full passive fist of middle, ring, small, but unable to maintain with exception of 1x following strengthening and passive IP stretch with theraband strip.      Strength (Dynamometer) and Pinch Strength (Pinch Gauge)  Measured in pounds.    01/21/2019 01/21/2019 02/06/2019     Right Left Left   Rung II 35 12 20 (+8)   Lambert Pinch  12 7  9 (+2)   3pt Pinch  9 4  9 (+5)   2pt Pinch  9 4  4 (=)      SensationCMS Impairment/Limitation/Restriction for FOTO hand Survey  Therapist reviewed FOTO scores for Amrita Yoder.  FOTO documents entered into [employer] - see Media section.     10th visit Limitation Score: 52% - 01/14/2019    Category: Self Care     Current : CK = at least 40% but < 60% impaired, limited or restricted  Goal: CJ = at least 20% but < 40% impaired, limited or restricted  Discharge: TBD         TREATMENT:    Amrita received the following supervised modalities after being cleared for contradictions for 10 minutes:   -paraffin with moist heat pack To left hand to decrease pain, improve circulation  and increased tissue extensibility.  Patient reports using dry heating pad at home, encouraged MH applications prior to therex.     Amrita received direct modalities Ultrasound 3 Mhz, cont, @ .6 w/cm2 x 8 min to palm for scar mgmt, Included volar scar adhesion and PIP joints of Index/ LF. Performed DTM, Scar extractor with active FDS glide to sheer adhesions and improve tensile glide. Dycem for scar mobilization and scar massage with massage stick to decrease adhesion.   Applied silicone scar pad for home scar management.  Provided dycem and scar pad for home use.     Amrita received the following manual therapy techniques for 15 minutes:   -Mobilization of PIP's ,Middle finger DIP, passive IP stretch of middle finger with theraband strip while mobilizing index PIP x 5 min;  -Performed scar massage to healed areas and around non-healed incision site to decrease adhesions and improve tensile glide.   - Passive range of motion stretches into flexion - PIP of IF, DIP and PIP of LF, intrinsic stretches LF.       Amrita received therapeutic exercises for  12 minutes including:   -       AROM  PIP blocking (IF and LF).  DIP blocking LF  Wave, straight fist  P/H straight fist for IF, composite for LF    X 10 reps each    Yellow  putty  x 10 squeezes  X 5 pancake/bloom  X 3 logs 2 pt pinch  X 3 logs 3 pt pinch  Isolated FDP flexion LF and isolated finger flexion for IF, LF x 10 reps each.    Digi flex  x 5# green x 20 IF and LF       Home Exercises and Education Provided     Education provided: Cont HEP, use of liam strap for IF and LF.Ok to stretch PIP joints into flexion- pt verbalized understanding. - sent pt home with liam strap.  Cont theraputty HEP- with addition of medium resistance putty to soft resistance yellow putty at home.   - Progress towards goals     Written Home Exercises Provided: Patient instructed to cont prior HEP.   Exercises were reviewed and Amrita was able to demonstrate them prior to the end of the session.  Amrita demonstrated good  understanding of the education provided.   .   See EMR under Patient Instructions for exercises provided 01/16/2019.     Assessment     PIP stiffness noted in index/middle finger.  Middle finger able to obtain full passive fist, index PIP limited at end range for flat fisting.  Upgraded HEP with isolated finger flexion with putty against gravity and added passive IP stretch for MF x 5 min with theraband strip.  Slightly  improved composite flexion noted in middle finger upon completion of therex.      Amrita is progressing well towards her goals and there are no updates to goals at this time. Pt prognosis continues as Good. Pt will continue to benefit from skilled outpatient occupational therapy to address the deficits listed in the problem list on initial evaluation, provide pt/family education and to maximize pt's level of independence in the home and community environment.     Anticipated barriers to continued occupational therapy: none    Pt's spiritual, cultural and educational needs considered and pt agreeable to plan of care and goals.    Goals     Long Term Goals (LTGs); to be met by discharge. All progressing  LTG #1: Pt will report a pain level of 2 out of 10 with ADLs  LTG #2: Pt will demo improved FOTO score by 20 points.   LTG #3: Pt will return to prior level of function for ADLs and household management.      Short Term Goals (STGs); to be met within 4 weeks (01/04/2019).  STG #1a: Pt will report 5 out of 10 pain level with ADLs. Progressing  STG #2a: Pt will report/demo [place] with fastening clothing manipulators. MET  STG #2b: Pt will report/demo [place] with using knife and fork to cut food. MET  STG #3a: Pt will demonstrate independence with issued HEP. Progressing  STG #3b: Pt will demo improved Left IF PIP Flexion by 30 degrees needed to aid with bimanual grasping skills Met- cont goal  STG#3c: Pt. Will demo improved Left LF SANTIAGO by 50+ degrees need to aid with in hand manipulation skills.- met- cont goal    Plan     Continue skilled occupational therapy with individualized plan of care 2 times weekly for 8 weeks during the certification period from 01/11/2019 to 03/01/2019      Updates/Grading for next session: cont to progress as able    Laney Weller, OT, CHT, JADE

## 2019-02-27 ENCOUNTER — CLINICAL SUPPORT (OUTPATIENT)
Dept: REHABILITATION | Facility: HOSPITAL | Age: 69
End: 2019-02-27
Payer: MEDICARE

## 2019-02-27 DIAGNOSIS — M79.645 PAIN IN LEFT FINGER(S): ICD-10-CM

## 2019-02-27 DIAGNOSIS — Z78.9 DECREASED ACTIVITIES OF DAILY LIVING (ADL): ICD-10-CM

## 2019-02-27 DIAGNOSIS — M25.642 FINGER STIFFNESS, LEFT: ICD-10-CM

## 2019-02-27 DIAGNOSIS — M25.611 DECREASED ROM OF RIGHT SHOULDER: ICD-10-CM

## 2019-02-27 DIAGNOSIS — R53.1 DECREASED STRENGTH: ICD-10-CM

## 2019-02-27 PROCEDURE — 97140 MANUAL THERAPY 1/> REGIONS: CPT | Mod: PN

## 2019-02-27 PROCEDURE — 97110 THERAPEUTIC EXERCISES: CPT | Mod: PN

## 2019-02-27 PROCEDURE — 97018 PARAFFIN BATH THERAPY: CPT | Mod: PN

## 2019-02-27 PROCEDURE — 97035 APP MDLTY 1+ULTRASOUND EA 15: CPT | Mod: PN

## 2019-02-27 PROCEDURE — 97014 ELECTRIC STIMULATION THERAPY: CPT | Mod: PN

## 2019-02-27 NOTE — PROGRESS NOTES
Physical Therapy Daily Treatment Note     Name: Amrita Yoder  Clinic Number: [id number]    Therapy Diagnosis:   Encounter Diagnoses   Name Primary?    Decreased ROM of right shoulder     Decreased strength     Decreased activities of daily living (ADL)      Physician: Stephanie Castillo PA-C    Visit Date: 2/27/2019    Physician Orders: PT Eval and Treat: R shoulder pain, 2x/wk x 6 weeks, Modalities prn  Medical Diagnosis from Referral: M25.511,G89.29 (ICD-10-CM) - Chronic right shoulder pain  Evaluation Date: 2/20/2019  Authorization Period Expiration: 02/08/2020  Plan of Care Expiration: 4/11/19  Visit # / Visits authorized: 2/12  FOTO: 2/5  G-code:2/10        Cap Visit: 102.71   Cap Total: 215.91     Time In: 0900  Time Out: 1000  Total Billable Time: 60 minutes (1 ES, 2 TE, 1 MT)  Precautions: HTN, hyperlipidemia    Subjective   Mrs. Yoder presents to PT for her first follow-up appointment.  Reports high R shoulder pain today.      She was compliant with home exercise program.  Response to previous treatment: eval only   Functional change: no change  Pain: 8/10 Location: right shoulder      Objective   O: R shoulder PROM: flexion= 90 degrees (heavy guarding with biceps spams), ER= 60 pain with empty end-feel), IR= WNL    Amrita received therapeutic exercises to develop strength, endurance, ROM, flexibility, posture and core stabilization for 30 minutes including:  - shoulder isometrics     Manual resistance; 10 reps x 5-7 second holds; 4-way  - scapular retractions     3x10  - shoulder rolls     20 reps forward/backward    Amrita received the following manual therapy techniques for a total of 15 minutes:  - grade I/II/III/IV right shoulder passive physiological flexion, ER, and IR    Amrita received the following supervised modalities after being cleared for contradictions: IFC Electrical Stimulation:  Amrita received IFC Electrical Stimulation for pain control applied to the R shoulder complex. Pt  received stimulation at 50 % scan at a frequency of 250 Hz for 10 minutes. Amrita tolderated treatment well without any adverse effects.      Amrita received cold pack for 10 minutes to R shoulder in combination with EStim.    Home Exercises Provided and Patient Education Provided   Education provided:   - continue HEP    Written Home Exercises Provided: Patient instructed to cont prior HEP.  Exercises were reviewed and Amrita was able to demonstrate them prior to the end of the session.  Amrita demonstrated fair  understanding of the education provided.     See EMR under Media for exercises provided prior visit (02/20/2019)    Assessment   A: Mrs. Yoder is a 69 y/o F with a multi-year history of progressive worsening R shoulder pain.  High tissue irritability today with heavy guarding with even grade I PROM attempts. Her right shoulder status is complicated by recent L hand surgery forcing her to utilize her RUE more for ADL performance.  Suspect soft tissue dysfunction as the cause of her pain due to Xray findings.  Recommend further MRI study if her R shoulder pain continues to not improve.      Amrita is progressing well towards her goals.   Pt prognosis is Guarded.     Pt will continue to benefit from skilled outpatient physical therapy to address the deficits listed in the problem list box on initial evaluation, provide pt/family education and to maximize pt's level of independence in the home and community environment.   Pt's spiritual, cultural and educational needs considered and pt agreeable to plan of care and goals.     Anticipated barriers to physical therapy: language barrier.    Goals:   Short Term Goals: 4 weeks  1.Report decreased R shoulder pain </= 4/10 with R UE movements below 90 degrees to increase tolerance for ADLs and increased QoL. Progressing towards  2. Increase R shoulder PROM by 90 degrees flexion/abduction for increased functional mobility. Progressing towards  3. Increased  strength by 1/3 MMT grade in R UE to increase tolerance for ADL and work activities. Progressing towards  4. Pt to tolerate HEP to improve ROM and independence with ADL's. Progressing towards     Long Term Goals: 8 weeks  1.Report decreased R shoulder pain </= 2 /10 with R UE AROM to increase tolerance for ADLs and increased QoL. Progressing towards  2.Increase R shoulder AROM to 90 degrees for increased functional mobility and independent ADLs with decreased pain. Progressing towards  3.Increase strength to >/= 4/5 in BUE to increase tolerance for ADL and work activities. Progressing towards  4. Pt goal: to be able to use my right arm again. Progressing towards  5. Pt will have improved gcode of CJ (20-40% limited) on FOTO shoulder in order to demonstrate true functional improvement. Progressing towards    Plan   Continue plan of care.  Emphasis on pain and inflammation control.  Hold aggressive stretching and strengthening at this time.     Dov Milligan, PT

## 2019-02-27 NOTE — PLAN OF CARE
"  Outpatient Therapy Updated Plan of Care     Visit Date: 2/27/2019  Name: Amrita Yoder  Clinic Number: [id number]    Therapy Diagnosis:   Encounter Diagnoses   Name Primary?    Pain in left finger(s)     Finger stiffness, left      Physician: Stephanie Castillo PA-C via Dr. Winkler    Physician Orders: Cont OT  Medical Diagnosis: Left IF DIP fusion with Acumend screw and Left LF trigger finger release    Evaluation Date: 01/25/2019    Total Visits Received: 19  Cancelled Visits: 3  No Show Visits: 0    Updated Certification Period:   03/01/2019-04/19/2019  Precautions:  Standard  Visits from Evaluation Date:  18  Functional Level Prior to Evaluation:  Pt. Stated difficulty performing daily ADLs due to pain in left hand and locking of trigger finger.     Subjective     Update: "  I don't have too much pain in in during the day, I just can't bend it but I'm working on it with everything you gave me. I still cannot hold on to things too much with the left hand but I am trying"     Objective     Update: Hand ROM. Measured in degrees.    02/27/2019 02/27/2019     LAROM LPROM           Index: MP  93  93              PIP     56  75              DIP NT NT              SANTIAGO               Long:  MP 93  90              PIP 65  93              DIP 38  55              SANTIAGO                   Strength (Dynamometer) and Pinch Strength (Pinch Gauge)  Measured in pounds.    01/21/2019 01/21/2019 02/06/2019 02/27/2019     Right Left Left Left   Rung II 35 12 20  22    Lambert Pinch  12 7  9  10    3pt Pinch  9 4  9  10    2pt Pinch  9 4  4  8           Assessment     Update: Pt. Is improving with AROM, however, continues to lack deficits for full motion compared to non-involved side. Strength is improving but continues about 40% deficit in strength compared to non involved hand. She is increases her use for daily ADLs but stil has deficits with full  on objects and sustained  on objects.        Previous Short Term Goals " Status:   STG #1a: Pt will report 5 out of 10 pain level with ADLs. Progressing  STG #2a: Pt will report/demo [place] with fastening clothing manipulators. MET  STG #2b: Pt will report/demo [place] with using knife and fork to cut food. MET  STG #3a: Pt will demonstrate independence with issued HEP. Progressing  STG #3b: Pt will demo improved Left IF PIP Flexion by 30 degrees needed to aid with bimanual grasping skills Met- cont goal  STG#3c: Pt. Will demo improved Left LF SANTIAGO by 50+ degrees need to aid with in hand manipulation skills.- met- cont goal     New Short Term Goals Status:   Cont unmet goals listed above  Long Term Goal Status:   continue per initial plan of care.  Reasons for Recertification of Therapy:   Cont stiffness and inability to perform daily ADLs independently.     Plan     Updated Certification Period: 03/01/2019 to 04/19/2019  Recommended Treatment Plan: 2 times per week for 8 weeks: Fluidotherapy, Manual Therapy, Moist Heat/ Ice, Neuromuscular Re-ed, Orthotic Management and Training, Paraffin, Patient Education, Self Care, Therapeutic Activites, Therapeutic Exercise and Ultrasound      Brit Haider, OT  2/27/2019      I CERTIFY THE NEED FOR THESE SERVICES FURNISHED UNDER THIS PLAN OF TREATMENT AND WHILE UNDER MY CARE    Physician's comments:        Physician's Signature: ___________________________________________________

## 2019-02-27 NOTE — PROGRESS NOTES
"  Occupational Therapy Daily Treatment Note      Name: Amrita Yoder  Clinic Number: [id number]     Medical Diagnosis: Left IF DIP fusion with Acumend screw and Left LF trigger finger release  Date of Surgery: 11/02/2018  Surgical Procedure:  Left IF DIP fusion with Acumend screw and Left LF trigger finger release  Therapy Diagnosis:        Encounter Diagnoses   Name Primary?    Pain in left finger(s)      Finger stiffness, left        Precautions: Standard     Physician: Stephanie Castillo PA-C via Dr. Winkler  Physician Orders:Cont OT,   Date of Return to MD: 03/27/2019  Evaluation Date: 12/4/2018  Plan of Care Certification Period: 03/01/2019-04/19/2019     Visit #: / Visits authorized: 19/24  Insurance Authorization period Expiration:TBD     Time In: 1005am  Time Out: 1105am  Total Billable Time: 60 minutes        Subjective     Pt reports:  " I got the shot but it didn't get me completely numb. My IF was a little swollen and hurt after the therapy visit but it went away. I don't have too much pain in in during the day, I just can't bend it but I'm working on it with everything you gave me"   she was compliant with home exercise program given last session.   Response to previous treatment: good  Functional change: Increased functional use overall  Pain: 3/10  Location: left long, index finger, with passive motion    Objective           Hand ROM. Measured in degrees.    12/4/2018 12/17/2018 12/28/2018 01/08/2019 01/14/2019 01/21/2019 02/06/2019 02/06/2019 02/06/2019 02/06/2019 02/27/2019 02/27/2019     Left Left  Left Left Left Left Left AROM Left PROM L AROM L PROM LAROM LPROM     AROM AROM          (post treatment measurements)    Index: MP  60 65 (+5) 80 (+15) 85 (+5) 85 (=) 87 90 95 90 (=) 95 (=) 93 (90) 93              PIP     0+/0 10 (+10) 30 (+20) 43 (+13) 57 (+14) 57 58 65 60 (+2) 65 (=) 56 (65) 75              DIP NT NT NT NT NT NT NT NT NT NT NT NT              SANTIAGO                               "   Long:  MP 72 78 83 (+5) 90 (+7) 90 (=) 90 (=) 90 95 91 (+1) 95 (=) 93 (95) 90              PIP 11 30 45 (+15) 46 (+1) 55 (+9) 55 (=) 58 85 64 (+6) 89 (+4) 65 (68) 93              DIP 0 25 37 (+12) 35 (-2) 37 (+2) 37 (=) 37 50 35 (-2) 55 (+5) 38 (35) 55              SANTIAGO 83 133 (+50)                                  Strength (Dynamometer) and Pinch Strength (Pinch Gauge)  Measured in pounds.    01/21/2019 01/21/2019 02/06/2019 02/27/2019     Right Left Left Left   Rung II 35 12 20 (+8) 22 (+2)   Lambert Pinch  12 7  9 (+2) 10 (+1)   3pt Pinch  9 4  9 (+5) 10 (+1)   2pt Pinch  9 4  4 (=) 8 (+4)      Sensation[employer] Impairment/Limitation/Restriction for FOTO hand Survey  Therapist reviewed FOTO scores for Amrita Yoder.  FOTO documents entered into [employer] - see Media section.     10th visit Limitation Score: 52% - 01/14/2019    Category: Self Care     Current : CK = at least 40% but < 60% impaired, limited or restricted  Goal: CJ = at least 20% but < 40% impaired, limited or restricted  Discharge: TBD         TREATMENT:      Amrita received the following supervised modalities after being cleared for contradictions for 15 minutes:   -paraffin with moist heat pack To left hand, continuous air, 110 deg, air speed 100 to decrease pain, edema & scar tissue and increased tissue extensibility.-        Amrita received direct modalitiesUltrasound 3 Mhz, cont, @ .8 w/cm2 x 8 min to palm for scar mgmt, Included volar IF and LF.     Amrita received the following manual therapy techniques for 25 minutes:   -Performed scar massage to healed areas and around non-healed incision site to decrease adhesions and improve tensile glide.   - Passive range of motion stretches into flexion (mobilization with movement) - PIP of IF, DIP and PIP of LF, intrinsic stretches LF.   -Dysem scar massage and use of scar retractor with cross friction during flexion/ext with dysem      Amrita received therapeutic exercises for  12 minutes  including:   -       AROM  PIP blocking (IF and LF)  Wave, straight fist  P/H straight fist for IF, composite for LF    X 10 reps each    pom pom  p/u alt LF to palm, IF to palm NOT PERFORMED THIS TREATMENT  X 3 containers large pom poms-    Peach putty    x 10 squeezes  X 5 pancake/bloom  X 3 logs 2 pt pinch  X 3 logs 3 pt pinch  Isolated FDP flexion LF and isolated FDS for IF, LF x 10 reps each.   Digi flex  x 5# green x 20 IF and LF       Home Exercises and Education Provided     Education provided: Cont HEP, use of liam strap for IF and LF.Ok to stretch PIP joints into flexion- pt verbalized understanding. - sent pt home with liam strap.  Cont theraputty HEP- with addition of medium resistance putty to soft resistance yellow putty at home. (with added isolated finger strengthening) Use scar pad at night that was given to her last visit by alternate therapist. Use dysem for scar massage.   - Progress towards goals     Written Home Exercises Provided: Patient instructed to cont prior HEP.   Exercises were reviewed and Amrita was able to demonstrate them prior to the end of the session.  Amrita demonstrated good  understanding of the education provided.   .   See EMR under Patient Instructions for exercises provided 01/16/2019.     Assessment     She continues with good passive motion. AROM and PROM with minimal changes noted this date compared to last measurement.  and all 3 pinch positions with significant increases. Pt. States she feels it is the arthritis that is deterring her full motion. She was able to obtain about 20 degrees more of passive motion per therapist progress note following the nerve block. Pt. Reports she had increased swelling and pain in the index finger PIP the next day but it resolved. Passively pt can get to flat fist with IF and near full composite flexion of LF but pt is unable to maintain position actively.     Amrita is progressing well towards her goals and there are no  updates to goals at this time. Pt prognosis continues as Good. Pt will continue to benefit from skilled outpatient occupational therapy to address the deficits listed in the problem list on initial evaluation, provide pt/family education and to maximize pt's level of independence in the home and community environment.     Anticipated barriers to continued occupational therapy: none    Pt's spiritual, cultural and educational needs considered and pt agreeable to plan of care and goals.    Goals     Long Term Goals (LTGs); to be met by discharge. All progressing  LTG #1: Pt will report a pain level of 2 out of 10 with ADLs  LTG #2: Pt will demo improved FOTO score by 20 points.   LTG #3: Pt will return to prior level of function for ADLs and household management.      Short Term Goals (STGs); to be met within 4 weeks (01/04/2019).  STG #1a: Pt will report 5 out of 10 pain level with ADLs. Progressing  STG #2a: Pt will report/demo [place] with fastening clothing manipulators. MET  STG #2b: Pt will report/demo [place] with using knife and fork to cut food. MET  STG #3a: Pt will demonstrate independence with issued HEP. Progressing  STG #3b: Pt will demo improved Left IF PIP Flexion by 30 degrees needed to aid with bimanual grasping skills Met- cont goal  STG#3c: Pt. Will demo improved Left LF SANTIAGO by 50+ degrees need to aid with in hand manipulation skills.- met- cont goal    Plan     Continue skilled occupational therapy with individualized plan of care 2 times weekly for 8 weeks during the certification period from 03/01/2019 to 04/19/2019      Updates/Grading for next session: cont to progress as able    Brit Haider OT

## 2019-03-04 ENCOUNTER — TELEPHONE (OUTPATIENT)
Dept: REHABILITATION | Facility: HOSPITAL | Age: 69
End: 2019-03-04

## 2019-03-04 NOTE — TELEPHONE ENCOUNTER
Left message for pt RE: missed appt for PT and OT this date. Left time and date of next message on pt's voicemail.     Brit Haider, OTD, OTR/L, CHT   Occupational therapist, Certified Hand Therapist

## 2019-03-06 ENCOUNTER — CLINICAL SUPPORT (OUTPATIENT)
Dept: REHABILITATION | Facility: HOSPITAL | Age: 69
End: 2019-03-06
Payer: MEDICARE

## 2019-03-06 DIAGNOSIS — Z78.9 DECREASED ACTIVITIES OF DAILY LIVING (ADL): ICD-10-CM

## 2019-03-06 DIAGNOSIS — M25.642 FINGER STIFFNESS, LEFT: ICD-10-CM

## 2019-03-06 DIAGNOSIS — M25.611 DECREASED ROM OF RIGHT SHOULDER: ICD-10-CM

## 2019-03-06 DIAGNOSIS — R53.1 DECREASED STRENGTH: ICD-10-CM

## 2019-03-06 DIAGNOSIS — M79.645 PAIN IN LEFT FINGER(S): ICD-10-CM

## 2019-03-06 PROCEDURE — 97018 PARAFFIN BATH THERAPY: CPT | Mod: PN,59

## 2019-03-06 PROCEDURE — 97110 THERAPEUTIC EXERCISES: CPT | Mod: PN

## 2019-03-06 PROCEDURE — 97140 MANUAL THERAPY 1/> REGIONS: CPT | Mod: PN

## 2019-03-06 PROCEDURE — 97035 APP MDLTY 1+ULTRASOUND EA 15: CPT | Mod: PN

## 2019-03-06 NOTE — PROGRESS NOTES
Physical Therapy Daily Treatment Note     Name: Amrita Yoder  Clinic Number: [id number]    Therapy Diagnosis:   Encounter Diagnoses   Name Primary?    Decreased ROM of right shoulder     Decreased strength     Decreased activities of daily living (ADL)      Physician: Stephanie Castillo PA-C    Visit Date: 3/6/2019    Physician Orders: PT Eval and Treat: R shoulder pain, 2x/wk x 6 weeks, Modalities prn  Medical Diagnosis from Referral: M25.511,G89.29 (ICD-10-CM) - Chronic right shoulder pain  Evaluation Date: 2/20/2019  Authorization Period Expiration: 02/08/2020  Plan of Care Expiration: 4/11/19  Visit # / Visits authorized: 3/12  FOTO: 3/5  G-code: 3/10        Cap Visit: 88.10  Cap Total: 304.01     Time In: 10:00 am  Time Out: 10:55 am  Total Billable Time: 55 minutes ( 2 TE + 1 MT)  Precautions: HTN, hyperlipidemia    Subjective   Mrs. Yoder reports she has hand MD appointment in the near weeks, and then a shoulder MD appointment later this month. Ice has been helping a lot.    She was compliant with home exercise program.  Response to previous treatment: eval only   Functional change: no change  Pain: 8/10 Location: right shoulder      Objective   O: R shoulder PROM: flexion= 90 degrees, ER= 60 (pain with empty end-feel), IR= WNL, Abd= 80    Amrita received therapeutic exercises to develop strength, endurance, ROM, flexibility, posture and core stabilization for 40 minutes including:  - shoulder isometrics     Manual and self resistance; 10 reps x 5-7 second holds; IR, ER, elbow flexion, add, abd  - shoulder shrugs     2x10  - scapular retractions     3x10; 3'' holds  - shoulder rolls     20 reps forward/backward    Amrita received the following manual therapy techniques for a total of 15 minutes:  - grade I/II/III right shoulder passive physiological flexion, ER, and IR     Amrita received cold pack for 10 minutes to R shoulder in combination with EStim.    Home Exercises Provided and Patient  Education Provided   Education provided:   - continue HEP    Written Home Exercises Provided: Patient instructed to cont prior HEP.  Exercises were reviewed and Amrita was able to demonstrate them prior to the end of the session.  Amrita demonstrated fair  understanding of the education provided.     See EMR under Media for exercises provided prior visit (02/20/2019)    Assessment   A: Mrs. Yodre is a 67 y/o F with a multi-year history of progressive worsening R shoulder pain currently complicated by L hand issues. High R shoulder irratability continued today though better than last visit with less guarding and pain. Ice greatly helps with pain and pt was able to tolerate isometric shoulder exercises with moderate cueing for ROM and intensity of contraction.    Amrita is progressing well towards her goals.   Pt prognosis is Guarded.     Pt will continue to benefit from skilled outpatient physical therapy to address the deficits listed in the problem list box on initial evaluation, provide pt/family education and to maximize pt's level of independence in the home and community environment.   Pt's spiritual, cultural and educational needs considered and pt agreeable to plan of care and goals.     Anticipated barriers to physical therapy: language barrier.    Goals:   Short Term Goals: 4 weeks  1.Report decreased R shoulder pain </= 4/10 with R UE movements below 90 degrees to increase tolerance for ADLs and increased QoL. Progressing towards  2. Increase R shoulder PROM by 90 degrees flexion/abduction for increased functional mobility. Progressing towards  3. Increased strength by 1/3 MMT grade in R UE to increase tolerance for ADL and work activities. Progressing towards  4. Pt to tolerate HEP to improve ROM and independence with ADL's. Progressing towards     Long Term Goals: 8 weeks  1.Report decreased R shoulder pain </= 2 /10 with R UE AROM to increase tolerance for ADLs and increased QoL. Progressing  towards  2.Increase R shoulder AROM to 90 degrees for increased functional mobility and independent ADLs with decreased pain. Progressing towards  3.Increase strength to >/= 4/5 in BUE to increase tolerance for ADL and work activities. Progressing towards  4. Pt goal: to be able to use my right arm again. Progressing towards  5. Pt will have improved gcode of CJ (20-40% limited) on FOTO shoulder in order to demonstrate true functional improvement. Progressing towards    Plan   Continue plan of care.  Emphasis on pain and inflammation control.  Hold aggressive stretching and strengthening at this time.     Mike Lira, PT

## 2019-03-06 NOTE — PROGRESS NOTES
"  Occupational Therapy Daily Treatment Note      Name: Amrita Yoder  Clinic Number: [id number]     Medical Diagnosis: Left IF DIP fusion with Acumend screw and Left LF trigger finger release  Date of Surgery: 11/02/2018  Surgical Procedure:  Left IF DIP fusion with Acumend screw and Left LF trigger finger release  Therapy Diagnosis:        Encounter Diagnoses   Name Primary?    Pain in left finger(s)      Finger stiffness, left        Precautions: Standard     Physician: Stephanie Castillo PA-C via Dr. Winkler  Physician Orders:Cont OT,   Date of Return to MD: 03/27/2019  Evaluation Date: 12/4/2018  Plan of Care Certification Period: 03/01/2019-04/19/2019     Visit #: / Visits authorized: 20/24  Insurance Authorization period Expiration:TBD -awaiting new auth     Time In: 11am  Time Out: 12pm  Total Billable Time: 60 minutes        Subjective     Pt reports:  " I have been using the little blue piece to massage the scar"   she was compliant with home exercise program given last session.   Response to previous treatment: good  Functional change: Increased functional use overall  Pain: 3/10  Location: left long, index finger, with passive motion    Objective           Hand ROM. Measured in degrees.    12/4/2018 12/17/2018 12/28/2018 01/08/2019 01/14/2019 01/21/2019 02/06/2019 02/06/2019 02/06/2019 02/06/2019 02/27/2019 02/27/2019     Left Left  Left Left Left Left Left AROM Left PROM L AROM L PROM LAROM LPROM     AROM AROM          (post treatment measurements)    Index: MP  60 65 (+5) 80 (+15) 85 (+5) 85 (=) 87 90 95 90 (=) 95 (=) 93 (90) 93              PIP     0+/0 10 (+10) 30 (+20) 43 (+13) 57 (+14) 57 58 65 60 (+2) 65 (=) 56 (65) 75              DIP NT NT NT NT NT NT NT NT NT NT NT NT              SANTIAGO                                 Long:  MP 72 78 83 (+5) 90 (+7) 90 (=) 90 (=) 90 95 91 (+1) 95 (=) 93 (95) 90              PIP 11 30 45 (+15) 46 (+1) 55 (+9) 55 (=) 58 85 64 (+6) 89 (+4) 65 (57) 93              " DIP 0 25 37 (+12) 35 (-2) 37 (+2) 37 (=) 37 50 35 (-2) 55 (+5) 38 (35) 55              SANTIAGO 83 133 (+50)                                 2/25/2016 following wrist nerve block:    Able to obtain Passive PIP flexion of index 92-94*, Middle finger 95-96* , DIP 50*   Place and hold remains limited for full flat/composite fist, Able to obtain full passive fist of middle, ring, small, but unable to maintain with exception of 1x following strengthening and passive IP stretch with theraband strip.        Strength (Dynamometer) and Pinch Strength (Pinch Gauge)  Measured in pounds.    01/21/2019 01/21/2019 02/06/2019 02/27/2019     Right Left Left Left   Rung II 35 12 20 (+8) 22 (+2)   Lambert Pinch  12 7  9 (+2) 10 (+1)   3pt Pinch  9 4  9 (+5) 10 (+1)   2pt Pinch  9 4  4 (=) 8 (+4)      Sensation[employer] Impairment/Limitation/Restriction for FOTO hand Survey  Therapist reviewed FOTO scores for Amrita Yoder.  FOTO documents entered into [employer] - see Media section.     10th visit Limitation Score: 52% - 01/14/2019    Category: Self Care     Current : CK = at least 40% but < 60% impaired, limited or restricted  Goal: CJ = at least 20% but < 40% impaired, limited or restricted  Discharge: TBD         TREATMENT:      Amrita received the following supervised modalities after being cleared for contradictions for 15 minutes:   -paraffin with moist heat pack To left hand, continuous air, 110 deg, air speed 100 to decrease pain, edema & scar tissue and increased tissue extensibility.-        Amrita received direct modalitiesUltrasound 3 Mhz, cont, @ .8 w/cm2 x 8 min to palm for scar mgmt, Included volar IF and LF.     Amrita received the following manual therapy techniques for 25 minutes:   -Performed scar massage to healed areas and around non-healed incision site to decrease adhesions and improve tensile glide. -Dysem scar massage and use of scar retractor with cross friction during flexion/ext with dysem, use of iastym tools to  scar and volar digits  - Passive range of motion stretches into flexion with grade II-III joint glides (mobilization with movement) - PIP of IF, DIP and PIP of LF, intrinsic stretches LF. - no theraband stretch this date as pt easily about to achieve near full passive composite flexion of LF        Amrita received therapeutic exercises for  12 minutes including:   -       AROM  PIP blocking (IF and LF)  Wave, straight fist  P/H straight fist for IF, composite for LF    X 10 reps each    pom pom  p/u alt LF to palm, IF to palm NOT PERFORMED THIS TREATMENT  X 3 containers large pom poms-    Peach putty    x 10 squeezes  X 5 pancake/bloom  X 3 logs 2 pt pinch  X 3 logs 3 pt pinch  Isolated FDP flexion LF and isolated FDS for IF, LF x 10 reps each.   Digi flex  x 5# green x 20 IF and LF       Home Exercises and Education Provided     Education provided: Cont HEP, use of liam strap for IF and LF.Ok to stretch PIP joints into flexion- pt verbalized understanding. - sent pt home with liam strap.  Cont theraputty HEP- with medium resistance pink theraputty at home. (with added isolated finger strengthening) Use scar pad at night that was given to her last visit by alternate therapist. Use dysem for scar massage.   - Progress towards goals     Written Home Exercises Provided: Patient instructed to cont prior HEP.   Exercises were reviewed and Amrita was able to demonstrate them prior to the end of the session.  Amrita demonstrated good  understanding of the education provided.   .   See EMR under Patient Instructions for exercises provided 01/16/2019.     Assessment     She continues with good passive motion. Pt. States she feels it is the arthritis that is deterring her full motion. She was able to obtain about 20 degrees more of passive motion at IF PIP , no significant change in passive motion of LF PIP or DIP compared to earlier treatments, per therapist progress note following the nerve block on 02/25/2019. Pt.  Reports she had increased swelling and pain in the index finger PIP the next day but it resolved. Passively pt can get to flat fist with IF and near full composite flexion of LF but pt is unable to maintain position actively. Pt. Continues to be observed avoiding use of IF for picking up car key, zipping and unzipping purse, etc despite continued education to use IF.     Amrita is progressing well towards her goals and there are no updates to goals at this time. Pt prognosis continues as Good. Pt will continue to benefit from skilled outpatient occupational therapy to address the deficits listed in the problem list on initial evaluation, provide pt/family education and to maximize pt's level of independence in the home and community environment.     Anticipated barriers to continued occupational therapy: none    Pt's spiritual, cultural and educational needs considered and pt agreeable to plan of care and goals.    Goals     Long Term Goals (LTGs); to be met by discharge. All progressing  LTG #1: Pt will report a pain level of 2 out of 10 with ADLs  LTG #2: Pt will demo improved FOTO score by 20 points.   LTG #3: Pt will return to prior level of function for ADLs and household management.      Short Term Goals (STGs); to be met within 4 weeks (01/04/2019).  STG #1a: Pt will report 5 out of 10 pain level with ADLs. Progressing  STG #2a: Pt will report/demo [place] with fastening clothing manipulators. MET  STG #2b: Pt will report/demo [place] with using knife and fork to cut food. MET  STG #3a: Pt will demonstrate independence with issued HEP. Progressing  STG #3b: Pt will demo improved Left IF PIP Flexion by 30 degrees needed to aid with bimanual grasping skills Met- cont goal  STG#3c: Pt. Will demo improved Left LF SANTIAGO by 50+ degrees need to aid with in hand manipulation skills.- met- cont goal    Plan     Continue skilled occupational therapy with individualized plan of care 2 times weekly for 8 weeks  during the certification period from 03/01/2019 to 04/19/2019      Updates/Grading for next session: cont to progress as able    Brit Haider OT

## 2019-03-11 ENCOUNTER — PROCEDURE VISIT (OUTPATIENT)
Dept: PAIN MEDICINE | Facility: CLINIC | Age: 69
End: 2019-03-11
Attending: ANESTHESIOLOGY
Payer: MEDICARE

## 2019-03-11 ENCOUNTER — CLINICAL SUPPORT (OUTPATIENT)
Dept: REHABILITATION | Facility: HOSPITAL | Age: 69
End: 2019-03-11
Payer: MEDICARE

## 2019-03-11 VITALS
SYSTOLIC BLOOD PRESSURE: 150 MMHG | DIASTOLIC BLOOD PRESSURE: 93 MMHG | BODY MASS INDEX: 28.35 KG/M2 | WEIGHT: 144.38 LBS | HEART RATE: 60 BPM | HEIGHT: 60 IN | TEMPERATURE: 98 F

## 2019-03-11 DIAGNOSIS — M79.645 PAIN IN LEFT FINGER(S): ICD-10-CM

## 2019-03-11 DIAGNOSIS — M65.332 TRIGGER MIDDLE FINGER OF LEFT HAND: Primary | ICD-10-CM

## 2019-03-11 DIAGNOSIS — M65.331 TRIGGER MIDDLE FINGER OF RIGHT HAND: Primary | ICD-10-CM

## 2019-03-11 DIAGNOSIS — M25.642 FINGER STIFFNESS, LEFT: ICD-10-CM

## 2019-03-11 PROCEDURE — 76942 PR U/S GUIDANCE FOR NEEDLE GUIDANCE: ICD-10-PCS | Mod: 26,S$GLB,, | Performed by: ANESTHESIOLOGY

## 2019-03-11 PROCEDURE — 64450 PR NERVE BLOCK INJ, ANES/STEROID, OTHER PERIPHERAL: ICD-10-PCS | Mod: LT,S$GLB,, | Performed by: ANESTHESIOLOGY

## 2019-03-11 PROCEDURE — 97035 APP MDLTY 1+ULTRASOUND EA 15: CPT

## 2019-03-11 PROCEDURE — 97110 THERAPEUTIC EXERCISES: CPT

## 2019-03-11 PROCEDURE — 99499 NO LOS: ICD-10-PCS | Mod: S$GLB,,, | Performed by: ANESTHESIOLOGY

## 2019-03-11 PROCEDURE — 76942 ECHO GUIDE FOR BIOPSY: CPT | Mod: 26,S$GLB,, | Performed by: ANESTHESIOLOGY

## 2019-03-11 PROCEDURE — 97140 MANUAL THERAPY 1/> REGIONS: CPT

## 2019-03-11 PROCEDURE — 64450 NJX AA&/STRD OTHER PN/BRANCH: CPT | Mod: LT,S$GLB,, | Performed by: ANESTHESIOLOGY

## 2019-03-11 PROCEDURE — 99499 UNLISTED E&M SERVICE: CPT | Mod: S$GLB,,, | Performed by: ANESTHESIOLOGY

## 2019-03-11 RX ORDER — BUPIVACAINE HYDROCHLORIDE 2.5 MG/ML
10 INJECTION, SOLUTION EPIDURAL; INFILTRATION; INTRACAUDAL
Status: COMPLETED | OUTPATIENT
Start: 2019-03-11 | End: 2019-03-11

## 2019-03-11 RX ORDER — LIDOCAINE HYDROCHLORIDE 10 MG/ML
10 INJECTION INFILTRATION; PERINEURAL
Status: COMPLETED | OUTPATIENT
Start: 2019-03-11 | End: 2019-03-11

## 2019-03-11 RX ADMIN — LIDOCAINE HYDROCHLORIDE 10 ML: 10 INJECTION INFILTRATION; PERINEURAL at 01:03

## 2019-03-11 RX ADMIN — BUPIVACAINE HYDROCHLORIDE 25 MG: 2.5 INJECTION, SOLUTION EPIDURAL; INFILTRATION; INTRACAUDAL at 01:03

## 2019-03-11 NOTE — PROGRESS NOTES
HPI  Patient presenting for left forearm block from orthopedics, prior to PT for trigger finger and left hand pain in the first three digits and palm.  No recent health changes.     Previous block did not help substantially for her procedure so we will reinject her more proximal in the forearm      No health changes since previous encounter    PMHx, PSHx, Allergies, Medications reviewed in epic    ROS negative except pain complaints in HPI    OBJECTIVE:    BP (!) 150/93   Pulse 60   Temp 98.2 °F (36.8 °C)   Ht 5' (1.524 m)   Wt 65.5 kg (144 lb 6.4 oz)   LMP  (LMP Unknown)   BMI 28.20 kg/m²     PHYSICAL EXAMINATION:    GENERAL: Well appearing, in no acute distress, alert and oriented x3.  PSYCH:  Mood and affect appropriate.  SKIN: Skin color, texture, turgor normal, no rashes or lesions.  CV: RRR with palpation of the radial artery.  PULM: No evidence of respiratory difficulty, symmetric chest rise. Clear to auscultation.  NEURO: Cranial nerves grossly intact.    Plan:    Procedure Note: Left radial and median nerve block under ultrasound                               Surgeon: Kaela Zaragoza M.D.    Assistant: None    Pre-Op Diagnosis: neuralgia/neuritis left radial and median nerve    Post-Op Diagnosis: Same    EBL: None    Complications: None    Specimens: None    Description of procedure:    After written consent was obtained, patient placed in supine position.  The area over the radial nerve was prepped in usual sterile fashion using chlorhexidine.  A sterile ultrasound cover was applied, and the probe was placed over the radial nerve proximal to the incision on the lateral aspect of the forearm approximately 5 cm above the wrist, and was visualized directly.  This was followed by advancement of a 27-gauge needle using in out of plane technique with the needle location being visualized in real time to pass into the area of the radial nerve.  Once encountered, after negative aspiration, and no  paresthesias,  A mixture of 10 mL of 0.25% bupivacaine +10 mL of 1% lidocaine was injected with dispersion of the medication into the area of the nerve being visualized directly using the ultrasound probe, confirming that the medication was deposited in the correct area.  A total of 20 mL was injected throughout the forearm.   Needle was withdrawn and a sterile band-aid applied to the skin.      Patient tolerated the procedure well, and was reporting improvement of pain symptoms after the injection.  She was discharged from the clinic in stable condition.        Kaela Zaragoza  03/11/2019

## 2019-03-11 NOTE — PROGRESS NOTES
"  Occupational Therapy Daily Treatment Note      Name: Amrita Yoder  Clinic Number: [id number]     Medical Diagnosis: Left IF DIP fusion with Acumend screw and Left LF trigger finger release  Date of Surgery: 11/02/2018  Surgical Procedure:  Left IF DIP fusion with Acumend screw and Left LF trigger finger release  Therapy Diagnosis:        Encounter Diagnoses   Name Primary?    Pain in left finger(s)      Finger stiffness, left        Precautions: Standard     Physician: Stephanie Castillo PA-C via Dr. Winkler  Physician Orders:Cont OT,   Date of Return to MD: 03/27/2019  Evaluation Date: 12/4/2018  Plan of Care Certification Period: 03/01/2019-04/19/2019     Visit #: / Visits authorized: 21/24  Insurance Authorization period Expiration:TBD -awaiting new auth     Time In: 1115  Time Out: 1215  Total Billable Time: 60 minutes        Subjective     Patient received forearm nerve block this day prior to therapy by Pain Management.   Pt reports:  " My fingers are really numb now, he put in more medicine this time.  The index is bending better, but middle finger still stiff.  I really want to have the surgery on my other hand soon."   she was compliant with home exercise program given last session.   Response to previous treatment: good  Functional change: Increased functional use overall  Pain: 3/10  Location: left long, index finger, with passive motion    Objective           Hand ROM. Measured in degrees.    12/4/2018 12/17/2018 12/28/2018 01/08/2019 01/14/2019 01/21/2019 02/06/2019 02/06/2019 02/06/2019 02/06/2019 02/27/2019 02/27/2019     Left Left  Left Left Left Left Left AROM Left PROM L AROM L PROM LAROM LPROM     AROM AROM          (post treatment measurements)    Index: MP  60 65 (+5) 80 (+15) 85 (+5) 85 (=) 87 90 95 90 (=) 95 (=) 93 (90) 93              PIP     0+/0 10 (+10) 30 (+20) 43 (+13) 57 (+14) 57 58 65 60 (+2) 65 (=) 56 (65) 75              DIP NT NT NT NT NT NT NT NT NT NT NT NT              SANTIAGO   " "                              Long:  MP 72 78 83 (+5) 90 (+7) 90 (=) 90 (=) 90 95 91 (+1) 95 (=) 93 (95) 90              PIP 11 30 45 (+15) 46 (+1) 55 (+9) 55 (=) 58 85 64 (+6) 89 (+4) 65 (68) 93              DIP 0 25 37 (+12) 35 (-2) 37 (+2) 37 (=) 37 50 35 (-2) 55 (+5) 38 (35) 55              SANTIAGO 83 133 (+50)                                 2/25/2016 following wrist nerve block:    Able to obtain Passive PIP flexion of index 92-94*, Middle finger 95-96* , DIP 50*   Place and hold remains limited for full flat/composite fist, Able to obtain full passive fist of middle, ring, small, but unable to maintain with exception of 1x following strengthening and passive IP stretch with theraband strip.     3/11/2019 following wrist nerve block"  Able to obtain Passive PIP flexion of index and middle finger; Improved "place and hold", however, Middle finger continues to be limited by strength and/or scar adhesions.      Strength (Dynamometer) and Pinch Strength (Pinch Gauge)  Measured in pounds.    01/21/2019 01/21/2019 02/06/2019 02/27/2019     Right Left Left Left   Rung II 35 12 20 (+8) 22 (+2)   Lambert Pinch  12 7  9 (+2) 10 (+1)   3pt Pinch  9 4  9 (+5) 10 (+1)   2pt Pinch  9 4  4 (=) 8 (+4)      Sensation[employer] Impairment/Limitation/Restriction for FOTO hand Survey  Therapist reviewed FOTO scores for Amrita Yoder.  FOTO documents entered into [employer] - see Media section.     10th visit Limitation Score: 52% - 01/14/2019    Category: Self Care     Current : CK = at least 40% but < 60% impaired, limited or restricted  Goal: CJ = at least 20% but < 40% impaired, limited or restricted  Discharge: TBD         TREATMENT:      Amrita received the following supervised modalities after being cleared for contradictions for 10 minutes:   -paraffin with moist heat pack To left hand, to increase blood flow, circulation and tisssue extensibility.-      Amrita received direct modalitiesUltrasound 3 Mhz, cont, @ .8 w/cm2 x 8 min to " palm for scar mgmt, Included volar IF and LF.     Amrita received the following manual therapy techniques for 25 minutes:   - added scar extractor to scar adhesion and palm to decrease adhesions and to improve tensile glide.  Will discuss with MD if tenolysis is warranted.   -Performed scar massage to healed areas and around non-healed incision site to decrease adhesions and improve tensile glide.   -Dysem scar massage and use of scar retractor with cross friction during flexion/ext with dysem, use of iastym tools to scar and volar digits  - Passive range of motion stretches into flexion with grade II-III joint glides (mobilization with movement) - PIP of IF, DIP and PIP of LF, intrinsic stretches LF. - no theraband stretch this date as pt easily about to achieve near full passive composite flexion of LF        Amrita received therapeutic exercises for  12 minutes including:   -       AROM  PIP blocking (IF and LF)  Wave, straight fist  P/H straight fist for IF, composite for LF    X 10 reps each    Red putty  - gross   - isolated finger flexion  - 2 and 3 pt pinch x 10 reps each          Home Exercises and Education Provided     Education provided: Cont HEP, use of liam strap for IF and LF.Ok to stretch PIP joints into flexion- pt verbalized understanding. - sent pt home with liam strap.  Cont theraputty HEP- with medium resistance pink theraputty at home. (with added isolated finger strengthening) Use scar pad at night that was given to her last visit by alternate therapist. Use dysem for scar massage.   - Progress towards goals     Written Home Exercises Provided: Patient instructed to cont prior HEP.   Exercises were reviewed and Amrita was able to demonstrate them prior to the end of the session.  Amrita demonstrated good  understanding of the education provided.   .   See EMR under Patient Instructions for exercises provided 01/16/2019.     Assessment     She continues with good passive motion.  Pt. States she feels it is the arthritis that is deterring her full motion. She was able to obtain about 20 degrees more of passive motion at IF PIP , no significant change in passive motion of LF PIP or DIP compared to earlier treatments, per therapist progress note following the nerve block on 02/25/2019. Pt. Reports she had increased swelling and pain in the index finger PIP the next day but it resolved. Passively pt can get to flat fist with IF and near full composite flexion of LF but pt is unable to maintain position actively. Pt. Continues to be observed avoiding use of IF for picking up car key, zipping and unzipping purse, etc despite continued education to use IF.     Amrita is progressing well towards her goals and there are no updates to goals at this time. Pt prognosis continues as Good. Pt will continue to benefit from skilled outpatient occupational therapy to address the deficits listed in the problem list on initial evaluation, provide pt/family education and to maximize pt's level of independence in the home and community environment.     Anticipated barriers to continued occupational therapy: none    Pt's spiritual, cultural and educational needs considered and pt agreeable to plan of care and goals.    Goals     Long Term Goals (LTGs); to be met by discharge. All progressing  LTG #1: Pt will report a pain level of 2 out of 10 with ADLs  LTG #2: Pt will demo improved FOTO score by 20 points.   LTG #3: Pt will return to prior level of function for ADLs and household management.      Short Term Goals (STGs); to be met within 4 weeks (01/04/2019).  STG #1a: Pt will report 5 out of 10 pain level with ADLs. Progressing  STG #2a: Pt will report/demo [place] with fastening clothing manipulators. MET  STG #2b: Pt will report/demo [place] with using knife and fork to cut food. MET  STG #3a: Pt will demonstrate independence with issued HEP. Progressing  STG #3b: Pt will demo improved Left IF PIP  Flexion by 30 degrees needed to aid with bimanual grasping skills Met- cont goal  STG#3c: Pt. Will demo improved Left LF SANTIAGO by 50+ degrees need to aid with in hand manipulation skills.- met- cont goal    Plan     Continue skilled occupational therapy with individualized plan of care 2 times weekly for 8 weeks during the certification period from 03/01/2019 to 04/19/2019      Updates/Grading for next session: cont to progress as able    Laney Weller, OT

## 2019-03-13 ENCOUNTER — CLINICAL SUPPORT (OUTPATIENT)
Dept: REHABILITATION | Facility: HOSPITAL | Age: 69
End: 2019-03-13
Payer: MEDICARE

## 2019-03-13 DIAGNOSIS — M79.645 PAIN IN LEFT FINGER(S): ICD-10-CM

## 2019-03-13 DIAGNOSIS — M25.642 FINGER STIFFNESS, LEFT: ICD-10-CM

## 2019-03-13 DIAGNOSIS — Z78.9 DECREASED ACTIVITIES OF DAILY LIVING (ADL): ICD-10-CM

## 2019-03-13 DIAGNOSIS — M25.611 DECREASED ROM OF RIGHT SHOULDER: ICD-10-CM

## 2019-03-13 DIAGNOSIS — R53.1 DECREASED STRENGTH: ICD-10-CM

## 2019-03-13 PROCEDURE — 97110 THERAPEUTIC EXERCISES: CPT | Mod: PN

## 2019-03-13 PROCEDURE — 97014 ELECTRIC STIMULATION THERAPY: CPT | Mod: PN

## 2019-03-13 PROCEDURE — 97140 MANUAL THERAPY 1/> REGIONS: CPT | Mod: PN

## 2019-03-13 PROCEDURE — 97018 PARAFFIN BATH THERAPY: CPT | Mod: PN,59

## 2019-03-13 NOTE — PROGRESS NOTES
"  Occupational Therapy Daily Treatment Note      Name: Amrita Yoder  Clinic Number: [id number]     Medical Diagnosis: Left IF DIP fusion with Acumend screw and Left LF trigger finger release  Date of Surgery: 11/02/2018  Surgical Procedure:  Left IF DIP fusion with Acumend screw and Left LF trigger finger release  Therapy Diagnosis:        Encounter Diagnoses   Name Primary?    Pain in left finger(s)      Finger stiffness, left        Precautions: Standard     Physician: Stephanie Castillo PA-C via Dr. Winkler  Physician Orders:Cont OT,   Date of Return to MD: 03/27/2019  Evaluation Date: 12/4/2018  Plan of Care Certification Period: 03/01/2019-04/19/2019     Visit #: / Visits authorized: 22/24  Insurance Authorization period Expiration:03/25/2019     Time In: 10AM  Time Out: 1055AM  Total Billable Time: 55 minutes        Subjective       Pt reports:  " My index finger was a little swollen yesterday. After the block wore off I could barely move my hand. Can you do  Soft massage today? It is hurting"   she was compliant with home exercise program given last session.   Response to previous treatment: good  Functional change: Increased functional use overall  Pain: 8/10  Location: left long, index finger, with passive motion    Objective           Hand ROM. Measured in degrees.    12/4/2018 12/17/2018 12/28/2018 01/08/2019 01/14/2019 01/21/2019 02/06/2019 02/06/2019 02/06/2019 02/06/2019 02/27/2019 02/27/2019     Left Left  Left Left Left Left Left AROM Left PROM L AROM L PROM LAROM LPROM     AROM AROM          (post treatment measurements)    Index: MP  60 65 (+5) 80 (+15) 85 (+5) 85 (=) 87 90 95 90 (=) 95 (=) 93 (90) 93              PIP     0+/0 10 (+10) 30 (+20) 43 (+13) 57 (+14) 57 58 65 60 (+2) 65 (=) 56 (65) 75              DIP NT NT NT NT NT NT NT NT NT NT NT NT              [place]ANT[place]O                                 Long:  MP 72 78 83 (+5) 90 (+7) 90 (=) 90 (=) 90 95 91 (+1) 95 (=) 93 (95) 90              PIP 11 30 " "45 (+15) 46 (+1) 55 (+9) 55 (=) 58 85 64 (+6) 89 (+4) 65 (68) 93              DIP 0 25 37 (+12) 35 (-2) 37 (+2) 37 (=) 37 50 35 (-2) 55 (+5) 38 (35) 55              SANTIAGO 83 133 (+50)                                 2/25/2016 following wrist nerve block:    Able to obtain Passive PIP flexion of index 92-94*, Middle finger 95-96* , DIP 50*   Place and hold remains limited for full flat/composite fist, Able to obtain full passive fist of middle, ring, small, but unable to maintain with exception of 1x following strengthening and passive IP stretch with theraband strip.     3/11/2019 following wrist nerve block"  Able to obtain Passive PIP flexion of index and middle finger; Improved "place and hold", however, Middle finger continues to be limited by strength and/or scar adhesions.      Strength (Dynamometer) and Pinch Strength (Pinch Gauge)  Measured in pounds.    01/21/2019 01/21/2019 02/06/2019 02/27/2019     Right Left Left Left   Rung II 35 12 20 (+8) 22 (+2)   Lambert Pinch  12 7  9 (+2) 10 (+1)   3pt Pinch  9 4  9 (+5) 10 (+1)   2pt Pinch  9 4  4 (=) 8 (+4)      SensationCMS Impairment/Limitation/Restriction for FOTO hand Survey  Therapist reviewed FOTO scores for Amrita Yoder.  FOTO documents entered into EPIC - see Media section.     10th visit Limitation Score: 52% - 01/14/2019    Category: Self Care     Current : CK = at least 40% but < 60% impaired, limited or restricted  Goal: CJ = at least 20% but < 40% impaired, limited or restricted  Discharge: TBD         TREATMENT:      Amrita received the following supervised modalities after being cleared for contradictions for 15 minutes:   -paraffin with moist heat pack To left hand, to increase blood flow, circulation and tisssue extensibility.-      Amrita received direct modalitiesUltrasound 3 Mhz, cont, @ .8 w/cm2 x 8 min to palm for scar mgmt, Included volar IF and LF.     Amrita received the following manual therapy techniques for 20 minutes:   - " added scar extractor to scar adhesion and palm to decrease adhesions and to improve tensile glide.    -Performed scar massage to healed areas and around non-healed incision site to decrease adhesions and improve tensile glide.   - gentle Dysem scar massage and use of scar retractor with cross friction during flexion/ext with dysem, use of iastym tools to scar and volar digits  -  Gentle Passive range of motion stretches into flexion with grade II-III joint glides (mobilization with movement) - PIP of IF, DIP and PIP of LF, intrinsic stretches LF. - no theraband stretch this date as pt easily about to achieve near full passive composite flexion of LF        Amrita received therapeutic exercises for  12 minutes including:   -       AROM  PIP blocking (IF and LF)  Wave, straight fist  P/H straight fist for IF, composite for LF    X 10 reps each    Red putty  x 10 squeezes  X 5 pancake/bloom   X 3 logs 2 pt pinch  X 3 logs 3 pt pinch  Isolated FDP flexion LF and isolated FDS for IF, LF x 10 reps each.   Digi flex x 5# green x 20 IF and LF     Home Exercises and Education Provided     Education provided: Cont HEP, use of liam strap for IF and LF.Ok to stretch PIP joints into flexion- pt verbalized understanding. - sent pt home with liam strap.  Cont theraputty HEP- with medium resistance pink theraputty at home. (with added isolated finger strengthening) Use scar pad at night that was given to her last visit by alternate therapist. Use dysem for scar massage.   - Progress towards goals     Written Home Exercises Provided: Patient instructed to cont prior HEP.   Exercises were reviewed and Amrita was able to demonstrate them prior to the end of the session.  Amrita demonstrated good  understanding of the education provided.   .   See EMR under Patient Instructions for exercises provided 01/16/2019.     Assessment     She continues with good passive motion. Pt. States she feels it is the arthritis that is deterring  her full motion.  Pt. Reports she had increased swelling and pain in the index finger PIP the next day but it resolved. However she did request to have more gentle scar massage today due to increase in pain. Pt. Has 8-9/10 pain reported in the finger.  Passively pt can get to flat fist with IF and near full composite flexion of LF but pt is unable to maintain position actively. Pt. Continues to be observed avoiding use of IF for picking up car key, zipping and unzipping purse, etc despite continued education to use IF.     Amrita is progressing well towards her goals and there are no updates to goals at this time. Pt prognosis continues as Good. Pt will continue to benefit from skilled outpatient occupational therapy to address the deficits listed in the problem list on initial evaluation, provide pt/family education and to maximize pt's level of independence in the home and community environment.     Anticipated barriers to continued occupational therapy: none    Pt's spiritual, cultural and educational needs considered and pt agreeable to plan of care and goals.    Goals     Long Term Goals (LTGs); to be met by discharge. All progressing  LTG #1: Pt will report a pain level of 2 out of 10 with ADLs  LTG #2: Pt will demo improved FOTO score by 20 points.   LTG #3: Pt will return to prior level of function for ADLs and household management.      Short Term Goals (STGs); to be met within 4 weeks (01/04/2019).  STG #1a: Pt will report 5 out of 10 pain level with ADLs. Progressing  STG #2a: Pt will report/demo [place] with fastening clothing manipulators. MET  STG #2b: Pt will report/demo [place] with using knife and fork to cut food. MET  STG #3a: Pt will demonstrate independence with issued HEP. Progressing  STG #3b: Pt will demo improved Left IF PIP Flexion by 30 degrees needed to aid with bimanual grasping skills Met- cont goal  STG#3c: Pt. Will demo improved Left LF SANTIAGO by 50+ degrees need to aid with  in hand manipulation skills.- met- cont goal    Plan     Continue skilled occupational therapy with individualized plan of care 2 times weekly for 8 weeks during the certification period from 03/01/2019 to 04/19/2019      Updates/Grading for next session: cont to progress as able    Brit Haider, OT

## 2019-03-13 NOTE — PROGRESS NOTES
"  Physical Therapy Daily Treatment Note     Name: Amrita Yoder  Clinic Number: [id number]    Therapy Diagnosis:   Encounter Diagnoses   Name Primary?    Decreased ROM of right shoulder     Decreased strength     Decreased activities of daily living (ADL)      Physician: Stephanie Castillo PA-C    Visit Date: 3/13/2019    Physician Orders: PT Eval and Treat: R shoulder pain, 2x/wk x 6 weeks, Modalities prn  Medical Diagnosis from Referral: M25.511,G89.29 (ICD-10-CM) - Chronic right shoulder pain  Evaluation Date: 2/20/2019  Authorization Period Expiration: 02/08/2020  Plan of Care Expiration: 4/11/19  Visit # / Visits authorized: 4/12  FOTO: 4/5 NEXT  G-code: 4/10     Cap Visit: 14.61 Cap Total: 318.62     Time In: 9:00 am  Time Out: 9:20 am  Total Billable Time: 15 minutes ( Estim x 1)  Precautions: HTN, hyperlipidemia    Subjective   Mrs. Yoder reports she was awakened by pain at about 3 am and has been in "terrible pain" since.  Requested no therapy today, states she doesn't even want to touch it now. Requests to do ice only for today.  States she will call MD regarding getting an earlier appointment.    She was compliant with home exercise program.  Response to previous treatment: no adverse effects  Functional change: no change  Pain: 8/10 Location: right shoulder      Objective       Amrita received cold pack for 15 minutes to R shoulder in combination with EStim.(Interferential) for pain management.    Home Exercises Provided and Patient Education Provided   Education provided:   - continue HEP  -Contact MD for sooner follow up if available.  -Participation in therapy as able.    Written Home Exercises Provided: Patient instructed to cont prior HEP.  Exercises were reviewed and Amrita was able to demonstrate them prior to the end of the session.  Amrita demonstrated fair  understanding of the education provided.     See EMR under Media for exercises provided prior visit (02/20/2019)    Assessment   A: " "Mrs. Yoder is a 69 y/o F with a multi-year history of progressive worsening R shoulder pain currently complicated by L hand issues. High R shoulder irritability  today . Ice pack and Interferential Estim able to provide some relief, reports "4-5/10 " pain after intervention.    Amrita is progressing well towards her goals.   Pt prognosis is Guarded.     Pt will continue to benefit from skilled outpatient physical therapy to address the deficits listed in the problem list box on initial evaluation, provide pt/family education and to maximize pt's level of independence in the home and community environment.   Pt's spiritual, cultural and educational needs considered and pt agreeable to plan of care and goals.     Anticipated barriers to physical therapy: language barrier.    Goals:   Short Term Goals: 4 weeks  1.Report decreased R shoulder pain </= 4/10 with R UE movements below 90 degrees to increase tolerance for ADLs and increased QoL. Progressing towards  2. Increase R shoulder PROM by 90 degrees flexion/abduction for increased functional mobility. Progressing towards  3. Increased strength by 1/3 MMT grade in R UE to increase tolerance for ADL and work activities. Progressing towards  4. Pt to tolerate HEP to improve ROM and independence with ADL's. Progressing towards     Long Term Goals: 8 weeks  1.Report decreased R shoulder pain </= 2 /10 with R UE AROM to increase tolerance for ADLs and increased QoL. Progressing towards  2.Increase R shoulder AROM to 90 degrees for increased functional mobility and independent ADLs with decreased pain. Progressing towards  3.Increase strength to >/= 4/5 in BUE to increase tolerance for ADL and work activities. Progressing towards  4. Pt goal: to be able to use my right arm again. Progressing towards  5. Pt will have improved gcode of CJ (20-40% limited) on FOTO shoulder in order to demonstrate true functional improvement. Progressing towards    Plan   Continue plan of " care.  Emphasis on pain and inflammation control.  Hold aggressive stretching and strengthening at this time.     Anette Haywood, PTA

## 2019-03-19 ENCOUNTER — TELEPHONE (OUTPATIENT)
Dept: ORTHOPEDICS | Facility: CLINIC | Age: 69
End: 2019-03-19

## 2019-03-19 ENCOUNTER — HOSPITAL ENCOUNTER (EMERGENCY)
Facility: HOSPITAL | Age: 69
Discharge: HOME | End: 2019-03-19
Attending: EMERGENCY MEDICINE
Payer: MEDICARE

## 2019-03-19 ENCOUNTER — TELEPHONE (OUTPATIENT)
Dept: REHABILITATION | Facility: HOSPITAL | Age: 69
End: 2019-03-19

## 2019-03-19 VITALS
RESPIRATION RATE: 16 BRPM | HEIGHT: 60 IN | SYSTOLIC BLOOD PRESSURE: 187 MMHG | DIASTOLIC BLOOD PRESSURE: 82 MMHG | BODY MASS INDEX: 28.27 KG/M2 | OXYGEN SATURATION: 98 % | HEART RATE: 83 BPM | WEIGHT: 144 LBS | TEMPERATURE: 98 F

## 2019-03-19 DIAGNOSIS — G89.29 CHRONIC RIGHT SHOULDER PAIN: Primary | ICD-10-CM

## 2019-03-19 DIAGNOSIS — M25.511 CHRONIC RIGHT SHOULDER PAIN: Primary | ICD-10-CM

## 2019-03-19 PROCEDURE — 63600175 PHARM REV CODE 636 W HCPCS: Performed by: NURSE PRACTITIONER

## 2019-03-19 PROCEDURE — 25000003 PHARM REV CODE 250: Performed by: NURSE PRACTITIONER

## 2019-03-19 PROCEDURE — 96372 THER/PROPH/DIAG INJ SC/IM: CPT

## 2019-03-19 PROCEDURE — 99284 EMERGENCY DEPT VISIT MOD MDM: CPT

## 2019-03-19 RX ORDER — METHOCARBAMOL 500 MG/1
1000 TABLET, FILM COATED ORAL 4 TIMES DAILY
Qty: 24 TABLET | Refills: 0 | Status: SHIPPED | OUTPATIENT
Start: 2019-03-19 | End: 2019-03-22

## 2019-03-19 RX ORDER — METHOCARBAMOL 750 MG/1
750 TABLET, FILM COATED ORAL
Status: COMPLETED | OUTPATIENT
Start: 2019-03-19 | End: 2019-03-19

## 2019-03-19 RX ORDER — NAPROXEN 500 MG/1
500 TABLET ORAL 2 TIMES DAILY WITH MEALS
Qty: 20 TABLET | Refills: 0 | Status: SHIPPED | OUTPATIENT
Start: 2019-03-19 | End: 2019-03-29 | Stop reason: SDUPTHER

## 2019-03-19 RX ORDER — KETOROLAC TROMETHAMINE 30 MG/ML
15 INJECTION, SOLUTION INTRAMUSCULAR; INTRAVENOUS
Status: COMPLETED | OUTPATIENT
Start: 2019-03-19 | End: 2019-03-19

## 2019-03-19 RX ORDER — METHOCARBAMOL 500 MG/1
1000 TABLET, FILM COATED ORAL 4 TIMES DAILY
Qty: 24 TABLET | Refills: 0 | Status: SHIPPED | OUTPATIENT
Start: 2019-03-19 | End: 2019-03-19 | Stop reason: SDUPTHER

## 2019-03-19 RX ADMIN — KETOROLAC TROMETHAMINE 15 MG: 30 INJECTION, SOLUTION INTRAMUSCULAR at 07:03

## 2019-03-19 RX ADMIN — METHOCARBAMOL TABLETS 750 MG: 750 TABLET, COATED ORAL at 07:03

## 2019-03-19 NOTE — TELEPHONE ENCOUNTER
Pt's daughter called clinic this am RE Pt's appointments. Daughter states Amrita is in severe pain with her right shoulder and she wants to cancel all her appointments for the time being until she can get an appointment with the doctor.   Brit Haider, RADHAD, OTR/L, CHT   Occupational therapist, Certified Hand Therapist

## 2019-03-19 NOTE — TELEPHONE ENCOUNTER
----- Message from April [facility] sent at 3/19/2019 10:23 AM CDT -----  Contact: Lesvia daughter  Name of Who is Calling:Lesvia        What is the request in detail:Erica is requesting a call back from clinical staff in regards to mother being in a lot of pain from her right shoulder all the way down to her hand. Please contact to further discuss and advise.        Can the clinic reply by [facility]: No      What Number to Call Back if not in [facility]:

## 2019-03-19 NOTE — TELEPHONE ENCOUNTER
Spoke w/pt daughter. She states the patient cannot even do therapy due to the pain, she cannot even stand to be touched. She states she is about to bring her mom to the ER for her shoulder pain.     She states she would like to speak to Dr Winkler, notified pt daughter that Dr Winkler is out of office at this time. She states she would like a call back from her first thing in the morning.     I notified patient's daughter I would send the message to Dr Winkler. She verbalized understanding.

## 2019-03-19 NOTE — TELEPHONE ENCOUNTER
----- Message from Bonnie Snow sent at 3/19/2019  4:36 PM CDT -----  Contact: Melissa(daughter)  Name of Who is Calling: Melissa(daughter)    What is the request in detail:Melissa(daughter) is requesting a call back from ....Please contact to further discuss and advise      Can the clinic reply by [facility]:     [facility]: [phone]

## 2019-03-20 NOTE — TELEPHONE ENCOUNTER
Returned pt daughter call. She states her mother continues to have right shoulder pain. She has been attending therapy which she states is not helping. The therapist is barely able to work on the shoulder due to pt pain. 3 days ago pain increased. She denies new injury. Pt daughter is requesting an MRI. MRI ordered. Will have nurse call pt to schedule as well as schedule a follow up with us to review results.

## 2019-03-22 ENCOUNTER — HOSPITAL ENCOUNTER (OUTPATIENT)
Dept: RADIOLOGY | Facility: HOSPITAL | Age: 69
Discharge: HOME | End: 2019-03-22
Attending: PHYSICIAN ASSISTANT
Payer: MEDICARE

## 2019-03-22 DIAGNOSIS — M25.511 CHRONIC RIGHT SHOULDER PAIN: ICD-10-CM

## 2019-03-22 DIAGNOSIS — G89.29 CHRONIC RIGHT SHOULDER PAIN: ICD-10-CM

## 2019-03-22 PROCEDURE — 73221 MRI JOINT UPR EXTREM W/O DYE: CPT | Mod: TC,RT

## 2019-03-22 PROCEDURE — 73221 MRI SHOULDER WITHOUT CONTRAST RIGHT: ICD-10-PCS | Mod: 26,RT,, | Performed by: RADIOLOGY

## 2019-03-22 PROCEDURE — 73221 MRI JOINT UPR EXTREM W/O DYE: CPT | Mod: 26,RT,, | Performed by: RADIOLOGY

## 2019-03-29 ENCOUNTER — OFFICE VISIT (OUTPATIENT)
Dept: INTERNAL MEDICINE | Facility: CLINIC | Age: 69
End: 2019-03-29
Payer: MEDICARE

## 2019-03-29 VITALS
HEART RATE: 63 BPM | DIASTOLIC BLOOD PRESSURE: 90 MMHG | WEIGHT: 144.81 LBS | OXYGEN SATURATION: 95 % | SYSTOLIC BLOOD PRESSURE: 136 MMHG | BODY MASS INDEX: 28.43 KG/M2 | HEIGHT: 60 IN

## 2019-03-29 DIAGNOSIS — M25.511 CHRONIC RIGHT SHOULDER PAIN: ICD-10-CM

## 2019-03-29 DIAGNOSIS — E66.3 OVERWEIGHT (BMI 25.0-29.9): ICD-10-CM

## 2019-03-29 DIAGNOSIS — I10 ESSENTIAL HYPERTENSION: ICD-10-CM

## 2019-03-29 DIAGNOSIS — H60.502 ACUTE OTITIS EXTERNA OF LEFT EAR, UNSPECIFIED TYPE: ICD-10-CM

## 2019-03-29 DIAGNOSIS — R68.89 FLU-LIKE SYMPTOMS: Primary | ICD-10-CM

## 2019-03-29 DIAGNOSIS — J06.9 UPPER RESPIRATORY TRACT INFECTION, UNSPECIFIED TYPE: ICD-10-CM

## 2019-03-29 DIAGNOSIS — J02.9 PHARYNGITIS, UNSPECIFIED ETIOLOGY: ICD-10-CM

## 2019-03-29 DIAGNOSIS — G89.29 CHRONIC RIGHT SHOULDER PAIN: ICD-10-CM

## 2019-03-29 LAB
CTP QC/QA: YES
DEPRECATED S PYO AG THROAT QL EIA: NEGATIVE
FLUAV AG NPH QL: NEGATIVE
FLUBV AG NPH QL: NEGATIVE

## 2019-03-29 PROCEDURE — 99214 OFFICE O/P EST MOD 30 MIN: CPT | Mod: S$GLB,,, | Performed by: INTERNAL MEDICINE

## 2019-03-29 PROCEDURE — 3075F PR MOST RECENT SYSTOLIC BLOOD PRESS GE 130-139MM HG: ICD-10-PCS | Mod: CPTII,S$GLB,, | Performed by: INTERNAL MEDICINE

## 2019-03-29 PROCEDURE — 1101F PT FALLS ASSESS-DOCD LE1/YR: CPT | Mod: CPTII,S$GLB,, | Performed by: INTERNAL MEDICINE

## 2019-03-29 PROCEDURE — 99999 PR PBB SHADOW E&M-EST. PATIENT-LVL IV: ICD-10-PCS | Mod: PBBFAC,,, | Performed by: INTERNAL MEDICINE

## 2019-03-29 PROCEDURE — 87880 STREP A ASSAY W/OPTIC: CPT

## 2019-03-29 PROCEDURE — 3080F DIAST BP >= 90 MM HG: CPT | Mod: CPTII,S$GLB,, | Performed by: INTERNAL MEDICINE

## 2019-03-29 PROCEDURE — 87804 INFLUENZA ASSAY W/OPTIC: CPT | Mod: QW,S$GLB,, | Performed by: INTERNAL MEDICINE

## 2019-03-29 PROCEDURE — 1101F PR PT FALLS ASSESS DOC 0-1 FALLS W/OUT INJ PAST YR: ICD-10-PCS | Mod: CPTII,S$GLB,, | Performed by: INTERNAL MEDICINE

## 2019-03-29 PROCEDURE — 99214 PR OFFICE/OUTPT VISIT, EST, LEVL IV, 30-39 MIN: ICD-10-PCS | Mod: S$GLB,,, | Performed by: INTERNAL MEDICINE

## 2019-03-29 PROCEDURE — 99999 PR PBB SHADOW E&M-EST. PATIENT-LVL IV: CPT | Mod: PBBFAC,,, | Performed by: INTERNAL MEDICINE

## 2019-03-29 PROCEDURE — 87081 CULTURE SCREEN ONLY: CPT

## 2019-03-29 PROCEDURE — 87804 POCT INFLUENZA A/B: ICD-10-PCS | Mod: 59,QW,S$GLB, | Performed by: INTERNAL MEDICINE

## 2019-03-29 PROCEDURE — 3075F SYST BP GE 130 - 139MM HG: CPT | Mod: CPTII,S$GLB,, | Performed by: INTERNAL MEDICINE

## 2019-03-29 PROCEDURE — 3080F PR MOST RECENT DIASTOLIC BLOOD PRESSURE >= 90 MM HG: ICD-10-PCS | Mod: CPTII,S$GLB,, | Performed by: INTERNAL MEDICINE

## 2019-03-29 RX ORDER — FLUTICASONE PROPIONATE 50 MCG
1 SPRAY, SUSPENSION (ML) NASAL DAILY PRN
Qty: 1 BOTTLE | Refills: 0 | Status: SHIPPED | OUTPATIENT
Start: 2019-03-29 | End: 2019-11-05

## 2019-03-29 RX ORDER — OFLOXACIN 3 MG/ML
5 SOLUTION AURICULAR (OTIC) DAILY
Qty: 10 ML | Refills: 0 | Status: SHIPPED | OUTPATIENT
Start: 2019-03-29 | End: 2019-04-08

## 2019-03-29 RX ORDER — BENZONATATE 100 MG/1
100 CAPSULE ORAL 3 TIMES DAILY PRN
Qty: 30 CAPSULE | Refills: 0 | Status: SHIPPED | OUTPATIENT
Start: 2019-03-29 | End: 2019-04-08

## 2019-03-29 RX ORDER — NAPROXEN 500 MG/1
500 TABLET ORAL 2 TIMES DAILY PRN
Qty: 40 TABLET | Refills: 0 | Status: SHIPPED | OUTPATIENT
Start: 2019-03-29 | End: 2019-05-13 | Stop reason: SDUPTHER

## 2019-03-29 NOTE — PROGRESS NOTES
Pt. ID: Amrita Yoder is a 68 y.o. female      Chief complaint:   Chief Complaint   Patient presents with    Cough     x5d    Sore Throat     x5d       HPI: Pt. Here for cough and sore throat for past 5 days; she has had a flu shot and would like flu screen; she is not taking anything OTC; she is compliant with meds and BP is borderline elevated; she has lost a few pounds; pt. Recently went to ER for chronic R shoulder pain and was placed on naproxen 500 mg BID prn and she would like to get a refill; MRI R shoulder dated 3/20/19 shows rotator cuff tendinitis and partial tearing of the infraspinatus; degenerative changes were noted; Biceps tendinosis was also noted; she is in PT and sees ortho      Review of Systems   Constitutional: Positive for chills. Negative for fever.   HENT: Positive for congestion, ear pain and sore throat.         Mild L > R ear discomfort    Respiratory: Positive for cough.    Cardiovascular: Negative for chest pain.   Gastrointestinal: Negative for abdominal pain, nausea and vomiting.   Genitourinary: Negative for dysuria.   Musculoskeletal: Positive for joint pain and myalgias.        R shoulder pain especially with ROM and inability to raise R arm above R shoulder          Objective:    Physical Exam   Constitutional: She is oriented to person, place, and time.   Overweight    HENT:   L ear external canal erythema; oropharyngeal erythema    Eyes: EOM are normal.   Neck: Normal range of motion.   Cardiovascular: Normal rate, regular rhythm and normal heart sounds.   Pulmonary/Chest: Effort normal and breath sounds normal. No respiratory distress. She has no wheezes. She has no rales.   Abdominal: Soft. There is no tenderness. There is no rebound and no guarding.   Musculoskeletal: She exhibits tenderness.   R shoulder pain with ROM and tenderness noted to R shoulder; inability to raise R arm above R shoulder    Neurological: She is alert and oriented to person, place, and time.    Skin: No rash noted.   Vitals reviewed.        Health Maintenance   Topic Date Due    Zoster Vaccine  05/26/2011    Colonoscopy  02/18/2020 (Originally 5/26/1968)    Mammogram  01/28/2020    Fecal Occult Blood Test (FOBT)/FitKit  02/11/2020    DEXA SCAN  12/11/2020    Lipid Panel  12/11/2022    TETANUS VACCINE  05/19/2025    Hepatitis C Screening  Completed    Pneumococcal Vaccine (65+ Low/Medium Risk)  Completed    Influenza Vaccine  Completed         Assessment:     1. Flu-like symptoms Active   2. Upper respiratory tract infection, unspecified type Active   3. Acute otitis externa of left ear, unspecified type Active   4. Pharyngitis, unspecified etiology Active   5. Chronic right shoulder pain Active   6. Essential hypertension Sub-optimally controlled   7. Overweight (BMI 25.0-29.9) Sub-optimally controlled         Plan: Flu-like symptoms  Comments:  tyelenol prn and screen for flu   Orders:  -     POCT Influenza A/B    Upper respiratory tract infection, unspecified type  Comments:  start tessalon prn and flonase prn  Orders:  -     benzonatate (TESSALON) 100 MG capsule; Take 1 capsule (100 mg total) by mouth 3 (three) times daily as needed for Cough.  Dispense: 30 capsule; Refill: 0  -     fluticasone (FLONASE) 50 mcg/actuation nasal spray; 1 spray (50 mcg total) by Each Nare route daily as needed for Rhinitis.  Dispense: 1 Bottle; Refill: 0    Acute otitis externa of left ear, unspecified type  Comments:  start floxin otic x 10 days   Orders:  -     ofloxacin (FLOXIN) 0.3 % otic solution; Place 5 drops into both ears once daily. for 10 days  Dispense: 10 mL; Refill: 0    Pharyngitis, unspecified etiology  Comments:  get rapid strep and Cx; start chloraseptic prn   Orders:  -     Throat Screen, Rapid    Chronic right shoulder pain  Comments:  continue naproxen prn and f/u ortho who is managing   Orders:  -     naproxen (NAPROSYN) 500 MG tablet; Take 1 tablet (500 mg total) by mouth 2 (two) times  daily as needed (avoid all other NSAIDs).  Dispense: 40 tablet; Refill: 0  -     Cancel: Ambulatory referral to Orthopedics    Essential hypertension  Comments:  continue current regimen and encouraged low Na diet and weight loss; repeat BP on f/u in 1 month with resolution of symptoms    Overweight (BMI 25.0-29.9)  Comments:  encouraged diet         Problem List Items Addressed This Visit        ENT    Pharyngitis    Relevant Orders    Throat Screen, Rapid    Acute otitis externa of left ear    Relevant Medications    ofloxacin (FLOXIN) 0.3 % otic solution    Upper respiratory tract infection    Relevant Medications    benzonatate (TESSALON) 100 MG capsule    fluticasone (FLONASE) 50 mcg/actuation nasal spray       Cardiac/Vascular    Essential hypertension       Endocrine    Overweight (BMI 25.0-29.9)       Orthopedic    Chronic right shoulder pain    Relevant Medications    naproxen (NAPROSYN) 500 MG tablet       Other    Flu-like symptoms - Primary    Relevant Orders    POCT Influenza A/B

## 2019-03-31 LAB — BACTERIA THROAT CULT: NORMAL

## 2019-04-15 DIAGNOSIS — I10 ESSENTIAL HYPERTENSION: ICD-10-CM

## 2019-04-15 DIAGNOSIS — E78.5 DYSLIPIDEMIA: ICD-10-CM

## 2019-04-15 RX ORDER — ATORVASTATIN CALCIUM 20 MG/1
20 TABLET, FILM COATED ORAL NIGHTLY
Qty: 90 TABLET | Refills: 3 | Status: SHIPPED | OUTPATIENT
Start: 2019-04-15 | End: 2020-06-28

## 2019-04-15 RX ORDER — LISINOPRIL 2.5 MG/1
2.5 TABLET ORAL DAILY
Qty: 90 TABLET | Refills: 3 | Status: SHIPPED | OUTPATIENT
Start: 2019-04-15 | End: 2019-05-13

## 2019-04-15 NOTE — TELEPHONE ENCOUNTER
----- Message from Queenie Garcia sent at 4/15/2019 10:52 AM CDT -----  Contact: Self [phone]  Patient would like a refill for lisinopril (PRINIVIL,ZESTRIL) 2.5 MG tablet and atorvastatin (LIPITOR) 20 MG tablet sent to [facility] - [place], [place] - [address]. Please advise.

## 2019-05-13 ENCOUNTER — LAB VISIT (OUTPATIENT)
Dept: LAB | Facility: HOSPITAL | Age: 69
End: 2019-05-13
Attending: FAMILY MEDICINE
Payer: MEDICARE

## 2019-05-13 ENCOUNTER — OFFICE VISIT (OUTPATIENT)
Dept: FAMILY MEDICINE | Facility: CLINIC | Age: 69
End: 2019-05-13
Payer: MEDICARE

## 2019-05-13 VITALS
DIASTOLIC BLOOD PRESSURE: 70 MMHG | HEIGHT: 60 IN | OXYGEN SATURATION: 97 % | HEART RATE: 67 BPM | SYSTOLIC BLOOD PRESSURE: 120 MMHG | BODY MASS INDEX: 28.57 KG/M2 | WEIGHT: 145.5 LBS

## 2019-05-13 DIAGNOSIS — G89.29 CHRONIC RIGHT SHOULDER PAIN: ICD-10-CM

## 2019-05-13 DIAGNOSIS — R49.0 HOARSENESS: ICD-10-CM

## 2019-05-13 DIAGNOSIS — M25.511 CHRONIC RIGHT SHOULDER PAIN: ICD-10-CM

## 2019-05-13 DIAGNOSIS — M25.50 ARTHRALGIA, UNSPECIFIED JOINT: ICD-10-CM

## 2019-05-13 DIAGNOSIS — I10 ESSENTIAL HYPERTENSION: ICD-10-CM

## 2019-05-13 DIAGNOSIS — I10 ESSENTIAL HYPERTENSION: Primary | ICD-10-CM

## 2019-05-13 LAB
ALBUMIN SERPL BCP-MCNC: 4 G/DL (ref 3.5–5.2)
ALP SERPL-CCNC: 111 U/L (ref 55–135)
ALT SERPL W/O P-5'-P-CCNC: 27 U/L (ref 10–44)
ANION GAP SERPL CALC-SCNC: 10 MMOL/L (ref 8–16)
AST SERPL-CCNC: 23 U/L (ref 10–40)
BACTERIA #/AREA URNS AUTO: ABNORMAL /HPF
BASOPHILS # BLD AUTO: 0.04 K/UL (ref 0–0.2)
BASOPHILS NFR BLD: 0.6 % (ref 0–1.9)
BILIRUB SERPL-MCNC: 0.6 MG/DL (ref 0.1–1)
BILIRUB UR QL STRIP: NEGATIVE
BUN SERPL-MCNC: 19 MG/DL (ref 8–23)
CALCIUM SERPL-MCNC: 9.3 MG/DL (ref 8.7–10.5)
CAOX CRY UR QL COMP ASSIST: ABNORMAL
CHLORIDE SERPL-SCNC: 105 MMOL/L (ref 95–110)
CHOLEST SERPL-MCNC: 160 MG/DL (ref 120–199)
CHOLEST/HDLC SERPL: 3.1 {RATIO} (ref 2–5)
CLARITY UR REFRACT.AUTO: ABNORMAL
CO2 SERPL-SCNC: 25 MMOL/L (ref 23–29)
COLOR UR AUTO: YELLOW
CREAT SERPL-MCNC: 0.7 MG/DL (ref 0.5–1.4)
DIFFERENTIAL METHOD: NORMAL
EOSINOPHIL # BLD AUTO: 0.1 K/UL (ref 0–0.5)
EOSINOPHIL NFR BLD: 1.8 % (ref 0–8)
ERYTHROCYTE [DISTWIDTH] IN BLOOD BY AUTOMATED COUNT: 11.9 % (ref 11.5–14.5)
EST. GFR  (AFRICAN AMERICAN): >60 ML/MIN/1.73 M^2
EST. GFR  (NON AFRICAN AMERICAN): >60 ML/MIN/1.73 M^2
GLUCOSE SERPL-MCNC: 97 MG/DL (ref 70–110)
GLUCOSE UR QL STRIP: NEGATIVE
HCT VFR BLD AUTO: 44.1 % (ref 37–48.5)
HDLC SERPL-MCNC: 52 MG/DL (ref 40–75)
HDLC SERPL: 32.5 % (ref 20–50)
HGB BLD-MCNC: 14.2 G/DL (ref 12–16)
HGB UR QL STRIP: ABNORMAL
HYALINE CASTS UR QL AUTO: 1 /LPF
IMM GRANULOCYTES # BLD AUTO: 0.02 K/UL (ref 0–0.04)
IMM GRANULOCYTES NFR BLD AUTO: 0.3 % (ref 0–0.5)
KETONES UR QL STRIP: NEGATIVE
LDLC SERPL CALC-MCNC: 89.8 MG/DL (ref 63–159)
LEUKOCYTE ESTERASE UR QL STRIP: ABNORMAL
LYMPHOCYTES # BLD AUTO: 2.8 K/UL (ref 1–4.8)
LYMPHOCYTES NFR BLD: 39.3 % (ref 18–48)
MCH RBC QN AUTO: 30.2 PG (ref 27–31)
MCHC RBC AUTO-ENTMCNC: 32.2 G/DL (ref 32–36)
MCV RBC AUTO: 94 FL (ref 82–98)
MICROSCOPIC COMMENT: ABNORMAL
MONOCYTES # BLD AUTO: 0.5 K/UL (ref 0.3–1)
MONOCYTES NFR BLD: 7.2 % (ref 4–15)
NEUTROPHILS # BLD AUTO: 3.7 K/UL (ref 1.8–7.7)
NEUTROPHILS NFR BLD: 50.8 % (ref 38–73)
NITRITE UR QL STRIP: NEGATIVE
NONHDLC SERPL-MCNC: 108 MG/DL
NRBC BLD-RTO: 0 /100 WBC
PH UR STRIP: 6 [PH] (ref 5–8)
PLATELET # BLD AUTO: 314 K/UL (ref 150–350)
PMV BLD AUTO: 9.7 FL (ref 9.2–12.9)
POTASSIUM SERPL-SCNC: 3.5 MMOL/L (ref 3.5–5.1)
PROT SERPL-MCNC: 7.4 G/DL (ref 6–8.4)
PROT UR QL STRIP: NEGATIVE
RBC # BLD AUTO: 4.7 M/UL (ref 4–5.4)
RBC #/AREA URNS AUTO: 9 /HPF (ref 0–4)
SODIUM SERPL-SCNC: 140 MMOL/L (ref 136–145)
SP GR UR STRIP: 1.01 (ref 1–1.03)
TRIGL SERPL-MCNC: 91 MG/DL (ref 30–150)
TSH SERPL DL<=0.005 MIU/L-ACNC: 0.78 UIU/ML (ref 0.4–4)
URN SPEC COLLECT METH UR: ABNORMAL
WBC # BLD AUTO: 7.23 K/UL (ref 3.9–12.7)
WBC #/AREA URNS AUTO: 5 /HPF (ref 0–5)

## 2019-05-13 PROCEDURE — 3078F DIAST BP <80 MM HG: CPT | Mod: CPTII,S$GLB,, | Performed by: FAMILY MEDICINE

## 2019-05-13 PROCEDURE — 3078F PR MOST RECENT DIASTOLIC BLOOD PRESSURE < 80 MM HG: ICD-10-PCS | Mod: CPTII,S$GLB,, | Performed by: FAMILY MEDICINE

## 2019-05-13 PROCEDURE — 1101F PT FALLS ASSESS-DOCD LE1/YR: CPT | Mod: CPTII,S$GLB,, | Performed by: FAMILY MEDICINE

## 2019-05-13 PROCEDURE — 1101F PR PT FALLS ASSESS DOC 0-1 FALLS W/OUT INJ PAST YR: ICD-10-PCS | Mod: CPTII,S$GLB,, | Performed by: FAMILY MEDICINE

## 2019-05-13 PROCEDURE — 3074F SYST BP LT 130 MM HG: CPT | Mod: CPTII,S$GLB,, | Performed by: FAMILY MEDICINE

## 2019-05-13 PROCEDURE — 99999 PR PBB SHADOW E&M-EST. PATIENT-LVL IV: CPT | Mod: PBBFAC,,, | Performed by: FAMILY MEDICINE

## 2019-05-13 PROCEDURE — 99999 PR PBB SHADOW E&M-EST. PATIENT-LVL IV: ICD-10-PCS | Mod: PBBFAC,,, | Performed by: FAMILY MEDICINE

## 2019-05-13 PROCEDURE — 85025 COMPLETE CBC W/AUTO DIFF WBC: CPT

## 2019-05-13 PROCEDURE — 80061 LIPID PANEL: CPT

## 2019-05-13 PROCEDURE — 99214 PR OFFICE/OUTPT VISIT, EST, LEVL IV, 30-39 MIN: ICD-10-PCS | Mod: S$GLB,,, | Performed by: FAMILY MEDICINE

## 2019-05-13 PROCEDURE — 84443 ASSAY THYROID STIM HORMONE: CPT

## 2019-05-13 PROCEDURE — 80053 COMPREHEN METABOLIC PANEL: CPT

## 2019-05-13 PROCEDURE — 3074F PR MOST RECENT SYSTOLIC BLOOD PRESSURE < 130 MM HG: ICD-10-PCS | Mod: CPTII,S$GLB,, | Performed by: FAMILY MEDICINE

## 2019-05-13 PROCEDURE — 99214 OFFICE O/P EST MOD 30 MIN: CPT | Mod: S$GLB,,, | Performed by: FAMILY MEDICINE

## 2019-05-13 PROCEDURE — 81001 URINALYSIS AUTO W/SCOPE: CPT

## 2019-05-13 PROCEDURE — 36415 COLL VENOUS BLD VENIPUNCTURE: CPT | Mod: PO

## 2019-05-13 RX ORDER — NAPROXEN 500 MG/1
500 TABLET ORAL 2 TIMES DAILY PRN
Qty: 60 TABLET | Refills: 0 | Status: SHIPPED | OUTPATIENT
Start: 2019-05-13 | End: 2020-10-21

## 2019-05-13 RX ORDER — VALSARTAN 40 MG/1
40 TABLET ORAL DAILY
Qty: 90 TABLET | Refills: 3 | Status: SHIPPED | OUTPATIENT
Start: 2019-05-13 | End: 2020-07-29

## 2019-05-13 NOTE — PATIENT INSTRUCTIONS
Coma alimentos saludables para callahan corazón  Lo que coma tiene un gran impacto en la britt de callahan corazón. De hecho, si come de manera más hakeem podrá disminuir muchos de saturnino riesgos cardíacos al mismo tiempo. Por ejemplo, le ayudará a manejar callahan peso, saturnino niveles de colesterol y callahan presión arterial. Aquí encontrará consejos para hacer cambios en callahan dieta que le harán ravindra a callahan corazón sin dejar de lado todos los alimentos y los sabores que más le gustan.  Cómo comenzar  · Hable con callahan proveedor de atención médica para que le aconseje sobre planes de alimentación, por ejemplo, la dieta DASH o la dieta mediterránea. También es posible que le remita a un dietista.  · Cambie algunas cosas por vez. Dese tiempo para acostumbrarse a algunos cambios en callahan alimentación antes de hacer más cambios.  · Intente crear un plan de alimentación saludable y sabroso que pueda mantener el john de callahan bess.    Metas para rock alimentación saludable  A continuación, verá algunos consejos para mejorar saturnino hábitos de alimentación.  · Coma menos grasas saturadas y grasas trans. Las grasas saturadas elevan saturnino niveles de colesterol, por eso, coma lo menos posible de estas grasas. Están en alimentos tales otilio las butch grasas, la leche entera, el queso entero y los aceites de arellano y de jin. Evite las grasas trans porque bajan acllahan colesterol cuello además de subir callahan colesterol ifrah. Las grasas trans se encuentran más que nada en los alimentos procesados.  · Coma menos sodio (sal). Consumir demasiada sal puede subirle la presión arterial. Reduzca la cantidad de sodio que ingiere a 2,300 mg diarios o menos según le recomiende callahan proveedor de atención médica. Salir a comer con menos frecuencia y elegir menos alimentos procesados son dos excelentes maneras de reducir la cantidad de sal que consume.  · Cuente las calorías. Rock caloría es rock unidad de energía. Callahan cuerpo quema calorías para obtener combustible, kaveh si usted come más calorías que  las que callahan cuerpo consume, las calorías adicionales se guardan en forma de grasa. Callahan proveedor de atención médica le ayudará a elaborar un plan de dieta para manejar saturnino calorías. Es probable que incluya comer alimentos más saludables y hacer actividad física con regularidad. Para ayudarle a llevar la cuenta de callahan progreso, tenga un diario en el que vaya anotando lo que come y la frecuencia con que hace actividad física.  Elija los alimentos adecuados  Trate de que estos alimentos ayo los pilares de callahan dieta. Si tiene diabetes, puede que le den otras recomendaciones diferentes de las que se mencionan aquí.  · Frutas y vegetales: brindan muchos nutrientes sin demasiadas calorías. Cuando coma, llene la mitad de callahan plato con estos alimentos. A la segunda mitad del plato, divídala entre granos integrales y proteínas magras.  · Granos integrales: tienen mucha fibra, vitaminas y nutrientes. Es rock buena opción incluir pan, pasta y arroz integrales.  · Proteínas magras: le aportan nutrición con menos grasas. Son buenas opciones el pescado, el blaze sin piel y los frijoles.  · Productos lácteos bajos en grasa o sin grasa: ofrecen nutrientes sin mucha grasa. Pruebe consumir leche, queso o yogur bajos en grasa o sin grasa.  · Grasas saludables: pueden ser buenas para callahan corazón si las come en cantidades moderadas. Son las grasas no saturadas, otilio el aceite de bahena, las nueces y los pescados. Intente comer al menos dos porciones a la semana de algún pescado graso, otilio salmón, jhonny, caballa, trucha arcoíris y atún albacora (garcias). Ladan tipo de pescados contienen ácidos grasos omega-3, los cuales son especialmente buenos para callahan corazón. Las semillas de amy constituyen otra phyllis de grasa saludable para callahan corazón.  Más información sobre la alimentación saludable para el corazón  Krystal las etiquetas de los alimentos  La alimentación saludable comienza en la pietro de alimentos. Preste atención a las etiquetas de  nutrición que traen los alimentos empaquetados. Busque productos altos en fibra y proteínas, y bajos en grasa saturada, colesterol y sodio. Evite los productos que contienen grasas trans. También preste mucha atención a las porciones que come. Por ejemplo, si piensa comer dos porciones, duplique todas las cantidades que figuran en la etiqueta.  Prepare la comida correctamente  Any parte clave de la alimentación saludable es reducir la cantidad de jose manuel y grasa que agrega a saturnino comidas. Busque en Internet recetas con menos contenido de grasa y sodio. También puede probar los siguientes consejos:  · Quite la grasa de la carne y la piel del blaze antes de cocinar.  · Quite la grasa que queda en la superficie de sopas y salsas.  · Ase, hierva, hornee, grille, cocine al vapor o en microondas callahan comida sin agregarle grasas.  · Elija ingredientes que le den sabor a saturnino comidas sin cargarla con calorías, grasa o sodio. Pruebe los siguientes ingredientes: rábano picante, salsa picante, viviana, mostaza, aderezos sin grasa para ensaladas y vinagre. Si desea hierbas y especias sin jose manuel, pruebe albahaca, cilantro, nicol, samantha y collado.   Date Last Reviewed: 6/1/2013  © 4954-0983 [employer]. [address] [place] [address], [place] [address]. Todos los derechos reservados. Esta información no pretende sustituir la atención médica profesional. Sólo callahan médico puede diagnosticar y tratar un problema de britt.

## 2019-05-13 NOTE — PROGRESS NOTES
Subjective:       Patient ID: Amrita Yoder is a 68 y.o. female.    Chief Complaint: Follow-up and Hypertension    68 years old female who came to the clinic with right shoulder pain for more than a year.  Patient was using anti-inflammatories with partial improvement of the symptoms.  Patient was using naproxen.  The pain is 3/10 of intensity on and off aggravated with activity and better with rest.  Blood pressure today stable.  No chest pain, palpitation, orthopnea or PND.  Patient with hoarseness also for the last couple of weeks.  Patient attributes the symptoms probably to the lisinopril.  No problems with swallowing.    Review of Systems   Constitutional: Negative.    HENT: Positive for voice change. Negative for trouble swallowing.    Eyes: Negative.    Respiratory: Negative.    Cardiovascular: Negative.  Negative for chest pain, palpitations and leg swelling.   Gastrointestinal: Negative.    Genitourinary: Negative.    Musculoskeletal: Negative.    Skin: Negative.    Neurological: Negative.    Psychiatric/Behavioral: Negative.        Objective:      Physical Exam   Constitutional: She is oriented to person, place, and time. She appears well-developed and well-nourished. No distress.   HENT:   Head: Normocephalic and atraumatic.   Right Ear: External ear normal.   Left Ear: External ear normal.   Nose: Nose normal.   Mouth/Throat: Oropharynx is clear and moist. No oropharyngeal exudate.   Eyes: Pupils are equal, round, and reactive to light. Conjunctivae and EOM are normal. Right eye exhibits no discharge. Left eye exhibits no discharge. No scleral icterus.   Neck: Normal range of motion. Neck supple. No JVD present. No tracheal deviation present. No thyromegaly present.   Cardiovascular: Normal rate, regular rhythm, normal heart sounds and intact distal pulses. Exam reveals no gallop and no friction rub.   No murmur heard.  Pulmonary/Chest: Effort normal and breath sounds normal. No stridor. No  respiratory distress. She has no wheezes. She has no rales. She exhibits no tenderness.   Abdominal: Soft. Bowel sounds are normal. She exhibits no distension and no mass. There is no tenderness. There is no rebound and no guarding.   Musculoskeletal: Normal range of motion. She exhibits no edema.        Right shoulder: She exhibits tenderness and pain.   Lymphadenopathy:     She has no cervical adenopathy.   Neurological: She is alert and oriented to person, place, and time. She has normal reflexes. No cranial nerve deficit. She exhibits normal muscle tone. Coordination normal.   Skin: Skin is warm and dry. No rash noted. She is not diaphoretic. No erythema. No pallor.   Psychiatric: She has a normal mood and affect. Her behavior is normal. Judgment and thought content normal.       Assessment:       1. Essential hypertension    2. Arthralgia, unspecified joint    3. Chronic right shoulder pain Active   4. Hoarseness        Plan:         Amrita was seen today for follow-up and hypertension.    Diagnoses and all orders for this visit:    Essential hypertension  -     valsartan (DIOVAN) 40 MG tablet; Take 1 tablet (40 mg total) by mouth once daily.  -     Comprehensive metabolic panel; Future  -     Lipid panel; Future  -     Urinalysis  -     TSH; Future  -     CBC auto differential; Future    Arthralgia, unspecified joint    Chronic right shoulder pain  Comments:  continue naproxen prn and f/u ortho who is managing   Orders:  -     naproxen (NAPROSYN) 500 MG tablet; Take 1 tablet (500 mg total) by mouth 2 (two) times daily as needed (avoid all other NSAIDs).    Hoarseness  -     Ambulatory referral to ENT    Continue monitoring blood pressure at home, low sodium diet.

## 2019-05-27 ENCOUNTER — DOCUMENTATION ONLY (OUTPATIENT)
Dept: REHABILITATION | Facility: HOSPITAL | Age: 69
End: 2019-05-27

## 2019-05-27 DIAGNOSIS — M25.642 FINGER STIFFNESS, LEFT: ICD-10-CM

## 2019-05-27 DIAGNOSIS — M79.645 PAIN IN LEFT FINGER(S): ICD-10-CM

## 2019-05-27 NOTE — PROGRESS NOTES
Outpatient Therapy Discharge Summary     Name: Amrita Yoder  Clinic Number: [id number]    Therapy Diagnosis:   Encounter Diagnoses   Name Primary?    Pain in left finger(s)     Finger stiffness, left      Physician: Dr. Jose Raul Kwok    Physician Orders: eval and treat  Medical Diagnosis: Left IF DIP fusion with Acumend screw and Left LF trigger finger release  Date of Surgery: 11/02/2018  Surgical Procedure:  Left IF DIP fusion with Acumend screw and Left LF trigger finger release  Evaluation Date: 12/4/2019    Date of Last visit: 03/13/2019  Total Visits Received: 22  Cancelled Visits: 4  No Show Visits: 0    Assessment    Goals: status unknown due to early DC    Discharge reason: Patient requested discharge    Plan   This patient is discharged from Occupational Therapy

## 2019-05-30 ENCOUNTER — TELEPHONE (OUTPATIENT)
Dept: FAMILY MEDICINE | Facility: CLINIC | Age: 69
End: 2019-05-30

## 2019-05-30 NOTE — TELEPHONE ENCOUNTER
Spoke to patient and I advised that doctor said there was nothing to worry about and just to drink more water. Patient voices understanding.

## 2019-05-30 NOTE — TELEPHONE ENCOUNTER
----- Message from Queenie Garcia sent at 5/30/2019 11:00 AM CDT -----  Contact: Self [phone]  Patient would like to speak with you about her urine showing blood and wants to know what to do. Please advise

## 2019-06-14 ENCOUNTER — OFFICE VISIT (OUTPATIENT)
Dept: OTOLARYNGOLOGY | Facility: CLINIC | Age: 69
End: 2019-06-14
Payer: MEDICARE

## 2019-06-14 VITALS
BODY MASS INDEX: 28.85 KG/M2 | HEART RATE: 65 BPM | SYSTOLIC BLOOD PRESSURE: 145 MMHG | HEIGHT: 60 IN | TEMPERATURE: 98 F | WEIGHT: 146.94 LBS | DIASTOLIC BLOOD PRESSURE: 90 MMHG

## 2019-06-14 DIAGNOSIS — R49.0 HOARSENESS OF VOICE: Primary | ICD-10-CM

## 2019-06-14 DIAGNOSIS — K21.9 LARYNGOPHARYNGEAL REFLUX (LPR): ICD-10-CM

## 2019-06-14 PROCEDURE — 99203 PR OFFICE/OUTPT VISIT, NEW, LEVL III, 30-44 MIN: ICD-10-PCS | Mod: 25,S$GLB,, | Performed by: OTOLARYNGOLOGY

## 2019-06-14 PROCEDURE — 3080F DIAST BP >= 90 MM HG: CPT | Mod: CPTII,S$GLB,, | Performed by: OTOLARYNGOLOGY

## 2019-06-14 PROCEDURE — 99203 OFFICE O/P NEW LOW 30 MIN: CPT | Mod: 25,S$GLB,, | Performed by: OTOLARYNGOLOGY

## 2019-06-14 PROCEDURE — 31575 PR LARYNGOSCOPY, FLEXIBLE; DIAGNOSTIC: ICD-10-PCS | Mod: S$GLB,,, | Performed by: OTOLARYNGOLOGY

## 2019-06-14 PROCEDURE — 3077F SYST BP >= 140 MM HG: CPT | Mod: CPTII,S$GLB,, | Performed by: OTOLARYNGOLOGY

## 2019-06-14 PROCEDURE — 31575 DIAGNOSTIC LARYNGOSCOPY: CPT | Mod: S$GLB,,, | Performed by: OTOLARYNGOLOGY

## 2019-06-14 PROCEDURE — 1101F PR PT FALLS ASSESS DOC 0-1 FALLS W/OUT INJ PAST YR: ICD-10-PCS | Mod: CPTII,S$GLB,, | Performed by: OTOLARYNGOLOGY

## 2019-06-14 PROCEDURE — 99499 RISK ADDL DX/OHS AUDIT: ICD-10-PCS | Mod: S$GLB,,, | Performed by: OTOLARYNGOLOGY

## 2019-06-14 PROCEDURE — 1101F PT FALLS ASSESS-DOCD LE1/YR: CPT | Mod: CPTII,S$GLB,, | Performed by: OTOLARYNGOLOGY

## 2019-06-14 PROCEDURE — 3077F PR MOST RECENT SYSTOLIC BLOOD PRESSURE >= 140 MM HG: ICD-10-PCS | Mod: CPTII,S$GLB,, | Performed by: OTOLARYNGOLOGY

## 2019-06-14 PROCEDURE — 99499 UNLISTED E&M SERVICE: CPT | Mod: S$GLB,,, | Performed by: OTOLARYNGOLOGY

## 2019-06-14 PROCEDURE — 99999 PR PBB SHADOW E&M-EST. PATIENT-LVL IV: CPT | Mod: PBBFAC,,, | Performed by: OTOLARYNGOLOGY

## 2019-06-14 PROCEDURE — 3080F PR MOST RECENT DIASTOLIC BLOOD PRESSURE >= 90 MM HG: ICD-10-PCS | Mod: CPTII,S$GLB,, | Performed by: OTOLARYNGOLOGY

## 2019-06-14 PROCEDURE — 99999 PR PBB SHADOW E&M-EST. PATIENT-LVL IV: ICD-10-PCS | Mod: PBBFAC,,, | Performed by: OTOLARYNGOLOGY

## 2019-06-14 RX ORDER — OMEPRAZOLE 40 MG/1
40 CAPSULE, DELAYED RELEASE ORAL EVERY MORNING
Qty: 30 CAPSULE | Refills: 11 | Status: SHIPPED | OUTPATIENT
Start: 2019-06-14 | End: 2021-05-10 | Stop reason: SDUPTHER

## 2019-06-14 NOTE — PROCEDURES
Procedures     Due to indication in patient's history, presentation or risk factors,  a fiber optic exam was performed.    SEPARATE PROCEDURE NOTE:    ANESTHESIA:  Topical xylocaine with jose david-synephrine    FINDINGS:  Moderate to severe inaterarytenoid erythema and edema    PROCEDURE:  After verbal consent was obtained, the flexible scope was passed through the patient's nasal cavity without difficulty.  The nasopharynx (adenoid pad) and eustachian tube orifices were first visualized and were found to be normal, without masses or irregularity.  The posterior pharyngeal wall and base of tongue were then examined and no mass or irregular tissue was seen.  The scope was then advanced to the larynx, and the epiglottis, valleculae, and piriform sinuses were normal, without masses or mucosal irregularity.  The false vocal folds and true vocal folds were then examined and were found to have normal mobility (full abduction and adduction) and no masses or mucosal irregularity was seen.  The interartyenoid area had moderate to severe edema and erythema consistent with reflux.

## 2019-06-14 NOTE — LETTER
June 14, 2019      Quentin Hoff MD  [address] [facility]  [place] [address]           [facility]  [address] [place] [address], [address]  [place] [address]  Phone: [phone]  Fax: [phone]          Patient: Amrita Yoder   MR Number: [id number]   YOB: 1950   Date of Visit: 6/14/2019       Dear Dr. Quentin Hoff:    Thank you for referring Amrita Yoder to me for evaluation. Attached you will find relevant portions of my assessment and plan of care.    If you have questions, please do not hesitate to call me. I look forward to following Amrita Yoder along with you.    Sincerely,    Jocelynn Modi MD    Enclosure  CC:  No Recipients    If you would like to receive this communication electronically, please contact [email or web address] or [phone] to request more information on [employer] access.    For providers and/or their staff who would like to refer a patient to [facility], please contact us through our one-stop-shop provider referral line, [facility], at [phone].    If you feel you have received this communication in error or would no longer like to receive these types of communications, please e-mail [email or web address]

## 2019-06-14 NOTE — PROGRESS NOTES
Chief Complaint   Patient presents with    Other     Referred By  for Hoarsness. Patient states No Pain   .     HPI:Amrita Yoder is a 69 y.o. female who has been referred by Dr. Quentin Hoff for a 2 months history of hoarseness. Her voice is progressively worsening over this time. She reports she will wake up frequently in the morning with hoarseness in little to no projection of her voice.  There are pitch breaks or cracks. There is vocal fatigue. She denies dysphagia, odynophagia, throat pain, and otalgia.  There is no hemoptysis or hematemesis. She is breathing well. She has not noted any exacerbating or alleviating factors.    She denies throat clearing and cough. She denies heartburn and reflux.         Past Medical History:   Diagnosis Date    Hyperlipidemia     Hypertension      Social History     Socioeconomic History    Marital status: Single     Spouse name: Not on file    Number of children: Not on file    Years of education: Not on file    Highest education level: Not on file   Occupational History    Not on file   Social Needs    Financial resource strain: Not on file    Food insecurity:     Worry: Not on file     Inability: Not on file    Transportation needs:     Medical: Not on file     Non-medical: Not on file   Tobacco Use    Smoking status: Never Smoker   Substance and Sexual Activity    Alcohol use: No    Drug use: No    Sexual activity: Not on file   Lifestyle    Physical activity:     Days per week: Not on file     Minutes per session: Not on file    Stress: Not on file   Relationships    Social connections:     Talks on phone: Not on file     Gets together: Not on file     Attends [employer] service: Not on file     Active member of club or organization: Not on file     Attends meetings of clubs or organizations: Not on file     Relationship status: Not on file   Other Topics Concern    Not on file   Social History Narrative    Not on file     Past  Surgical History:   Procedure Laterality Date    CATARACT EXTRACTION W/  INTRAOCULAR LENS IMPLANT Bilateral     FUSION, JOINT, FINGER left index Left 11/2/2018    [facility]    RELEASE, TRIGGER FINGER left long Left 11/2/2018    Performed [facility] at [facility] [place]     Family History   Adopted: Yes           Review of Systems  General: negative for chills, fever or weight loss  Psychological: negative for mood changes or depression  Ophthalmic: negative for blurry vision, photophobia or eye pain  ENT: see HPI  Respiratory: no cough, shortness of breath, or wheezing  Cardiovascular: no chest pain or dyspnea on exertion  Gastrointestinal: no abdominal pain, change in bowel habits, or black/ bloody stools  Musculoskeletal: negative for gait disturbance or muscular weakness  Neurological: no syncope or seizures; no ataxia  Dermatological: negative for puritis,  rash and jaundice  Hematologic/lymphatic: no easy bruising, no new lumps or bumps      Physical Exam:    Vitals:    06/14/19 1316   BP: (!) 145/90   Pulse: 65   Temp: 98.3 °F (36.8 °C)       Constitutional: Well appearing / communicating without difficutly.  NAD.  Eyes: EOM I Bilaterally  Head/Face: Normocephalic.  Negative paranasal sinus pressure/tenderness.  Salivary glands WNL.  House Brackmann I Bilaterally.    Right Ear: Auricle normal appearance. External Auditory Canal within normal limits no lesions or masses,TM w/o masses/lesions/perforations. TM mobility noted.   Left Ear: Auricle normal appearance. External Auditory Canal within normal limits no lesions or masses,TM w/o masses/lesions/perforations. TM mobility noted.  Nose: No gross nasal septal deviation. Inferior Turbinates 3+ bilaterally. No septal perforation. No masses/lesions. External nasal skin appears normal without masses/lesions.  Oral Cavity: Gingiva/lips within normal limits.  Dentition/gingiva healthy appearing. Mucus membranes moist.  Floor of mouth soft, no masses palpated. Oral Tongue mobile. Hard Palate appears normal.    Oropharynx: Base of tongue appears normal. No masses/lesions noted. Tonsillar fossa/pharyngeal wall without lesions. Posterior oropharynx WNL.  Soft palate without masses. Midline uvula.   Neck/Lymphatic: No LAD I-VI bilaterally.  No thyromegaly.  No masses noted on exam.    Mirror laryngoscopy/nasopharyngoscopy: Active gag reflex.  Unable to perform.    Neuro/Psychiatric: AOx3.  Normal mood and affect.   Cardiovascular: Normal carotid pulses bilaterally, no increasing jugular venous distention noted at cervical region bilaterally.    Respiratory: Normal respiratory effort, no stridor, no retractions noted.      See separate procedure note for FFL.       Assessment:    ICD-10-CM ICD-9-CM    1. Hoarseness of voice R49.0 784.42    2. Laryngopharyngeal reflux (LPR) K21.9 478.79      The primary encounter diagnosis was Hoarseness of voice. A diagnosis of Laryngopharyngeal reflux (LPR) was also pertinent to this visit.      Plan:  No orders of the defined types were placed in this encounter.     The patient has the physical findings of chronic laryngopharyngeal reflux, which I believe is the cause of the hoarseness. I explained to the patient how it is common to experience throat discomfort, a foreign body sensation, problems swallowing, chronic cough and hoarseness among other things due to reflux, even in the absence of heartburn. Smaller volumes of gastric contents are required to irritate the pharynx than the lower esophagus. Often patients with significant reflux and heartburn will seek medical treatment earlier.     I recommended that the patient start taking Omeprazole 40mg daily* and provided a prescription.     I also recommended that the patient refrain from eating within 3 hours of going to bed at night and that the patient place a 2 x 4 underneath the head board of the bed to elevate the head of bed very subtly and  optimize the impact of gravity on the potential reflux.  I recommended that the patient avoid alcohol, caffeine, tobacco, tomato sauce, spicy foods, fried food, and chocolate.  Follow up in 4-6 weeks to assess progress with treatment.       Jocelynn Modi MD

## 2019-06-14 NOTE — PATIENT INSTRUCTIONS
VOCAL HYGIENE AND HYDRATION RECOMMENDATIONS     DO   · Drink plenty of waterabout  oz a day! If your urine is pale, you should be well-hydrated.  Try some of these tips to increase hydration as well.  · Eat wet snacks such as grapes, melon, cucumbers, apple slices   · Reduce intake coffee and other caffeinated and carbonated beverages   · Suck on pastille lozenges or sugar free candies (not mentholated lozenges)   · Use a room or personal humidifier   · Use sinus rinses/irrigations or nasal saline spray   · Inhale steam for a few minutes before speaking or singing   · Pay attention to how, and how much, you use your voice.   · Give yourself vocal breaks throughout the day.   · Breathe when talking, take frequent pauses for breath.   · Use a microphone when speaking in front of a group of 15 or more to reduce voice strain.   · Learn how to use your voice correctly to get loud with voice therapy techniques.  · Stay healthy. Eat right and get plenty of rest. Follow a reflux precaution diet if indicated.   ____________________________________________________________________    REDUCE   · Loud talking, yelling, cheering, or screaming. Voice injury can take place over a long time, or all at once.  If you need to talk loud or yell, be sure you are doing it properly.   · Clearing your throat and forceful coughing. The vocal folds collect mucus when irritated or inflamed and this can cause the urge to clear your throat. The trauma of clearing the throat creates more inflammation and mucus. See tips above for keeping hydrated to assist with cutting back on throat clearing.  Instead of clearing your throat, try these behaviors until the sensation passes:   · Take a sip of water   · Silently clear with a hard exhale making an hhh sound   · Swallow hard   · Drying agents such as anti-histamines or decongestant medications. You should always take medications as prescribed, but keep in mind these will dry you  out, so increase your hydration!   ____________________________________________________________________    AVOID   · Inhalant irritants such as smoke, strong fumes, and allergens. Take advantage of 1-800-QUIT-NOW if you are ready to stop smoking.   · Unnecessary non-speech noises, such as grunting during exercise, loud noises, or excessively high- or low-pitched sounds. Save your voice for talking and singing!   · Whispering. Whispering places your vocal folds in a tenser position than usual.       Tips to Control Acid Reflux    To control acid reflux, youll need to make some basic diet and lifestyle changes. The simple steps outlined below may be all youll need to ease discomfort.  Watch what you eat  · Avoid fatty foods and spicy foods.  · Eat fewer acidic foods, such as citrus and tomato-based foods. These can increase symptoms.  · Limit drinking alcohol, caffeine, and fizzy beverages. All increase acid reflux.  · Try limiting chocolate, peppermint, and spearmint. These can worsen acid reflux in some people.  Watch when you eat  · Avoid lying down for 3 hours after eating.  · Do not snack before going to bed.  Raise your head  Raising your head and upper body by 4 to 6 inches helps limit reflux when youre lying down. Put blocks under the head of your bed frame to raise it.  Other changes  · Lose weight, if you need to  · Dont exercise near bedtime  · Avoid tight-fitting clothes  · Limit aspirin and ibuprofen  · Stop smoking   Date Last Reviewed: 7/1/2016  © 6085-1357 [employer] [address] [place] [address], [place] [address]. All rights reserved. This information is not intended as a substitute for professional medical care. Always follow your healthcare professional's instructions.

## 2019-08-01 NOTE — PROGRESS NOTES
"          Occupational Therapy Daily Treatment Note      Name: Amrita Yoder  Clinic Number: [id number]     Medical Diagnosis: Left IF DIP fusion with Acumend screw and Left LF trigger finger release  Date of Surgery: 11/02/2018  Surgical Procedure:  Left IF DIP fusion with Acumend screw and Left LF trigger finger release  Therapy Diagnosis:        Encounter Diagnoses   Name Primary?    Pain in left finger(s)      Finger stiffness, left        Precautions: Standard     Physician: Stephanie Castillo PA-C via Dr. Winkler  Physician Orders:Cont OT, begin weaning from DIP splint per PA Note on 12/14/2018  Date of Return to MD: 1/25/2019     Evaluation Date: 12/4/2018  Plan of Care Certification Period: 12/4/2018-01/11/2019     Visit #: / Visits authorized: 6/12   Insurance Authorization period Expiration: 02/02/2019     Time In: 1030 am  Time Out: 1130am  Total Billable Time: 60 minutes        Subjective     Pt reports: " I've been working it. I was able to put it all the way down last night- the long finger."  she was compliant with home exercise program given last session.   Response to previous treatment: good  Functional change: none    Pain: 5/10  Location: left long, index finger, dorsum of hand    Objective     Observation/Appearance: Pt weaning from splint- not wearing this visit. Pt continues to hold L IF in hyperextension position of PIP.  Well healing scar sites.                Hand ROM. Measured in degrees.    12/4/2018 12/17/2018 12/28/2018     Left Left  Left     AROM AROM    Index: MP  60 65 (+5) 80 (+15)              PIP     0+/0 10 (+10) 30 (+20)              DIP NT NT NT              SANTIAGO               Long:  MP 72 78 83 (+5)              PIP 11 30 45 (+15)              DIP 0 25 37 (+12)              SANTIAGO 83 133 (+50)                Strength (Dynamometer) and Pinch Strength (Pinch Gauge)  Measured in pounds.    12/4/2018 12/4/2018     Left Right   Rung II Deferred due to protocol Deferred due to " What Is The Reason For Today's Visit?: the risk of recurrence, and the development of new lesions "protocol   Key Pinch       3pt Pinch       2pt Pinch          SensationCMS Impairment/Limitation/Restriction for FOTO Survey  Therapist reviewed FOTO scores for Amrita Yoder.  FOTO documents entered into EPIC - see Media section.     6th visit Limitation Score: 39% - 12/28/2018  Category: Self Care     Current : CK = at least 40% but < 60% impaired, limited or restricted  Goal: CJ = at least 20% but < 40% impaired, limited or restricted  Discharge: TBD         TREATMENT:      Amrita received the following supervised modalities after being cleared for contradictions for 15 minutes:   -Fluidotherapy: To left hand, continuous air, 110 deg, air speed 100 to decrease pain, edema & scar tissue and increased tissue extensibility.  - 5 min moist heat pack post treatment with low load progressive stretch to straight fist with elastic wrap ( given to pt to take home)    Amrita received Ultrasound 3 Mhz, cont, @ .8 w/cm2 x 8 min to palm for scar mgmt.     Amrita received the following manual therapy techniques for 15 minutes:   -Performed RM to left digits/hand/wrist to decrease edema, improve joint mobility, decrease pain and improve lymphatic drainage to increase hand function.   -Performed scar massage to healed areas and around non-healed incision site to decrease adhesions and improve tensile glide.   - Passive range of motion stretches into flexion - PIP of IF, DIP and PIP of LF      Amrita received therapeutic exercises for 17 minutes including:   -       AROM  PIP blocking (IF and LF)  Wave, straight fist    X 10 reps each    Finger abd/add X 10 reps   Finger extension X 10 reps   isospheres X 2 min    Gentle gripping/flexion with med dowel X 2 min with liam strap (1/2" black liam strap)   Towel scrunches X 2 min   Light gripping with pink sponge X 10 squeezes   Cotton ball p/u IF and LF to palm X 1 container       Home Exercises and Education Provided     Education provided: Cont HEP, only do 10 sponge " "squeezes every 2 hours. Work on towel scrunches, use of liam strap for IF and LF. Practice cotton ball p/u at home. Ok to stretch PIP joints into flexion- pt verbalized understanding. - sent pt home with liam strap. Use of elastic strap for passive low load stretch to straight fist Pt. demo'd understanding of wear.   - Progress towards goals     Written Home Exercises Provided: Patient instructed to cont prior HEP. 4-6xday  Exercises were reviewed and Amrita was able to demonstrate them prior to the end of the session.  Amrita demonstrated good  understanding of the education provided.   .   See EMR under Patient Instructions for exercises provided prior visit.     Assessment     Amrita Yoder is a 68 y.o. female referred to outpatient occupational/hand therapy and presents with a medical diagnosis of Left IF DIP fusion with Acumend screw and Left LF trigger finger release.  Pt responding well to MT with P/AROM improvement noted afterwards.  Re-assessment with significant increases noted. She cont with pain and sensitivity, especially on palm and dorsum of IF.  Pt noted to still hold L IF in hypertension which could be contributing to pain.  Cont to encourage pt to relax IF with slight bend at PIP jt.   Completed ultrasound this date to mobilize scar tissue in palm. Tolerated without complaints.  Added light low load stretch to straight fist with 2" elastic band x 5 min with moist heat applied for stretch. Pt. Instructed to use at home along with active motion. Pt. Verbalized understanding.       Amrita is progressing well towards her goals and there are no updates to goals at this time. Pt prognosis continues as Good. Pt will continue to benefit from skilled outpatient occupational therapy to address the deficits listed in the problem list on initial evaluation, provide pt/family education and to maximize pt's level of independence in the home and community environment.     Anticipated barriers to " continued occupational therapy: none    Pt's spiritual, cultural and educational needs considered and pt agreeable to plan of care and goals.    Goals     Long Term Goals (LTGs); to be met by discharge.  LTG #1: Pt will report a pain level of 2 out of 10 with ADLs  LTG #2: Pt will demo improved FOTO score by 20 points.   LTG #3: Pt will return to prior level of function for ADLs and household management.      Short Term Goals (STGs); to be met within 4 weeks (01/04/2019).  STG #1a: Pt will report 5 out of 10 pain level with ADLs.  STG #2a: Pt will report/demo [place] with fastening clothing manipulators.  STG #2b: Pt will report/demo [place] with using knife and fork to cut food.  STG #3a: Pt will demonstrate independence with issued HEP.   STG #3b: Pt will demo improved Left IF PIP Flexion by 30 degrees needed to aid with bimanual grasping skills  STG#3c: Pt. Will demo improved Left LF SANTIAGO by 50+ degrees need to aid with in hand manipulation skills.    Plan     Continue skilled occupational therapy with individualized plan of care 2 times weekly for 8 weeks during the certification period from 12/4/2018 to 02/02/2019    Discussed Plan of Care with patient: Yes  Updates/Grading for next session: none    Brit Haider, OT

## 2019-08-07 ENCOUNTER — TELEPHONE (OUTPATIENT)
Dept: OTOLARYNGOLOGY | Facility: CLINIC | Age: 69
End: 2019-08-07

## 2019-08-07 NOTE — TELEPHONE ENCOUNTER
----- Message from Mary Grace Crawford sent at 8/6/2019  1:29 PM CDT -----  Contact: Self/ [phone]  Patient called in returning your call to reschedule her appointment. Patient stated she needs a appointment next week either Monday or Wednesday in the morning.     Please call since first available is Aug 28.

## 2019-08-21 ENCOUNTER — OFFICE VISIT (OUTPATIENT)
Dept: OTOLARYNGOLOGY | Facility: CLINIC | Age: 69
End: 2019-08-21
Payer: MEDICARE

## 2019-08-21 VITALS
BODY MASS INDEX: 28.22 KG/M2 | DIASTOLIC BLOOD PRESSURE: 82 MMHG | HEART RATE: 66 BPM | WEIGHT: 143.75 LBS | SYSTOLIC BLOOD PRESSURE: 130 MMHG | HEIGHT: 60 IN

## 2019-08-21 DIAGNOSIS — J01.90 ACUTE BACTERIAL SINUSITIS: ICD-10-CM

## 2019-08-21 DIAGNOSIS — R49.0 HOARSENESS OF VOICE: ICD-10-CM

## 2019-08-21 DIAGNOSIS — B96.89 ACUTE BACTERIAL SINUSITIS: ICD-10-CM

## 2019-08-21 DIAGNOSIS — K21.9 LARYNGOPHARYNGEAL REFLUX (LPR): Primary | ICD-10-CM

## 2019-08-21 DIAGNOSIS — J31.0 CHRONIC RHINITIS: ICD-10-CM

## 2019-08-21 PROCEDURE — 99214 OFFICE O/P EST MOD 30 MIN: CPT | Mod: S$GLB,,, | Performed by: OTOLARYNGOLOGY

## 2019-08-21 PROCEDURE — 3075F PR MOST RECENT SYSTOLIC BLOOD PRESS GE 130-139MM HG: ICD-10-PCS | Mod: CPTII,S$GLB,, | Performed by: OTOLARYNGOLOGY

## 2019-08-21 PROCEDURE — 3075F SYST BP GE 130 - 139MM HG: CPT | Mod: CPTII,S$GLB,, | Performed by: OTOLARYNGOLOGY

## 2019-08-21 PROCEDURE — 99999 PR PBB SHADOW E&M-EST. PATIENT-LVL III: CPT | Mod: PBBFAC,,, | Performed by: OTOLARYNGOLOGY

## 2019-08-21 PROCEDURE — 1101F PT FALLS ASSESS-DOCD LE1/YR: CPT | Mod: CPTII,S$GLB,, | Performed by: OTOLARYNGOLOGY

## 2019-08-21 PROCEDURE — 3079F DIAST BP 80-89 MM HG: CPT | Mod: CPTII,S$GLB,, | Performed by: OTOLARYNGOLOGY

## 2019-08-21 PROCEDURE — 99499 UNLISTED E&M SERVICE: CPT | Mod: S$GLB,,, | Performed by: OTOLARYNGOLOGY

## 2019-08-21 PROCEDURE — 99214 PR OFFICE/OUTPT VISIT, EST, LEVL IV, 30-39 MIN: ICD-10-PCS | Mod: S$GLB,,, | Performed by: OTOLARYNGOLOGY

## 2019-08-21 PROCEDURE — 1101F PR PT FALLS ASSESS DOC 0-1 FALLS W/OUT INJ PAST YR: ICD-10-PCS | Mod: CPTII,S$GLB,, | Performed by: OTOLARYNGOLOGY

## 2019-08-21 PROCEDURE — 99499 RISK ADDL DX/OHS AUDIT: ICD-10-PCS | Mod: S$GLB,,, | Performed by: OTOLARYNGOLOGY

## 2019-08-21 PROCEDURE — 99999 PR PBB SHADOW E&M-EST. PATIENT-LVL III: ICD-10-PCS | Mod: PBBFAC,,, | Performed by: OTOLARYNGOLOGY

## 2019-08-21 PROCEDURE — 3079F PR MOST RECENT DIASTOLIC BLOOD PRESSURE 80-89 MM HG: ICD-10-PCS | Mod: CPTII,S$GLB,, | Performed by: OTOLARYNGOLOGY

## 2019-08-21 RX ORDER — AMOXICILLIN AND CLAVULANATE POTASSIUM 875; 125 MG/1; MG/1
1 TABLET, FILM COATED ORAL 2 TIMES DAILY
Qty: 20 TABLET | Refills: 0 | Status: SHIPPED | OUTPATIENT
Start: 2019-08-21 | End: 2019-08-31

## 2019-08-21 RX ORDER — LEVOCETIRIZINE DIHYDROCHLORIDE 5 MG/1
5 TABLET, FILM COATED ORAL NIGHTLY
Qty: 30 TABLET | Refills: 11 | Status: SHIPPED | OUTPATIENT
Start: 2019-08-21 | End: 2020-10-21

## 2019-08-21 RX ORDER — AZELASTINE 1 MG/ML
1 SPRAY, METERED NASAL 2 TIMES DAILY
Qty: 30 ML | Refills: 0 | Status: SHIPPED | OUTPATIENT
Start: 2019-08-21 | End: 2021-11-11 | Stop reason: SDUPTHER

## 2019-08-21 NOTE — PROGRESS NOTES
Chief Complaint   Patient presents with    Follow-up    Hoarse    Nasal Congestion    Cough     worse in the AM   Note: patient seen with professional [place] Zealander  present.       HPI:Amrita Yoder is a 69 y.o. female who has been referred by Dr. Quentin Hoff for a 2 months history of hoarseness. Her voice is progressively worsening over this time. She reports she will wake up frequently in the morning with hoarseness in little to no projection of her voice.  There are pitch breaks or cracks. There is vocal fatigue. She denies dysphagia, odynophagia, throat pain, and otalgia.  There is no hemoptysis or hematemesis. She is breathing well. She has not noted any exacerbating or alleviating factors.    She denies throat clearing and cough. She denies heartburn and reflux.     Interval HPI 8/21/2019:   Mrs. Yoder follows up for LPR. Today she reports that she is having sinonasal symptoms for the past 3 weeks.  She notes that the symptoms started suddenly under gradually worsening.  She notes that she is having continued nasal congestion, hoarseness, and cough.  She notes the cough is worse in the mornings. She notes that she has been using Flonase without relief.   She has been using omeprazole 40 mg daily for LPR.  She feels this has been helpful in that she has had interval improvement in her voice.  She thinks that the throat clearing is somewhat improved.    Past Medical History:   Diagnosis Date    Hyperlipidemia     Hypertension      Social History     Socioeconomic History    Marital status: Single     Spouse name: Not on file    Number of children: Not on file    Years of education: Not on file    Highest education level: Not on file   Occupational History    Not on file   Social Needs    Financial resource strain: Not on file    Food insecurity:     Worry: Not on file     Inability: Not on file    Transportation needs:     Medical: Not on file     Non-medical: Not on  file   Tobacco Use    Smoking status: Never Smoker   Substance and Sexual Activity    Alcohol use: No    Drug use: No    Sexual activity: Not on file   Lifestyle    Physical activity:     Days per week: Not on file     Minutes per session: Not on file    Stress: Not on file   Relationships    Social connections:     Talks on phone: Not on file     Gets together: Not on file     Attends Mu-ism service: Not on file     Active member of club or organization: Not on file     Attends meetings of clubs or organizations: Not on file     Relationship status: Not on file   Other Topics Concern    Not on file   Social History Narrative    Not on file     Past Surgical History:   Procedure Laterality Date    CATARACT EXTRACTION W/  INTRAOCULAR LENS IMPLANT Bilateral     FUSION, JOINT, FINGER left index Left 11/2/2018    Performed [facility]ler [facility] at [facility]    RELEASE, TRIGGER FINGER left long Left 11/2/2018    Performed by [facility]ler [facility] at [facility] OR     Family History   Adopted: Yes           Review of Systems  General: negative for chills, fever or weight loss  Psychological: negative for mood changes or depression  Ophthalmic: negative for blurry vision, photophobia or eye pain  ENT: see HPI  Respiratory: no cough, shortness of breath, or wheezing  Cardiovascular: no chest pain or dyspnea on exertion  Gastrointestinal: no abdominal pain, change in bowel habits, or black/ bloody stools  Musculoskeletal: negative for gait disturbance or muscular weakness  Neurological: no syncope or seizures; no ataxia  Dermatological: negative for puritis,  rash and jaundice  Hematologic/lymphatic: no easy bruising, no new lumps or bumps      Physical Exam:    Vitals:    08/21/19 1123   BP: 130/82   Pulse: 66       Constitutional: Well appearing / communicating without difficutly.  NAD.  Eyes: EOM I Bilaterally  Head/Face: Normocephalic.  Negative paranasal sinus pressure/tenderness.  Salivary glands WNL.   House Brackmann I Bilaterally.    Right Ear: Auricle normal appearance. External Auditory Canal within normal limits no lesions or masses,TM w/o masses/lesions/perforations. TM mobility noted.   Left Ear: Auricle normal appearance. External Auditory Canal within normal limits no lesions or masses,TM w/o masses/lesions/perforations. TM mobility noted.  Nose: No gross nasal septal deviation. Inferior Turbinates 3+ bilaterally. No septal perforation. No masses/lesions. External nasal skin appears normal without masses/lesions.  Oral Cavity: Gingiva/lips within normal limits.  Dentition/gingiva healthy appearing. Mucus membranes moist. Floor of mouth soft, no masses palpated. Oral Tongue mobile. Hard Palate appears normal.    Oropharynx: Base of tongue appears normal. No masses/lesions noted. Tonsillar fossa/pharyngeal wall without lesions. Posterior oropharynx WNL.  Soft palate without masses. Midline uvula.   Neck/Lymphatic: No LAD I-VI bilaterally.  No thyromegaly.  No masses noted on exam.    Mirror laryngoscopy/nasopharyngoscopy: Active gag reflex.  Unable to perform.    Neuro/Psychiatric: AOx3.  Normal mood and affect.   Cardiovascular: Normal carotid pulses bilaterally, no increasing jugular venous distention noted at cervical region bilaterally.    Respiratory: Normal respiratory effort, no stridor, no retractions noted.      See separate procedure note for FFL.       Assessment:    ICD-10-CM ICD-9-CM    1. Laryngopharyngeal reflux (LPR) K21.9 478.79    2. Hoarseness of voice R49.0 784.42    3. Acute bacterial sinusitis J01.90 461.9     B96.89     4. Chronic rhinitis J31.0 472.0      The primary encounter diagnosis was Laryngopharyngeal reflux (LPR). Diagnoses of Hoarseness of voice, Acute bacterial sinusitis, and Chronic rhinitis were also pertinent to this visit.      Plan:  No orders of the defined types were placed in this encounter.    Start Augmentin 875 mg p.o. b.i.d. for 10 days.  Continue Flonase  Start  Xyzal 5 mg p.o. Q.h.s.  Start Astelin 1 spray per nostril b.i.d.    Continue taking Omeprazole 40mg daily* and provided a prescription. I also reiterated that the patient refrain from eating within 3 hours of going to bed at night and that the patient place a 2 x 4 underneath the head board of the bed to elevate the head of bed very subtly and optimize the impact of gravity on the potential reflux. I recommended that the patient avoid alcohol, caffeine, tobacco, tomato sauce, spicy foods, fried food, and chocolate.  Follow up in 4-6 weeks to assess progress with treatment.     Jocelynn Modi MD

## 2019-10-04 ENCOUNTER — PATIENT OUTREACH (OUTPATIENT)
Dept: ADMINISTRATIVE | Facility: OTHER | Age: 69
End: 2019-10-04

## 2019-10-07 ENCOUNTER — OFFICE VISIT (OUTPATIENT)
Dept: OTOLARYNGOLOGY | Facility: CLINIC | Age: 69
End: 2019-10-07
Payer: MEDICARE

## 2019-10-07 VITALS
WEIGHT: 141.13 LBS | HEIGHT: 60 IN | SYSTOLIC BLOOD PRESSURE: 142 MMHG | BODY MASS INDEX: 27.71 KG/M2 | TEMPERATURE: 97 F | HEART RATE: 45 BPM | DIASTOLIC BLOOD PRESSURE: 85 MMHG

## 2019-10-07 DIAGNOSIS — R49.0 HOARSENESS OF VOICE: ICD-10-CM

## 2019-10-07 DIAGNOSIS — J31.0 CHRONIC RHINITIS: ICD-10-CM

## 2019-10-07 DIAGNOSIS — K21.9 LARYNGOPHARYNGEAL REFLUX (LPR): Primary | ICD-10-CM

## 2019-10-07 PROCEDURE — 1101F PT FALLS ASSESS-DOCD LE1/YR: CPT | Mod: CPTII,S$GLB,, | Performed by: OTOLARYNGOLOGY

## 2019-10-07 PROCEDURE — 1101F PR PT FALLS ASSESS DOC 0-1 FALLS W/OUT INJ PAST YR: ICD-10-PCS | Mod: CPTII,S$GLB,, | Performed by: OTOLARYNGOLOGY

## 2019-10-07 PROCEDURE — 99999 PR PBB SHADOW E&M-EST. PATIENT-LVL IV: ICD-10-PCS | Mod: PBBFAC,,, | Performed by: OTOLARYNGOLOGY

## 2019-10-07 PROCEDURE — 99999 PR PBB SHADOW E&M-EST. PATIENT-LVL IV: CPT | Mod: PBBFAC,,, | Performed by: OTOLARYNGOLOGY

## 2019-10-07 PROCEDURE — 3079F PR MOST RECENT DIASTOLIC BLOOD PRESSURE 80-89 MM HG: ICD-10-PCS | Mod: CPTII,S$GLB,, | Performed by: OTOLARYNGOLOGY

## 2019-10-07 PROCEDURE — 3077F SYST BP >= 140 MM HG: CPT | Mod: CPTII,S$GLB,, | Performed by: OTOLARYNGOLOGY

## 2019-10-07 PROCEDURE — 3077F PR MOST RECENT SYSTOLIC BLOOD PRESSURE >= 140 MM HG: ICD-10-PCS | Mod: CPTII,S$GLB,, | Performed by: OTOLARYNGOLOGY

## 2019-10-07 PROCEDURE — 99214 PR OFFICE/OUTPT VISIT, EST, LEVL IV, 30-39 MIN: ICD-10-PCS | Mod: S$GLB,,, | Performed by: OTOLARYNGOLOGY

## 2019-10-07 PROCEDURE — 99214 OFFICE O/P EST MOD 30 MIN: CPT | Mod: S$GLB,,, | Performed by: OTOLARYNGOLOGY

## 2019-10-07 PROCEDURE — 3079F DIAST BP 80-89 MM HG: CPT | Mod: CPTII,S$GLB,, | Performed by: OTOLARYNGOLOGY

## 2019-10-07 NOTE — PATIENT INSTRUCTIONS
VOCAL HYGIENE AND HYDRATION RECOMMENDATIONS     DO   · Drink plenty of waterabout  oz a day! If your urine is pale, you should be well-hydrated.  Try some of these tips to increase hydration as well.  · Eat wet snacks such as grapes, melon, cucumbers, apple slices   · Reduce intake coffee and other caffeinated and carbonated beverages   · Suck on pastille lozenges or sugar free candies (not mentholated lozenges)   · Use a room or personal humidifier   · Use sinus rinses/irrigations or nasal saline spray   · Inhale steam for a few minutes before speaking or singing   · Pay attention to how, and how much, you use your voice.   · Give yourself vocal breaks throughout the day.   · Breathe when talking, take frequent pauses for breath.   · Use a microphone when speaking in front of a group of 15 or more to reduce voice strain.   · Learn how to use your voice correctly to get loud with voice therapy techniques.  · Stay healthy. Eat right and get plenty of rest. Follow a reflux precaution diet if indicated.   ____________________________________________________________________    REDUCE   · Loud talking, yelling, cheering, or screaming. Voice injury can take place over a long time, or all at once.  If you need to talk loud or yell, be sure you are doing it properly.   · Clearing your throat and forceful coughing. The vocal folds collect mucus when irritated or inflamed and this can cause the urge to clear your throat. The trauma of clearing the throat creates more inflammation and mucus. See tips above for keeping hydrated to assist with cutting back on throat clearing.  Instead of clearing your throat, try these behaviors until the sensation passes:   · Take a sip of water   · Silently clear with a hard exhale making an hhh sound   · Swallow hard   · Drying agents such as anti-histamines or decongestant medications. You should always take medications as prescribed, but keep in mind these will dry you  out, so increase your hydration!   ____________________________________________________________________    AVOID   · Inhalant irritants such as smoke, strong fumes, and allergens. Take advantage of 1-800-QUIT-NOW if you are ready to stop smoking.   · Unnecessary non-speech noises, such as grunting during exercise, loud noises, or excessively high- or low-pitched sounds. Save your voice for talking and singing!   · Whispering. Whispering places your vocal folds in a tenser position than usual.     Tips to Control Acid Reflux    To control acid reflux, youll need to make some basic diet and lifestyle changes. The simple steps outlined below may be all youll need to ease discomfort.  Watch what you eat  · Avoid fatty foods and spicy foods.  · Eat fewer acidic foods, such as citrus and tomato-based foods. These can increase symptoms.  · Limit drinking alcohol, caffeine, and fizzy beverages. All increase acid reflux.  · Try limiting chocolate, peppermint, and spearmint. These can worsen acid reflux in some people.  Watch when you eat  · Avoid lying down for 3 hours after eating.  · Do not snack before going to bed.  Raise your head  Raising your head and upper body by 4 to 6 inches helps limit reflux when youre lying down. Put blocks under the head of your bed frame to raise it.  Other changes  · Lose weight, if you need to  · Dont exercise near bedtime  · Avoid tight-fitting clothes  · Limit aspirin and ibuprofen  · Stop smoking   Date Last Reviewed: 7/1/2016  © 3756-8550 [employer]. [address] [place] [address], [place] [address]. All rights reserved. This information is not intended as a substitute for professional medical care. Always follow your healthcare professional's instructions.

## 2019-10-07 NOTE — PROGRESS NOTES
Chief Complaint   Patient presents with    Follow-up     Patient states no improvement since last visit.   Note: patient seen with professional [place]  present.       HPI:Amrita Yoder is a 69 y.o. female who has been referred by Dr. Quentin Hoff for a 2 months history of hoarseness. Her voice is progressively worsening over this time. She reports she will wake up frequently in the morning with hoarseness in little to no projection of her voice.  There are pitch breaks or cracks. There is vocal fatigue. She denies dysphagia, odynophagia, throat pain, and otalgia.  There is no hemoptysis or hematemesis. She is breathing well. She has not noted any exacerbating or alleviating factors.    She denies throat clearing and cough. She denies heartburn and reflux.       Interval HPI 10/7/2019:  Mrs. Yoder follows up today for LPR and AR.  She reports that she is still having difficulty with her hoarseness. She states that she is not currently taking omeprazole 40mg PO daily or xyzal qhs.  While she had refills, she did not understand that she had to get them from the pharmacy.  She notes that she is having continued nasal congestion, hoarseness, and cough.  She notes the cough is worse in the mornings.     Past Medical History:   Diagnosis Date    Hyperlipidemia     Hypertension      Social History     Socioeconomic History    Marital status: Single     Spouse name: Not on file    Number of children: Not on file    Years of education: Not on file    Highest education level: Not on file   Occupational History    Not on file   Social Needs    Financial resource strain: Not on file    Food insecurity:     Worry: Not on file     Inability: Not on file    Transportation needs:     Medical: Not on file     Non-medical: Not on file   Tobacco Use    Smoking status: Never Smoker   Substance and Sexual Activity    Alcohol use: No    Drug use: No    Sexual activity: Not on file    Lifestyle    Physical activity:     Days per week: Not on file     Minutes per session: Not on file    Stress: Not on file   Relationships    Social connections:     Talks on phone: Not on file     Gets together: Not on file     Attends Jehovah's witness service: Not on file     Active member of club or organization: Not on file     Attends meetings of clubs or organizations: Not on file     Relationship status: Not on file   Other Topics Concern    Not on file   Social History Narrative    Not on file     Past Surgical History:   Procedure Laterality Date    CATARACT EXTRACTION W/  INTRAOCULAR LENS IMPLANT Bilateral     TRIGGER FINGER RELEASE Left 11/2/2018    Procedure: RELEASE, TRIGGER FINGER left long;  Surgeon: Zoe Winkler MD;  Location: [facility];  Service: Orthopedics;  Laterality: Left;  stretcher, supine, hand pan 1 and pan 2     Family History   Adopted: Yes           Review of Systems  General: negative for chills, fever or weight loss  Psychological: negative for mood changes or depression  Ophthalmic: negative for blurry vision, photophobia or eye pain  ENT: see HPI  Respiratory: no cough, shortness of breath, or wheezing  Cardiovascular: no chest pain or dyspnea on exertion  Gastrointestinal: no abdominal pain, change in bowel habits, or black/ bloody stools  Musculoskeletal: negative for gait disturbance or muscular weakness  Neurological: no syncope or seizures; no ataxia  Dermatological: negative for puritis,  rash and jaundice  Hematologic/lymphatic: no easy bruising, no new lumps or bumps      Physical Exam:    Vitals:    10/07/19 1059   BP: (!) 142/85   Pulse: (!) 45   Temp: 96.7 °F (35.9 °C)       Constitutional: Well appearing / communicating without difficutly.  NAD.  Eyes: EOM I Bilaterally  Head/Face: Normocephalic.  Negative paranasal sinus pressure/tenderness.  Salivary glands WNL.  House Brackmann I Bilaterally.    Right Ear: Auricle normal appearance. External Auditory Canal  within normal limits no lesions or masses,TM w/o masses/lesions/perforations. TM mobility noted.   Left Ear: Auricle normal appearance. External Auditory Canal within normal limits no lesions or masses,TM w/o masses/lesions/perforations. TM mobility noted.  Nose: No gross nasal septal deviation. Inferior Turbinates 3+ bilaterally. No septal perforation. No masses/lesions. External nasal skin appears normal without masses/lesions.  Oral Cavity: Gingiva/lips within normal limits.  Dentition/gingiva healthy appearing. Mucus membranes moist. Floor of mouth soft, no masses palpated. Oral Tongue mobile. Hard Palate appears normal.    Oropharynx: Base of tongue appears normal. No masses/lesions noted. Tonsillar fossa/pharyngeal wall without lesions. Posterior oropharynx WNL.  Soft palate without masses. Midline uvula.   Neck/Lymphatic: No LAD I-VI bilaterally.  No thyromegaly.  No masses noted on exam.    Mirror laryngoscopy/nasopharyngoscopy: Active gag reflex.  Unable to perform.    Neuro/Psychiatric: AOx3.  Normal mood and affect.   Cardiovascular: Normal carotid pulses bilaterally, no increasing jugular venous distention noted at cervical region bilaterally.    Respiratory: Normal respiratory effort, no stridor, no retractions noted.          Assessment:    ICD-10-CM ICD-9-CM    1. Laryngopharyngeal reflux (LPR) K21.9 478.79    2. Hoarseness of voice R49.0 784.42    3. Chronic rhinitis J31.0 472.0      The primary encounter diagnosis was Laryngopharyngeal reflux (LPR). Diagnoses of Hoarseness of voice and Chronic rhinitis were also pertinent to this visit.      Plan:  No orders of the defined types were placed in this encounter.    Continue Flonase  Continue Xyzal 5 mg p.o. Q.h.s.  Continue Astelin 1 spray per nostril b.i.d.  Continue taking Omeprazole 40mg daily* and provided a prescription. I also reiterated that the patient refrain from eating within 3 hours of going to bed at night and that the patient place a 2 x  4 underneath the head board of the bed to elevate the head of bed very subtly and optimize the impact of gravity on the potential reflux. I recommended that the patient avoid alcohol, caffeine, tobacco, tomato sauce, spicy foods, fried food, and chocolate.  Follow up in 4-6 weeks to assess progress with treatment.     Jocelynn Modi MD

## 2019-10-23 ENCOUNTER — IMMUNIZATION (OUTPATIENT)
Dept: PHARMACY | Facility: CLINIC | Age: 69
End: 2019-10-23
Payer: MEDICARE

## 2019-11-05 ENCOUNTER — OFFICE VISIT (OUTPATIENT)
Dept: FAMILY MEDICINE | Facility: CLINIC | Age: 69
End: 2019-11-05
Payer: MEDICARE

## 2019-11-05 VITALS
DIASTOLIC BLOOD PRESSURE: 86 MMHG | HEART RATE: 88 BPM | WEIGHT: 136.69 LBS | OXYGEN SATURATION: 96 % | BODY MASS INDEX: 26.84 KG/M2 | SYSTOLIC BLOOD PRESSURE: 112 MMHG | HEIGHT: 60 IN | TEMPERATURE: 98 F

## 2019-11-05 DIAGNOSIS — B96.89 ACUTE BACTERIAL SINUSITIS: Primary | ICD-10-CM

## 2019-11-05 DIAGNOSIS — J01.90 ACUTE BACTERIAL SINUSITIS: Primary | ICD-10-CM

## 2019-11-05 PROCEDURE — 1101F PT FALLS ASSESS-DOCD LE1/YR: CPT | Mod: CPTII,S$GLB,, | Performed by: FAMILY MEDICINE

## 2019-11-05 PROCEDURE — 99999 PR PBB SHADOW E&M-EST. PATIENT-LVL III: CPT | Mod: PBBFAC,,, | Performed by: FAMILY MEDICINE

## 2019-11-05 PROCEDURE — 99214 PR OFFICE/OUTPT VISIT, EST, LEVL IV, 30-39 MIN: ICD-10-PCS | Mod: S$GLB,,, | Performed by: FAMILY MEDICINE

## 2019-11-05 PROCEDURE — 99999 PR PBB SHADOW E&M-EST. PATIENT-LVL III: ICD-10-PCS | Mod: PBBFAC,,, | Performed by: FAMILY MEDICINE

## 2019-11-05 PROCEDURE — 3074F SYST BP LT 130 MM HG: CPT | Mod: CPTII,S$GLB,, | Performed by: FAMILY MEDICINE

## 2019-11-05 PROCEDURE — 99214 OFFICE O/P EST MOD 30 MIN: CPT | Mod: S$GLB,,, | Performed by: FAMILY MEDICINE

## 2019-11-05 PROCEDURE — 3074F PR MOST RECENT SYSTOLIC BLOOD PRESSURE < 130 MM HG: ICD-10-PCS | Mod: CPTII,S$GLB,, | Performed by: FAMILY MEDICINE

## 2019-11-05 PROCEDURE — 3079F PR MOST RECENT DIASTOLIC BLOOD PRESSURE 80-89 MM HG: ICD-10-PCS | Mod: CPTII,S$GLB,, | Performed by: FAMILY MEDICINE

## 2019-11-05 PROCEDURE — 3079F DIAST BP 80-89 MM HG: CPT | Mod: CPTII,S$GLB,, | Performed by: FAMILY MEDICINE

## 2019-11-05 PROCEDURE — 1101F PR PT FALLS ASSESS DOC 0-1 FALLS W/OUT INJ PAST YR: ICD-10-PCS | Mod: CPTII,S$GLB,, | Performed by: FAMILY MEDICINE

## 2019-11-05 RX ORDER — BENZONATATE 100 MG/1
100 CAPSULE ORAL 3 TIMES DAILY PRN
Qty: 30 CAPSULE | Refills: 0 | Status: SHIPPED | OUTPATIENT
Start: 2019-11-05 | End: 2019-11-15

## 2019-11-05 RX ORDER — AMOXICILLIN AND CLAVULANATE POTASSIUM 875; 125 MG/1; MG/1
1 TABLET, FILM COATED ORAL EVERY 12 HOURS
Qty: 14 TABLET | Refills: 0 | Status: SHIPPED | OUTPATIENT
Start: 2019-11-05 | End: 2019-11-12

## 2019-11-05 RX ORDER — FLUTICASONE PROPIONATE 50 MCG
1 SPRAY, SUSPENSION (ML) NASAL 2 TIMES DAILY
Qty: 16 G | Refills: 0 | Status: SHIPPED | OUTPATIENT
Start: 2019-11-05 | End: 2019-12-05

## 2019-11-05 RX ORDER — OXYMETAZOLINE HCL 0.05 %
1 SPRAY, NON-AEROSOL (ML) NASAL 2 TIMES DAILY
Start: 2019-11-05 | End: 2019-11-08

## 2019-11-05 NOTE — PATIENT INSTRUCTIONS
Discussed diagnosis and treatment of sinusitis  Augmentin x 7 days   Suggested brief course of Afrin for 3 days total (OTC)  Flonase BID  Continue astelin nasal spray  Nasal saline spray for congestion qhs  Aggressive fluids  Buckwheat honey for possible cough  Tessalon perles for the cough  Follow up if symptoms aren't resolving.  Follow up with PCP as needed      Acute Bacterial Rhinosinusitis (ABRS)    Acute bacterial rhinosinusitis (ABRS) is an infection of your nasal cavity and sinuses. Its caused by bacteria. Acute means that youve had symptoms for less than 12 weeks.  Understanding your sinuses  The nasal cavity is the large air-filled space behind your nose. The sinuses are a group of spaces formed by the bones of your face. They connect with your nasal cavity. ABRS causes the tissue lining these spaces to become inflamed. Mucus may not drain normally. This leads to facial pain and other symptoms.  What causes ABRS?  ABRS most often follows an upper respiratory infection caused by a virus. Bacteria then infect the lining of your nasal cavity and sinuses. But you can also get ABRS if you have:  · Nasal allergies  · Long-term nasal swelling and congestion not caused by allergies  · Blockage in the nose  Symptoms of ABRS  The symptoms of ABRS may be different for each person, and can include:  · Nasal congestion  · Runny nose  · Fluid draining from the nose down the throat (postnasal drip)  · Headache  · Cough  · Pain in the sinuses  · Thick, colored fluid from the nose (mucus)  · Fever  Diagnosing ABRS  ABRS may be diagnosed if youve had an upper respiratory infection like a cold and cough for longer than 10 to 14 days. Your health care provider will ask about your symptoms and your medical history. The provider will check your vital signs, including your temperature. Youll have a physical exam. The health care provider will check your ears, nose, and throat. You likely wont need any tests. If ABRS comes  back, you may have a culture or other tests.  Treatment for ABRS  Treatment may include:  · Antibiotic medicine. This is for symptoms that last for at least 10 to 14 days.  · Nasal corticosteroid medicine. Drops or spray used in the nose can lessen swelling and congestion.  · Over-the-counter pain medicine. This is to lessen sinus pain and pressure.  · Nasal decongestant medicine. Spray or drops may help to lessen congestion. Do not use them for more than a few days.  · Salt wash (saline irrigation). This can help to loosen mucus.  Possible complications of ABRS  ABRS may come back or become long-term (chronic).  In rare cases, ABRS may cause complications such as:   · Inflamed tissue around the brain and spinal cord (meningitis)  · Inflamed tissue around the eyes (orbital cellulitis)  · Inflamed bones around the sinuses (osteitis)  These problems may need to be treated in a hospital with intravenous (IV) antibiotic medicine or surgery.  When to call the health care provider  Call your health care provider if you have any of the following:  · Symptoms that dont get better, or get worse  · Symptoms that dont get better after 3 to 5 days on antibiotics  · Trouble seeing  · Swelling around your eyes  · Confusion or trouble staying awake   Date Last Reviewed: 3/3/2015  © 8859-9854 [employer]. [address] [place] [address], [place] [address]. All rights reserved. This information is not intended as a substitute for professional medical care. Always follow your healthcare professional's instructions.

## 2019-11-05 NOTE — PROGRESS NOTES
Subjective:       Patient ID: Amrita Yoder is a 69 y.o. female.    Chief Complaint: Cough    Amrita is a 69 y.o. female who presents today for an  visit for a cough. Flu shot was 10 days ago. Symptoms started 7 days ago. The cough is the same. One day of fever, this was 5 days ago, this resolved. She tried tylenol and benadryl and nothing helped. The cough is present all day but worse at night.   Sinusitis   This is a new problem. The current episode started 1 to 4 weeks ago. The problem is unchanged. The fever has been present for less than 1 day. Associated symptoms include congestion, coughing, ear pain, a hoarse voice, sinus pressure and a sore throat. Pertinent negatives include no chills, diaphoresis, headaches, neck pain, shortness of breath, sneezing or swollen glands. Past treatments include acetaminophen.     Review of Systems   Constitutional: Negative for chills and diaphoresis.   HENT: Positive for congestion, ear pain, hoarse voice, sinus pressure and sore throat. Negative for sneezing.    Respiratory: Positive for cough. Negative for shortness of breath.    Musculoskeletal: Negative for neck pain.   Neurological: Negative for headaches.       Objective:     Vitals:    11/05/19 1457   BP: 112/86   Pulse: 88   Temp: 97.8 °F (36.6 °C)        Physical Exam   Constitutional: She is oriented to person, place, and time. She appears well-developed and well-nourished. No distress.   HENT:   Head: Normocephalic and atraumatic.   TM: appear normal BL  Nasal congestion noted BL with L>R  Cobblestoning without exudate noted  No adenopathy  Full ROM of neck   Eyes: Conjunctivae are normal.   Neck: Normal range of motion. Neck supple.   Cardiovascular: Normal rate and regular rhythm.   Pulmonary/Chest: Effort normal and breath sounds normal. No stridor. No respiratory distress. She has no wheezes.   CTA in anterior and posterior listening posts   Musculoskeletal: She exhibits no edema.   Neurological: She  is alert and oriented to person, place, and time.   Skin: Skin is warm. She is not diaphoretic.   Psychiatric: She has a normal mood and affect. Her behavior is normal. Judgment and thought content normal.   Nursing note and vitals reviewed.      Assessment:       1. Acute bacterial sinusitis        Plan:       Discussed diagnosis and treatment of sinusitis  Augmentin x 7 days   Suggested brief course of Afrin for 3 days total (OTC)  Flonase BID  Continue astelin nasal spray  Nasal saline spray for congestion qhs  Aggressive fluids  Buckwheat honey for possible cough  Tessalon perles for the cough  Follow up if symptoms aren't resolving.  Follow up with PCP as needed        Acute bacterial sinusitis  -     amoxicillin-clavulanate 875-125mg (AUGMENTIN) 875-125 mg per tablet; Take 1 tablet by mouth every 12 (twelve) hours. for 7 days  Dispense: 14 tablet; Refill: 0  -     fluticasone propionate (FLONASE) 50 mcg/actuation nasal spray; 1 spray (50 mcg total) by Each Nostril route 2 (two) times daily.  Dispense: 16 g; Refill: 0  -     oxymetazoline (AFRIN, OXYMETAZOLINE,) 0.05 % nasal spray; 1 spray by Nasal route 2 (two) times daily. for 3 days  -     benzonatate (TESSALON) 100 MG capsule; Take 1 capsule (100 mg total) by mouth 3 (three) times daily as needed for Cough.  Dispense: 30 capsule; Refill: 0        Warning signs discussed, patient to call with any further issues or worsening of symptoms.

## 2020-03-25 ENCOUNTER — DOCUMENTATION ONLY (OUTPATIENT)
Dept: REHABILITATION | Facility: HOSPITAL | Age: 70
End: 2020-03-25

## 2020-03-25 DIAGNOSIS — M79.671 RIGHT FOOT PAIN: ICD-10-CM

## 2020-03-25 DIAGNOSIS — M62.81 MUSCLE WEAKNESS: ICD-10-CM

## 2020-03-25 DIAGNOSIS — M25.673 DECREASED RANGE OF MOTION OF ANKLE, UNSPECIFIED LATERALITY: ICD-10-CM

## 2020-03-25 NOTE — PROGRESS NOTES
Pt was evaluated on 7/11/2018 and was seen 4 times for PT. Pt has not attended PT since 8/1/2018. Pt was given HEP. Current status is unknown. Pt to be d/c'd at this time.

## 2020-05-13 ENCOUNTER — PATIENT OUTREACH (OUTPATIENT)
Dept: ADMINISTRATIVE | Facility: HOSPITAL | Age: 70
End: 2020-05-13

## 2020-05-13 NOTE — PROGRESS NOTES
audit performed. Telephone Outreach regarding Colorectal cancer screening / Annual visit not seen in 12 months. Appointment scheduled

## 2020-10-21 ENCOUNTER — OFFICE VISIT (OUTPATIENT)
Dept: FAMILY MEDICINE | Facility: CLINIC | Age: 70
End: 2020-10-21
Payer: MEDICARE

## 2020-10-21 VITALS
TEMPERATURE: 97 F | HEIGHT: 60 IN | BODY MASS INDEX: 28.99 KG/M2 | OXYGEN SATURATION: 96 % | HEART RATE: 65 BPM | SYSTOLIC BLOOD PRESSURE: 118 MMHG | DIASTOLIC BLOOD PRESSURE: 80 MMHG | WEIGHT: 147.69 LBS

## 2020-10-21 DIAGNOSIS — Z23 NEEDS FLU SHOT: ICD-10-CM

## 2020-10-21 DIAGNOSIS — Z12.11 SCREEN FOR COLON CANCER: ICD-10-CM

## 2020-10-21 DIAGNOSIS — F51.01 PRIMARY INSOMNIA: ICD-10-CM

## 2020-10-21 DIAGNOSIS — I10 ESSENTIAL HYPERTENSION: ICD-10-CM

## 2020-10-21 DIAGNOSIS — E78.5 DYSLIPIDEMIA: ICD-10-CM

## 2020-10-21 DIAGNOSIS — Z12.31 ENCOUNTER FOR SCREENING MAMMOGRAM FOR BREAST CANCER: ICD-10-CM

## 2020-10-21 DIAGNOSIS — Z00.00 ANNUAL PHYSICAL EXAM: Primary | ICD-10-CM

## 2020-10-21 PROCEDURE — 90694 VACC AIIV4 NO PRSRV 0.5ML IM: CPT | Mod: S$GLB,,, | Performed by: FAMILY MEDICINE

## 2020-10-21 PROCEDURE — G0008 ADMIN INFLUENZA VIRUS VAC: HCPCS | Mod: S$GLB,,, | Performed by: FAMILY MEDICINE

## 2020-10-21 PROCEDURE — 1101F PT FALLS ASSESS-DOCD LE1/YR: CPT | Mod: CPTII,S$GLB,, | Performed by: FAMILY MEDICINE

## 2020-10-21 PROCEDURE — 3008F BODY MASS INDEX DOCD: CPT | Mod: CPTII,S$GLB,, | Performed by: FAMILY MEDICINE

## 2020-10-21 PROCEDURE — G0008 FLU VACCINE - QUADRIVALENT - ADJUVANTED: ICD-10-PCS | Mod: S$GLB,,, | Performed by: FAMILY MEDICINE

## 2020-10-21 PROCEDURE — 1126F AMNT PAIN NOTED NONE PRSNT: CPT | Mod: S$GLB,,, | Performed by: FAMILY MEDICINE

## 2020-10-21 PROCEDURE — 99214 OFFICE O/P EST MOD 30 MIN: CPT | Mod: 25,S$GLB,, | Performed by: FAMILY MEDICINE

## 2020-10-21 PROCEDURE — 1159F MED LIST DOCD IN RCRD: CPT | Mod: S$GLB,,, | Performed by: FAMILY MEDICINE

## 2020-10-21 PROCEDURE — 3074F PR MOST RECENT SYSTOLIC BLOOD PRESSURE < 130 MM HG: ICD-10-PCS | Mod: CPTII,S$GLB,, | Performed by: FAMILY MEDICINE

## 2020-10-21 PROCEDURE — 1126F PR PAIN SEVERITY QUANTIFIED, NO PAIN PRESENT: ICD-10-PCS | Mod: S$GLB,,, | Performed by: FAMILY MEDICINE

## 2020-10-21 PROCEDURE — 99999 PR PBB SHADOW E&M-EST. PATIENT-LVL IV: ICD-10-PCS | Mod: PBBFAC,,, | Performed by: FAMILY MEDICINE

## 2020-10-21 PROCEDURE — 3074F SYST BP LT 130 MM HG: CPT | Mod: CPTII,S$GLB,, | Performed by: FAMILY MEDICINE

## 2020-10-21 PROCEDURE — 99999 PR PBB SHADOW E&M-EST. PATIENT-LVL IV: CPT | Mod: PBBFAC,,, | Performed by: FAMILY MEDICINE

## 2020-10-21 PROCEDURE — 3008F PR BODY MASS INDEX (BMI) DOCUMENTED: ICD-10-PCS | Mod: CPTII,S$GLB,, | Performed by: FAMILY MEDICINE

## 2020-10-21 PROCEDURE — 90694 FLU VACCINE - QUADRIVALENT - ADJUVANTED: ICD-10-PCS | Mod: S$GLB,,, | Performed by: FAMILY MEDICINE

## 2020-10-21 PROCEDURE — 3079F DIAST BP 80-89 MM HG: CPT | Mod: CPTII,S$GLB,, | Performed by: FAMILY MEDICINE

## 2020-10-21 PROCEDURE — 99214 PR OFFICE/OUTPT VISIT, EST, LEVL IV, 30-39 MIN: ICD-10-PCS | Mod: 25,S$GLB,, | Performed by: FAMILY MEDICINE

## 2020-10-21 PROCEDURE — 1101F PR PT FALLS ASSESS DOC 0-1 FALLS W/OUT INJ PAST YR: ICD-10-PCS | Mod: CPTII,S$GLB,, | Performed by: FAMILY MEDICINE

## 2020-10-21 PROCEDURE — 3079F PR MOST RECENT DIASTOLIC BLOOD PRESSURE 80-89 MM HG: ICD-10-PCS | Mod: CPTII,S$GLB,, | Performed by: FAMILY MEDICINE

## 2020-10-21 PROCEDURE — 1159F PR MEDICATION LIST DOCUMENTED IN MEDICAL RECORD: ICD-10-PCS | Mod: S$GLB,,, | Performed by: FAMILY MEDICINE

## 2020-10-21 RX ORDER — VALSARTAN 40 MG/1
40 TABLET ORAL DAILY
Qty: 90 TABLET | Refills: 3 | Status: SHIPPED | OUTPATIENT
Start: 2020-10-21 | End: 2021-05-06 | Stop reason: SDUPTHER

## 2020-10-21 RX ORDER — TEMAZEPAM 15 MG/1
CAPSULE ORAL
COMMUNITY
Start: 2020-07-31 | End: 2020-10-21

## 2020-10-21 RX ORDER — DOXEPIN HYDROCHLORIDE 25 MG/1
25 CAPSULE ORAL NIGHTLY
Qty: 90 CAPSULE | Refills: 0 | Status: SHIPPED | OUTPATIENT
Start: 2020-10-21 | End: 2021-01-19

## 2020-10-21 RX ORDER — ATORVASTATIN CALCIUM 20 MG/1
20 TABLET, FILM COATED ORAL NIGHTLY
Qty: 90 TABLET | Refills: 3 | Status: SHIPPED | OUTPATIENT
Start: 2020-10-21 | End: 2021-05-06 | Stop reason: SDUPTHER

## 2020-10-21 NOTE — PROGRESS NOTES
Subjective:       Patient ID: Amrita Yoder is a 70 y.o. female.    Chief Complaint: Annual Exam    70 years old female came to the clinic for her physical exam .  Pressure today stable .  No chest pain, palpitation, orthopnea or PND.  No recent cholesterol.  Patient due for her mammogram and flu vaccine.  Patient is willing to do the colonoscopy.  She is requesting a medicine to help her with the insomnia.  She was using before Restoril .    Review of Systems   Constitutional: Negative.    HENT: Negative.    Eyes: Negative.    Respiratory: Negative.    Cardiovascular: Negative for chest pain, palpitations, leg swelling and claudication.   Gastrointestinal: Negative.    Genitourinary: Negative.    Musculoskeletal: Negative.    Neurological: Negative.    Psychiatric/Behavioral: Positive for sleep disturbance.         Objective:      Physical Exam  Constitutional:       General: She is not in acute distress.     Appearance: She is well-developed. She is not diaphoretic.   HENT:      Head: Normocephalic and atraumatic.      Right Ear: External ear normal.      Left Ear: External ear normal.      Nose: Nose normal.      Mouth/Throat:      Pharynx: No oropharyngeal exudate.   Eyes:      General: No scleral icterus.        Right eye: No discharge.         Left eye: No discharge.      Conjunctiva/sclera: Conjunctivae normal.      Pupils: Pupils are equal, round, and reactive to light.   Neck:      Musculoskeletal: Normal range of motion and neck supple.      Thyroid: No thyromegaly.      Vascular: No JVD.      Trachea: No tracheal deviation.   Cardiovascular:      Rate and Rhythm: Normal rate and regular rhythm.      Heart sounds: Normal heart sounds. No murmur. No friction rub. No gallop.    Pulmonary:      Effort: Pulmonary effort is normal. No respiratory distress.      Breath sounds: Normal breath sounds. No stridor. No wheezing or rales.   Chest:      Chest wall: No tenderness.   Abdominal:      General: Bowel  sounds are normal. There is no distension.      Palpations: Abdomen is soft. There is no mass.      Tenderness: There is no abdominal tenderness. There is no guarding or rebound.   Musculoskeletal: Normal range of motion.         General: No tenderness.   Lymphadenopathy:      Cervical: No cervical adenopathy.   Skin:     General: Skin is warm and dry.      Coloration: Skin is not pale.      Findings: No erythema or rash.   Neurological:      Mental Status: She is alert and oriented to person, place, and time.      Cranial Nerves: No cranial nerve deficit.      Motor: No abnormal muscle tone.      Coordination: Coordination normal.      Deep Tendon Reflexes: Reflexes are normal and symmetric.   Psychiatric:         Behavior: Behavior normal.         Thought Content: Thought content normal.         Judgment: Judgment normal.         Assessment:       1. Annual physical exam    2. Essential hypertension    3. Dyslipidemia    4. Encounter for screening mammogram for breast cancer    5. Needs flu shot    6. Screen for colon cancer    7. Primary insomnia        Plan:         Amrita was seen today for annual exam.    Diagnoses and all orders for this visit:    Annual physical exam  -     CBC auto differential; Future  -     Comprehensive Metabolic Panel; Future  -     Lipid Panel; Future  -     TSH; Future  -     Urinalysis; Future  -     Mammo Digital Screening Bilat; Future    Essential hypertension  -     CBC auto differential; Future  -     Comprehensive Metabolic Panel; Future  -     Lipid Panel; Future  -     TSH; Future  -     Urinalysis; Future    Dyslipidemia  -     Comprehensive Metabolic Panel; Future  -     Lipid Panel; Future    Encounter for screening mammogram for breast cancer  -     Mammo Digital Screening Bilat; Future    Needs flu shot  -     Influenza (FLUAD) - Quadrivalent (Adjuvanted) *Preferred* (65+) (PF)    Screen for colon cancer  -     Case request GI: COLONOSCOPY    Primary insomnia  -      doxepin (SINEQUAN) 25 MG capsule; Take 1 capsule (25 mg total) by mouth every evening.      Sleep hygiene.

## 2020-10-26 ENCOUNTER — LAB VISIT (OUTPATIENT)
Dept: LAB | Facility: HOSPITAL | Age: 70
End: 2020-10-26
Attending: FAMILY MEDICINE
Payer: MEDICARE

## 2020-10-26 DIAGNOSIS — I10 ESSENTIAL HYPERTENSION: ICD-10-CM

## 2020-10-26 DIAGNOSIS — Z00.00 ANNUAL PHYSICAL EXAM: ICD-10-CM

## 2020-10-26 LAB
BACTERIA #/AREA URNS AUTO: ABNORMAL /HPF
BILIRUB UR QL STRIP: NEGATIVE
CAOX CRY UR QL COMP ASSIST: ABNORMAL
CLARITY UR REFRACT.AUTO: ABNORMAL
COLOR UR AUTO: YELLOW
GLUCOSE UR QL STRIP: NEGATIVE
HGB UR QL STRIP: ABNORMAL
KETONES UR QL STRIP: NEGATIVE
LEUKOCYTE ESTERASE UR QL STRIP: ABNORMAL
MICROSCOPIC COMMENT: ABNORMAL
NITRITE UR QL STRIP: NEGATIVE
PH UR STRIP: 6 [PH] (ref 5–8)
PROT UR QL STRIP: NEGATIVE
RBC #/AREA URNS AUTO: 2 /HPF (ref 0–4)
SP GR UR STRIP: 1.01 (ref 1–1.03)
SQUAMOUS #/AREA URNS AUTO: 1 /HPF
URN SPEC COLLECT METH UR: ABNORMAL
WBC #/AREA URNS AUTO: 10 /HPF (ref 0–5)

## 2020-10-26 PROCEDURE — 81001 URINALYSIS AUTO W/SCOPE: CPT

## 2020-10-29 ENCOUNTER — TELEPHONE (OUTPATIENT)
Dept: FAMILY MEDICINE | Facility: CLINIC | Age: 70
End: 2020-10-29

## 2020-10-29 DIAGNOSIS — N39.0 URINARY TRACT INFECTION WITHOUT HEMATURIA, SITE UNSPECIFIED: Primary | ICD-10-CM

## 2020-10-29 RX ORDER — SULFAMETHOXAZOLE AND TRIMETHOPRIM 800; 160 MG/1; MG/1
1 TABLET ORAL 2 TIMES DAILY
Qty: 10 TABLET | Refills: 0 | Status: SHIPPED | OUTPATIENT
Start: 2020-10-29 | End: 2020-11-03

## 2020-10-29 NOTE — TELEPHONE ENCOUNTER
Patient with early urinary tract infection.    Antibiotic was sent to the pharmacy.    Please notify the patient.

## 2020-10-29 NOTE — TELEPHONE ENCOUNTER
Patient with early urinary tract infection.     Antibiotic was sent to the pharmacy.    Left detail message that patient has a urinary tract infection and to  medication at the pharmacy.

## 2020-10-31 ENCOUNTER — TELEPHONE (OUTPATIENT)
Dept: FAMILY MEDICINE | Facility: CLINIC | Age: 70
End: 2020-10-31

## 2020-11-04 ENCOUNTER — HOSPITAL ENCOUNTER (OUTPATIENT)
Dept: RADIOLOGY | Facility: HOSPITAL | Age: 70
Discharge: HOME | End: 2020-11-04
Attending: FAMILY MEDICINE
Payer: MEDICARE

## 2020-11-04 DIAGNOSIS — Z00.00 ANNUAL PHYSICAL EXAM: ICD-10-CM

## 2020-11-04 DIAGNOSIS — Z12.31 ENCOUNTER FOR SCREENING MAMMOGRAM FOR BREAST CANCER: ICD-10-CM

## 2020-11-04 PROCEDURE — 77063 MAMMO DIGITAL SCREENING BILAT WITH TOMO: ICD-10-PCS | Mod: 26,,, | Performed by: RADIOLOGY

## 2020-11-04 PROCEDURE — 77067 MAMMO DIGITAL SCREENING BILAT WITH TOMO: ICD-10-PCS | Mod: 26,,, | Performed by: RADIOLOGY

## 2020-11-04 PROCEDURE — 77063 BREAST TOMOSYNTHESIS BI: CPT | Mod: 26,,, | Performed by: RADIOLOGY

## 2020-11-04 PROCEDURE — 77067 SCR MAMMO BI INCL CAD: CPT | Mod: TC

## 2020-11-04 PROCEDURE — 77067 SCR MAMMO BI INCL CAD: CPT | Mod: 26,,, | Performed by: RADIOLOGY

## 2020-11-05 ENCOUNTER — TELEPHONE (OUTPATIENT)
Dept: GASTROENTEROLOGY | Facility: CLINIC | Age: 70
End: 2020-11-05

## 2020-11-05 NOTE — TELEPHONE ENCOUNTER
Referral was sent from Dr. Olvera to get pt scheduled for a Screening Colonoscopy. Left a message on patients voicemail to call the Clinic back to schedule procedure.

## 2020-11-06 ENCOUNTER — TELEPHONE (OUTPATIENT)
Dept: FAMILY MEDICINE | Facility: CLINIC | Age: 70
End: 2020-11-06

## 2020-11-06 NOTE — TELEPHONE ENCOUNTER
----- Message from Queenie Garcia sent at 11/6/2020  9:08 AM CST -----  Contact: SELF [phone]  Patient would like to speak with you about getting test results and the reason she received a new medication Please advise.

## 2020-12-04 ENCOUNTER — HOSPITAL ENCOUNTER (OUTPATIENT)
Dept: RADIOLOGY | Facility: HOSPITAL | Age: 70
Discharge: HOME | End: 2020-12-04
Attending: FAMILY MEDICINE
Payer: MEDICARE

## 2020-12-04 DIAGNOSIS — R92.8 ABNORMAL MAMMOGRAM: ICD-10-CM

## 2020-12-04 PROCEDURE — 77061 BREAST TOMOSYNTHESIS UNI: CPT | Mod: 26,LT,, | Performed by: RADIOLOGY

## 2020-12-04 PROCEDURE — 77065 MAMMO DIGITAL DIAGNOSTIC LEFT WITH TOMO: ICD-10-PCS | Mod: 26,LT,, | Performed by: RADIOLOGY

## 2020-12-04 PROCEDURE — 77061 MAMMO DIGITAL DIAGNOSTIC LEFT WITH TOMO: ICD-10-PCS | Mod: 26,LT,, | Performed by: RADIOLOGY

## 2020-12-04 PROCEDURE — 77065 DX MAMMO INCL CAD UNI: CPT | Mod: 26,LT,, | Performed by: RADIOLOGY

## 2020-12-04 PROCEDURE — 77065 DX MAMMO INCL CAD UNI: CPT | Mod: TC,LT

## 2020-12-04 PROCEDURE — 77061 BREAST TOMOSYNTHESIS UNI: CPT | Mod: TC,LT

## 2020-12-23 DIAGNOSIS — J06.9 UPPER RESPIRATORY TRACT INFECTION, UNSPECIFIED TYPE: ICD-10-CM

## 2020-12-23 RX ORDER — TEMAZEPAM 15 MG/1
CAPSULE ORAL
COMMUNITY
Start: 2020-11-04 | End: 2021-11-11

## 2020-12-23 RX ORDER — PROMETHAZINE HYDROCHLORIDE AND DEXTROMETHORPHAN HYDROBROMIDE 6.25; 15 MG/5ML; MG/5ML
5 SYRUP ORAL
COMMUNITY
End: 2020-12-29

## 2020-12-23 RX ORDER — PROMETHAZINE HYDROCHLORIDE AND DEXTROMETHORPHAN HYDROBROMIDE 6.25; 15 MG/5ML; MG/5ML
5 SYRUP ORAL
Refills: 0 | OUTPATIENT
Start: 2020-12-23

## 2020-12-23 NOTE — TELEPHONE ENCOUNTER
----- Message from Daniela Levy MA sent at 12/23/2020 10:00 AM CST -----  Regarding: refill  Type:  RX Refill Request    Who Called: pt   RX Name and Strength: promethazine-dextromethorphan (PROMETHAZINE-DM) 6.25-15 mg/5 mL Syrp  Preferred Pharmacy with phone number: [id number] - [place], [place] - [address]  Would the patient rather a call back or a response via [facility]? Call back   Best Call Back Number: [phone]  Additional Information: none

## 2020-12-29 RX ORDER — PROMETHAZINE HYDROCHLORIDE AND DEXTROMETHORPHAN HYDROBROMIDE 6.25; 15 MG/5ML; MG/5ML
5 SYRUP ORAL 3 TIMES DAILY PRN
Qty: 180 ML | Refills: 0 | Status: SHIPPED | OUTPATIENT
Start: 2020-12-29 | End: 2021-01-08

## 2021-05-06 ENCOUNTER — OFFICE VISIT (OUTPATIENT)
Dept: FAMILY MEDICINE | Facility: CLINIC | Age: 71
End: 2021-05-06
Payer: MEDICARE

## 2021-05-06 VITALS
SYSTOLIC BLOOD PRESSURE: 120 MMHG | OXYGEN SATURATION: 96 % | WEIGHT: 147.5 LBS | HEART RATE: 64 BPM | BODY MASS INDEX: 28.8 KG/M2 | DIASTOLIC BLOOD PRESSURE: 72 MMHG

## 2021-05-06 DIAGNOSIS — E78.5 DYSLIPIDEMIA: ICD-10-CM

## 2021-05-06 DIAGNOSIS — I10 ESSENTIAL HYPERTENSION: Primary | ICD-10-CM

## 2021-05-06 DIAGNOSIS — Z78.0 ASYMPTOMATIC MENOPAUSE: ICD-10-CM

## 2021-05-06 DIAGNOSIS — Z12.11 SCREEN FOR COLON CANCER: ICD-10-CM

## 2021-05-06 PROCEDURE — 3074F PR MOST RECENT SYSTOLIC BLOOD PRESSURE < 130 MM HG: ICD-10-PCS | Mod: CPTII,S$GLB,, | Performed by: FAMILY MEDICINE

## 2021-05-06 PROCEDURE — 3288F FALL RISK ASSESSMENT DOCD: CPT | Mod: CPTII,S$GLB,, | Performed by: FAMILY MEDICINE

## 2021-05-06 PROCEDURE — 3008F BODY MASS INDEX DOCD: CPT | Mod: CPTII,S$GLB,, | Performed by: FAMILY MEDICINE

## 2021-05-06 PROCEDURE — 3078F PR MOST RECENT DIASTOLIC BLOOD PRESSURE < 80 MM HG: ICD-10-PCS | Mod: CPTII,S$GLB,, | Performed by: FAMILY MEDICINE

## 2021-05-06 PROCEDURE — 3074F SYST BP LT 130 MM HG: CPT | Mod: CPTII,S$GLB,, | Performed by: FAMILY MEDICINE

## 2021-05-06 PROCEDURE — 99499 UNLISTED E&M SERVICE: CPT | Mod: S$GLB,,, | Performed by: FAMILY MEDICINE

## 2021-05-06 PROCEDURE — 1101F PR PT FALLS ASSESS DOC 0-1 FALLS W/OUT INJ PAST YR: ICD-10-PCS | Mod: CPTII,S$GLB,, | Performed by: FAMILY MEDICINE

## 2021-05-06 PROCEDURE — 1159F MED LIST DOCD IN RCRD: CPT | Mod: S$GLB,,, | Performed by: FAMILY MEDICINE

## 2021-05-06 PROCEDURE — 99214 OFFICE O/P EST MOD 30 MIN: CPT | Mod: S$GLB,,, | Performed by: FAMILY MEDICINE

## 2021-05-06 PROCEDURE — 3288F PR FALLS RISK ASSESSMENT DOCUMENTED: ICD-10-PCS | Mod: CPTII,S$GLB,, | Performed by: FAMILY MEDICINE

## 2021-05-06 PROCEDURE — 99214 PR OFFICE/OUTPT VISIT, EST, LEVL IV, 30-39 MIN: ICD-10-PCS | Mod: S$GLB,,, | Performed by: FAMILY MEDICINE

## 2021-05-06 PROCEDURE — 99999 PR PBB SHADOW E&M-EST. PATIENT-LVL III: ICD-10-PCS | Mod: PBBFAC,,, | Performed by: FAMILY MEDICINE

## 2021-05-06 PROCEDURE — 99499 RISK ADDL DX/OHS AUDIT: ICD-10-PCS | Mod: S$GLB,,, | Performed by: FAMILY MEDICINE

## 2021-05-06 PROCEDURE — 1101F PT FALLS ASSESS-DOCD LE1/YR: CPT | Mod: CPTII,S$GLB,, | Performed by: FAMILY MEDICINE

## 2021-05-06 PROCEDURE — 3078F DIAST BP <80 MM HG: CPT | Mod: CPTII,S$GLB,, | Performed by: FAMILY MEDICINE

## 2021-05-06 PROCEDURE — 1159F PR MEDICATION LIST DOCUMENTED IN MEDICAL RECORD: ICD-10-PCS | Mod: S$GLB,,, | Performed by: FAMILY MEDICINE

## 2021-05-06 PROCEDURE — 1126F AMNT PAIN NOTED NONE PRSNT: CPT | Mod: S$GLB,,, | Performed by: FAMILY MEDICINE

## 2021-05-06 PROCEDURE — 1126F PR PAIN SEVERITY QUANTIFIED, NO PAIN PRESENT: ICD-10-PCS | Mod: S$GLB,,, | Performed by: FAMILY MEDICINE

## 2021-05-06 PROCEDURE — 99999 PR PBB SHADOW E&M-EST. PATIENT-LVL III: CPT | Mod: PBBFAC,,, | Performed by: FAMILY MEDICINE

## 2021-05-06 PROCEDURE — 3008F PR BODY MASS INDEX (BMI) DOCUMENTED: ICD-10-PCS | Mod: CPTII,S$GLB,, | Performed by: FAMILY MEDICINE

## 2021-05-06 RX ORDER — VALSARTAN 40 MG/1
40 TABLET ORAL DAILY
Qty: 90 TABLET | Refills: 3 | Status: SHIPPED | OUTPATIENT
Start: 2021-05-06 | End: 2021-05-10 | Stop reason: SDUPTHER

## 2021-05-06 RX ORDER — ATORVASTATIN CALCIUM 20 MG/1
20 TABLET, FILM COATED ORAL NIGHTLY
Qty: 90 TABLET | Refills: 3 | Status: SHIPPED | OUTPATIENT
Start: 2021-05-06 | End: 2021-05-10 | Stop reason: SDUPTHER

## 2021-05-10 ENCOUNTER — LAB VISIT (OUTPATIENT)
Dept: LAB | Facility: HOSPITAL | Age: 71
End: 2021-05-10
Attending: FAMILY MEDICINE
Payer: MEDICARE

## 2021-05-10 ENCOUNTER — TELEPHONE (OUTPATIENT)
Dept: FAMILY MEDICINE | Facility: CLINIC | Age: 71
End: 2021-05-10

## 2021-05-10 DIAGNOSIS — F51.01 PRIMARY INSOMNIA: ICD-10-CM

## 2021-05-10 DIAGNOSIS — K21.9 GASTROESOPHAGEAL REFLUX DISEASE WITHOUT ESOPHAGITIS: Primary | ICD-10-CM

## 2021-05-10 DIAGNOSIS — E78.5 DYSLIPIDEMIA: ICD-10-CM

## 2021-05-10 DIAGNOSIS — I10 ESSENTIAL HYPERTENSION: ICD-10-CM

## 2021-05-10 LAB
BASOPHILS # BLD AUTO: 0.04 K/UL (ref 0–0.2)
BASOPHILS NFR BLD: 0.5 % (ref 0–1.9)
DIFFERENTIAL METHOD: ABNORMAL
EOSINOPHIL # BLD AUTO: 0.2 K/UL (ref 0–0.5)
EOSINOPHIL NFR BLD: 1.9 % (ref 0–8)
ERYTHROCYTE [DISTWIDTH] IN BLOOD BY AUTOMATED COUNT: 12 % (ref 11.5–14.5)
HCT VFR BLD AUTO: 39.5 % (ref 37–48.5)
HGB BLD-MCNC: 12.9 G/DL (ref 12–16)
IMM GRANULOCYTES # BLD AUTO: 0.03 K/UL (ref 0–0.04)
IMM GRANULOCYTES NFR BLD AUTO: 0.4 % (ref 0–0.5)
LYMPHOCYTES # BLD AUTO: 4.2 K/UL (ref 1–4.8)
LYMPHOCYTES NFR BLD: 49.7 % (ref 18–48)
MCH RBC QN AUTO: 30.8 PG (ref 27–31)
MCHC RBC AUTO-ENTMCNC: 32.7 G/DL (ref 32–36)
MCV RBC AUTO: 94 FL (ref 82–98)
MONOCYTES # BLD AUTO: 0.6 K/UL (ref 0.3–1)
MONOCYTES NFR BLD: 7 % (ref 4–15)
NEUTROPHILS # BLD AUTO: 3.4 K/UL (ref 1.8–7.7)
NEUTROPHILS NFR BLD: 40.5 % (ref 38–73)
NRBC BLD-RTO: 0 /100 WBC
PLATELET # BLD AUTO: 296 K/UL (ref 150–450)
PMV BLD AUTO: 10 FL (ref 9.2–12.9)
RBC # BLD AUTO: 4.19 M/UL (ref 4–5.4)
WBC # BLD AUTO: 8.39 K/UL (ref 3.9–12.7)

## 2021-05-10 PROCEDURE — 80061 LIPID PANEL: CPT | Performed by: FAMILY MEDICINE

## 2021-05-10 PROCEDURE — 80053 COMPREHEN METABOLIC PANEL: CPT | Performed by: FAMILY MEDICINE

## 2021-05-10 PROCEDURE — 85025 COMPLETE CBC W/AUTO DIFF WBC: CPT | Performed by: FAMILY MEDICINE

## 2021-05-10 PROCEDURE — 84443 ASSAY THYROID STIM HORMONE: CPT | Performed by: FAMILY MEDICINE

## 2021-05-10 PROCEDURE — 36415 COLL VENOUS BLD VENIPUNCTURE: CPT | Mod: PO | Performed by: FAMILY MEDICINE

## 2021-05-10 RX ORDER — VALSARTAN 40 MG/1
40 TABLET ORAL DAILY
Qty: 180 TABLET | Refills: 0 | Status: SHIPPED | OUTPATIENT
Start: 2021-05-10 | End: 2021-11-11 | Stop reason: SDUPTHER

## 2021-05-10 RX ORDER — OMEPRAZOLE 40 MG/1
40 CAPSULE, DELAYED RELEASE ORAL
Qty: 180 CAPSULE | Refills: 0 | Status: SHIPPED | OUTPATIENT
Start: 2021-05-10 | End: 2021-11-11 | Stop reason: SDUPTHER

## 2021-05-10 RX ORDER — ATORVASTATIN CALCIUM 20 MG/1
20 TABLET, FILM COATED ORAL NIGHTLY
Qty: 180 TABLET | Refills: 0 | Status: SHIPPED | OUTPATIENT
Start: 2021-05-10 | End: 2021-11-11 | Stop reason: SDUPTHER

## 2021-05-10 RX ORDER — DOXEPIN HYDROCHLORIDE 25 MG/1
25 CAPSULE ORAL NIGHTLY
Qty: 180 CAPSULE | Refills: 0 | Status: SHIPPED | OUTPATIENT
Start: 2021-05-10 | End: 2021-11-11 | Stop reason: SDUPTHER

## 2021-05-11 LAB
ALBUMIN SERPL BCP-MCNC: 3.6 G/DL (ref 3.5–5.2)
ALP SERPL-CCNC: 100 U/L (ref 55–135)
ALT SERPL W/O P-5'-P-CCNC: 19 U/L (ref 10–44)
ANION GAP SERPL CALC-SCNC: 11 MMOL/L (ref 8–16)
AST SERPL-CCNC: 21 U/L (ref 10–40)
BILIRUB SERPL-MCNC: 0.5 MG/DL (ref 0.1–1)
BUN SERPL-MCNC: 11 MG/DL (ref 8–23)
CALCIUM SERPL-MCNC: 9.3 MG/DL (ref 8.7–10.5)
CHLORIDE SERPL-SCNC: 107 MMOL/L (ref 95–110)
CHOLEST SERPL-MCNC: 143 MG/DL (ref 120–199)
CHOLEST/HDLC SERPL: 3.1 {RATIO} (ref 2–5)
CO2 SERPL-SCNC: 24 MMOL/L (ref 23–29)
CREAT SERPL-MCNC: 0.7 MG/DL (ref 0.5–1.4)
EST. GFR  (AFRICAN AMERICAN): >60 ML/MIN/1.73 M^2
EST. GFR  (NON AFRICAN AMERICAN): >60 ML/MIN/1.73 M^2
GLUCOSE SERPL-MCNC: 92 MG/DL (ref 70–110)
HDLC SERPL-MCNC: 46 MG/DL (ref 40–75)
HDLC SERPL: 32.2 % (ref 20–50)
LDLC SERPL CALC-MCNC: 76.6 MG/DL (ref 63–159)
NONHDLC SERPL-MCNC: 97 MG/DL
POTASSIUM SERPL-SCNC: 3.7 MMOL/L (ref 3.5–5.1)
PROT SERPL-MCNC: 6.9 G/DL (ref 6–8.4)
SODIUM SERPL-SCNC: 142 MMOL/L (ref 136–145)
TRIGL SERPL-MCNC: 102 MG/DL (ref 30–150)
TSH SERPL DL<=0.005 MIU/L-ACNC: 2.47 UIU/ML (ref 0.4–4)

## 2021-05-18 ENCOUNTER — HOSPITAL ENCOUNTER (OUTPATIENT)
Dept: RADIOLOGY | Facility: HOSPITAL | Age: 71
Discharge: HOME | End: 2021-05-18
Attending: FAMILY MEDICINE
Payer: MEDICARE

## 2021-05-18 DIAGNOSIS — Z78.0 ASYMPTOMATIC MENOPAUSE: ICD-10-CM

## 2021-05-18 PROCEDURE — 77080 DXA BONE DENSITY AXIAL: CPT | Mod: TC

## 2021-05-18 PROCEDURE — 77080 DXA BONE DENSITY AXIAL: CPT | Mod: 26,,, | Performed by: RADIOLOGY

## 2021-05-18 PROCEDURE — 77080 DEXA BONE DENSITY SPINE HIP: ICD-10-PCS | Mod: 26,,, | Performed by: RADIOLOGY

## 2021-05-20 ENCOUNTER — TELEPHONE (OUTPATIENT)
Dept: FAMILY MEDICINE | Facility: CLINIC | Age: 71
End: 2021-05-20

## 2021-05-20 DIAGNOSIS — R82.998 CALCIUM OXALATE CRYSTALS PRESENT IN URINE: Primary | ICD-10-CM

## 2021-05-20 DIAGNOSIS — R31.9 HEMATURIA, UNSPECIFIED TYPE: ICD-10-CM

## 2021-06-02 ENCOUNTER — HOSPITAL ENCOUNTER (OUTPATIENT)
Dept: RADIOLOGY | Facility: HOSPITAL | Age: 71
Discharge: HOME | End: 2021-06-02
Attending: FAMILY MEDICINE
Payer: MEDICARE

## 2021-06-02 DIAGNOSIS — R31.9 HEMATURIA, UNSPECIFIED TYPE: ICD-10-CM

## 2021-06-02 DIAGNOSIS — R82.998 CALCIUM OXALATE CRYSTALS PRESENT IN URINE: ICD-10-CM

## 2021-06-02 PROCEDURE — 76770 US RETROPERITONEAL COMPLETE: ICD-10-PCS | Mod: 26,,, | Performed by: RADIOLOGY

## 2021-06-02 PROCEDURE — 76770 US EXAM ABDO BACK WALL COMP: CPT | Mod: TC

## 2021-06-02 PROCEDURE — 76770 US EXAM ABDO BACK WALL COMP: CPT | Mod: 26,,, | Performed by: RADIOLOGY

## 2021-06-03 ENCOUNTER — LAB VISIT (OUTPATIENT)
Dept: LAB | Facility: HOSPITAL | Age: 71
End: 2021-06-03
Attending: FAMILY MEDICINE
Payer: MEDICARE

## 2021-06-03 DIAGNOSIS — Z12.11 SCREEN FOR COLON CANCER: ICD-10-CM

## 2021-06-03 PROCEDURE — 82274 ASSAY TEST FOR BLOOD FECAL: CPT | Performed by: FAMILY MEDICINE

## 2021-06-04 ENCOUNTER — CLINICAL SUPPORT (OUTPATIENT)
Dept: URGENT CARE | Facility: CLINIC | Age: 71
End: 2021-06-04
Payer: MEDICARE

## 2021-06-04 DIAGNOSIS — Z91.89 AT INCREASED RISK OF EXPOSURE TO COVID-19 VIRUS: Primary | ICD-10-CM

## 2021-06-04 PROCEDURE — U0005 INFEC AGEN DETEC AMPLI PROBE: HCPCS | Performed by: NURSE PRACTITIONER

## 2021-06-04 PROCEDURE — U0003 INFECTIOUS AGENT DETECTION BY NUCLEIC ACID (DNA OR RNA); SEVERE ACUTE RESPIRATORY SYNDROME CORONAVIRUS 2 (SARS-COV-2) (CORONAVIRUS DISEASE [COVID-19]), AMPLIFIED PROBE TECHNIQUE, MAKING USE OF HIGH THROUGHPUT TECHNOLOGIES AS DESCRIBED BY CMS-2020-01-R: HCPCS | Performed by: NURSE PRACTITIONER

## 2021-06-05 LAB — SARS-COV-2 RNA RESP QL NAA+PROBE: NOT DETECTED

## 2021-06-11 LAB — HEMOCCULT STL QL IA: NEGATIVE

## 2021-06-18 ENCOUNTER — TELEPHONE (OUTPATIENT)
Dept: FAMILY MEDICINE | Facility: CLINIC | Age: 71
End: 2021-06-18

## 2021-11-02 ENCOUNTER — TELEPHONE (OUTPATIENT)
Dept: FAMILY MEDICINE | Facility: CLINIC | Age: 71
End: 2021-11-02
Payer: MEDICARE

## 2021-11-11 ENCOUNTER — OFFICE VISIT (OUTPATIENT)
Dept: FAMILY MEDICINE | Facility: CLINIC | Age: 71
End: 2021-11-11
Payer: MEDICARE

## 2021-11-11 VITALS
WEIGHT: 147.69 LBS | SYSTOLIC BLOOD PRESSURE: 100 MMHG | BODY MASS INDEX: 28.99 KG/M2 | HEART RATE: 84 BPM | DIASTOLIC BLOOD PRESSURE: 72 MMHG | OXYGEN SATURATION: 95 % | TEMPERATURE: 98 F | HEIGHT: 60 IN

## 2021-11-11 DIAGNOSIS — E78.5 DYSLIPIDEMIA: ICD-10-CM

## 2021-11-11 DIAGNOSIS — I10 ESSENTIAL HYPERTENSION: ICD-10-CM

## 2021-11-11 DIAGNOSIS — N20.0 RENAL STONES: ICD-10-CM

## 2021-11-11 DIAGNOSIS — K21.9 GASTROESOPHAGEAL REFLUX DISEASE WITHOUT ESOPHAGITIS: ICD-10-CM

## 2021-11-11 DIAGNOSIS — Z12.31 ENCOUNTER FOR SCREENING MAMMOGRAM FOR BREAST CANCER: ICD-10-CM

## 2021-11-11 DIAGNOSIS — F51.01 PRIMARY INSOMNIA: ICD-10-CM

## 2021-11-11 DIAGNOSIS — J30.1 SEASONAL ALLERGIC RHINITIS DUE TO POLLEN: ICD-10-CM

## 2021-11-11 DIAGNOSIS — M25.512 CHRONIC LEFT SHOULDER PAIN: Primary | ICD-10-CM

## 2021-11-11 DIAGNOSIS — G89.29 CHRONIC LEFT SHOULDER PAIN: Primary | ICD-10-CM

## 2021-11-11 DIAGNOSIS — Z23 NEED FOR INFLUENZA VACCINATION: ICD-10-CM

## 2021-11-11 PROCEDURE — 3288F FALL RISK ASSESSMENT DOCD: CPT | Mod: CPTII,S$GLB,, | Performed by: FAMILY MEDICINE

## 2021-11-11 PROCEDURE — 90694 VACC AIIV4 NO PRSRV 0.5ML IM: CPT | Mod: S$GLB,,, | Performed by: FAMILY MEDICINE

## 2021-11-11 PROCEDURE — 3008F BODY MASS INDEX DOCD: CPT | Mod: CPTII,S$GLB,, | Performed by: FAMILY MEDICINE

## 2021-11-11 PROCEDURE — 1160F PR REVIEW ALL MEDS BY PRESCRIBER/CLIN PHARMACIST DOCUMENTED: ICD-10-PCS | Mod: CPTII,S$GLB,, | Performed by: FAMILY MEDICINE

## 2021-11-11 PROCEDURE — 1101F PT FALLS ASSESS-DOCD LE1/YR: CPT | Mod: CPTII,S$GLB,, | Performed by: FAMILY MEDICINE

## 2021-11-11 PROCEDURE — 1159F MED LIST DOCD IN RCRD: CPT | Mod: CPTII,S$GLB,, | Performed by: FAMILY MEDICINE

## 2021-11-11 PROCEDURE — 4010F PR ACE/ARB THEARPY RXD/TAKEN: ICD-10-PCS | Mod: CPTII,S$GLB,, | Performed by: FAMILY MEDICINE

## 2021-11-11 PROCEDURE — G0008 FLU VACCINE - QUADRIVALENT - ADJUVANTED: ICD-10-PCS | Mod: S$GLB,,, | Performed by: FAMILY MEDICINE

## 2021-11-11 PROCEDURE — 1125F PR PAIN SEVERITY QUANTIFIED, PAIN PRESENT: ICD-10-PCS | Mod: CPTII,S$GLB,, | Performed by: FAMILY MEDICINE

## 2021-11-11 PROCEDURE — 3078F DIAST BP <80 MM HG: CPT | Mod: CPTII,S$GLB,, | Performed by: FAMILY MEDICINE

## 2021-11-11 PROCEDURE — 99499 RISK ADDL DX/OHS AUDIT: ICD-10-PCS | Mod: S$GLB,,, | Performed by: FAMILY MEDICINE

## 2021-11-11 PROCEDURE — 99215 PR OFFICE/OUTPT VISIT, EST, LEVL V, 40-54 MIN: ICD-10-PCS | Mod: 25,S$GLB,, | Performed by: FAMILY MEDICINE

## 2021-11-11 PROCEDURE — 1160F RVW MEDS BY RX/DR IN RCRD: CPT | Mod: CPTII,S$GLB,, | Performed by: FAMILY MEDICINE

## 2021-11-11 PROCEDURE — 4010F ACE/ARB THERAPY RXD/TAKEN: CPT | Mod: CPTII,S$GLB,, | Performed by: FAMILY MEDICINE

## 2021-11-11 PROCEDURE — 3074F PR MOST RECENT SYSTOLIC BLOOD PRESSURE < 130 MM HG: ICD-10-PCS | Mod: CPTII,S$GLB,, | Performed by: FAMILY MEDICINE

## 2021-11-11 PROCEDURE — 3008F PR BODY MASS INDEX (BMI) DOCUMENTED: ICD-10-PCS | Mod: CPTII,S$GLB,, | Performed by: FAMILY MEDICINE

## 2021-11-11 PROCEDURE — 99999 PR PBB SHADOW E&M-EST. PATIENT-LVL V: CPT | Mod: PBBFAC,,, | Performed by: FAMILY MEDICINE

## 2021-11-11 PROCEDURE — 99215 OFFICE O/P EST HI 40 MIN: CPT | Mod: 25,S$GLB,, | Performed by: FAMILY MEDICINE

## 2021-11-11 PROCEDURE — 3078F PR MOST RECENT DIASTOLIC BLOOD PRESSURE < 80 MM HG: ICD-10-PCS | Mod: CPTII,S$GLB,, | Performed by: FAMILY MEDICINE

## 2021-11-11 PROCEDURE — G0008 ADMIN INFLUENZA VIRUS VAC: HCPCS | Mod: S$GLB,,, | Performed by: FAMILY MEDICINE

## 2021-11-11 PROCEDURE — 90694 FLU VACCINE - QUADRIVALENT - ADJUVANTED: ICD-10-PCS | Mod: S$GLB,,, | Performed by: FAMILY MEDICINE

## 2021-11-11 PROCEDURE — 3074F SYST BP LT 130 MM HG: CPT | Mod: CPTII,S$GLB,, | Performed by: FAMILY MEDICINE

## 2021-11-11 PROCEDURE — 1159F PR MEDICATION LIST DOCUMENTED IN MEDICAL RECORD: ICD-10-PCS | Mod: CPTII,S$GLB,, | Performed by: FAMILY MEDICINE

## 2021-11-11 PROCEDURE — 1125F AMNT PAIN NOTED PAIN PRSNT: CPT | Mod: CPTII,S$GLB,, | Performed by: FAMILY MEDICINE

## 2021-11-11 PROCEDURE — 99499 UNLISTED E&M SERVICE: CPT | Mod: S$GLB,,, | Performed by: FAMILY MEDICINE

## 2021-11-11 PROCEDURE — 1101F PR PT FALLS ASSESS DOC 0-1 FALLS W/OUT INJ PAST YR: ICD-10-PCS | Mod: CPTII,S$GLB,, | Performed by: FAMILY MEDICINE

## 2021-11-11 PROCEDURE — 99999 PR PBB SHADOW E&M-EST. PATIENT-LVL V: ICD-10-PCS | Mod: PBBFAC,,, | Performed by: FAMILY MEDICINE

## 2021-11-11 PROCEDURE — 3288F PR FALLS RISK ASSESSMENT DOCUMENTED: ICD-10-PCS | Mod: CPTII,S$GLB,, | Performed by: FAMILY MEDICINE

## 2021-11-11 RX ORDER — DOXEPIN HYDROCHLORIDE 25 MG/1
25 CAPSULE ORAL NIGHTLY
Qty: 90 CAPSULE | Refills: 3 | Status: SHIPPED | OUTPATIENT
Start: 2021-11-11 | End: 2022-12-05

## 2021-11-11 RX ORDER — VALSARTAN 40 MG/1
40 TABLET ORAL DAILY
Qty: 90 TABLET | Refills: 3 | Status: SHIPPED | OUTPATIENT
Start: 2021-11-11 | End: 2022-12-05 | Stop reason: SDUPTHER

## 2021-11-11 RX ORDER — ATORVASTATIN CALCIUM 20 MG/1
20 TABLET, FILM COATED ORAL NIGHTLY
Qty: 90 TABLET | Refills: 3 | Status: SHIPPED | OUTPATIENT
Start: 2021-11-11 | End: 2022-12-05 | Stop reason: SDUPTHER

## 2021-11-11 RX ORDER — OMEPRAZOLE 40 MG/1
40 CAPSULE, DELAYED RELEASE ORAL
Qty: 90 CAPSULE | Refills: 3 | Status: SHIPPED | OUTPATIENT
Start: 2021-11-11 | End: 2023-12-05

## 2021-11-11 RX ORDER — AZELASTINE 1 MG/ML
1 SPRAY, METERED NASAL 2 TIMES DAILY
Qty: 30 ML | Refills: 3 | Status: SHIPPED | OUTPATIENT
Start: 2021-11-11 | End: 2022-12-05 | Stop reason: SDUPTHER

## 2021-11-12 ENCOUNTER — TELEPHONE (OUTPATIENT)
Dept: ADMINISTRATIVE | Facility: OTHER | Age: 71
End: 2021-11-12
Payer: MEDICARE

## 2021-11-15 ENCOUNTER — TELEPHONE (OUTPATIENT)
Dept: FAMILY MEDICINE | Facility: CLINIC | Age: 71
End: 2021-11-15
Payer: MEDICARE

## 2021-11-15 ENCOUNTER — TELEPHONE (OUTPATIENT)
Dept: ADMINISTRATIVE | Facility: OTHER | Age: 71
End: 2021-11-15
Payer: MEDICARE

## 2021-11-16 ENCOUNTER — TELEPHONE (OUTPATIENT)
Dept: FAMILY MEDICINE | Facility: CLINIC | Age: 71
End: 2021-11-16
Payer: MEDICARE

## 2021-11-16 ENCOUNTER — LAB VISIT (OUTPATIENT)
Dept: LAB | Facility: HOSPITAL | Age: 71
End: 2021-11-16
Attending: FAMILY MEDICINE
Payer: MEDICARE

## 2021-11-16 ENCOUNTER — CLINICAL SUPPORT (OUTPATIENT)
Dept: REHABILITATION | Facility: HOSPITAL | Age: 71
End: 2021-11-16
Payer: MEDICARE

## 2021-11-16 DIAGNOSIS — M25.512 CHRONIC LEFT SHOULDER PAIN: ICD-10-CM

## 2021-11-16 DIAGNOSIS — Z74.09 STIFFNESS DUE TO IMMOBILITY: ICD-10-CM

## 2021-11-16 DIAGNOSIS — R53.1 WEAKNESS: ICD-10-CM

## 2021-11-16 DIAGNOSIS — M25.60 STIFFNESS DUE TO IMMOBILITY: ICD-10-CM

## 2021-11-16 DIAGNOSIS — I10 ESSENTIAL HYPERTENSION: Primary | ICD-10-CM

## 2021-11-16 DIAGNOSIS — G89.29 CHRONIC LEFT SHOULDER PAIN: ICD-10-CM

## 2021-11-16 DIAGNOSIS — N20.0 RENAL STONES: ICD-10-CM

## 2021-11-16 DIAGNOSIS — M79.602 PAIN OF LEFT UPPER EXTREMITY: ICD-10-CM

## 2021-11-16 LAB
ANION GAP SERPL CALC-SCNC: 8 MMOL/L (ref 8–16)
BUN SERPL-MCNC: 13 MG/DL (ref 8–23)
CALCIUM SERPL-MCNC: 9.6 MG/DL (ref 8.7–10.5)
CHLORIDE SERPL-SCNC: 107 MMOL/L (ref 95–110)
CO2 SERPL-SCNC: 26 MMOL/L (ref 23–29)
CREAT SERPL-MCNC: 0.7 MG/DL (ref 0.5–1.4)
EST. GFR  (AFRICAN AMERICAN): >60 ML/MIN/1.73 M^2
EST. GFR  (NON AFRICAN AMERICAN): >60 ML/MIN/1.73 M^2
GLUCOSE SERPL-MCNC: 101 MG/DL (ref 70–110)
POTASSIUM SERPL-SCNC: 4.3 MMOL/L (ref 3.5–5.1)
SODIUM SERPL-SCNC: 141 MMOL/L (ref 136–145)

## 2021-11-16 PROCEDURE — 97165 OT EVAL LOW COMPLEX 30 MIN: CPT | Mod: PN

## 2021-11-16 PROCEDURE — 36415 COLL VENOUS BLD VENIPUNCTURE: CPT | Mod: PO | Performed by: FAMILY MEDICINE

## 2021-11-16 PROCEDURE — 80048 BASIC METABOLIC PNL TOTAL CA: CPT | Performed by: FAMILY MEDICINE

## 2021-11-18 ENCOUNTER — TELEPHONE (OUTPATIENT)
Dept: FAMILY MEDICINE | Facility: CLINIC | Age: 71
End: 2021-11-18
Payer: MEDICARE

## 2021-11-18 DIAGNOSIS — N39.0 URINARY TRACT INFECTION WITHOUT HEMATURIA, SITE UNSPECIFIED: Primary | ICD-10-CM

## 2021-11-18 RX ORDER — SULFAMETHOXAZOLE AND TRIMETHOPRIM 800; 160 MG/1; MG/1
1 TABLET ORAL 2 TIMES DAILY
Qty: 10 TABLET | Refills: 0 | Status: SHIPPED | OUTPATIENT
Start: 2021-11-18 | End: 2021-11-23

## 2021-11-29 ENCOUNTER — HOSPITAL ENCOUNTER (OUTPATIENT)
Dept: RADIOLOGY | Facility: HOSPITAL | Age: 71
Discharge: HOME | End: 2021-11-29
Attending: FAMILY MEDICINE
Payer: MEDICARE

## 2021-11-29 DIAGNOSIS — N20.0 RENAL STONES: ICD-10-CM

## 2021-11-29 PROCEDURE — 76770 US EXAM ABDO BACK WALL COMP: CPT | Mod: TC

## 2021-11-29 PROCEDURE — 76770 US EXAM ABDO BACK WALL COMP: CPT | Mod: 26,,, | Performed by: RADIOLOGY

## 2021-11-29 PROCEDURE — 76770 US RETROPERITONEAL COMPLETE: ICD-10-PCS | Mod: 26,,, | Performed by: RADIOLOGY

## 2021-12-01 ENCOUNTER — CLINICAL SUPPORT (OUTPATIENT)
Dept: REHABILITATION | Facility: HOSPITAL | Age: 71
End: 2021-12-01
Payer: MEDICARE

## 2021-12-01 DIAGNOSIS — M25.60 STIFFNESS DUE TO IMMOBILITY: ICD-10-CM

## 2021-12-01 DIAGNOSIS — Z74.09 STIFFNESS DUE TO IMMOBILITY: ICD-10-CM

## 2021-12-01 DIAGNOSIS — R53.1 WEAKNESS: ICD-10-CM

## 2021-12-01 DIAGNOSIS — M79.602 PAIN OF LEFT UPPER EXTREMITY: ICD-10-CM

## 2021-12-01 PROCEDURE — 97110 THERAPEUTIC EXERCISES: CPT | Mod: PN

## 2021-12-01 PROCEDURE — 97140 MANUAL THERAPY 1/> REGIONS: CPT | Mod: PN

## 2021-12-06 ENCOUNTER — CLINICAL SUPPORT (OUTPATIENT)
Dept: REHABILITATION | Facility: HOSPITAL | Age: 71
End: 2021-12-06
Payer: MEDICARE

## 2021-12-06 DIAGNOSIS — M79.602 PAIN OF LEFT UPPER EXTREMITY: ICD-10-CM

## 2021-12-06 DIAGNOSIS — R53.1 WEAKNESS: ICD-10-CM

## 2021-12-06 DIAGNOSIS — M25.60 STIFFNESS DUE TO IMMOBILITY: ICD-10-CM

## 2021-12-06 DIAGNOSIS — Z74.09 STIFFNESS DUE TO IMMOBILITY: ICD-10-CM

## 2021-12-06 PROCEDURE — 97110 THERAPEUTIC EXERCISES: CPT | Mod: PN

## 2021-12-06 PROCEDURE — 97140 MANUAL THERAPY 1/> REGIONS: CPT | Mod: PN

## 2021-12-08 ENCOUNTER — CLINICAL SUPPORT (OUTPATIENT)
Dept: REHABILITATION | Facility: HOSPITAL | Age: 71
End: 2021-12-08
Payer: MEDICARE

## 2021-12-08 DIAGNOSIS — Z74.09 STIFFNESS DUE TO IMMOBILITY: ICD-10-CM

## 2021-12-08 DIAGNOSIS — M25.60 STIFFNESS DUE TO IMMOBILITY: ICD-10-CM

## 2021-12-08 DIAGNOSIS — R53.1 WEAKNESS: ICD-10-CM

## 2021-12-08 DIAGNOSIS — M79.602 PAIN OF LEFT UPPER EXTREMITY: ICD-10-CM

## 2021-12-08 PROCEDURE — 97110 THERAPEUTIC EXERCISES: CPT | Mod: PN

## 2021-12-08 PROCEDURE — 97140 MANUAL THERAPY 1/> REGIONS: CPT | Mod: PN

## 2021-12-13 ENCOUNTER — CLINICAL SUPPORT (OUTPATIENT)
Dept: REHABILITATION | Facility: HOSPITAL | Age: 71
End: 2021-12-13
Payer: MEDICARE

## 2021-12-13 DIAGNOSIS — M25.60 STIFFNESS DUE TO IMMOBILITY: ICD-10-CM

## 2021-12-13 DIAGNOSIS — M79.602 PAIN OF LEFT UPPER EXTREMITY: ICD-10-CM

## 2021-12-13 DIAGNOSIS — R53.1 WEAKNESS: ICD-10-CM

## 2021-12-13 DIAGNOSIS — Z74.09 STIFFNESS DUE TO IMMOBILITY: ICD-10-CM

## 2021-12-20 ENCOUNTER — HOSPITAL ENCOUNTER (OUTPATIENT)
Dept: RADIOLOGY | Facility: HOSPITAL | Age: 71
Discharge: HOME | End: 2021-12-20
Attending: FAMILY MEDICINE
Payer: MEDICARE

## 2021-12-20 DIAGNOSIS — Z12.31 ENCOUNTER FOR SCREENING MAMMOGRAM FOR BREAST CANCER: ICD-10-CM

## 2021-12-20 PROCEDURE — 77063 BREAST TOMOSYNTHESIS BI: CPT | Mod: 26,,, | Performed by: RADIOLOGY

## 2021-12-20 PROCEDURE — 77067 SCR MAMMO BI INCL CAD: CPT | Mod: TC

## 2021-12-20 PROCEDURE — 77067 MAMMO DIGITAL SCREENING BILAT WITH TOMO: ICD-10-PCS | Mod: 26,,, | Performed by: RADIOLOGY

## 2021-12-20 PROCEDURE — 77067 SCR MAMMO BI INCL CAD: CPT | Mod: 26,,, | Performed by: RADIOLOGY

## 2021-12-20 PROCEDURE — 77063 MAMMO DIGITAL SCREENING BILAT WITH TOMO: ICD-10-PCS | Mod: 26,,, | Performed by: RADIOLOGY

## 2022-02-07 ENCOUNTER — TELEPHONE (OUTPATIENT)
Dept: UROLOGY | Facility: CLINIC | Age: 72
End: 2022-02-07
Payer: MEDICARE

## 2022-02-07 NOTE — TELEPHONE ENCOUNTER
----- Message from Laura Tan sent at 2/7/2022  1:32 PM CST -----  Regarding: sooner  Contact: [phone]  Type:  Sooner Apoointment Request    Caller is requesting a sooner appointment.  Caller is requesting a message be sent to doctor.  Name of Caller: self   When is the first available appointment? 02/18 and needing to be rescheduled with an   Symptoms: Renal stones  Would the patient rather a call back or a response via [facility]?  call  Best Call Back Number: [phone]  Additional Information:

## 2022-02-09 ENCOUNTER — TELEPHONE (OUTPATIENT)
Dept: UROLOGY | Facility: CLINIC | Age: 72
End: 2022-02-09
Payer: MEDICARE

## 2022-02-09 NOTE — TELEPHONE ENCOUNTER
----- Message from Cathy Lawsroe sent at 2/9/2022 11:52 AM CST -----  Contact: pt  Type:  Needs Medical Advice    Who Called: PT  Symptoms (please be specific):    How long has patient had these symptoms:    Pharmacy name and phone #:    Would the patient rather a call back or a response via [facility]? No need to call   Best Call Back Number:   Additional Information: pt is calling back...she is returning call. She stated she did not ask for a sooner appt . She wanted to make sure her appt for 2/18/22 was going to have an . She is confirming she will be there on the 2/18

## 2022-02-09 NOTE — TELEPHONE ENCOUNTER
Use language line([id number]) to call patient to inform that an  will be used. Patient voice her understanding.

## 2022-02-16 ENCOUNTER — PATIENT OUTREACH (OUTPATIENT)
Dept: ADMINISTRATIVE | Facility: OTHER | Age: 72
End: 2022-02-16
Payer: MEDICARE

## 2022-02-16 NOTE — PROGRESS NOTES
Health Maintenance Due   Topic Date Due    Shingles Vaccine (1 of 2) Never done    COVID-19 Vaccine (3 - Booster for Moderna series) 08/09/2021     Updates were requested from care everywhere.  Chart was reviewed for overdue [facility] Encounters ([facility]) topics (CRS, Breast Cancer Screening, Eye exam)  Health Maintenance has been updated.  LINKS immunization registry triggered.  Immunizations were reconciled.

## 2022-02-18 ENCOUNTER — HOSPITAL ENCOUNTER (OUTPATIENT)
Dept: RADIOLOGY | Facility: HOSPITAL | Age: 72
Discharge: HOME | End: 2022-02-18
Attending: STUDENT IN AN ORGANIZED HEALTH CARE EDUCATION/TRAINING PROGRAM
Payer: MEDICARE

## 2022-02-18 ENCOUNTER — OFFICE VISIT (OUTPATIENT)
Dept: UROLOGY | Facility: CLINIC | Age: 72
End: 2022-02-18
Payer: MEDICARE

## 2022-02-18 VITALS
HEART RATE: 70 BPM | HEIGHT: 60 IN | DIASTOLIC BLOOD PRESSURE: 83 MMHG | WEIGHT: 147.69 LBS | BODY MASS INDEX: 28.99 KG/M2 | SYSTOLIC BLOOD PRESSURE: 127 MMHG

## 2022-02-18 DIAGNOSIS — N20.0 RENAL STONES: Primary | ICD-10-CM

## 2022-02-18 DIAGNOSIS — N20.0 RENAL STONES: ICD-10-CM

## 2022-02-18 LAB
BILIRUB SERPL-MCNC: NEGATIVE MG/DL
BLOOD URINE, POC: NORMAL
CLARITY, POC UA: CLEAR
COLOR, POC UA: YELLOW
GLUCOSE UR QL STRIP: NORMAL
KETONES UR QL STRIP: NEGATIVE
LEUKOCYTE ESTERASE URINE, POC: NORMAL
NITRITE, POC UA: NEGATIVE
PH, POC UA: 5
PROTEIN, POC: NORMAL
SPECIFIC GRAVITY, POC UA: 1.03
UROBILINOGEN, POC UA: NORMAL

## 2022-02-18 PROCEDURE — 3079F PR MOST RECENT DIASTOLIC BLOOD PRESSURE 80-89 MM HG: ICD-10-PCS | Mod: CPTII,S$GLB,, | Performed by: STUDENT IN AN ORGANIZED HEALTH CARE EDUCATION/TRAINING PROGRAM

## 2022-02-18 PROCEDURE — 99204 PR OFFICE/OUTPT VISIT, NEW, LEVL IV, 45-59 MIN: ICD-10-PCS | Mod: S$GLB,,, | Performed by: STUDENT IN AN ORGANIZED HEALTH CARE EDUCATION/TRAINING PROGRAM

## 2022-02-18 PROCEDURE — 3288F PR FALLS RISK ASSESSMENT DOCUMENTED: ICD-10-PCS | Mod: CPTII,S$GLB,, | Performed by: STUDENT IN AN ORGANIZED HEALTH CARE EDUCATION/TRAINING PROGRAM

## 2022-02-18 PROCEDURE — 87086 URINE CULTURE/COLONY COUNT: CPT | Performed by: STUDENT IN AN ORGANIZED HEALTH CARE EDUCATION/TRAINING PROGRAM

## 2022-02-18 PROCEDURE — 81002 URINALYSIS NONAUTO W/O SCOPE: CPT | Mod: S$GLB,,, | Performed by: STUDENT IN AN ORGANIZED HEALTH CARE EDUCATION/TRAINING PROGRAM

## 2022-02-18 PROCEDURE — 3079F DIAST BP 80-89 MM HG: CPT | Mod: CPTII,S$GLB,, | Performed by: STUDENT IN AN ORGANIZED HEALTH CARE EDUCATION/TRAINING PROGRAM

## 2022-02-18 PROCEDURE — 3008F PR BODY MASS INDEX (BMI) DOCUMENTED: ICD-10-PCS | Mod: CPTII,S$GLB,, | Performed by: STUDENT IN AN ORGANIZED HEALTH CARE EDUCATION/TRAINING PROGRAM

## 2022-02-18 PROCEDURE — 99999 PR PBB SHADOW E&M-EST. PATIENT-LVL V: CPT | Mod: PBBFAC,,, | Performed by: STUDENT IN AN ORGANIZED HEALTH CARE EDUCATION/TRAINING PROGRAM

## 2022-02-18 PROCEDURE — 3008F BODY MASS INDEX DOCD: CPT | Mod: CPTII,S$GLB,, | Performed by: STUDENT IN AN ORGANIZED HEALTH CARE EDUCATION/TRAINING PROGRAM

## 2022-02-18 PROCEDURE — 4010F ACE/ARB THERAPY RXD/TAKEN: CPT | Mod: CPTII,S$GLB,, | Performed by: STUDENT IN AN ORGANIZED HEALTH CARE EDUCATION/TRAINING PROGRAM

## 2022-02-18 PROCEDURE — 99999 PR PBB SHADOW E&M-EST. PATIENT-LVL V: ICD-10-PCS | Mod: PBBFAC,,, | Performed by: STUDENT IN AN ORGANIZED HEALTH CARE EDUCATION/TRAINING PROGRAM

## 2022-02-18 PROCEDURE — 1101F PR PT FALLS ASSESS DOC 0-1 FALLS W/OUT INJ PAST YR: ICD-10-PCS | Mod: CPTII,S$GLB,, | Performed by: STUDENT IN AN ORGANIZED HEALTH CARE EDUCATION/TRAINING PROGRAM

## 2022-02-18 PROCEDURE — 1159F MED LIST DOCD IN RCRD: CPT | Mod: CPTII,S$GLB,, | Performed by: STUDENT IN AN ORGANIZED HEALTH CARE EDUCATION/TRAINING PROGRAM

## 2022-02-18 PROCEDURE — 1126F AMNT PAIN NOTED NONE PRSNT: CPT | Mod: CPTII,S$GLB,, | Performed by: STUDENT IN AN ORGANIZED HEALTH CARE EDUCATION/TRAINING PROGRAM

## 2022-02-18 PROCEDURE — 99204 OFFICE O/P NEW MOD 45 MIN: CPT | Mod: S$GLB,,, | Performed by: STUDENT IN AN ORGANIZED HEALTH CARE EDUCATION/TRAINING PROGRAM

## 2022-02-18 PROCEDURE — 74018 RADEX ABDOMEN 1 VIEW: CPT | Mod: TC,FY

## 2022-02-18 PROCEDURE — 1159F PR MEDICATION LIST DOCUMENTED IN MEDICAL RECORD: ICD-10-PCS | Mod: CPTII,S$GLB,, | Performed by: STUDENT IN AN ORGANIZED HEALTH CARE EDUCATION/TRAINING PROGRAM

## 2022-02-18 PROCEDURE — 81002 PR URINALYSIS NONAUTO W/O SCOPE: ICD-10-PCS | Mod: S$GLB,,, | Performed by: STUDENT IN AN ORGANIZED HEALTH CARE EDUCATION/TRAINING PROGRAM

## 2022-02-18 PROCEDURE — 1160F RVW MEDS BY RX/DR IN RCRD: CPT | Mod: CPTII,S$GLB,, | Performed by: STUDENT IN AN ORGANIZED HEALTH CARE EDUCATION/TRAINING PROGRAM

## 2022-02-18 PROCEDURE — 3074F PR MOST RECENT SYSTOLIC BLOOD PRESSURE < 130 MM HG: ICD-10-PCS | Mod: CPTII,S$GLB,, | Performed by: STUDENT IN AN ORGANIZED HEALTH CARE EDUCATION/TRAINING PROGRAM

## 2022-02-18 PROCEDURE — 1101F PT FALLS ASSESS-DOCD LE1/YR: CPT | Mod: CPTII,S$GLB,, | Performed by: STUDENT IN AN ORGANIZED HEALTH CARE EDUCATION/TRAINING PROGRAM

## 2022-02-18 PROCEDURE — 1160F PR REVIEW ALL MEDS BY PRESCRIBER/CLIN PHARMACIST DOCUMENTED: ICD-10-PCS | Mod: CPTII,S$GLB,, | Performed by: STUDENT IN AN ORGANIZED HEALTH CARE EDUCATION/TRAINING PROGRAM

## 2022-02-18 PROCEDURE — 74018 RADEX ABDOMEN 1 VIEW: CPT | Mod: 26,,, | Performed by: RADIOLOGY

## 2022-02-18 PROCEDURE — 4010F PR ACE/ARB THEARPY RXD/TAKEN: ICD-10-PCS | Mod: CPTII,S$GLB,, | Performed by: STUDENT IN AN ORGANIZED HEALTH CARE EDUCATION/TRAINING PROGRAM

## 2022-02-18 PROCEDURE — 74018 XR ABDOMEN AP 1 VIEW: ICD-10-PCS | Mod: 26,,, | Performed by: RADIOLOGY

## 2022-02-18 PROCEDURE — 3288F FALL RISK ASSESSMENT DOCD: CPT | Mod: CPTII,S$GLB,, | Performed by: STUDENT IN AN ORGANIZED HEALTH CARE EDUCATION/TRAINING PROGRAM

## 2022-02-18 PROCEDURE — 3074F SYST BP LT 130 MM HG: CPT | Mod: CPTII,S$GLB,, | Performed by: STUDENT IN AN ORGANIZED HEALTH CARE EDUCATION/TRAINING PROGRAM

## 2022-02-18 PROCEDURE — 1126F PR PAIN SEVERITY QUANTIFIED, NO PAIN PRESENT: ICD-10-PCS | Mod: CPTII,S$GLB,, | Performed by: STUDENT IN AN ORGANIZED HEALTH CARE EDUCATION/TRAINING PROGRAM

## 2022-02-18 NOTE — PROGRESS NOTES
Subjective:       Patient ID: Amrita Yoder is a 71 y.o. female.    Chief Complaint: Nephrolithiasis  This is a 71 y.o.  female patient that is new to me.  The patient was referred to me by Dr. Olvera for kidney stones. This is not her first episode of kidney stones. 7-8 years ago had kidney stones. She passed 2 stones. Never had surgeries.   US 6/2021 and 11/2021 performed demonstrated stones. On the more recent renal US  Right - 3 stones each measuring 4mm  Left - 1 superior 6mm and 1 mid 8mm stone seen        Lab Results   Component Value Date    CREATININE 0.7 11/16/2021       ---  Past Medical History:   Diagnosis Date    Hyperlipidemia     Hypertension        Past Surgical History:   Procedure Laterality Date    CARPAL TUNNEL RELEASE  05/25/2020    Dr. Marie    CATARACT EXTRACTION W/  INTRAOCULAR LENS IMPLANT Bilateral     TRIGGER FINGER RELEASE Left 11/2/2018    Procedure: RELEASE, TRIGGER FINGER left long;  Surgeon: Zoe Winkler MD;  Location[facility];  Service: Orthopedics;  Laterality: Left;  stretcher, supine, hand pan 1 and pan 2       Family History   Adopted: Yes       Social History     Tobacco Use    Smoking status: Never Smoker    Smokeless tobacco: Never Used   Substance Use Topics    Alcohol use: No    Drug use: No       Current Outpatient Medications on File Prior to Visit   Medication Sig Dispense Refill    atorvastatin (LIPITOR) 20 MG tablet Take 1 tablet (20 mg total) by mouth nightly. 90 tablet 3    azelastine (ASTELIN) 137 mcg (0.1 %) nasal spray 1 spray (137 mcg total) by Nasal route 2 (two) times daily. 30 mL 3    doxepin (SINEQUAN) 25 MG capsule Take 1 capsule (25 mg total) by mouth every evening. 90 capsule 3    multivitamin capsule Take 1 capsule by mouth once daily.      omeprazole (PRILOSEC) 40 MG capsule Take 1 capsule (40 mg total) by mouth before breakfast. 90 capsule 3    valsartan (DIOVAN) 40 MG tablet Take 1 tablet (40 mg total) by mouth once daily.  90 tablet 3    PATADAY 0.2 % Drop        No current facility-administered medications on file prior to visit.       Review of patient's allergies indicates:  No Known Allergies    Review of Systems   Constitutional: Negative for activity change.   HENT: Negative for congestion.    Eyes: Negative for visual disturbance.   Respiratory: Negative for shortness of breath.    Cardiovascular: Negative for chest pain.   Gastrointestinal: Negative for abdominal distention.   Musculoskeletal: Negative for gait problem.   Skin: Negative for color change.   Neurological: Negative for dizziness.   Psychiatric/Behavioral: Negative for agitation.       Objective:      Physical Exam  Constitutional:       Appearance: She is well-developed.   HENT:      Head: Normocephalic and atraumatic.   Eyes:      Extraocular Movements: EOM normal.   Cardiovascular:      Pulses: Intact distal pulses.   Pulmonary:      Effort: Pulmonary effort is normal.   Musculoskeletal:         General: Normal range of motion.      Cervical back: Normal range of motion.   Skin:     General: Skin is warm and dry.   Neurological:      Mental Status: She is alert and oriented to person, place, and time.   Psychiatric:         Mood and Affect: Mood and affect normal.         Assessment:       1. Renal stones        Plan:         Counseled pt that a 6 and 8mm stone may have issues with passing. Need further imaging to help delineate between 2 options for surgery: URS vs ESWL.  1. Urine culture.  2. KUB and CT.     Renal stones  -     Ambulatory referral/consult to Urology  -     POCT URINE DIPSTICK WITHOUT MICROSCOPE  -     Urine culture  -     CT Abdomen Pelvis  Without Contrast; Future; Expected date: 02/18/2022  -     X-Ray Abdomen AP 1 View; Future; Expected date: 02/18/2022

## 2022-02-19 LAB — BACTERIA UR CULT: NO GROWTH

## 2022-02-23 ENCOUNTER — HOSPITAL ENCOUNTER (OUTPATIENT)
Dept: RADIOLOGY | Facility: HOSPITAL | Age: 72
Discharge: HOME | End: 2022-02-23
Attending: STUDENT IN AN ORGANIZED HEALTH CARE EDUCATION/TRAINING PROGRAM
Payer: MEDICARE

## 2022-02-23 DIAGNOSIS — N20.0 RENAL STONES: ICD-10-CM

## 2022-02-23 PROCEDURE — 74176 CT ABDOMEN PELVIS WITHOUT CONTRAST: ICD-10-PCS | Mod: 26,,, | Performed by: INTERNAL MEDICINE

## 2022-02-23 PROCEDURE — 74176 CT ABD & PELVIS W/O CONTRAST: CPT | Mod: 26,,, | Performed by: INTERNAL MEDICINE

## 2022-02-23 PROCEDURE — 74176 CT ABD & PELVIS W/O CONTRAST: CPT | Mod: TC

## 2022-02-24 ENCOUNTER — OFFICE VISIT (OUTPATIENT)
Dept: UROLOGY | Facility: CLINIC | Age: 72
End: 2022-02-24
Payer: MEDICARE

## 2022-02-24 VITALS
WEIGHT: 150.44 LBS | BODY MASS INDEX: 29.54 KG/M2 | RESPIRATION RATE: 16 BRPM | HEART RATE: 60 BPM | HEIGHT: 60 IN | SYSTOLIC BLOOD PRESSURE: 154 MMHG | DIASTOLIC BLOOD PRESSURE: 90 MMHG

## 2022-02-24 DIAGNOSIS — N20.0 NEPHROLITHIASIS: Primary | ICD-10-CM

## 2022-02-24 DIAGNOSIS — R31.29 MICROSCOPIC HEMATURIA: ICD-10-CM

## 2022-02-24 PROCEDURE — 4010F ACE/ARB THERAPY RXD/TAKEN: CPT | Mod: CPTII,S$GLB,, | Performed by: STUDENT IN AN ORGANIZED HEALTH CARE EDUCATION/TRAINING PROGRAM

## 2022-02-24 PROCEDURE — 1160F PR REVIEW ALL MEDS BY PRESCRIBER/CLIN PHARMACIST DOCUMENTED: ICD-10-PCS | Mod: CPTII,S$GLB,, | Performed by: STUDENT IN AN ORGANIZED HEALTH CARE EDUCATION/TRAINING PROGRAM

## 2022-02-24 PROCEDURE — 1159F PR MEDICATION LIST DOCUMENTED IN MEDICAL RECORD: ICD-10-PCS | Mod: CPTII,S$GLB,, | Performed by: STUDENT IN AN ORGANIZED HEALTH CARE EDUCATION/TRAINING PROGRAM

## 2022-02-24 PROCEDURE — 3077F PR MOST RECENT SYSTOLIC BLOOD PRESSURE >= 140 MM HG: ICD-10-PCS | Mod: CPTII,S$GLB,, | Performed by: STUDENT IN AN ORGANIZED HEALTH CARE EDUCATION/TRAINING PROGRAM

## 2022-02-24 PROCEDURE — 1126F AMNT PAIN NOTED NONE PRSNT: CPT | Mod: CPTII,S$GLB,, | Performed by: STUDENT IN AN ORGANIZED HEALTH CARE EDUCATION/TRAINING PROGRAM

## 2022-02-24 PROCEDURE — 99999 PR PBB SHADOW E&M-EST. PATIENT-LVL IV: CPT | Mod: PBBFAC,,, | Performed by: STUDENT IN AN ORGANIZED HEALTH CARE EDUCATION/TRAINING PROGRAM

## 2022-02-24 PROCEDURE — 1160F RVW MEDS BY RX/DR IN RCRD: CPT | Mod: CPTII,S$GLB,, | Performed by: STUDENT IN AN ORGANIZED HEALTH CARE EDUCATION/TRAINING PROGRAM

## 2022-02-24 PROCEDURE — 1126F PR PAIN SEVERITY QUANTIFIED, NO PAIN PRESENT: ICD-10-PCS | Mod: CPTII,S$GLB,, | Performed by: STUDENT IN AN ORGANIZED HEALTH CARE EDUCATION/TRAINING PROGRAM

## 2022-02-24 PROCEDURE — 1159F MED LIST DOCD IN RCRD: CPT | Mod: CPTII,S$GLB,, | Performed by: STUDENT IN AN ORGANIZED HEALTH CARE EDUCATION/TRAINING PROGRAM

## 2022-02-24 PROCEDURE — 4010F PR ACE/ARB THEARPY RXD/TAKEN: ICD-10-PCS | Mod: CPTII,S$GLB,, | Performed by: STUDENT IN AN ORGANIZED HEALTH CARE EDUCATION/TRAINING PROGRAM

## 2022-02-24 PROCEDURE — 99214 OFFICE O/P EST MOD 30 MIN: CPT | Mod: S$GLB,,, | Performed by: STUDENT IN AN ORGANIZED HEALTH CARE EDUCATION/TRAINING PROGRAM

## 2022-02-24 PROCEDURE — 3077F SYST BP >= 140 MM HG: CPT | Mod: CPTII,S$GLB,, | Performed by: STUDENT IN AN ORGANIZED HEALTH CARE EDUCATION/TRAINING PROGRAM

## 2022-02-24 PROCEDURE — 99999 PR PBB SHADOW E&M-EST. PATIENT-LVL IV: ICD-10-PCS | Mod: PBBFAC,,, | Performed by: STUDENT IN AN ORGANIZED HEALTH CARE EDUCATION/TRAINING PROGRAM

## 2022-02-24 PROCEDURE — 3008F PR BODY MASS INDEX (BMI) DOCUMENTED: ICD-10-PCS | Mod: CPTII,S$GLB,, | Performed by: STUDENT IN AN ORGANIZED HEALTH CARE EDUCATION/TRAINING PROGRAM

## 2022-02-24 PROCEDURE — 3080F PR MOST RECENT DIASTOLIC BLOOD PRESSURE >= 90 MM HG: ICD-10-PCS | Mod: CPTII,S$GLB,, | Performed by: STUDENT IN AN ORGANIZED HEALTH CARE EDUCATION/TRAINING PROGRAM

## 2022-02-24 PROCEDURE — 3080F DIAST BP >= 90 MM HG: CPT | Mod: CPTII,S$GLB,, | Performed by: STUDENT IN AN ORGANIZED HEALTH CARE EDUCATION/TRAINING PROGRAM

## 2022-02-24 PROCEDURE — 99214 PR OFFICE/OUTPT VISIT, EST, LEVL IV, 30-39 MIN: ICD-10-PCS | Mod: S$GLB,,, | Performed by: STUDENT IN AN ORGANIZED HEALTH CARE EDUCATION/TRAINING PROGRAM

## 2022-02-24 PROCEDURE — 3008F BODY MASS INDEX DOCD: CPT | Mod: CPTII,S$GLB,, | Performed by: STUDENT IN AN ORGANIZED HEALTH CARE EDUCATION/TRAINING PROGRAM

## 2022-02-24 NOTE — PROGRESS NOTES
Subjective:       Patient ID: Amrita Yoder is a 71 y.o. female.    Chief Complaint: Urinary Frequency  This is a 71 y.o.  female patient that is an established patient of mine.  The patient was referred to me by Dr. Olvera for kidney stones. This is not her first episode of kidney stones. 7-8 years ago had kidney stones. She passed 2 stones. Never had surgeries.   US 6/2021 and 11/2021 performed demonstrated stones. On the more recent renal US  Right - 3 stones each measuring 4mm  Left - 1 superior 6mm and 1 mid 8mm stone seen     She underwent a KUB and CT to further correlate with the US. KUB did not visualize any stones.   CT Abdomen Pelvis  Without Contrast - possible punctate stone in left kidney. Possible stone in bladder.   Result Date: 2/23/2022    Patient's daughter expressed concern that the stone may have been present for some time since she has had trace blood in the urine for some time.      line main [place] Ms. [place]         Lab Results   Component Value Date    CREATININE 0.7 11/16/2021       ---  Past Medical History:   Diagnosis Date    Hyperlipidemia     Hypertension        Past Surgical History:   Procedure Laterality Date    CARPAL TUNNEL RELEASE  05/25/2020    Dr. Marie    CATARACT EXTRACTION W/  INTRAOCULAR LENS IMPLANT Bilateral     TRIGGER FINGER RELEASE Left 11/2/2018    Procedure: RELEASE, TRIGGER FINGER left long;  Surgeon: Zoe Wnikler MD;  Location[facility];  Service: Orthopedics;  Laterality: Left;  stretcher, supine, hand pan 1 and pan 2       Family History   Adopted: Yes       Social History     Tobacco Use    Smoking status: Never Smoker    Smokeless tobacco: Never Used   Substance Use Topics    Alcohol use: No    Drug use: No       Current Outpatient Medications on File Prior to Visit   Medication Sig Dispense Refill    atorvastatin (LIPITOR) 20 MG tablet Take 1 tablet (20 mg total) by mouth nightly. 90 tablet 3    azelastine (ASTELIN) 137  mcg (0.1 %) nasal spray 1 spray (137 mcg total) by Nasal route 2 (two) times daily. 30 mL 3    doxepin (SINEQUAN) 25 MG capsule Take 1 capsule (25 mg total) by mouth every evening. 90 capsule 3    multivitamin capsule Take 1 capsule by mouth once daily.      valsartan (DIOVAN) 40 MG tablet Take 1 tablet (40 mg total) by mouth once daily. 90 tablet 3    omeprazole (PRILOSEC) 40 MG capsule Take 1 capsule (40 mg total) by mouth before breakfast. (Patient not taking: Reported on 2/24/2022) 90 capsule 3    PATADAY 0.2 % Drop        No current facility-administered medications on file prior to visit.       Review of patient's allergies indicates:  No Known Allergies    Review of Systems   Constitutional: Negative for activity change.   HENT: Negative for congestion.    Eyes: Negative for visual disturbance.   Respiratory: Negative for shortness of breath.    Cardiovascular: Negative for chest pain.   Gastrointestinal: Negative for abdominal distention.   Musculoskeletal: Negative for gait problem.   Skin: Negative for color change.   Neurological: Negative for dizziness.   Psychiatric/Behavioral: Negative for agitation.       Objective:      Physical Exam  Constitutional:       Appearance: She is well-developed.   HENT:      Head: Normocephalic and atraumatic.   Pulmonary:      Effort: Pulmonary effort is normal.   Musculoskeletal:         General: Normal range of motion.      Cervical back: Normal range of motion.   Skin:     General: Skin is warm and dry.   Neurological:      Mental Status: She is alert and oriented to person, place, and time.         Assessment:       1. Nephrolithiasis        Plan:         1. CT scan yearly as renal US for some reason was falsely positive.  2. Strategy for stone prevention-  - try to limit salt and red meat intake  - stone formers are encouraged to hydrate enough to produce 2.5 - 3.0 liters of urine output daily  - adding citrate to water in the form of aminah, lemon juice, or  crystal light has been shown to be preventative of kidney stones  - limit iced tea and kylah   - excessive Tums, Rolaids or Vitamin C supplements (above normal daily recommended values) may possibly increase their risk of stone formation  3. Patient's daughter concerned that stone is still in the bladder. Offered cystoscopy in about 1 month to inspect the bladder and if the stone is still present, I could perform a cystoscopy and stone basketing. They would like this under anesthesia.   Will sign consents on day of surgery.     Nephrolithiasis

## 2022-02-24 NOTE — PATIENT INSTRUCTIONS
Strategy for stone prevention-  - try to limit salt and red meat intake  - stone formers are encouraged to hydrate enough to produce 2.5 - 3.0 liters of urine output daily  - adding citrate to water in the form of aminah, lemon juice, or crystal light has been shown to be preventative of kidney stones  - limit iced tea and kylah   - excessive Tums, Rolaids or Vitamin C supplements (above normal daily recommended values) may possibly increase their risk of stone formation

## 2022-02-25 ENCOUNTER — TELEPHONE (OUTPATIENT)
Dept: UROLOGY | Facility: CLINIC | Age: 72
End: 2022-02-25
Payer: MEDICARE

## 2022-02-25 NOTE — TELEPHONE ENCOUNTER
----- Message from Usha Mario MD sent at 2/24/2022 11:18 AM CST -----  Please ask pt or her daughter if Wed 3/23 is ok for her cystoscopy under anesthesia? If so, will need preop covid test scheduled.

## 2022-02-28 ENCOUNTER — TELEPHONE (OUTPATIENT)
Dept: UROLOGY | Facility: CLINIC | Age: 72
End: 2022-02-28
Payer: MEDICARE

## 2022-02-28 DIAGNOSIS — N21.0 BLADDER CALCULUS: ICD-10-CM

## 2022-02-28 DIAGNOSIS — N21.0 BLADDER STONE: Primary | ICD-10-CM

## 2022-02-28 RX ORDER — SODIUM CHLORIDE 9 MG/ML
INJECTION, SOLUTION INTRAVENOUS CONTINUOUS
Status: CANCELLED | OUTPATIENT
Start: 2022-02-28

## 2022-02-28 NOTE — TELEPHONE ENCOUNTER
Daughter informed of covid testing/procedure date.  Informed she will receive a call the day before procedure with all other instructions.  She voiced understanding.

## 2022-02-28 NOTE — TELEPHONE ENCOUNTER
----- Message from Mark Arrieta sent at 2/28/2022 11:16 AM CST -----  Contact: patient daughter/   [phone]  Patient daughter returning your call  Please advise

## 2022-02-28 NOTE — TELEPHONE ENCOUNTER
Spoke with patient's daughter, would like cystoscopy done under anesthesia.  Agreed to 3/23/22.  Covid test scheduled.  Thanks.

## 2022-02-28 NOTE — PROGRESS NOTES
Cysto, possible bladder stone removal, possible holmium laser lithotripsy of bladder stone under anesthesia 3/23/22.

## 2022-03-23 ENCOUNTER — HOSPITAL ENCOUNTER (OUTPATIENT)
Dept: RADIOLOGY | Facility: HOSPITAL | Age: 72
Discharge: HOME | End: 2022-03-23
Attending: STUDENT IN AN ORGANIZED HEALTH CARE EDUCATION/TRAINING PROGRAM
Payer: MEDICARE

## 2022-03-23 ENCOUNTER — HOSPITAL ENCOUNTER (OUTPATIENT)
Facility: HOSPITAL | Age: 72
Discharge: HOME | End: 2022-03-23
Attending: STUDENT IN AN ORGANIZED HEALTH CARE EDUCATION/TRAINING PROGRAM | Admitting: STUDENT IN AN ORGANIZED HEALTH CARE EDUCATION/TRAINING PROGRAM
Payer: MEDICARE

## 2022-03-23 ENCOUNTER — ANESTHESIA (OUTPATIENT)
Dept: SURGERY | Facility: HOSPITAL | Age: 72
End: 2022-03-23
Payer: MEDICARE

## 2022-03-23 ENCOUNTER — ANESTHESIA EVENT (OUTPATIENT)
Dept: SURGERY | Facility: HOSPITAL | Age: 72
End: 2022-03-23
Payer: MEDICARE

## 2022-03-23 VITALS
HEART RATE: 58 BPM | RESPIRATION RATE: 16 BRPM | OXYGEN SATURATION: 96 % | SYSTOLIC BLOOD PRESSURE: 128 MMHG | TEMPERATURE: 98 F | DIASTOLIC BLOOD PRESSURE: 72 MMHG | HEIGHT: 62 IN | BODY MASS INDEX: 27.23 KG/M2 | WEIGHT: 148 LBS

## 2022-03-23 DIAGNOSIS — N21.0 BLADDER CALCULUS: ICD-10-CM

## 2022-03-23 DIAGNOSIS — N21.0 BLADDER STONE: ICD-10-CM

## 2022-03-23 DIAGNOSIS — I10 HYPERTENSION: ICD-10-CM

## 2022-03-23 PROCEDURE — 63600175 PHARM REV CODE 636 W HCPCS: Performed by: NURSE ANESTHETIST, CERTIFIED REGISTERED

## 2022-03-23 PROCEDURE — 25000003 PHARM REV CODE 250: Performed by: STUDENT IN AN ORGANIZED HEALTH CARE EDUCATION/TRAINING PROGRAM

## 2022-03-23 PROCEDURE — 52310 CYSTOSCOPY AND TREATMENT: CPT | Mod: ,,, | Performed by: STUDENT IN AN ORGANIZED HEALTH CARE EDUCATION/TRAINING PROGRAM

## 2022-03-23 PROCEDURE — 36000707: Performed by: STUDENT IN AN ORGANIZED HEALTH CARE EDUCATION/TRAINING PROGRAM

## 2022-03-23 PROCEDURE — 93010 ELECTROCARDIOGRAM REPORT: CPT | Mod: ,,, | Performed by: INTERNAL MEDICINE

## 2022-03-23 PROCEDURE — 93010 EKG 12-LEAD: ICD-10-PCS | Mod: ,,, | Performed by: INTERNAL MEDICINE

## 2022-03-23 PROCEDURE — 82365 CALCULUS SPECTROSCOPY: CPT | Performed by: STUDENT IN AN ORGANIZED HEALTH CARE EDUCATION/TRAINING PROGRAM

## 2022-03-23 PROCEDURE — 71000015 HC POSTOP RECOV 1ST HR: Performed by: STUDENT IN AN ORGANIZED HEALTH CARE EDUCATION/TRAINING PROGRAM

## 2022-03-23 PROCEDURE — SPCCPT FACILITY SINGLE PATH SOFT-CODING CPT: Mod: SPCCPT | Performed by: STUDENT IN AN ORGANIZED HEALTH CARE EDUCATION/TRAINING PROGRAM

## 2022-03-23 PROCEDURE — 37000008 HC ANESTHESIA 1ST 15 MINUTES: Performed by: STUDENT IN AN ORGANIZED HEALTH CARE EDUCATION/TRAINING PROGRAM

## 2022-03-23 PROCEDURE — 25500020 PHARM REV CODE 255: Performed by: STUDENT IN AN ORGANIZED HEALTH CARE EDUCATION/TRAINING PROGRAM

## 2022-03-23 PROCEDURE — 93005 ELECTROCARDIOGRAM TRACING: CPT | Mod: 59

## 2022-03-23 PROCEDURE — 36000706: Performed by: STUDENT IN AN ORGANIZED HEALTH CARE EDUCATION/TRAINING PROGRAM

## 2022-03-23 PROCEDURE — 37000009 HC ANESTHESIA EA ADD 15 MINS: Performed by: STUDENT IN AN ORGANIZED HEALTH CARE EDUCATION/TRAINING PROGRAM

## 2022-03-23 PROCEDURE — 25000003 PHARM REV CODE 250: Performed by: NURSE ANESTHETIST, CERTIFIED REGISTERED

## 2022-03-23 PROCEDURE — 52310 CYSTOSCOPY AND TREATMENT: CPT | Mod: SPCCPT | Performed by: STUDENT IN AN ORGANIZED HEALTH CARE EDUCATION/TRAINING PROGRAM

## 2022-03-23 PROCEDURE — 63600175 PHARM REV CODE 636 W HCPCS: Performed by: STUDENT IN AN ORGANIZED HEALTH CARE EDUCATION/TRAINING PROGRAM

## 2022-03-23 PROCEDURE — 52310 PR CYSTOSCOPY,REMV CALCULUS,SIMPLE: ICD-10-PCS | Mod: ,,, | Performed by: STUDENT IN AN ORGANIZED HEALTH CARE EDUCATION/TRAINING PROGRAM

## 2022-03-23 PROCEDURE — 71000033 HC RECOVERY, INTIAL HOUR: Performed by: STUDENT IN AN ORGANIZED HEALTH CARE EDUCATION/TRAINING PROGRAM

## 2022-03-23 RX ORDER — PROPOFOL 10 MG/ML
VIAL (ML) INTRAVENOUS
Status: DISCONTINUED | OUTPATIENT
Start: 2022-03-23 | End: 2022-03-23

## 2022-03-23 RX ORDER — HYDROMORPHONE HYDROCHLORIDE 2 MG/ML
0.2 INJECTION, SOLUTION INTRAMUSCULAR; INTRAVENOUS; SUBCUTANEOUS EVERY 5 MIN PRN
Status: CANCELLED | OUTPATIENT
Start: 2022-03-23

## 2022-03-23 RX ORDER — HYDROMORPHONE HYDROCHLORIDE 2 MG/ML
0.5 INJECTION, SOLUTION INTRAMUSCULAR; INTRAVENOUS; SUBCUTANEOUS EVERY 5 MIN PRN
Status: CANCELLED | OUTPATIENT
Start: 2022-03-23

## 2022-03-23 RX ORDER — SODIUM CHLORIDE 9 MG/ML
INJECTION, SOLUTION INTRAVENOUS CONTINUOUS
Status: DISCONTINUED | OUTPATIENT
Start: 2022-03-23 | End: 2022-03-23 | Stop reason: HOSPADM

## 2022-03-23 RX ORDER — ONDANSETRON 2 MG/ML
4 INJECTION INTRAMUSCULAR; INTRAVENOUS DAILY PRN
Status: CANCELLED | OUTPATIENT
Start: 2022-03-23

## 2022-03-23 RX ORDER — LIDOCAINE HCL/PF 100 MG/5ML
SYRINGE (ML) INTRAVENOUS
Status: DISCONTINUED | OUTPATIENT
Start: 2022-03-23 | End: 2022-03-23

## 2022-03-23 RX ORDER — PROCHLORPERAZINE EDISYLATE 5 MG/ML
5 INJECTION INTRAMUSCULAR; INTRAVENOUS EVERY 30 MIN PRN
Status: CANCELLED | OUTPATIENT
Start: 2022-03-23

## 2022-03-23 RX ORDER — ONDANSETRON HYDROCHLORIDE 2 MG/ML
INJECTION, SOLUTION INTRAMUSCULAR; INTRAVENOUS
Status: DISCONTINUED | OUTPATIENT
Start: 2022-03-23 | End: 2022-03-23

## 2022-03-23 RX ORDER — OXYCODONE AND ACETAMINOPHEN 5; 325 MG/1; MG/1
1 TABLET ORAL EVERY 6 HOURS PRN
Qty: 10 TABLET | Refills: 0 | Status: SHIPPED | OUTPATIENT
Start: 2022-03-23 | End: 2022-06-06

## 2022-03-23 RX ORDER — MIDAZOLAM HYDROCHLORIDE 1 MG/ML
INJECTION INTRAMUSCULAR; INTRAVENOUS
Status: DISCONTINUED | OUTPATIENT
Start: 2022-03-23 | End: 2022-03-23

## 2022-03-23 RX ORDER — CEFAZOLIN SODIUM 2 G/50ML
2 SOLUTION INTRAVENOUS
Status: COMPLETED | OUTPATIENT
Start: 2022-03-23 | End: 2022-03-23

## 2022-03-23 RX ORDER — AMOXICILLIN AND CLAVULANATE POTASSIUM 875; 125 MG/1; MG/1
1 TABLET, FILM COATED ORAL EVERY 12 HOURS
Qty: 6 TABLET | Refills: 0 | Status: SHIPPED | OUTPATIENT
Start: 2022-03-23 | End: 2022-03-26

## 2022-03-23 RX ADMIN — MIDAZOLAM HYDROCHLORIDE 2 MG: 1 INJECTION, SOLUTION INTRAMUSCULAR; INTRAVENOUS at 10:03

## 2022-03-23 RX ADMIN — LIDOCAINE HYDROCHLORIDE 75 MG: 20 INJECTION, SOLUTION INTRAVENOUS at 10:03

## 2022-03-23 RX ADMIN — PROPOFOL 200 MG: 10 INJECTION, EMULSION INTRAVENOUS at 10:03

## 2022-03-23 RX ADMIN — ONDANSETRON 8 MG: 2 INJECTION, SOLUTION INTRAMUSCULAR; INTRAVENOUS at 10:03

## 2022-03-23 RX ADMIN — SODIUM CHLORIDE: 0.9 INJECTION, SOLUTION INTRAVENOUS at 10:03

## 2022-03-23 RX ADMIN — CEFAZOLIN SODIUM 2 G: 2 SOLUTION INTRAVENOUS at 10:03

## 2022-03-23 NOTE — ANESTHESIA PREPROCEDURE EVALUATION
03/23/2022  Amrita Yoder is a 71 y.o., female with HTN scheduled for cystoscopy with bladder stone removal     Pre-op Assessment    I have reviewed the Patient Summary Reports.    I have reviewed the Nursing Notes.    I have reviewed the Medications.     Review of Systems  Anesthesia Hx:  Neg history of prior surgery. Denies Family Hx of Anesthesia complications.   Denies Personal Hx of Anesthesia complications.   Social:  Non-Smoker, No Alcohol Use    Hematology/Oncology:  Hematology Normal   Oncology Normal     EENT/Dental:EENT/Dental Normal   Cardiovascular:   Hypertension hyperlipidemia    Pulmonary:  Pulmonary Normal    Renal/:  Renal/ Normal     Hepatic/GI:   GERD, well controlled    Musculoskeletal:   Arthritis     Neurological:   Neuromuscular Disease, (CTS)    Endocrine:  Endocrine Normal    Dermatological:  Skin Normal    Psych:  Psychiatric Normal           Physical Exam  General:  Well nourished        Dental:  Dental Findings: In tact          Mental Status:  Mental Status Findings:  Cooperative, Alert and Oriented         Anesthesia Plan  Type of Anesthesia, risks & benefits discussed:  Anesthesia Type:  general    Patient's Preference:   Plan Factors:          Intra-op Monitoring Plan: standard ASA monitors  Intra-op Monitoring Plan Comments:   Post Op Pain Control Plan: per primary service following discharge from PACU  Post Op Pain Control Plan Comments:     Induction:    Beta Blocker:         Informed Consent: Informed consent signed with the Patient and all parties understand the risks and agree with anesthesia plan.  All questions answered.  Anesthesia consent signed with patient.  ASA Score: 2     Day of Surgery Review of History & Physical:        Anesthesia Plan Notes: No labs    IV propofol for local injection by surgeon          Ready For Surgery From Anesthesia Perspective.            Physical Exam  General: Well nourished    Dental:  In tact          Anesthesia Plan  Type of Anesthesia, risks & benefits discussed:    Anesthesia Type: general  Intra-op Monitoring Plan: standard ASA monitors  Post Op Pain Control Plan: per primary service following discharge from PACU  Informed Consent: Informed consent signed with the Patient and all parties understand the risks and agree with anesthesia plan.  All questions answered.   ASA Score: 2  Anesthesia Plan Notes: No labs    IV propofol for local injection by surgeon      Ready For Surgery From Anesthesia Perspective.       .

## 2022-03-23 NOTE — OP NOTE
OPERATIVE DICTATION  DATE OF OPERATION: 03/23/2022    SERVICE: Urology    SURGEONS:  1. Usha Mario MD    ANESTHESIA:  No anesthesia staff entered.    STAFF:  Circulator: Shannan Kent RN  Scrub Person: Shaila ALYCIA Garfield    ANESTHESIA: General    PREOPERATIVE DIAGNOSIS: Pre-Op Diagnosis Codes:     * Bladder stone [N21.0]    POSTOPERATIVE DIAGNOSIS: Post-Op Diagnosis Codes:     * Bladder stone [N21.0]    PROCEDURES:   Procedure(s):  CYSTOSCOPY, removal of bladder stone      COMPLICATIONS: * No complications entered in OR log *    DRAINS: none    TUBES: none    IMPLANTS: * No implants in log *    FLUIDS: see anesthesia record     ESTIMATED BLOOD LOSS: * No values recorded between 3/23/2022 12:00 AM and 3/23/2022 10:19 AM *    FINDINGS:   1. Very tiny punctate stone in bladder that was removed via cystoscope - sent for analysis  2. Normal bladder mucosa, clear efflux of urine from right and left ureteral orifices    SPECIMEN(S):   * No specimens in log *    CONDITION: stable    INDICATIONS FOR THE PROCEDURE:  This is a 71 y.o.  female patient that is an established patient of [facility].  The patient was referred to me by Dr. Olvera for kidney stones. This is not her first episode of kidney stones. 7-8 years ago had kidney stones. She passed 2 stones. Never had surgeries.   US 6/2021 and 11/2021 performed demonstrated stones. On the more recent renal US  Right - 3 stones each measuring 4mm  Left - 1 superior 6mm and 1 mid 8mm stone seen      She underwent a KUB and CT to further correlate with the US. KUB did not visualize any stones.   CT Abdomen Pelvis  Without Contrast - possible punctate stone in left kidney. Possible stone in bladder.   Result Date: 2/23/2022     Patient's daughter expressed concern that the stone may have been present for some time since she has had trace blood in the urine for some time.       line main campus Ms. Medina    Patient's daughter concerned that stone is still in the  bladder. Offered cystoscopy in about 1 month to inspect the bladder and if the stone is still present, I could perform a cystoscopy and stone basketing. They would like this under anesthesia. We signed consents on day of surgery 3/23/22 - cystoscopy, removal of bladder stone, possible holmium laser lithotripsy, possible bladder biopsy and all indicated procedures.      PROCEDURE IN DETAIL:    After appropriate informed consent was obtained, the patient was taken to the operating room and placed in the supine position. After induction of General, she was placed in the dorsal lithotomy position. she was prepped and draped in the usual sterile fashion.     Thereafter a WHO timeout was performed and the procedure was initiated. The procedure began by inserting a 21 [place] cystoscope into the bladder via the urethra. I carefully inspected the mucosa and did not appreciate any tumors and no abnormal mucosa seen. I noted clear efflux of urine from the right and left ureteral orifices.     After inspection of the bladder again, I did not a very tiny calcification in the trigone that I was able to remove via drainage through the cystoscope. This tiny calcification was collected and sent off for stone analysis.     This concluded the procedure.  All surgical needle, sponge, ray-evgeny, and lap counts were correct.     ATTENDING ATTESTATION  I was present and scrubbed for the entire duration  of the procedure.    CASE DURATION:  * Missing case tracking time(s) *    DISPOSITION:   The patient tolerated the procedure well. she was extubated, and taken to post-anesthesia care unit in satisfactory condition.  She will f/u in the office in 6 months.     Usha Mario MD

## 2022-03-23 NOTE — ANESTHESIA PROCEDURE NOTES
Intubation    Date/Time: 3/23/2022 10:36 AM  Performed by: Mercedes Ortiz CRNA  Authorized by: Corinne C. Weinstein, MD     Intubation:     Induction:  Intravenous    Intubated:  Postinduction    Mask Ventilation:  Easy mask    Attempts:  1    Attempted By:  CRNA    Method of Intubation:  Other (see comments) (LMA)    Difficult Airway Encountered?: No      Complications:  None    Airway Device:  Supraglottic airway/LMA    Airway Device Size:  3.0    Style/Cuff Inflation:  Cuffed (inflated to minimal occlusive pressure)    Secured at:  The lips    Placement Verified By:  Capnometry    Complicating Factors:  None    Findings Post-Intubation:  BS equal bilateral

## 2022-03-23 NOTE — TRANSFER OF CARE
"Anesthesia Transfer of Care Note    Patient: Amrita Yoder    Procedure(s) Performed: Procedure(s) (LRB):  CYSTOSCOPY, possible removal of bladder stone, possible holmium laser lithotripsy of bladder stone (N/A)  REMOVAL bladder stone (N/A)    Patient location: PACU    Anesthesia Type: general    Transport from OR: Transported from OR on 100% O2 by closed face mask with adequate spontaneous ventilation    Post pain: adequate analgesia    Post assessment: no apparent anesthetic complications and tolerated procedure well    Post vital signs: stable    Level of consciousness: sedated    Nausea/Vomiting: no nausea/vomiting    Complications: none    Transfer of care protocol was followed      Last vitals:   Visit Vitals  BP (!) 143/73 (BP Location: Right arm, Patient Position: Sitting)   Pulse 65   Temp 36.8 °C (98.2 °F) (Temporal)   Resp 16   Ht 5' 2" (1.575 m)   Wt 67.1 kg (148 lb)   LMP  (LMP Unknown)   SpO2 97%   Breastfeeding No   BMI 27.07 kg/m²     "

## 2022-03-23 NOTE — ANESTHESIA POSTPROCEDURE EVALUATION
Anesthesia Post Evaluation    Patient: Amrita Yoder    Procedure(s) Performed: Procedure(s) (LRB):  CYSTOSCOPY, possible removal of bladder stone, possible holmium laser lithotripsy of bladder stone (N/A)  REMOVAL bladder stone (N/A)    Final Anesthesia Type: general      Patient location during evaluation: PACU  Patient participation: Yes- Able to Participate  Level of consciousness: awake and alert, oriented and awake  Post-procedure vital signs: reviewed and stable  Pain management: adequate  Airway patency: patent    PONV status at discharge: No PONV  Anesthetic complications: no      Cardiovascular status: blood pressure returned to baseline  Respiratory status: unassisted and room air  Hydration status: euvolemic  Follow-up not needed.          Vitals Value Taken Time   /72 03/23/22 1205   Temp 36.7 °C (98 °F) 03/23/22 1205   Pulse 58 03/23/22 1205   Resp 16 03/23/22 1205   SpO2 96 % 03/23/22 1205         Event Time   Out of Recovery 11:34:08         Pain/Juan Score: Juan Score: 10 (3/23/2022 12:07 PM)

## 2022-03-23 NOTE — DISCHARGE INSTRUCTIONS
ANESTHESIA  -For the first 24 hours after surgery:  Do not drive, use heavy equipment, make important decisions, or drink alcohol  -It is normal to feel sleepy for several hours.  Rest until you are more awake.  -Have someone stay with you, if needed.  They can watch for problems and help keep you safe.  -Some possible post anesthesia side effects include: nausea and vomiting, sore throat and hoarseness, sleepiness, and dizziness.    PAIN  -If you have pain after surgery, pain medicine will help you feel better.  Take it as directed, before pain becomes severe.  Most pain relievers taken by mouth need at least 20-30 minutes to start working.  -Do not drive or drink alcohol while taking pain medicine.  -Pain medication can upset your stomach.  Taking them with a little food may help.  -Other ways to help control pain: elevation, ice, and relaxation  -Call your surgeon if still having unmanageable pain an hour after taking pain medicine.  -Pain medicine can cause constipation.  Taking an over-the counter stool softener while on prescription pain medicine and drinking plenty of fluids can prevent this side effect.  -Call your surgeon if you have severe side effects like: breathing problems, trouble waking up, dizziness, confusion, or severe constipation.    NAUSEA  -Some people have nausea after surgery.  This is often because of anesthesia, pain, pain medicine, or the stress of surgery.  -Do not push yourself to eat.  Start off with clear liquids and soup.  Slowly move to solid foods.  Don't eat fatty, rich, spicy foods at first.  Eat smaller amounts.  -If you develop persistent nausea and vomiting please notify your surgeon immediately.    BLEEDING  -Different types of surgery require different types of care and dressing changes.  It is important to follow all instructions and advice from your surgeon.  Change dressing as directed.  Call your surgeon for any concerns regarding postop bleeding.    SIGNS OF  INFECTION  -Signs of infection include: fever, swelling, drainage, and redness  -Notify your surgeon if you have a fever of 100.4 F (38.0 C) or higher.  -Notify your surgeon if you notice redness, swelling, increased pain, pus, or a foul smell at the incision site.    Notify Dr. Mario for any problems or concerns

## 2022-03-23 NOTE — DISCHARGE SUMMARY
[facility] ([facility])  Urology  Discharge Summary      Patient Name: Amrita Yoder  MRN: [id number]  Admission Date: 3/23/2022  Hospital Length of Stay: 0 days  Discharge Date: 03/23/2022  Attending Physician: Dorothy Barbosa MD   Discharging Provider: Dorothy Barbosa MD  Primary Care Physician: Quentin Hoff MD    HPI: The patient is a 71 y.o. female with past medical history (listed below) calcification in bladder / bladder stone.     The patient elected to proceed with the procedure(s) below. Please see H&P and/or clinic progress note(s) for full details.     Procedure(s) (LRB):  CYSTOSCOPY, possible removal of bladder stone, possible holmium laser lithotripsy of bladder stone (N/A)  REMOVAL bladder stone (N/A)     Past Medical History:   Diagnosis Date    Hyperlipidemia     Hypertension        Hospital Course (synopsis of major diagnoses, care, treatment, and services provided during the course of the hospital stay): pt tolerated procedure well, was transferred to pacu and discharged home in stable condition.       Consults:     Significant Diagnostic Studies:      Pending Diagnostic Studies:     Procedure Component Value Units Date/Time    EKG 12-lead [[id number]] Collected: 03/23/22 0905    Order Status: Sent Lab Status: In process Updated: 03/23/22 1046    Narrative:      Test Reason : I10,    Vent. Rate : 055 BPM     Atrial Rate : 055 BPM     P-R Int : 134 ms          QRS Dur : 078 ms      QT Int : 434 ms       P-R-T Axes : -05 -22 -13 degrees     QTc Int : 415 ms    Sinus bradycardia  Otherwise normal ECG  When compared with ECG of 10-MAY-2017 10:43,  Inverted T waves have replaced nonspecific T wave abnormality in Inferior  leads    Referred By: DOROTHY BARBOSA           Confirmed By:     Urinary Stone Analysis [[id number]] Collected: 03/23/22 1055    Order Status: Sent Lab Status: In process Updated: 03/23/22 1055    Specimen: Urine from Stone           There are no hospital problems to display  for this patient.      Discharged Condition: good    Disposition: Home or Self Care    Follow Up:   Follow-up Information     Usha Mario MD Follow up.    Specialty: Urology  Why: f/u with me in 6 months  Contact information:  [address]  [address]  [place] [id number] [phone]                         Patient Instructions:      Diet Adult Regular     Activity as tolerated       Medications:  Reconciled Home Medications:      Medication List      START taking these medications    amoxicillin-clavulanate 875-125mg 875-125 mg per tablet  Commonly known as: AUGMENTIN  Take 1 tablet by mouth every 12 (twelve) hours. for 3 days     oxyCODONE-acetaminophen 5-325 mg per tablet  Commonly known as: PERCOCET  Take 1 tablet by mouth every 6 (six) hours as needed for Pain.        CONTINUE taking these medications    atorvastatin 20 MG tablet  Commonly known as: LIPITOR  Take 1 tablet (20 mg total) by mouth nightly.     azelastine 137 mcg (0.1 %) nasal spray  Commonly known as: ASTELIN  1 spray (137 mcg total) by Nasal route 2 (two) times daily.     doxepin 25 MG capsule  Commonly known as: SINEQUAN  Take 1 capsule (25 mg total) by mouth every evening.     multivitamin capsule  Take 1 capsule by mouth once daily.     omeprazole 40 MG capsule  Commonly known as: PRILOSEC  Take 1 capsule (40 mg total) by mouth before breakfast.     PATADAY 0.2 % Drop  Generic drug: olopatadine     valsartan 40 MG tablet  Commonly known as: DIOVAN  Take 1 tablet (40 mg total) by mouth once daily.            Time spent on the discharge of patient: 15 minutes    Usha Mario MD  Urology  [facility] ([facility])

## 2022-03-25 LAB
COMPN STONE: NORMAL
SPECIMEN SOURCE: NORMAL
STONE ANALYSIS IR-IMP: NORMAL

## 2022-03-28 ENCOUNTER — TELEPHONE (OUTPATIENT)
Dept: UROLOGY | Facility: CLINIC | Age: 72
End: 2022-03-28
Payer: MEDICARE

## 2022-03-28 NOTE — TELEPHONE ENCOUNTER
----- Message from Usha Mario MD sent at 3/28/2022  8:42 AM CDT -----  Please let pt or her daughter know it was a calcium phosphate stone. Here are the stone prevention tips, let them know we will mail the results and recommendations to them, please print and mail.  Thank you!    Strategy for stone prevention-  - try to limit salt and red meat intake  - stone formers are encouraged to hydrate enough to produce 2.5 - 3.0 liters of urine output daily  - adding citrate to water in the form of aminah, lemon juice, or crystal light has been shown to be preventative of kidney stones  - limit iced tea and kylah   - excessive Tums, Rolaids or Vitamin C supplements (above normal daily recommended values) may possibly increase their risk of stone formation

## 2022-06-06 ENCOUNTER — OFFICE VISIT (OUTPATIENT)
Dept: FAMILY MEDICINE | Facility: CLINIC | Age: 72
End: 2022-06-06
Payer: MEDICARE

## 2022-06-06 VITALS
HEART RATE: 67 BPM | WEIGHT: 148.56 LBS | OXYGEN SATURATION: 98 % | HEIGHT: 62 IN | SYSTOLIC BLOOD PRESSURE: 126 MMHG | BODY MASS INDEX: 27.34 KG/M2 | DIASTOLIC BLOOD PRESSURE: 78 MMHG

## 2022-06-06 DIAGNOSIS — K21.9 GASTROESOPHAGEAL REFLUX DISEASE, UNSPECIFIED WHETHER ESOPHAGITIS PRESENT: ICD-10-CM

## 2022-06-06 DIAGNOSIS — E78.2 MIXED HYPERLIPIDEMIA: ICD-10-CM

## 2022-06-06 DIAGNOSIS — N20.0 CALCULUS OF KIDNEY: Primary | ICD-10-CM

## 2022-06-06 DIAGNOSIS — I10 ESSENTIAL HYPERTENSION: ICD-10-CM

## 2022-06-06 DIAGNOSIS — H66.92 ACUTE BACTERIAL OTITIS MEDIA, LEFT: ICD-10-CM

## 2022-06-06 PROCEDURE — 3008F BODY MASS INDEX DOCD: CPT | Mod: CPTII,S$GLB,, | Performed by: FAMILY MEDICINE

## 2022-06-06 PROCEDURE — 99999 PR PBB SHADOW E&M-EST. PATIENT-LVL III: ICD-10-PCS | Mod: PBBFAC,,, | Performed by: FAMILY MEDICINE

## 2022-06-06 PROCEDURE — 4010F ACE/ARB THERAPY RXD/TAKEN: CPT | Mod: CPTII,S$GLB,, | Performed by: FAMILY MEDICINE

## 2022-06-06 PROCEDURE — 99215 PR OFFICE/OUTPT VISIT, EST, LEVL V, 40-54 MIN: ICD-10-PCS | Mod: S$GLB,,, | Performed by: FAMILY MEDICINE

## 2022-06-06 PROCEDURE — 1126F AMNT PAIN NOTED NONE PRSNT: CPT | Mod: CPTII,S$GLB,, | Performed by: FAMILY MEDICINE

## 2022-06-06 PROCEDURE — 1126F PR PAIN SEVERITY QUANTIFIED, NO PAIN PRESENT: ICD-10-PCS | Mod: CPTII,S$GLB,, | Performed by: FAMILY MEDICINE

## 2022-06-06 PROCEDURE — 3078F PR MOST RECENT DIASTOLIC BLOOD PRESSURE < 80 MM HG: ICD-10-PCS | Mod: CPTII,S$GLB,, | Performed by: FAMILY MEDICINE

## 2022-06-06 PROCEDURE — 3078F DIAST BP <80 MM HG: CPT | Mod: CPTII,S$GLB,, | Performed by: FAMILY MEDICINE

## 2022-06-06 PROCEDURE — 3074F PR MOST RECENT SYSTOLIC BLOOD PRESSURE < 130 MM HG: ICD-10-PCS | Mod: CPTII,S$GLB,, | Performed by: FAMILY MEDICINE

## 2022-06-06 PROCEDURE — 3008F PR BODY MASS INDEX (BMI) DOCUMENTED: ICD-10-PCS | Mod: CPTII,S$GLB,, | Performed by: FAMILY MEDICINE

## 2022-06-06 PROCEDURE — 3074F SYST BP LT 130 MM HG: CPT | Mod: CPTII,S$GLB,, | Performed by: FAMILY MEDICINE

## 2022-06-06 PROCEDURE — 99999 PR PBB SHADOW E&M-EST. PATIENT-LVL III: CPT | Mod: PBBFAC,,, | Performed by: FAMILY MEDICINE

## 2022-06-06 PROCEDURE — 4010F PR ACE/ARB THEARPY RXD/TAKEN: ICD-10-PCS | Mod: CPTII,S$GLB,, | Performed by: FAMILY MEDICINE

## 2022-06-06 PROCEDURE — 99215 OFFICE O/P EST HI 40 MIN: CPT | Mod: S$GLB,,, | Performed by: FAMILY MEDICINE

## 2022-06-06 RX ORDER — NEOMYCIN SULFATE, POLYMYXIN B SULFATE, HYDROCORTISONE 3.5; 10000; 1 MG/ML; [USP'U]/ML; MG/ML
3 SOLUTION/ DROPS AURICULAR (OTIC) EVERY 8 HOURS
Qty: 10 ML | Refills: 0 | Status: SHIPPED | OUTPATIENT
Start: 2022-06-06 | End: 2022-12-05

## 2022-06-06 RX ORDER — AMOXICILLIN 500 MG/1
500 TABLET, FILM COATED ORAL 3 TIMES DAILY
Qty: 21 TABLET | Refills: 0 | Status: SHIPPED | OUTPATIENT
Start: 2022-06-06 | End: 2022-06-13

## 2022-06-06 RX ORDER — CETIRIZINE HYDROCHLORIDE 10 MG/1
10 TABLET ORAL DAILY
Qty: 90 TABLET | Refills: 0 | Status: SHIPPED | OUTPATIENT
Start: 2022-06-06 | End: 2022-12-05 | Stop reason: SDUPTHER

## 2022-06-06 NOTE — PROGRESS NOTES
Subjective:       Patient ID: Amrita Yoder is a 72 y.o. female.    Chief Complaint: Follow-up, Hypertension, and Ear Fullness    72 years old female came to the clinic for blood pressure check.  Blood pressure today was stable.  No chest pain, palpitation, orthopnea or PND.  Patient with left ear pain for the last week.  Reflux currently control.  Patient status post recent kidney stones removal.  She is feeling significantly better.    Review of Systems   Constitutional: Negative.    HENT: Negative.    Eyes: Negative.    Respiratory: Negative.    Cardiovascular: Negative for chest pain, palpitations, leg swelling and claudication.   Gastrointestinal: Negative.    Genitourinary: Negative.  Negative for dysuria, frequency and urgency.   Musculoskeletal: Negative.    Neurological: Negative.    Psychiatric/Behavioral: Negative.          Objective:      Physical Exam  Constitutional:       General: She is not in acute distress.     Appearance: She is well-developed. She is not diaphoretic.   HENT:      Head: Normocephalic and atraumatic.      Right Ear: External ear normal.      Left Ear: External ear normal. Tenderness present. A middle ear effusion is present. Tympanic membrane is erythematous. Tympanic membrane is not bulging.      Nose: Nose normal.      Mouth/Throat:      Pharynx: No oropharyngeal exudate.   Eyes:      General: No scleral icterus.        Right eye: No discharge.         Left eye: No discharge.      Conjunctiva/sclera: Conjunctivae normal.      Pupils: Pupils are equal, round, and reactive to light.   Neck:      Thyroid: No thyromegaly.      Vascular: No JVD.      Trachea: No tracheal deviation.   Cardiovascular:      Rate and Rhythm: Normal rate and regular rhythm.      Heart sounds: Normal heart sounds. No murmur heard.    No friction rub. No gallop.   Pulmonary:      Effort: Pulmonary effort is normal. No respiratory distress.      Breath sounds: Normal breath sounds. No stridor. No wheezing  or rales.   Chest:      Chest wall: No tenderness.   Abdominal:      General: Bowel sounds are normal. There is no distension.      Palpations: Abdomen is soft. There is no mass.      Tenderness: There is no abdominal tenderness. There is no guarding or rebound.   Musculoskeletal:         General: No tenderness. Normal range of motion.      Cervical back: Normal range of motion and neck supple.   Lymphadenopathy:      Cervical: No cervical adenopathy.   Skin:     General: Skin is warm and dry.      Coloration: Skin is not pale.      Findings: No erythema or rash.   Neurological:      Mental Status: She is alert and oriented to person, place, and time.      Cranial Nerves: No cranial nerve deficit.      Motor: No abnormal muscle tone.      Coordination: Coordination normal.      Deep Tendon Reflexes: Reflexes are normal and symmetric.   Psychiatric:         Behavior: Behavior normal.         Thought Content: Thought content normal.         Judgment: Judgment normal.         Assessment:       Problem List Items Addressed This Visit     Essential hypertension    Relevant Orders    Comprehensive Metabolic Panel    CBC Auto Differential    Lipid Panel    Urinalysis      Other Visit Diagnoses     Calculus of kidney    -  Primary    Relevant Orders    Comprehensive Metabolic Panel    CBC Auto Differential    Urinalysis    Mixed hyperlipidemia        Gastroesophageal reflux disease, unspecified whether esophagitis present        Acute bacterial otitis media, left        Relevant Medications    cetirizine (ZYRTEC) 10 MG tablet    amoxicillin (AMOXIL) 500 MG Tab    neomycin-polymyxin-hydrocortisone (CORTISPORIN) otic solution          Plan:           Amrita was seen today for follow-up, hypertension and ear fullness.    Diagnoses and all orders for this visit:    Calculus of kidney  -     Comprehensive Metabolic Panel; Future  -     CBC Auto Differential; Future  -     Urinalysis; Future    Essential hypertension  -      Comprehensive Metabolic Panel; Future  -     CBC Auto Differential; Future  -     Lipid Panel; Future  -     Urinalysis; Future    Mixed hyperlipidemia    Gastroesophageal reflux disease, unspecified whether esophagitis present    Acute bacterial otitis media, left  -     cetirizine (ZYRTEC) 10 MG tablet; Take 1 tablet (10 mg total) by mouth once daily.  -     amoxicillin (AMOXIL) 500 MG Tab; Take 1 tablet (500 mg total) by mouth 3 (three) times daily. for 7 days  -     neomycin-polymyxin-hydrocortisone (CORTISPORIN) otic solution; Place 3 drops into the left ear every 8 (eight) hours.    Continue monitoring blood pressure at home, low sodium diet.

## 2022-06-13 ENCOUNTER — LAB VISIT (OUTPATIENT)
Dept: LAB | Facility: HOSPITAL | Age: 72
End: 2022-06-13
Attending: FAMILY MEDICINE
Payer: MEDICARE

## 2022-06-13 DIAGNOSIS — N20.0 CALCULUS OF KIDNEY: ICD-10-CM

## 2022-06-13 DIAGNOSIS — I10 ESSENTIAL HYPERTENSION: ICD-10-CM

## 2022-06-13 LAB
BILIRUB UR QL STRIP: NEGATIVE
CAOX CRY UR QL COMP ASSIST: ABNORMAL
CLARITY UR REFRACT.AUTO: ABNORMAL
COLOR UR AUTO: YELLOW
GLUCOSE UR QL STRIP: NEGATIVE
HGB UR QL STRIP: ABNORMAL
KETONES UR QL STRIP: NEGATIVE
LEUKOCYTE ESTERASE UR QL STRIP: NEGATIVE
MICROSCOPIC COMMENT: ABNORMAL
NITRITE UR QL STRIP: NEGATIVE
PH UR STRIP: 5 [PH] (ref 5–8)
PROT UR QL STRIP: NEGATIVE
RBC #/AREA URNS AUTO: 18 /HPF (ref 0–4)
SP GR UR STRIP: 1.02 (ref 1–1.03)
SQUAMOUS #/AREA URNS AUTO: 0 /HPF
URN SPEC COLLECT METH UR: ABNORMAL
WBC #/AREA URNS AUTO: 4 /HPF (ref 0–5)

## 2022-06-13 PROCEDURE — 81001 URINALYSIS AUTO W/SCOPE: CPT | Performed by: FAMILY MEDICINE

## 2022-08-18 ENCOUNTER — PES CALL (OUTPATIENT)
Dept: ADMINISTRATIVE | Facility: CLINIC | Age: 72
End: 2022-08-18
Payer: MEDICARE

## 2022-09-17 NOTE — TELEPHONE ENCOUNTER
Scheduled sooner appt for patient- from 10/16 w/Dr SIDDIQUI to 10/12 w/Stephanie. Offered sooner w./Dr Mahmood but wanted Dr Blunt. And also wanted AM appt, so she was ok with waiting until 10/12.   
---

## 2022-10-25 ENCOUNTER — OFFICE VISIT (OUTPATIENT)
Dept: URGENT CARE | Facility: CLINIC | Age: 72
End: 2022-10-25
Payer: MEDICARE

## 2022-10-25 VITALS
DIASTOLIC BLOOD PRESSURE: 70 MMHG | RESPIRATION RATE: 20 BRPM | BODY MASS INDEX: 26.31 KG/M2 | TEMPERATURE: 99 F | SYSTOLIC BLOOD PRESSURE: 102 MMHG | HEART RATE: 70 BPM | OXYGEN SATURATION: 96 % | HEIGHT: 62 IN | WEIGHT: 143 LBS

## 2022-10-25 DIAGNOSIS — J06.9 UPPER RESPIRATORY TRACT INFECTION, UNSPECIFIED TYPE: Primary | ICD-10-CM

## 2022-10-25 DIAGNOSIS — J02.9 SORE THROAT: ICD-10-CM

## 2022-10-25 DIAGNOSIS — Z11.59 SCREENING FOR VIRAL DISEASE: ICD-10-CM

## 2022-10-25 LAB
CTP QC/QA: YES
MOLECULAR STREP A: NEGATIVE
POC MOLECULAR INFLUENZA A AGN: NEGATIVE
POC MOLECULAR INFLUENZA B AGN: NEGATIVE
SARS-COV-2 RDRP RESP QL NAA+PROBE: NEGATIVE

## 2022-10-25 PROCEDURE — 87651 STREP A DNA AMP PROBE: CPT | Mod: QW,S$GLB,, | Performed by: NURSE PRACTITIONER

## 2022-10-25 PROCEDURE — 4010F ACE/ARB THERAPY RXD/TAKEN: CPT | Mod: CPTII,S$GLB,, | Performed by: NURSE PRACTITIONER

## 2022-10-25 PROCEDURE — 3078F PR MOST RECENT DIASTOLIC BLOOD PRESSURE < 80 MM HG: ICD-10-PCS | Mod: CPTII,S$GLB,, | Performed by: NURSE PRACTITIONER

## 2022-10-25 PROCEDURE — 87651 POCT STREP A MOLECULAR: ICD-10-PCS | Mod: QW,S$GLB,, | Performed by: NURSE PRACTITIONER

## 2022-10-25 PROCEDURE — 1160F RVW MEDS BY RX/DR IN RCRD: CPT | Mod: CPTII,S$GLB,, | Performed by: NURSE PRACTITIONER

## 2022-10-25 PROCEDURE — 99204 PR OFFICE/OUTPT VISIT, NEW, LEVL IV, 45-59 MIN: ICD-10-PCS | Mod: S$GLB,,, | Performed by: NURSE PRACTITIONER

## 2022-10-25 PROCEDURE — 1159F PR MEDICATION LIST DOCUMENTED IN MEDICAL RECORD: ICD-10-PCS | Mod: CPTII,S$GLB,, | Performed by: NURSE PRACTITIONER

## 2022-10-25 PROCEDURE — 3078F DIAST BP <80 MM HG: CPT | Mod: CPTII,S$GLB,, | Performed by: NURSE PRACTITIONER

## 2022-10-25 PROCEDURE — 87635: ICD-10-PCS | Mod: QW,S$GLB,, | Performed by: NURSE PRACTITIONER

## 2022-10-25 PROCEDURE — 3074F SYST BP LT 130 MM HG: CPT | Mod: CPTII,S$GLB,, | Performed by: NURSE PRACTITIONER

## 2022-10-25 PROCEDURE — 1160F PR REVIEW ALL MEDS BY PRESCRIBER/CLIN PHARMACIST DOCUMENTED: ICD-10-PCS | Mod: CPTII,S$GLB,, | Performed by: NURSE PRACTITIONER

## 2022-10-25 PROCEDURE — 99204 OFFICE O/P NEW MOD 45 MIN: CPT | Mod: S$GLB,,, | Performed by: NURSE PRACTITIONER

## 2022-10-25 PROCEDURE — 1125F PR PAIN SEVERITY QUANTIFIED, PAIN PRESENT: ICD-10-PCS | Mod: CPTII,S$GLB,, | Performed by: NURSE PRACTITIONER

## 2022-10-25 PROCEDURE — 87635 SARS-COV-2 COVID-19 AMP PRB: CPT | Mod: QW,S$GLB,, | Performed by: NURSE PRACTITIONER

## 2022-10-25 PROCEDURE — 87502 POCT INFLUENZA A/B MOLECULAR: ICD-10-PCS | Mod: QW,S$GLB,, | Performed by: NURSE PRACTITIONER

## 2022-10-25 PROCEDURE — 3074F PR MOST RECENT SYSTOLIC BLOOD PRESSURE < 130 MM HG: ICD-10-PCS | Mod: CPTII,S$GLB,, | Performed by: NURSE PRACTITIONER

## 2022-10-25 PROCEDURE — 1125F AMNT PAIN NOTED PAIN PRSNT: CPT | Mod: CPTII,S$GLB,, | Performed by: NURSE PRACTITIONER

## 2022-10-25 PROCEDURE — 1159F MED LIST DOCD IN RCRD: CPT | Mod: CPTII,S$GLB,, | Performed by: NURSE PRACTITIONER

## 2022-10-25 PROCEDURE — 4010F PR ACE/ARB THEARPY RXD/TAKEN: ICD-10-PCS | Mod: CPTII,S$GLB,, | Performed by: NURSE PRACTITIONER

## 2022-10-25 PROCEDURE — 87502 INFLUENZA DNA AMP PROBE: CPT | Mod: QW,S$GLB,, | Performed by: NURSE PRACTITIONER

## 2022-10-25 RX ORDER — BENZONATATE 200 MG/1
200 CAPSULE ORAL 3 TIMES DAILY PRN
Qty: 30 CAPSULE | Refills: 0 | Status: SHIPPED | OUTPATIENT
Start: 2022-10-25 | End: 2022-12-05

## 2022-10-25 NOTE — PROGRESS NOTES
"Subjective:       Patient ID: Amrita Yoder is a 72 y.o. female.    Vitals:  height is 5' 2" (1.575 m) and weight is 64.9 kg (143 lb). Her temperature is 99 °F (37.2 °C). Her blood pressure is 102/70 and her pulse is 70. Her respiration is 20 and oxygen saturation is 96%.     Chief Complaint: Cough    Pt c/o cough, sore throat and nasal congestion x5 days. + vomiting & diarrhea x3 days, has since resolved.     Cough  This is a new problem. The current episode started in the past 7 days. The problem has been gradually worsening. The cough is Productive of purulent sputum and productive of bloody sputum. Associated symptoms include a fever (at night only), headaches, myalgias, nasal congestion, postnasal drip, rhinorrhea, a sore throat and sweats. Associated symptoms comments: Swollen glands . The symptoms are aggravated by lying down. She has tried OTC cough suppressant for the symptoms.     Constitution: Positive for fever (at night only).   HENT:  Positive for postnasal drip and sore throat.    Respiratory:  Positive for cough.    Musculoskeletal:  Positive for muscle ache.   Neurological:  Positive for headaches.     Objective:      Physical Exam   Constitutional: She is oriented to person, place, and time. She appears well-developed.   HENT:   Head: Normocephalic and atraumatic.   Ears:   Right Ear: External ear normal.   Left Ear: External ear normal.   Nose: Nose normal.   Mouth/Throat: Mucous membranes are normal. Posterior oropharyngeal edema and posterior oropharyngeal erythema present. Tonsils are 1+ on the right. Tonsils are 1+ on the left.   Eyes: Conjunctivae and lids are normal.   Neck: Trachea normal. Neck supple.   Cardiovascular: Normal rate, regular rhythm and normal heart sounds.   Pulmonary/Chest: Effort normal and breath sounds normal. No respiratory distress.   Abdominal: Normal appearance and bowel sounds are normal. She exhibits no distension and no mass. Soft. There is no abdominal " tenderness.   Musculoskeletal: Normal range of motion.         General: Normal range of motion.   Neurological: She is alert and oriented to person, place, and time. She has normal strength.   Skin: Skin is warm, dry, intact, not diaphoretic and not pale.   Psychiatric: Her speech is normal and behavior is normal. Judgment and thought content normal.   Nursing note and vitals reviewed.      Assessment:       1. Upper respiratory tract infection, unspecified type    2. Screening for viral disease    3. Sore throat          Plan:     Pt instructed on OTC meds for symptom relief.     Upper respiratory tract infection, unspecified type    Screening for viral disease  -     POCT COVID-19 Rapid Screening  -     POCT Influenza A/B MOLECULAR    Sore throat  -     POCT Strep A, Molecular    Other orders  -     benzonatate (TESSALON) 200 MG capsule; Take 1 capsule (200 mg total) by mouth 3 (three) times daily as needed for Cough.  Dispense: 30 capsule; Refill: 0

## 2022-11-21 ENCOUNTER — PATIENT OUTREACH (OUTPATIENT)
Dept: ADMINISTRATIVE | Facility: HOSPITAL | Age: 72
End: 2022-11-21
Payer: MEDICARE

## 2022-11-21 NOTE — PROGRESS NOTES
2022 Care Everywhere updates requested and reviewed.  Immunizations reconciled. Media reports reviewed.  Duplicate HM overrides and  orders removed.  Overdue HM topic chart audit and/or requested.  Overdue lab testing linked to upcoming lab appointments if applies.  Lab kal, and ice reviewed    DIS reviewed     Portal outreached regarding Health maintenance     Health Maintenance Due   Topic Date Due    Shingles Vaccine (1 of 2) Never done    COVID-19 Vaccine (3 - Booster for Moderna series) 2021    Colorectal Cancer Screening  2021    Influenza Vaccine (1) 2022    Mammogram  2022

## 2022-12-05 ENCOUNTER — OFFICE VISIT (OUTPATIENT)
Dept: FAMILY MEDICINE | Facility: CLINIC | Age: 72
End: 2022-12-05
Payer: MEDICARE

## 2022-12-05 ENCOUNTER — LAB VISIT (OUTPATIENT)
Dept: LAB | Facility: HOSPITAL | Age: 72
End: 2022-12-05
Attending: FAMILY MEDICINE
Payer: MEDICARE

## 2022-12-05 VITALS
SYSTOLIC BLOOD PRESSURE: 102 MMHG | WEIGHT: 139.75 LBS | BODY MASS INDEX: 25.72 KG/M2 | DIASTOLIC BLOOD PRESSURE: 68 MMHG | HEIGHT: 62 IN | HEART RATE: 92 BPM | OXYGEN SATURATION: 95 %

## 2022-12-05 DIAGNOSIS — I10 ESSENTIAL HYPERTENSION: ICD-10-CM

## 2022-12-05 DIAGNOSIS — J20.8 ACUTE BRONCHITIS, BACTERIAL: ICD-10-CM

## 2022-12-05 DIAGNOSIS — R25.2 LEG CRAMPS: ICD-10-CM

## 2022-12-05 DIAGNOSIS — B96.89 ACUTE BRONCHITIS, BACTERIAL: ICD-10-CM

## 2022-12-05 DIAGNOSIS — Z12.11 SCREEN FOR COLON CANCER: ICD-10-CM

## 2022-12-05 DIAGNOSIS — H66.92 ACUTE BACTERIAL OTITIS MEDIA, LEFT: ICD-10-CM

## 2022-12-05 DIAGNOSIS — E78.5 DYSLIPIDEMIA: ICD-10-CM

## 2022-12-05 DIAGNOSIS — I10 ESSENTIAL HYPERTENSION: Primary | ICD-10-CM

## 2022-12-05 DIAGNOSIS — J30.1 SEASONAL ALLERGIC RHINITIS DUE TO POLLEN: ICD-10-CM

## 2022-12-05 DIAGNOSIS — Z12.31 ENCOUNTER FOR SCREENING MAMMOGRAM FOR BREAST CANCER: ICD-10-CM

## 2022-12-05 LAB
BILIRUB UR QL STRIP: NEGATIVE
CLARITY UR REFRACT.AUTO: ABNORMAL
COLOR UR AUTO: YELLOW
GLUCOSE UR QL STRIP: NEGATIVE
HGB UR QL STRIP: ABNORMAL
KETONES UR QL STRIP: NEGATIVE
LEUKOCYTE ESTERASE UR QL STRIP: ABNORMAL
MICROSCOPIC COMMENT: ABNORMAL
NITRITE UR QL STRIP: NEGATIVE
PH UR STRIP: 6 [PH] (ref 5–8)
PROT UR QL STRIP: NEGATIVE
RBC #/AREA URNS AUTO: 11 /HPF (ref 0–4)
SP GR UR STRIP: 1.02 (ref 1–1.03)
SQUAMOUS #/AREA URNS AUTO: 2 /HPF
URN SPEC COLLECT METH UR: ABNORMAL
WBC #/AREA URNS AUTO: 0 /HPF (ref 0–5)

## 2022-12-05 PROCEDURE — 1160F PR REVIEW ALL MEDS BY PRESCRIBER/CLIN PHARMACIST DOCUMENTED: ICD-10-PCS | Mod: CPTII,S$GLB,, | Performed by: FAMILY MEDICINE

## 2022-12-05 PROCEDURE — 99215 OFFICE O/P EST HI 40 MIN: CPT | Mod: S$GLB,,, | Performed by: FAMILY MEDICINE

## 2022-12-05 PROCEDURE — 3008F PR BODY MASS INDEX (BMI) DOCUMENTED: ICD-10-PCS | Mod: CPTII,S$GLB,, | Performed by: FAMILY MEDICINE

## 2022-12-05 PROCEDURE — 1101F PT FALLS ASSESS-DOCD LE1/YR: CPT | Mod: CPTII,S$GLB,, | Performed by: FAMILY MEDICINE

## 2022-12-05 PROCEDURE — 3008F BODY MASS INDEX DOCD: CPT | Mod: CPTII,S$GLB,, | Performed by: FAMILY MEDICINE

## 2022-12-05 PROCEDURE — 1159F MED LIST DOCD IN RCRD: CPT | Mod: CPTII,S$GLB,, | Performed by: FAMILY MEDICINE

## 2022-12-05 PROCEDURE — 3078F PR MOST RECENT DIASTOLIC BLOOD PRESSURE < 80 MM HG: ICD-10-PCS | Mod: CPTII,S$GLB,, | Performed by: FAMILY MEDICINE

## 2022-12-05 PROCEDURE — 3074F PR MOST RECENT SYSTOLIC BLOOD PRESSURE < 130 MM HG: ICD-10-PCS | Mod: CPTII,S$GLB,, | Performed by: FAMILY MEDICINE

## 2022-12-05 PROCEDURE — 1160F RVW MEDS BY RX/DR IN RCRD: CPT | Mod: CPTII,S$GLB,, | Performed by: FAMILY MEDICINE

## 2022-12-05 PROCEDURE — 99215 PR OFFICE/OUTPT VISIT, EST, LEVL V, 40-54 MIN: ICD-10-PCS | Mod: S$GLB,,, | Performed by: FAMILY MEDICINE

## 2022-12-05 PROCEDURE — 3288F PR FALLS RISK ASSESSMENT DOCUMENTED: ICD-10-PCS | Mod: CPTII,S$GLB,, | Performed by: FAMILY MEDICINE

## 2022-12-05 PROCEDURE — 81001 URINALYSIS AUTO W/SCOPE: CPT | Performed by: FAMILY MEDICINE

## 2022-12-05 PROCEDURE — 3078F DIAST BP <80 MM HG: CPT | Mod: CPTII,S$GLB,, | Performed by: FAMILY MEDICINE

## 2022-12-05 PROCEDURE — 99999 PR PBB SHADOW E&M-EST. PATIENT-LVL III: CPT | Mod: PBBFAC,,, | Performed by: FAMILY MEDICINE

## 2022-12-05 PROCEDURE — 4010F PR ACE/ARB THEARPY RXD/TAKEN: ICD-10-PCS | Mod: CPTII,S$GLB,, | Performed by: FAMILY MEDICINE

## 2022-12-05 PROCEDURE — 4010F ACE/ARB THERAPY RXD/TAKEN: CPT | Mod: CPTII,S$GLB,, | Performed by: FAMILY MEDICINE

## 2022-12-05 PROCEDURE — 1126F AMNT PAIN NOTED NONE PRSNT: CPT | Mod: CPTII,S$GLB,, | Performed by: FAMILY MEDICINE

## 2022-12-05 PROCEDURE — 1101F PR PT FALLS ASSESS DOC 0-1 FALLS W/OUT INJ PAST YR: ICD-10-PCS | Mod: CPTII,S$GLB,, | Performed by: FAMILY MEDICINE

## 2022-12-05 PROCEDURE — 3288F FALL RISK ASSESSMENT DOCD: CPT | Mod: CPTII,S$GLB,, | Performed by: FAMILY MEDICINE

## 2022-12-05 PROCEDURE — 99999 PR PBB SHADOW E&M-EST. PATIENT-LVL III: ICD-10-PCS | Mod: PBBFAC,,, | Performed by: FAMILY MEDICINE

## 2022-12-05 PROCEDURE — 1126F PR PAIN SEVERITY QUANTIFIED, NO PAIN PRESENT: ICD-10-PCS | Mod: CPTII,S$GLB,, | Performed by: FAMILY MEDICINE

## 2022-12-05 PROCEDURE — 1159F PR MEDICATION LIST DOCUMENTED IN MEDICAL RECORD: ICD-10-PCS | Mod: CPTII,S$GLB,, | Performed by: FAMILY MEDICINE

## 2022-12-05 PROCEDURE — 3074F SYST BP LT 130 MM HG: CPT | Mod: CPTII,S$GLB,, | Performed by: FAMILY MEDICINE

## 2022-12-05 RX ORDER — VALSARTAN 40 MG/1
40 TABLET ORAL DAILY
Qty: 90 TABLET | Refills: 3 | Status: SHIPPED | OUTPATIENT
Start: 2022-12-05 | End: 2023-12-05

## 2022-12-05 RX ORDER — ATORVASTATIN CALCIUM 20 MG/1
20 TABLET, FILM COATED ORAL NIGHTLY
Qty: 90 TABLET | Refills: 3 | Status: SHIPPED | OUTPATIENT
Start: 2022-12-05 | End: 2023-12-05

## 2022-12-05 RX ORDER — AZELASTINE 1 MG/ML
1 SPRAY, METERED NASAL 2 TIMES DAILY
Qty: 30 ML | Refills: 3 | Status: SHIPPED | OUTPATIENT
Start: 2022-12-05 | End: 2023-06-05 | Stop reason: SDUPTHER

## 2022-12-05 RX ORDER — CETIRIZINE HYDROCHLORIDE 10 MG/1
10 TABLET ORAL DAILY
Qty: 90 TABLET | Refills: 0 | Status: SHIPPED | OUTPATIENT
Start: 2022-12-05 | End: 2023-10-31 | Stop reason: SDUPTHER

## 2022-12-05 RX ORDER — PROMETHAZINE HYDROCHLORIDE AND DEXTROMETHORPHAN HYDROBROMIDE 6.25; 15 MG/5ML; MG/5ML
5 SYRUP ORAL 4 TIMES DAILY PRN
Qty: 240 ML | Refills: 0 | Status: SHIPPED | OUTPATIENT
Start: 2022-12-05 | End: 2022-12-15

## 2022-12-05 RX ORDER — DOXYCYCLINE 100 MG/1
100 CAPSULE ORAL 2 TIMES DAILY
Qty: 20 CAPSULE | Refills: 0 | Status: SHIPPED | OUTPATIENT
Start: 2022-12-05 | End: 2022-12-12 | Stop reason: SDUPTHER

## 2022-12-05 RX ORDER — LANOLIN ALCOHOL/MO/W.PET/CERES
400 CREAM (GRAM) TOPICAL DAILY
Qty: 90 TABLET | Refills: 0 | Status: SHIPPED | OUTPATIENT
Start: 2022-12-05 | End: 2023-03-05

## 2022-12-05 NOTE — PROGRESS NOTES
Subjective:       Patient ID: Amrita Yoder is a 72 y.o. female.    Chief Complaint: Hypertension    72 years old female came to the clinic for blood pressure check.  Blood pressure today was stable.  No chest pain, palpitation, orthopnea or PND.  Patient with cough and congestion for the last 3 weeks.  The cough is productive with yellowish sputum.  Patient with episodic leg cramps.  Patient is not drinking enough water during the day.  Patient is physically active.  Patient requests refill for medicine for cholesterol and blood pressure.  She also reports seasonal allergies with significant improvement with Astelin.    Hypertension  Pertinent negatives include no chest pain, palpitations or shortness of breath.   Review of Systems   Constitutional: Negative.  Negative for activity change, fatigue and fever.   HENT:  Positive for sinus pressure/congestion. Negative for nasal congestion, dental problem, ear discharge, ear pain, hearing loss, postnasal drip, rhinorrhea, sore throat and voice change.    Eyes: Negative.  Negative for pain, discharge, itching and visual disturbance.   Respiratory:  Positive for cough. Negative for chest tightness, shortness of breath and wheezing.    Cardiovascular:  Negative for chest pain, palpitations, leg swelling and claudication.   Gastrointestinal: Negative.  Negative for abdominal pain, blood in stool, diarrhea, nausea and vomiting.   Genitourinary: Negative.  Negative for difficulty urinating, dyspareunia, dysuria, hematuria, menstrual problem, pelvic pain, urgency, vaginal bleeding, vaginal discharge and vaginal pain.   Musculoskeletal: Negative.  Negative for arthralgias and gait problem.   Integumentary:  Negative for wound.   Neurological: Negative.  Negative for dizziness and seizures.   Hematological:  Negative for adenopathy. Does not bruise/bleed easily.   Psychiatric/Behavioral: Negative.  Negative for behavioral problems, decreased concentration, sleep disturbance  and suicidal ideas. The patient is not nervous/anxious.        Objective:      Physical Exam  Constitutional:       General: She is not in acute distress.     Appearance: She is well-developed. She is not diaphoretic.   HENT:      Head: Normocephalic and atraumatic.      Right Ear: External ear normal.      Left Ear: External ear normal.      Nose: Nose normal.      Mouth/Throat:      Pharynx: No oropharyngeal exudate.   Eyes:      General: No scleral icterus.        Right eye: No discharge.         Left eye: No discharge.      Conjunctiva/sclera: Conjunctivae normal.      Pupils: Pupils are equal, round, and reactive to light.   Neck:      Thyroid: No thyromegaly.      Vascular: No JVD.      Trachea: No tracheal deviation.   Cardiovascular:      Rate and Rhythm: Normal rate and regular rhythm.      Heart sounds: Normal heart sounds. No murmur heard.    No friction rub. No gallop.   Pulmonary:      Effort: Pulmonary effort is normal. No respiratory distress.      Breath sounds: No stridor. Examination of the right-middle field reveals rhonchi. Examination of the left-middle field reveals rhonchi. Rhonchi present. No wheezing or rales.   Chest:      Chest wall: No tenderness.   Abdominal:      General: Bowel sounds are normal. There is no distension.      Palpations: Abdomen is soft. There is no mass.      Tenderness: There is no abdominal tenderness. There is no guarding or rebound.   Musculoskeletal:         General: No tenderness. Normal range of motion.      Cervical back: Normal range of motion and neck supple.   Lymphadenopathy:      Cervical: No cervical adenopathy.   Skin:     General: Skin is warm and dry.      Coloration: Skin is not pale.      Findings: No erythema or rash.   Neurological:      Mental Status: She is alert and oriented to person, place, and time.      Cranial Nerves: No cranial nerve deficit.      Motor: No abnormal muscle tone.      Coordination: Coordination normal.      Deep Tendon  Reflexes: Reflexes are normal and symmetric.   Psychiatric:         Behavior: Behavior normal.         Thought Content: Thought content normal.         Judgment: Judgment normal.       Assessment:       Problem List Items Addressed This Visit       Essential hypertension - Primary    Relevant Medications    valsartan (DIOVAN) 40 MG tablet    Other Relevant Orders    CBC Auto Differential    Comprehensive Metabolic Panel    Lipid Panel    Urinalysis     Other Visit Diagnoses       Seasonal allergic rhinitis due to pollen        Relevant Medications    azelastine (ASTELIN) 137 mcg (0.1 %) nasal spray    Acute bacterial otitis media, left        Relevant Medications    cetirizine (ZYRTEC) 10 MG tablet    Dyslipidemia        Relevant Medications    atorvastatin (LIPITOR) 20 MG tablet    Acute bronchitis, bacterial        Relevant Medications    doxycycline (MONODOX) 100 MG capsule    promethazine-dextromethorphan (PROMETHAZINE-DM) 6.25-15 mg/5 mL Syrp    Leg cramps        Relevant Medications    magnesium oxide (MAG-OX) 400 mg (241.3 mg magnesium) tablet    Encounter for screening mammogram for breast cancer        Screen for colon cancer        Relevant Orders    Case Request Endoscopy: COLONOSCOPY (Completed)              Plan:         Amrita was seen today for hypertension.    Diagnoses and all orders for this visit:    Essential hypertension  -     valsartan (DIOVAN) 40 MG tablet; Take 1 tablet (40 mg total) by mouth once daily.  -     CBC Auto Differential; Future  -     Comprehensive Metabolic Panel; Future  -     Lipid Panel; Future  -     Urinalysis; Future    Seasonal allergic rhinitis due to pollen  -     azelastine (ASTELIN) 137 mcg (0.1 %) nasal spray; 1 spray (137 mcg total) by Nasal route 2 (two) times daily.    Acute bacterial otitis media, left  -     cetirizine (ZYRTEC) 10 MG tablet; Take 1 tablet (10 mg total) by mouth once daily.    Dyslipidemia  -     atorvastatin (LIPITOR) 20 MG tablet; Take 1  tablet (20 mg total) by mouth nightly.    Acute bronchitis, bacterial  -     doxycycline (MONODOX) 100 MG capsule; Take 1 capsule (100 mg total) by mouth 2 (two) times daily. for 10 days  -     promethazine-dextromethorphan (PROMETHAZINE-DM) 6.25-15 mg/5 mL Syrp; Take 5 mLs by mouth 4 (four) times daily as needed (cough).    Leg cramps  -     magnesium oxide (MAG-OX) 400 mg (241.3 mg magnesium) tablet; Take 1 tablet (400 mg total) by mouth once daily.    Encounter for screening mammogram for breast cancer    Screen for colon cancer  -     Case Request Endoscopy: COLONOSCOPY   Continue monitoring blood pressure at home, low sodium diet.   Stretching exercises and increase fluid intake.

## 2022-12-08 ENCOUNTER — TELEPHONE (OUTPATIENT)
Dept: INTERNAL MEDICINE | Facility: CLINIC | Age: 72
End: 2022-12-08

## 2022-12-12 DIAGNOSIS — B96.89 ACUTE BRONCHITIS, BACTERIAL: ICD-10-CM

## 2022-12-12 DIAGNOSIS — J20.8 ACUTE BRONCHITIS, BACTERIAL: ICD-10-CM

## 2022-12-13 RX ORDER — DOXYCYCLINE 100 MG/1
100 CAPSULE ORAL 2 TIMES DAILY
Qty: 20 CAPSULE | Refills: 0 | Status: SHIPPED | OUTPATIENT
Start: 2022-12-13 | End: 2022-12-23

## 2022-12-21 ENCOUNTER — TELEPHONE (OUTPATIENT)
Dept: ENDOSCOPY | Facility: HOSPITAL | Age: 72
End: 2022-12-21
Payer: MEDICARE

## 2022-12-21 NOTE — TELEPHONE ENCOUNTER
Using  Services [facility] [id number] Attempted to contact Amrita Yoder  to schedule procedure , no answer, left message with call back number

## 2022-12-22 ENCOUNTER — TELEPHONE (OUTPATIENT)
Dept: FAMILY MEDICINE | Facility: CLINIC | Age: 72
End: 2022-12-22
Payer: MEDICARE

## 2022-12-22 DIAGNOSIS — D72.829 LEUKOCYTOSIS, UNSPECIFIED TYPE: Primary | ICD-10-CM

## 2022-12-22 NOTE — TELEPHONE ENCOUNTER
Patient with slightly elevated white blood cell count.    Repeat testing.    Urine with evidence of blood trace probably benign.  Patient with this finding previously.    Please notify the patient with appointment.

## 2023-01-09 ENCOUNTER — TELEPHONE (OUTPATIENT)
Dept: FAMILY MEDICINE | Facility: CLINIC | Age: 73
End: 2023-01-09
Payer: MEDICARE

## 2023-01-09 DIAGNOSIS — Z12.31 SCREENING MAMMOGRAM, ENCOUNTER FOR: Primary | ICD-10-CM

## 2023-01-14 ENCOUNTER — TELEPHONE (OUTPATIENT)
Dept: GASTROENTEROLOGY | Facility: CLINIC | Age: 73
End: 2023-01-14
Payer: MEDICARE

## 2023-03-09 ENCOUNTER — TELEPHONE (OUTPATIENT)
Dept: FAMILY MEDICINE | Facility: CLINIC | Age: 73
End: 2023-03-09
Payer: MEDICARE

## 2023-03-29 ENCOUNTER — HOSPITAL ENCOUNTER (OUTPATIENT)
Dept: RADIOLOGY | Facility: HOSPITAL | Age: 73
Discharge: HOME | End: 2023-03-29
Attending: FAMILY MEDICINE
Payer: MEDICARE

## 2023-03-29 DIAGNOSIS — Z12.31 SCREENING MAMMOGRAM, ENCOUNTER FOR: ICD-10-CM

## 2023-03-29 PROCEDURE — 77067 SCR MAMMO BI INCL CAD: CPT | Mod: TC

## 2023-03-29 PROCEDURE — 77067 MAMMO DIGITAL SCREENING BILAT WITH TOMO: ICD-10-PCS | Mod: 26,,, | Performed by: RADIOLOGY

## 2023-03-29 PROCEDURE — 77063 MAMMO DIGITAL SCREENING BILAT WITH TOMO: ICD-10-PCS | Mod: 26,,, | Performed by: RADIOLOGY

## 2023-03-29 PROCEDURE — 77067 SCR MAMMO BI INCL CAD: CPT | Mod: 26,,, | Performed by: RADIOLOGY

## 2023-03-29 PROCEDURE — 77063 BREAST TOMOSYNTHESIS BI: CPT | Mod: 26,,, | Performed by: RADIOLOGY

## 2023-05-22 ENCOUNTER — PATIENT OUTREACH (OUTPATIENT)
Dept: ADMINISTRATIVE | Facility: HOSPITAL | Age: 73
End: 2023-05-22
Payer: MEDICARE

## 2023-05-22 NOTE — PROGRESS NOTES
2023 Care Everywhere updates requested and reviewed.  Immunizations reconciled. Media reports reviewed.  Duplicate HM overrides and  orders removed.  Overdue HM topic chart audit and/or requested.  Overdue lab testing linked to upcoming lab appointments if applies.  Lab kal, and [employer] reviewed    Portal outreached regarding Health maintenance     Health Maintenance Due   Topic Date Due    Shingles Vaccine (1 of 2) Never done    COVID-19 Vaccine (3 - Booster for Moderna series) 2021    Colorectal Cancer Screening  2021

## 2023-06-05 ENCOUNTER — OFFICE VISIT (OUTPATIENT)
Dept: FAMILY MEDICINE | Facility: CLINIC | Age: 73
End: 2023-06-05
Payer: MEDICARE

## 2023-06-05 VITALS
DIASTOLIC BLOOD PRESSURE: 76 MMHG | WEIGHT: 139.13 LBS | HEART RATE: 60 BPM | OXYGEN SATURATION: 98 % | BODY MASS INDEX: 25.6 KG/M2 | HEIGHT: 62 IN | SYSTOLIC BLOOD PRESSURE: 114 MMHG

## 2023-06-05 DIAGNOSIS — J30.1 SEASONAL ALLERGIC RHINITIS DUE TO POLLEN: ICD-10-CM

## 2023-06-05 DIAGNOSIS — Z12.11 SCREEN FOR COLON CANCER: ICD-10-CM

## 2023-06-05 DIAGNOSIS — M79.89 LEG SWELLING: ICD-10-CM

## 2023-06-05 DIAGNOSIS — N95.0 POST-MENOPAUSAL BLEEDING: ICD-10-CM

## 2023-06-05 DIAGNOSIS — I10 ESSENTIAL HYPERTENSION: Primary | ICD-10-CM

## 2023-06-05 DIAGNOSIS — N62 LARGE BREASTS: ICD-10-CM

## 2023-06-05 PROCEDURE — 1101F PR PT FALLS ASSESS DOC 0-1 FALLS W/OUT INJ PAST YR: ICD-10-PCS | Mod: CPTII,S$GLB,, | Performed by: FAMILY MEDICINE

## 2023-06-05 PROCEDURE — 3078F DIAST BP <80 MM HG: CPT | Mod: CPTII,S$GLB,, | Performed by: FAMILY MEDICINE

## 2023-06-05 PROCEDURE — 3074F SYST BP LT 130 MM HG: CPT | Mod: CPTII,S$GLB,, | Performed by: FAMILY MEDICINE

## 2023-06-05 PROCEDURE — 1159F PR MEDICATION LIST DOCUMENTED IN MEDICAL RECORD: ICD-10-PCS | Mod: CPTII,S$GLB,, | Performed by: FAMILY MEDICINE

## 2023-06-05 PROCEDURE — 1160F RVW MEDS BY RX/DR IN RCRD: CPT | Mod: CPTII,S$GLB,, | Performed by: FAMILY MEDICINE

## 2023-06-05 PROCEDURE — 3288F PR FALLS RISK ASSESSMENT DOCUMENTED: ICD-10-PCS | Mod: CPTII,S$GLB,, | Performed by: FAMILY MEDICINE

## 2023-06-05 PROCEDURE — 3078F PR MOST RECENT DIASTOLIC BLOOD PRESSURE < 80 MM HG: ICD-10-PCS | Mod: CPTII,S$GLB,, | Performed by: FAMILY MEDICINE

## 2023-06-05 PROCEDURE — 3074F PR MOST RECENT SYSTOLIC BLOOD PRESSURE < 130 MM HG: ICD-10-PCS | Mod: CPTII,S$GLB,, | Performed by: FAMILY MEDICINE

## 2023-06-05 PROCEDURE — 3288F FALL RISK ASSESSMENT DOCD: CPT | Mod: CPTII,S$GLB,, | Performed by: FAMILY MEDICINE

## 2023-06-05 PROCEDURE — 3008F BODY MASS INDEX DOCD: CPT | Mod: CPTII,S$GLB,, | Performed by: FAMILY MEDICINE

## 2023-06-05 PROCEDURE — 99999 PR PBB SHADOW E&M-EST. PATIENT-LVL IV: ICD-10-PCS | Mod: PBBFAC,,, | Performed by: FAMILY MEDICINE

## 2023-06-05 PROCEDURE — 1160F PR REVIEW ALL MEDS BY PRESCRIBER/CLIN PHARMACIST DOCUMENTED: ICD-10-PCS | Mod: CPTII,S$GLB,, | Performed by: FAMILY MEDICINE

## 2023-06-05 PROCEDURE — 4010F PR ACE/ARB THEARPY RXD/TAKEN: ICD-10-PCS | Mod: CPTII,S$GLB,, | Performed by: FAMILY MEDICINE

## 2023-06-05 PROCEDURE — 99215 OFFICE O/P EST HI 40 MIN: CPT | Mod: S$GLB,,, | Performed by: FAMILY MEDICINE

## 2023-06-05 PROCEDURE — 99215 PR OFFICE/OUTPT VISIT, EST, LEVL V, 40-54 MIN: ICD-10-PCS | Mod: S$GLB,,, | Performed by: FAMILY MEDICINE

## 2023-06-05 PROCEDURE — 1101F PT FALLS ASSESS-DOCD LE1/YR: CPT | Mod: CPTII,S$GLB,, | Performed by: FAMILY MEDICINE

## 2023-06-05 PROCEDURE — 99999 PR PBB SHADOW E&M-EST. PATIENT-LVL IV: CPT | Mod: PBBFAC,,, | Performed by: FAMILY MEDICINE

## 2023-06-05 PROCEDURE — 3008F PR BODY MASS INDEX (BMI) DOCUMENTED: ICD-10-PCS | Mod: CPTII,S$GLB,, | Performed by: FAMILY MEDICINE

## 2023-06-05 PROCEDURE — 4010F ACE/ARB THERAPY RXD/TAKEN: CPT | Mod: CPTII,S$GLB,, | Performed by: FAMILY MEDICINE

## 2023-06-05 PROCEDURE — 1159F MED LIST DOCD IN RCRD: CPT | Mod: CPTII,S$GLB,, | Performed by: FAMILY MEDICINE

## 2023-06-05 RX ORDER — AZELASTINE 1 MG/ML
1 SPRAY, METERED NASAL 2 TIMES DAILY
Qty: 30 ML | Refills: 3 | Status: SHIPPED | OUTPATIENT
Start: 2023-06-05 | End: 2024-06-04

## 2023-06-05 NOTE — PROGRESS NOTES
Subjective     Patient ID: Amrita Yoder is a 73 y.o. female.    Chief Complaint: Follow-up    73 years old female came to the clinic with postmenopausal bleeding for the last couple of weeks.  Patient with upper back pain associated with large breasts, blood pressure today was stable.  No chest pain, palpitation, orthopnea or PND.  Patient with leg swelling.  She is consuming significant amounts of salt.  Patient is requesting her medicine for seasonal allergies.    Follow-up  Pertinent negatives include no chest pain.   Review of Systems   Constitutional: Negative.    HENT:  Positive for rhinorrhea.    Eyes: Negative.    Respiratory: Negative.     Cardiovascular:  Positive for leg swelling. Negative for chest pain, palpitations and claudication.   Gastrointestinal: Negative.    Genitourinary:  Positive for vaginal bleeding. Negative for vaginal discharge.   Musculoskeletal: Negative.    Neurological: Negative.    Psychiatric/Behavioral: Negative.          Objective     Physical Exam  Constitutional:       General: She is not in acute distress.     Appearance: She is well-developed. She is not diaphoretic.   HENT:      Head: Normocephalic and atraumatic.      Right Ear: External ear normal.      Left Ear: External ear normal.      Nose: Nose normal.      Mouth/Throat:      Pharynx: No oropharyngeal exudate.   Eyes:      General: No scleral icterus.        Right eye: No discharge.         Left eye: No discharge.      Conjunctiva/sclera: Conjunctivae normal.      Pupils: Pupils are equal, round, and reactive to light.   Neck:      Thyroid: No thyromegaly.      Vascular: No JVD.      Trachea: No tracheal deviation.   Cardiovascular:      Rate and Rhythm: Normal rate and regular rhythm.      Heart sounds: Normal heart sounds. No murmur heard.    No friction rub. No gallop.   Pulmonary:      Effort: Pulmonary effort is normal. No respiratory distress.      Breath sounds: Normal breath sounds. No stridor. No wheezing  or rales.   Chest:      Chest wall: No tenderness.   Abdominal:      General: Bowel sounds are normal. There is no distension.      Palpations: Abdomen is soft. There is no mass.      Tenderness: There is no abdominal tenderness. There is no guarding or rebound.   Musculoskeletal:         General: Normal range of motion.      Cervical back: Normal range of motion and neck supple.      Thoracic back: Spasms and tenderness present.   Lymphadenopathy:      Cervical: No cervical adenopathy.   Skin:     General: Skin is warm and dry.      Coloration: Skin is not pale.      Findings: No erythema or rash.   Neurological:      Mental Status: She is alert and oriented to person, place, and time.      Cranial Nerves: No cranial nerve deficit.      Motor: No abnormal muscle tone.      Coordination: Coordination normal.      Deep Tendon Reflexes: Reflexes are normal and symmetric.   Psychiatric:         Behavior: Behavior normal.         Thought Content: Thought content normal.         Judgment: Judgment normal.          Assessment and Plan     1. Essential hypertension  -     Urinalysis; Future  -     Comprehensive Metabolic Panel; Future; Expected date: 06/05/2023  -     Lipid Panel; Future; Expected date: 06/05/2023  -     TSH; Future; Expected date: 06/05/2023  -     CBC Auto Differential; Future; Expected date: 06/05/2023  -     BNP; Future; Expected date: 06/05/2023    2. Post-menopausal bleeding  -     US Pelvis Complete Non OB; Future; Expected date: 06/05/2023  -     Ambulatory referral/consult to Obstetrics / Gynecology; Future; Expected date: 06/12/2023    3. Leg swelling  -     BNP; Future; Expected date: 06/05/2023    4. Constipation, unspecified constipation type    5. Large breasts  -     Ambulatory referral/consult to Plastic Surgery; Future; Expected date: 06/12/2023    6. Screen for colon cancer  -     Case Request Endoscopy: COLONOSCOPY        Amrita was seen today for follow-up.    Diagnoses and all orders  for this visit:    Essential hypertension  -     Urinalysis; Future  -     Comprehensive Metabolic Panel; Future  -     Lipid Panel; Future  -     TSH; Future  -     CBC Auto Differential; Future  -     BNP; Future    Post-menopausal bleeding  -     US Pelvis Complete Non OB; Future  -     Ambulatory referral/consult to Obstetrics / Gynecology; Future    Leg swelling  -     BNP; Future    Large breasts  -     Ambulatory referral/consult to Plastic Surgery; Future    Screen for colon cancer  -     Case Request Endoscopy: COLONOSCOPY    Seasonal allergic rhinitis due to pollen  -     azelastine (ASTELIN) 137 mcg (0.1 %) nasal spray; 1 spray (137 mcg total) by Nasal route 2 (two) times daily.            Follow-up in 6 months.

## 2023-06-06 ENCOUNTER — TELEPHONE (OUTPATIENT)
Dept: PLASTIC SURGERY | Facility: CLINIC | Age: 73
End: 2023-06-06
Payer: MEDICARE

## 2023-06-06 ENCOUNTER — LAB VISIT (OUTPATIENT)
Dept: LAB | Facility: HOSPITAL | Age: 73
End: 2023-06-06
Attending: FAMILY MEDICINE
Payer: MEDICARE

## 2023-06-06 DIAGNOSIS — I10 ESSENTIAL HYPERTENSION: ICD-10-CM

## 2023-06-06 DIAGNOSIS — M79.89 LEG SWELLING: ICD-10-CM

## 2023-06-06 LAB
ALBUMIN SERPL BCP-MCNC: 3.4 G/DL (ref 3.5–5.2)
ALP SERPL-CCNC: 111 U/L (ref 55–135)
ALT SERPL W/O P-5'-P-CCNC: 14 U/L (ref 10–44)
ANION GAP SERPL CALC-SCNC: 6 MMOL/L (ref 8–16)
AST SERPL-CCNC: 16 U/L (ref 10–40)
BASOPHILS # BLD AUTO: 0.05 K/UL (ref 0–0.2)
BASOPHILS NFR BLD: 0.7 % (ref 0–1.9)
BILIRUB SERPL-MCNC: 0.4 MG/DL (ref 0.1–1)
BNP SERPL-MCNC: 30 PG/ML (ref 0–99)
BUN SERPL-MCNC: 8 MG/DL (ref 8–23)
CALCIUM SERPL-MCNC: 9.1 MG/DL (ref 8.7–10.5)
CHLORIDE SERPL-SCNC: 110 MMOL/L (ref 95–110)
CHOLEST SERPL-MCNC: 191 MG/DL (ref 120–199)
CHOLEST/HDLC SERPL: 3.5 {RATIO} (ref 2–5)
CO2 SERPL-SCNC: 27 MMOL/L (ref 23–29)
CREAT SERPL-MCNC: 0.7 MG/DL (ref 0.5–1.4)
DIFFERENTIAL METHOD: ABNORMAL
EOSINOPHIL # BLD AUTO: 0.1 K/UL (ref 0–0.5)
EOSINOPHIL NFR BLD: 2.1 % (ref 0–8)
ERYTHROCYTE [DISTWIDTH] IN BLOOD BY AUTOMATED COUNT: 12.1 % (ref 11.5–14.5)
EST. GFR  (NO RACE VARIABLE): >60 ML/MIN/1.73 M^2
GLUCOSE SERPL-MCNC: 91 MG/DL (ref 70–110)
HCT VFR BLD AUTO: 38.9 % (ref 37–48.5)
HDLC SERPL-MCNC: 54 MG/DL (ref 40–75)
HDLC SERPL: 28.3 % (ref 20–50)
HGB BLD-MCNC: 12.7 G/DL (ref 12–16)
IMM GRANULOCYTES # BLD AUTO: 0.01 K/UL (ref 0–0.04)
IMM GRANULOCYTES NFR BLD AUTO: 0.1 % (ref 0–0.5)
LDLC SERPL CALC-MCNC: 112.2 MG/DL (ref 63–159)
LYMPHOCYTES # BLD AUTO: 2.7 K/UL (ref 1–4.8)
LYMPHOCYTES NFR BLD: 40.5 % (ref 18–48)
MCH RBC QN AUTO: 31.1 PG (ref 27–31)
MCHC RBC AUTO-ENTMCNC: 32.6 G/DL (ref 32–36)
MCV RBC AUTO: 95 FL (ref 82–98)
MONOCYTES # BLD AUTO: 0.5 K/UL (ref 0.3–1)
MONOCYTES NFR BLD: 6.7 % (ref 4–15)
NEUTROPHILS # BLD AUTO: 3.3 K/UL (ref 1.8–7.7)
NEUTROPHILS NFR BLD: 49.9 % (ref 38–73)
NONHDLC SERPL-MCNC: 137 MG/DL
NRBC BLD-RTO: 0 /100 WBC
PLATELET # BLD AUTO: 310 K/UL (ref 150–450)
PMV BLD AUTO: 9.1 FL (ref 9.2–12.9)
POTASSIUM SERPL-SCNC: 3.9 MMOL/L (ref 3.5–5.1)
PROT SERPL-MCNC: 6.7 G/DL (ref 6–8.4)
RBC # BLD AUTO: 4.08 M/UL (ref 4–5.4)
SODIUM SERPL-SCNC: 143 MMOL/L (ref 136–145)
TRIGL SERPL-MCNC: 124 MG/DL (ref 30–150)
TSH SERPL DL<=0.005 MIU/L-ACNC: 1.34 UIU/ML (ref 0.4–4)
WBC # BLD AUTO: 6.67 K/UL (ref 3.9–12.7)

## 2023-06-06 PROCEDURE — 80053 COMPREHEN METABOLIC PANEL: CPT | Performed by: FAMILY MEDICINE

## 2023-06-06 PROCEDURE — 84443 ASSAY THYROID STIM HORMONE: CPT | Performed by: FAMILY MEDICINE

## 2023-06-06 PROCEDURE — 80061 LIPID PANEL: CPT | Performed by: FAMILY MEDICINE

## 2023-06-06 PROCEDURE — 85025 COMPLETE CBC W/AUTO DIFF WBC: CPT | Performed by: FAMILY MEDICINE

## 2023-06-06 PROCEDURE — 36415 COLL VENOUS BLD VENIPUNCTURE: CPT | Mod: PO | Performed by: FAMILY MEDICINE

## 2023-06-06 PROCEDURE — 83880 ASSAY OF NATRIURETIC PEPTIDE: CPT | Performed by: FAMILY MEDICINE

## 2023-06-06 NOTE — TELEPHONE ENCOUNTER
Attempted to contact pt to discuss scheduling.  Pt does not meet PLS criteria at this time.  Pt was not available at the time of the call. Amanda with language services left a detailed VM explaining the above.

## 2023-06-12 ENCOUNTER — HOSPITAL ENCOUNTER (OUTPATIENT)
Dept: RADIOLOGY | Facility: HOSPITAL | Age: 73
Discharge: HOME | End: 2023-06-12
Attending: FAMILY MEDICINE
Payer: MEDICARE

## 2023-06-12 DIAGNOSIS — N95.0 POST-MENOPAUSAL BLEEDING: ICD-10-CM

## 2023-06-12 PROCEDURE — 76856 US EXAM PELVIC COMPLETE: CPT | Mod: 26,,, | Performed by: RADIOLOGY

## 2023-06-12 PROCEDURE — 76856 US PELVIS COMP WITH TRANSVAG NON-OB (XPD): ICD-10-PCS | Mod: 26,,, | Performed by: RADIOLOGY

## 2023-06-12 PROCEDURE — 76830 US PELVIS COMP WITH TRANSVAG NON-OB (XPD): ICD-10-PCS | Mod: 26,,, | Performed by: RADIOLOGY

## 2023-06-12 PROCEDURE — 76856 US EXAM PELVIC COMPLETE: CPT | Mod: TC

## 2023-06-12 PROCEDURE — 76830 TRANSVAGINAL US NON-OB: CPT | Mod: 26,,, | Performed by: RADIOLOGY

## 2023-06-19 NOTE — PROGRESS NOTES
Please notify the patient.  Normal cholesterol and triglycerides .   Thyroid test was normal.   No heart failure.  Normal kidney and liver function.  Normal blood sugar.  No anemia or infection.

## 2023-06-23 ENCOUNTER — TELEPHONE (OUTPATIENT)
Dept: FAMILY MEDICINE | Facility: CLINIC | Age: 73
End: 2023-06-23
Payer: MEDICARE

## 2023-06-23 NOTE — TELEPHONE ENCOUNTER
----- Message from Quentin Hoff MD sent at 6/19/2023 10:17 AM CDT -----  Please notify the patient.  Normal cholesterol and triglycerides .   Thyroid test was normal.   No heart failure.  Normal kidney and liver function.  Normal blood sugar.  No anemia or infection.

## 2023-06-27 NOTE — PROGRESS NOTES
Ultrasound with large polyp and fibroid.    I personally discussed the results with the patient.    Chelsie-please schedule the patient as soon as possible with the gynecologist.

## 2023-07-11 ENCOUNTER — OFFICE VISIT (OUTPATIENT)
Dept: OBSTETRICS AND GYNECOLOGY | Facility: CLINIC | Age: 73
End: 2023-07-11
Payer: MEDICARE

## 2023-07-11 VITALS
WEIGHT: 143.88 LBS | BODY MASS INDEX: 26.32 KG/M2 | DIASTOLIC BLOOD PRESSURE: 69 MMHG | SYSTOLIC BLOOD PRESSURE: 108 MMHG

## 2023-07-11 DIAGNOSIS — N95.0 POST-MENOPAUSAL BLEEDING: ICD-10-CM

## 2023-07-11 PROCEDURE — 3078F DIAST BP <80 MM HG: CPT | Mod: CPTII,S$GLB,, | Performed by: OBSTETRICS & GYNECOLOGY

## 2023-07-11 PROCEDURE — 3288F FALL RISK ASSESSMENT DOCD: CPT | Mod: CPTII,S$GLB,, | Performed by: OBSTETRICS & GYNECOLOGY

## 2023-07-11 PROCEDURE — 1160F RVW MEDS BY RX/DR IN RCRD: CPT | Mod: CPTII,S$GLB,, | Performed by: OBSTETRICS & GYNECOLOGY

## 2023-07-11 PROCEDURE — 58100 PR BIOPSY OF UTERUS LINING: ICD-10-PCS | Mod: S$GLB,,, | Performed by: OBSTETRICS & GYNECOLOGY

## 2023-07-11 PROCEDURE — 4010F ACE/ARB THERAPY RXD/TAKEN: CPT | Mod: CPTII,S$GLB,, | Performed by: OBSTETRICS & GYNECOLOGY

## 2023-07-11 PROCEDURE — 3074F PR MOST RECENT SYSTOLIC BLOOD PRESSURE < 130 MM HG: ICD-10-PCS | Mod: CPTII,S$GLB,, | Performed by: OBSTETRICS & GYNECOLOGY

## 2023-07-11 PROCEDURE — 1101F PT FALLS ASSESS-DOCD LE1/YR: CPT | Mod: CPTII,S$GLB,, | Performed by: OBSTETRICS & GYNECOLOGY

## 2023-07-11 PROCEDURE — 99999 PR PBB SHADOW E&M-EST. PATIENT-LVL III: ICD-10-PCS | Mod: PBBFAC,,, | Performed by: OBSTETRICS & GYNECOLOGY

## 2023-07-11 PROCEDURE — 58100 BIOPSY OF UTERUS LINING: CPT | Mod: S$GLB,,, | Performed by: OBSTETRICS & GYNECOLOGY

## 2023-07-11 PROCEDURE — 1126F AMNT PAIN NOTED NONE PRSNT: CPT | Mod: CPTII,S$GLB,, | Performed by: OBSTETRICS & GYNECOLOGY

## 2023-07-11 PROCEDURE — 1159F PR MEDICATION LIST DOCUMENTED IN MEDICAL RECORD: ICD-10-PCS | Mod: CPTII,S$GLB,, | Performed by: OBSTETRICS & GYNECOLOGY

## 2023-07-11 PROCEDURE — 3074F SYST BP LT 130 MM HG: CPT | Mod: CPTII,S$GLB,, | Performed by: OBSTETRICS & GYNECOLOGY

## 2023-07-11 PROCEDURE — 1160F PR REVIEW ALL MEDS BY PRESCRIBER/CLIN PHARMACIST DOCUMENTED: ICD-10-PCS | Mod: CPTII,S$GLB,, | Performed by: OBSTETRICS & GYNECOLOGY

## 2023-07-11 PROCEDURE — 88175 CYTOPATH C/V AUTO FLUID REDO: CPT | Performed by: OBSTETRICS & GYNECOLOGY

## 2023-07-11 PROCEDURE — 99999 PR PBB SHADOW E&M-EST. PATIENT-LVL III: CPT | Mod: PBBFAC,,, | Performed by: OBSTETRICS & GYNECOLOGY

## 2023-07-11 PROCEDURE — 3008F BODY MASS INDEX DOCD: CPT | Mod: CPTII,S$GLB,, | Performed by: OBSTETRICS & GYNECOLOGY

## 2023-07-11 PROCEDURE — 1101F PR PT FALLS ASSESS DOC 0-1 FALLS W/OUT INJ PAST YR: ICD-10-PCS | Mod: CPTII,S$GLB,, | Performed by: OBSTETRICS & GYNECOLOGY

## 2023-07-11 PROCEDURE — G0101 CA SCREEN;PELVIC/BREAST EXAM: HCPCS | Mod: GZ,S$GLB,, | Performed by: OBSTETRICS & GYNECOLOGY

## 2023-07-11 PROCEDURE — 3078F PR MOST RECENT DIASTOLIC BLOOD PRESSURE < 80 MM HG: ICD-10-PCS | Mod: CPTII,S$GLB,, | Performed by: OBSTETRICS & GYNECOLOGY

## 2023-07-11 PROCEDURE — 4010F PR ACE/ARB THEARPY RXD/TAKEN: ICD-10-PCS | Mod: CPTII,S$GLB,, | Performed by: OBSTETRICS & GYNECOLOGY

## 2023-07-11 PROCEDURE — 88305 TISSUE EXAM BY PATHOLOGIST: CPT | Mod: 26,,, | Performed by: PATHOLOGY

## 2023-07-11 PROCEDURE — G0101 PR CA SCREEN;PELVIC/BREAST EXAM: ICD-10-PCS | Mod: GZ,S$GLB,, | Performed by: OBSTETRICS & GYNECOLOGY

## 2023-07-11 PROCEDURE — 88305 TISSUE EXAM BY PATHOLOGIST: CPT | Performed by: PATHOLOGY

## 2023-07-11 PROCEDURE — 1159F MED LIST DOCD IN RCRD: CPT | Mod: CPTII,S$GLB,, | Performed by: OBSTETRICS & GYNECOLOGY

## 2023-07-11 PROCEDURE — 1126F PR PAIN SEVERITY QUANTIFIED, NO PAIN PRESENT: ICD-10-PCS | Mod: CPTII,S$GLB,, | Performed by: OBSTETRICS & GYNECOLOGY

## 2023-07-11 PROCEDURE — 3008F PR BODY MASS INDEX (BMI) DOCUMENTED: ICD-10-PCS | Mod: CPTII,S$GLB,, | Performed by: OBSTETRICS & GYNECOLOGY

## 2023-07-11 PROCEDURE — 88305 TISSUE EXAM BY PATHOLOGIST: ICD-10-PCS | Mod: 26,,, | Performed by: PATHOLOGY

## 2023-07-11 PROCEDURE — 3288F PR FALLS RISK ASSESSMENT DOCUMENTED: ICD-10-PCS | Mod: CPTII,S$GLB,, | Performed by: OBSTETRICS & GYNECOLOGY

## 2023-07-11 NOTE — PROGRESS NOTES
HPI:   73 y.o.   OB History          2    Para   2    Term   2            AB        Living             SAB        IAB        Ectopic        Multiple        Live Births                  No LMP recorded (lmp unknown). Patient is postmenopausal.    Patient is  here for her annual gynecologic exam.  She has no complaints.     ROS:  GENERAL: No fever, chills, fatigability or weight loss.  SKIN: No rashes, itching or changes in color or texture of skin.  HEAD: No headaches or recent head trauma.  EYES: Visual acuity fine. No photophobia, ocular pain or diplopia.  EARS: Denies ear pain, discharge or vertigo.  NOSE: No loss of smell, no epistaxis or postnasal drip.  MOUTH & THROAT: No hoarseness or change in voice. No excessive gum bleeding.  NODES: Denies swollen glands.  CHEST: Denies SERRANO, cyanosis, wheezing, cough and sputum production.  CARDIOVASCULAR: Denies chest pain, PND, orthopnea or reduced exercise tolerance.  ABDOMEN: Appetite fine. No weight loss. Denies diarrhea, abdominal pain, hematemesis or blood in stool.  URINARY: No flank pain, dysuria or hematuria.  PERIPHERAL VASCULAR: No claudication or cyanosis.  MUSCULOSKELETAL: No joint stiffness or swelling. Denies back pain.  NEUROLOGIC: No history of seizures, paralysis, alteration of gait or coordination.    PE:   /69   Wt 65.3 kg (143 lb 14.4 oz)   LMP  (LMP Unknown)   BMI 26.32 kg/m²   APPEARANCE: Well nourished, well developed, in no acute distress.  NECK: Neck symmetric without masses or thyromegaly.  BREASTS: Symmetrical, no skin changes or visible lesions. No palpable masses, nipple discharge or adenopathy bilaterally.  ABDOMEN: Flat. Soft. No tenderness or masses. No hepatosplenomegaly. No hernias. No CVA tenderness.  VULVA: No lesions. Normal female genitalia.  URETHRAL MEATUS: Normal size and location, no lesions, no prolapse.  URETHRA: No masses, tenderness, prolapse or scarring.  VAGINA: Moist and well rugated, no discharge, no  significant cystocele or rectocele.  CERVIX: No lesions and discharge. PAP done.  UTERUS: Normal size, regular shape, mobile, non-tender, bladder base nontender.  ADNEXA: No masses, tenderness or CDS nodularity.  ANUS PERINEUM: Normal.    PROCEDURES:  Pap smear    Assessment:  Normal Gynecologic Exam    Plan:  Mammogram and Colonoscopy if indicated by current recommendations.  Return to clinic in one year or for any problems or complaints.    Pt needs pap  Has few months co dark blood per vagina  Neg gyn hx  Us by primaray showed vascular polpy  Dicussed need for embx, consent done    Embx  Pipelle passed to 6-7cm x 3  Circular suction sample done  Dark blood, tissue  Adrienne well, no issures  Discussed will call after results

## 2023-07-14 LAB
FINAL PATHOLOGIC DIAGNOSIS: NORMAL
GROSS: NORMAL
Lab: NORMAL

## 2023-07-18 LAB
FINAL PATHOLOGIC DIAGNOSIS: NORMAL
Lab: NORMAL

## 2023-07-19 ENCOUNTER — TELEPHONE (OUTPATIENT)
Dept: OBSTETRICS AND GYNECOLOGY | Facility: CLINIC | Age: 73
End: 2023-07-19
Payer: MEDICARE

## 2023-07-19 NOTE — TELEPHONE ENCOUNTER
----- Message from Dale Tena sent at 7/19/2023 12:08 PM CDT -----  Contact: pt  Type: Requesting to speak with nurse        Who Called: PT  Regarding: discuss results   Would the patient rather a call back or a response via [facility]? Call back  Best Call Back Number: [phone]  Additional Information:

## 2023-08-25 ENCOUNTER — TELEPHONE (OUTPATIENT)
Dept: GASTROENTEROLOGY | Facility: CLINIC | Age: 73
End: 2023-08-25
Payer: MEDICARE

## 2023-08-25 NOTE — TELEPHONE ENCOUNTER
Eleazar ID# [id number]  Left message for patient to call the office to schedule a screening colonoscopy.

## 2023-09-22 ENCOUNTER — OFFICE VISIT (OUTPATIENT)
Dept: OBSTETRICS AND GYNECOLOGY | Facility: CLINIC | Age: 73
End: 2023-09-22
Payer: MEDICARE

## 2023-09-22 VITALS — SYSTOLIC BLOOD PRESSURE: 135 MMHG | WEIGHT: 149.5 LBS | BODY MASS INDEX: 27.34 KG/M2 | DIASTOLIC BLOOD PRESSURE: 81 MMHG

## 2023-09-22 DIAGNOSIS — N95.0 POSTMENOPAUSAL BLEEDING: Primary | ICD-10-CM

## 2023-09-22 PROCEDURE — 99499 NO LOS: ICD-10-PCS | Mod: S$GLB,,, | Performed by: OBSTETRICS & GYNECOLOGY

## 2023-09-22 PROCEDURE — 88305 TISSUE EXAM BY PATHOLOGIST: CPT | Mod: 26,,, | Performed by: PATHOLOGY

## 2023-09-22 PROCEDURE — 99499 UNLISTED E&M SERVICE: CPT | Mod: S$GLB,,, | Performed by: OBSTETRICS & GYNECOLOGY

## 2023-09-22 PROCEDURE — 88305 TISSUE EXAM BY PATHOLOGIST: CPT | Performed by: PATHOLOGY

## 2023-09-22 PROCEDURE — 88305 TISSUE EXAM BY PATHOLOGIST: ICD-10-PCS | Mod: 26,,, | Performed by: PATHOLOGY

## 2023-09-22 PROCEDURE — 99999 PR PBB SHADOW E&M-EST. PATIENT-LVL II: CPT | Mod: PBBFAC,,, | Performed by: OBSTETRICS & GYNECOLOGY

## 2023-09-22 PROCEDURE — 58100 BIOPSY OF UTERUS LINING: CPT | Mod: S$GLB,,, | Performed by: OBSTETRICS & GYNECOLOGY

## 2023-09-22 PROCEDURE — 58100 PR BIOPSY OF UTERUS LINING: ICD-10-PCS | Mod: S$GLB,,, | Performed by: OBSTETRICS & GYNECOLOGY

## 2023-09-22 PROCEDURE — 99999 PR PBB SHADOW E&M-EST. PATIENT-LVL II: ICD-10-PCS | Mod: PBBFAC,,, | Performed by: OBSTETRICS & GYNECOLOGY

## 2023-09-22 NOTE — PROGRESS NOTES
73 y.o.   OB History          2    Para   2    Term   2            AB        Living             SAB        IAB        Ectopic        Multiple        Live Births                   Comlaining of:  P there for fu, see last visit  Pathology benign, however it stated not sure endometrial tissue,  insufficient sample  No more bleeding  Discussed prob need for anther sample to be sure  Pt wans it done    ROS:  GENERAL: No fever, chills, fatigability or weight loss.  SKIN: No rashes, itching or changes in color or texture of skin.  HEAD: No headaches or recent head trauma.  EYES: Visual acuity fine. No photophobia, ocular pain or diplopia.  EARS: Denies ear pain, discharge or vertigo.  NOSE: No loss of smell, no epistaxis or postnasal drip.  MOUTH & THROAT: No hoarseness or change in voice. No excessive gum bleeding.  NODES: Denies swollen glands.  CHEST: Denies SERRANO, cyanosis, wheezing, cough and sputum production.  CARDIOVASCULAR: Denies chest pain, PND, orthopnea or reduced exercise tolerance.  ABDOMEN: Appetite fine. No weight loss. Denies diarrhea, abdominal pain, hematemesis or blood in stool.  URINARY: No flank pain, dysuria or hematuria.  PERIPHERAL VASCULAR: No claudication or cyanosis.  MUSCULOSKELETAL: No joint stiffness or swelling. Denies back pain.  NEUROLOGIC: No history of seizures, paralysis, alteration of gait or coordination      PE: /81   Wt 67.8 kg (149 lb 7.6 oz)   LMP  (LMP Unknown)   BMI 27.34 kg/m²      Embx  Consent cone  Tennac used, easy dilation/.sound to 6-7  Pipelle used x 3, circular suction  Good sample ob  Adrienne well    A  bleeding  Plan, check results

## 2023-10-02 LAB
FINAL PATHOLOGIC DIAGNOSIS: NORMAL
Lab: NORMAL

## 2023-10-03 ENCOUNTER — TELEPHONE (OUTPATIENT)
Dept: OBSTETRICS AND GYNECOLOGY | Facility: CLINIC | Age: 73
End: 2023-10-03
Payer: MEDICARE

## 2023-10-31 ENCOUNTER — OFFICE VISIT (OUTPATIENT)
Dept: FAMILY MEDICINE | Facility: CLINIC | Age: 73
End: 2023-10-31
Payer: MEDICARE

## 2023-10-31 VITALS
BODY MASS INDEX: 26.94 KG/M2 | HEART RATE: 64 BPM | DIASTOLIC BLOOD PRESSURE: 72 MMHG | HEIGHT: 62 IN | WEIGHT: 146.38 LBS | SYSTOLIC BLOOD PRESSURE: 100 MMHG | OXYGEN SATURATION: 98 %

## 2023-10-31 DIAGNOSIS — J30.1 SEASONAL ALLERGIC RHINITIS DUE TO POLLEN: ICD-10-CM

## 2023-10-31 DIAGNOSIS — Z23 NEEDS FLU SHOT: ICD-10-CM

## 2023-10-31 DIAGNOSIS — I73.9 PERIPHERAL VASCULAR DISEASE, UNSPECIFIED: ICD-10-CM

## 2023-10-31 DIAGNOSIS — Z12.11 SCREEN FOR COLON CANCER: ICD-10-CM

## 2023-10-31 DIAGNOSIS — B02.9 HERPES ZOSTER WITHOUT COMPLICATION: Primary | ICD-10-CM

## 2023-10-31 DIAGNOSIS — T78.40XS ALLERGY, SEQUELA: ICD-10-CM

## 2023-10-31 DIAGNOSIS — F33.0 MAJOR DEPRESSIVE DISORDER, RECURRENT, MILD: ICD-10-CM

## 2023-10-31 PROCEDURE — 90694 FLU VACCINE - QUADRIVALENT - ADJUVANTED: ICD-10-PCS | Mod: S$GLB,,, | Performed by: FAMILY MEDICINE

## 2023-10-31 PROCEDURE — G0008 FLU VACCINE - QUADRIVALENT - ADJUVANTED: ICD-10-PCS | Mod: S$GLB,,, | Performed by: FAMILY MEDICINE

## 2023-10-31 PROCEDURE — 99215 PR OFFICE/OUTPT VISIT, EST, LEVL V, 40-54 MIN: ICD-10-PCS | Mod: S$GLB,,, | Performed by: FAMILY MEDICINE

## 2023-10-31 PROCEDURE — 99999 PR PBB SHADOW E&M-EST. PATIENT-LVL III: ICD-10-PCS | Mod: PBBFAC,,, | Performed by: FAMILY MEDICINE

## 2023-10-31 PROCEDURE — 1160F PR REVIEW ALL MEDS BY PRESCRIBER/CLIN PHARMACIST DOCUMENTED: ICD-10-PCS | Mod: CPTII,S$GLB,, | Performed by: FAMILY MEDICINE

## 2023-10-31 PROCEDURE — 1159F PR MEDICATION LIST DOCUMENTED IN MEDICAL RECORD: ICD-10-PCS | Mod: CPTII,S$GLB,, | Performed by: FAMILY MEDICINE

## 2023-10-31 PROCEDURE — 3288F PR FALLS RISK ASSESSMENT DOCUMENTED: ICD-10-PCS | Mod: CPTII,S$GLB,, | Performed by: FAMILY MEDICINE

## 2023-10-31 PROCEDURE — 4010F ACE/ARB THERAPY RXD/TAKEN: CPT | Mod: CPTII,S$GLB,, | Performed by: FAMILY MEDICINE

## 2023-10-31 PROCEDURE — 1160F RVW MEDS BY RX/DR IN RCRD: CPT | Mod: CPTII,S$GLB,, | Performed by: FAMILY MEDICINE

## 2023-10-31 PROCEDURE — 3008F PR BODY MASS INDEX (BMI) DOCUMENTED: ICD-10-PCS | Mod: CPTII,S$GLB,, | Performed by: FAMILY MEDICINE

## 2023-10-31 PROCEDURE — 1159F MED LIST DOCD IN RCRD: CPT | Mod: CPTII,S$GLB,, | Performed by: FAMILY MEDICINE

## 2023-10-31 PROCEDURE — G0008 ADMIN INFLUENZA VIRUS VAC: HCPCS | Mod: S$GLB,,, | Performed by: FAMILY MEDICINE

## 2023-10-31 PROCEDURE — 4010F PR ACE/ARB THEARPY RXD/TAKEN: ICD-10-PCS | Mod: CPTII,S$GLB,, | Performed by: FAMILY MEDICINE

## 2023-10-31 PROCEDURE — 3008F BODY MASS INDEX DOCD: CPT | Mod: CPTII,S$GLB,, | Performed by: FAMILY MEDICINE

## 2023-10-31 PROCEDURE — 90694 VACC AIIV4 NO PRSRV 0.5ML IM: CPT | Mod: S$GLB,,, | Performed by: FAMILY MEDICINE

## 2023-10-31 PROCEDURE — 3288F FALL RISK ASSESSMENT DOCD: CPT | Mod: CPTII,S$GLB,, | Performed by: FAMILY MEDICINE

## 2023-10-31 PROCEDURE — 99215 OFFICE O/P EST HI 40 MIN: CPT | Mod: S$GLB,,, | Performed by: FAMILY MEDICINE

## 2023-10-31 PROCEDURE — 1101F PR PT FALLS ASSESS DOC 0-1 FALLS W/OUT INJ PAST YR: ICD-10-PCS | Mod: CPTII,S$GLB,, | Performed by: FAMILY MEDICINE

## 2023-10-31 PROCEDURE — 1101F PT FALLS ASSESS-DOCD LE1/YR: CPT | Mod: CPTII,S$GLB,, | Performed by: FAMILY MEDICINE

## 2023-10-31 PROCEDURE — 99999 PR PBB SHADOW E&M-EST. PATIENT-LVL III: CPT | Mod: PBBFAC,,, | Performed by: FAMILY MEDICINE

## 2023-10-31 PROCEDURE — 3074F SYST BP LT 130 MM HG: CPT | Mod: CPTII,S$GLB,, | Performed by: FAMILY MEDICINE

## 2023-10-31 PROCEDURE — 3078F DIAST BP <80 MM HG: CPT | Mod: CPTII,S$GLB,, | Performed by: FAMILY MEDICINE

## 2023-10-31 PROCEDURE — 3074F PR MOST RECENT SYSTOLIC BLOOD PRESSURE < 130 MM HG: ICD-10-PCS | Mod: CPTII,S$GLB,, | Performed by: FAMILY MEDICINE

## 2023-10-31 PROCEDURE — 3078F PR MOST RECENT DIASTOLIC BLOOD PRESSURE < 80 MM HG: ICD-10-PCS | Mod: CPTII,S$GLB,, | Performed by: FAMILY MEDICINE

## 2023-10-31 RX ORDER — MONTELUKAST SODIUM 10 MG/1
10 TABLET ORAL NIGHTLY
Qty: 30 TABLET | Refills: 0 | Status: SHIPPED | OUTPATIENT
Start: 2023-10-31 | End: 2023-11-25

## 2023-10-31 RX ORDER — VALACYCLOVIR HYDROCHLORIDE 1 G/1
1000 TABLET, FILM COATED ORAL 2 TIMES DAILY
Qty: 20 TABLET | Refills: 0 | Status: SHIPPED | OUTPATIENT
Start: 2023-10-31 | End: 2023-12-05

## 2023-10-31 RX ORDER — CETIRIZINE HYDROCHLORIDE 10 MG/1
10 TABLET ORAL DAILY
Qty: 90 TABLET | Refills: 0 | Status: SHIPPED | OUTPATIENT
Start: 2023-10-31 | End: 2024-01-23

## 2023-10-31 RX ORDER — CALAMINE, PRAMOXIND HCL 8.6; 20; 1 MG/ML; MG/ML; MG/ML
LOTION TOPICAL 2 TIMES DAILY PRN
Qty: 177 ML | Refills: 0 | Status: SHIPPED | OUTPATIENT
Start: 2023-10-31

## 2023-10-31 NOTE — PROGRESS NOTES
Subjective     Patient ID: Amrita Yoder is a 73 y.o. female.    Chief Complaint: Herpes Zoster    73 years old female came to the clinic herpes zoster over the left lower extremity.  Patient with significant pain associated with blisters and lesions over the leg.  Patient due for her flu shot.  Patient is requesting a medicine to help with seasonal allergies.  She is due for her colonoscopy.  She was previously diagnosed with depression and peripheral vascular disease with no recent ups.      Review of Systems   Constitutional: Negative.  Negative for activity change, fatigue and fever.   HENT:  Positive for nasal congestion, rhinorrhea and sinus pressure/congestion. Negative for dental problem, ear discharge, ear pain, hearing loss, postnasal drip, sore throat and voice change.    Eyes: Negative.  Negative for pain, discharge, itching and visual disturbance.   Respiratory: Negative.  Negative for cough, chest tightness, shortness of breath and wheezing.    Cardiovascular:  Negative for chest pain and palpitations.   Gastrointestinal: Negative.  Negative for abdominal pain, blood in stool, diarrhea, nausea and vomiting.   Genitourinary: Negative.  Negative for difficulty urinating, dyspareunia, dysuria, hematuria, menstrual problem, pelvic pain, urgency, vaginal bleeding, vaginal discharge and vaginal pain.   Musculoskeletal: Negative.  Negative for arthralgias and gait problem.   Integumentary:  Positive for rash. Negative for wound.   Neurological: Negative.  Negative for dizziness and seizures.   Hematological:  Negative for adenopathy. Does not bruise/bleed easily.   Psychiatric/Behavioral: Negative.  Negative for behavioral problems, decreased concentration, sleep disturbance and suicidal ideas. The patient is not nervous/anxious.           Objective     Physical Exam  Constitutional:       General: She is not in acute distress.     Appearance: She is well-developed. She is not diaphoretic.   HENT:       Head: Normocephalic and atraumatic.      Right Ear: External ear normal.      Left Ear: External ear normal.      Nose: Nose normal.      Mouth/Throat:      Pharynx: No oropharyngeal exudate.   Eyes:      General: No scleral icterus.        Right eye: No discharge.         Left eye: No discharge.      Conjunctiva/sclera: Conjunctivae normal.      Pupils: Pupils are equal, round, and reactive to light.   Neck:      Thyroid: No thyromegaly.      Vascular: No JVD.      Trachea: No tracheal deviation.   Cardiovascular:      Rate and Rhythm: Normal rate and regular rhythm.      Heart sounds: Normal heart sounds. No murmur heard.     No friction rub. No gallop.   Pulmonary:      Effort: Pulmonary effort is normal. No respiratory distress.      Breath sounds: Normal breath sounds. No stridor. No wheezing or rales.   Chest:      Chest wall: No tenderness.   Abdominal:      General: Bowel sounds are normal. There is no distension.      Palpations: Abdomen is soft. There is no mass.      Tenderness: There is no abdominal tenderness. There is no guarding or rebound.   Musculoskeletal:         General: No tenderness. Normal range of motion.      Cervical back: Normal range of motion and neck supple.   Lymphadenopathy:      Cervical: No cervical adenopathy.   Skin:     General: Skin is warm and dry.      Coloration: Skin is not pale.      Findings: Rash (Left lower extremity over the posterior part.) present. No erythema. Rash is vesicular.   Neurological:      Mental Status: She is alert and oriented to person, place, and time.      Cranial Nerves: No cranial nerve deficit.      Motor: No abnormal muscle tone.      Coordination: Coordination normal.      Deep Tendon Reflexes: Reflexes are normal and symmetric.   Psychiatric:         Behavior: Behavior normal.         Thought Content: Thought content normal.         Judgment: Judgment normal.            Assessment and Plan     1. Herpes zoster without complication  -      valACYclovir (VALTREX) 1000 MG tablet; Take 1 tablet (1,000 mg total) by mouth 2 (two) times daily. for 10 days  Dispense: 20 tablet; Refill: 0  -     diphenhydramine-calamine (CALAMINE MEDICATED) 1-8 % Lotn; Apply topically 2 (two) times daily as needed.  Dispense: 177 mL; Refill: 0    2. Needs flu shot  -     Influenza (FLUAD) - Quadrivalent (Adjuvanted) *Preferred* (65+) (PF)    3. Seasonal allergic rhinitis due to pollen  -     montelukast (SINGULAIR) 10 mg tablet; Take 1 tablet (10 mg total) by mouth every evening.  Dispense: 30 tablet; Refill: 0    4. Screen for colon cancer  -     Case Request Endoscopy: COLONOSCOPY    Allergies.  -     cetirizine (ZYRTEC) 10 MG tablet; Take 1 tablet (10 mg total) by mouth once daily.  Dispense: 90 tablet; Refill: 0    6. Major depressive disorder, recurrent, mild    7. Peripheral vascular disease, unspecified                 Follow up if symptoms worsen or fail to improve.

## 2023-11-17 ENCOUNTER — TELEPHONE (OUTPATIENT)
Dept: GASTROENTEROLOGY | Facility: CLINIC | Age: 73
End: 2023-11-17
Payer: MEDICARE

## 2023-11-24 DIAGNOSIS — J30.1 SEASONAL ALLERGIC RHINITIS DUE TO POLLEN: ICD-10-CM

## 2023-11-24 NOTE — TELEPHONE ENCOUNTER
No care due was identified.  Health [facility] Embedded Care Due Messages. Reference number: [id number].   11/24/2023 5:06:50 PM CST

## 2023-11-25 RX ORDER — MONTELUKAST SODIUM 10 MG/1
10 TABLET ORAL NIGHTLY
Qty: 90 TABLET | Refills: 3 | Status: SHIPPED | OUTPATIENT
Start: 2023-11-25

## 2023-11-25 NOTE — TELEPHONE ENCOUNTER
Refill Routing Note   Medication(s) are not appropriate for processing by [facility] for the following reason(s):        New or recently adjusted medication    [facility] action(s):  Defer               Appointments  past 12m or future 3m with PCP    Date Provider   Last Visit   10/31/2023 Quentin Hoff MD   Next Visit   12/5/2023 Quentin Hoff MD   ED visits in past 90 days: 0        Note composed:6:03 PM 11/24/2023

## 2023-12-05 ENCOUNTER — OFFICE VISIT (OUTPATIENT)
Dept: FAMILY MEDICINE | Facility: CLINIC | Age: 73
End: 2023-12-05
Payer: MEDICARE

## 2023-12-05 VITALS
OXYGEN SATURATION: 96 % | BODY MASS INDEX: 27.1 KG/M2 | SYSTOLIC BLOOD PRESSURE: 102 MMHG | HEIGHT: 62 IN | WEIGHT: 147.25 LBS | DIASTOLIC BLOOD PRESSURE: 78 MMHG | HEART RATE: 69 BPM

## 2023-12-05 DIAGNOSIS — B02.23 NEUROPATHY DUE TO HERPES ZOSTER: Primary | ICD-10-CM

## 2023-12-05 DIAGNOSIS — R25.2 LEG CRAMPS: ICD-10-CM

## 2023-12-05 DIAGNOSIS — Z12.11 SCREEN FOR COLON CANCER: ICD-10-CM

## 2023-12-05 DIAGNOSIS — E78.2 MIXED HYPERLIPIDEMIA: ICD-10-CM

## 2023-12-05 PROCEDURE — 99999 PR PBB SHADOW E&M-EST. PATIENT-LVL III: ICD-10-PCS | Mod: PBBFAC,,, | Performed by: FAMILY MEDICINE

## 2023-12-05 PROCEDURE — 1159F PR MEDICATION LIST DOCUMENTED IN MEDICAL RECORD: ICD-10-PCS | Mod: CPTII,S$GLB,, | Performed by: FAMILY MEDICINE

## 2023-12-05 PROCEDURE — 99215 OFFICE O/P EST HI 40 MIN: CPT | Mod: S$GLB,,, | Performed by: FAMILY MEDICINE

## 2023-12-05 PROCEDURE — 3074F SYST BP LT 130 MM HG: CPT | Mod: CPTII,S$GLB,, | Performed by: FAMILY MEDICINE

## 2023-12-05 PROCEDURE — 3074F PR MOST RECENT SYSTOLIC BLOOD PRESSURE < 130 MM HG: ICD-10-PCS | Mod: CPTII,S$GLB,, | Performed by: FAMILY MEDICINE

## 2023-12-05 PROCEDURE — 1159F MED LIST DOCD IN RCRD: CPT | Mod: CPTII,S$GLB,, | Performed by: FAMILY MEDICINE

## 2023-12-05 PROCEDURE — 3008F BODY MASS INDEX DOCD: CPT | Mod: CPTII,S$GLB,, | Performed by: FAMILY MEDICINE

## 2023-12-05 PROCEDURE — 3288F PR FALLS RISK ASSESSMENT DOCUMENTED: ICD-10-PCS | Mod: CPTII,S$GLB,, | Performed by: FAMILY MEDICINE

## 2023-12-05 PROCEDURE — 1160F RVW MEDS BY RX/DR IN RCRD: CPT | Mod: CPTII,S$GLB,, | Performed by: FAMILY MEDICINE

## 2023-12-05 PROCEDURE — 3008F PR BODY MASS INDEX (BMI) DOCUMENTED: ICD-10-PCS | Mod: CPTII,S$GLB,, | Performed by: FAMILY MEDICINE

## 2023-12-05 PROCEDURE — 3078F PR MOST RECENT DIASTOLIC BLOOD PRESSURE < 80 MM HG: ICD-10-PCS | Mod: CPTII,S$GLB,, | Performed by: FAMILY MEDICINE

## 2023-12-05 PROCEDURE — 3078F DIAST BP <80 MM HG: CPT | Mod: CPTII,S$GLB,, | Performed by: FAMILY MEDICINE

## 2023-12-05 PROCEDURE — 3288F FALL RISK ASSESSMENT DOCD: CPT | Mod: CPTII,S$GLB,, | Performed by: FAMILY MEDICINE

## 2023-12-05 PROCEDURE — 4010F PR ACE/ARB THEARPY RXD/TAKEN: ICD-10-PCS | Mod: CPTII,S$GLB,, | Performed by: FAMILY MEDICINE

## 2023-12-05 PROCEDURE — 1101F PT FALLS ASSESS-DOCD LE1/YR: CPT | Mod: CPTII,S$GLB,, | Performed by: FAMILY MEDICINE

## 2023-12-05 PROCEDURE — 99215 PR OFFICE/OUTPT VISIT, EST, LEVL V, 40-54 MIN: ICD-10-PCS | Mod: S$GLB,,, | Performed by: FAMILY MEDICINE

## 2023-12-05 PROCEDURE — 1101F PR PT FALLS ASSESS DOC 0-1 FALLS W/OUT INJ PAST YR: ICD-10-PCS | Mod: CPTII,S$GLB,, | Performed by: FAMILY MEDICINE

## 2023-12-05 PROCEDURE — 4010F ACE/ARB THERAPY RXD/TAKEN: CPT | Mod: CPTII,S$GLB,, | Performed by: FAMILY MEDICINE

## 2023-12-05 PROCEDURE — 99999 PR PBB SHADOW E&M-EST. PATIENT-LVL III: CPT | Mod: PBBFAC,,, | Performed by: FAMILY MEDICINE

## 2023-12-05 PROCEDURE — 1160F PR REVIEW ALL MEDS BY PRESCRIBER/CLIN PHARMACIST DOCUMENTED: ICD-10-PCS | Mod: CPTII,S$GLB,, | Performed by: FAMILY MEDICINE

## 2023-12-05 RX ORDER — GABAPENTIN 100 MG/1
100 CAPSULE ORAL 2 TIMES DAILY
Qty: 180 CAPSULE | Refills: 0 | Status: SHIPPED | OUTPATIENT
Start: 2023-12-05 | End: 2024-03-04

## 2023-12-05 RX ORDER — CALCIUM CARBONATE 300MG(750)
1 TABLET,CHEWABLE ORAL NIGHTLY
Qty: 90 TABLET | Refills: 0 | Status: SHIPPED | OUTPATIENT
Start: 2023-12-05

## 2023-12-05 NOTE — PROGRESS NOTES
Subjective     Patient ID: Amrita Yoder is a 73 y.o. female.    Chief Complaint: Follow-up and Leg Pain    73 years old female came to the clinic with left lower extremity weakness and pain associated with recent infection with shingles.  Patient is requesting a medicine to help with the symptoms.  No recent cholesterol check.  No chest pain, palpitation, orthopnea or PND.  She is requesting refill of magnesium to help with the leg cramps.  Patient due for her colonoscopy.    Follow-up  Pertinent negatives include no chest pain.   Leg Pain       Review of Systems   Constitutional: Negative.    HENT: Negative.     Eyes: Negative.    Respiratory: Negative.     Cardiovascular:  Negative for chest pain, palpitations, leg swelling and claudication.   Gastrointestinal: Negative.    Genitourinary: Negative.    Musculoskeletal: Negative.  Positive for leg pain.   Neurological: Negative.    Psychiatric/Behavioral: Negative.            Objective     Physical Exam  Constitutional:       General: She is not in acute distress.     Appearance: She is well-developed. She is not diaphoretic.   HENT:      Head: Normocephalic and atraumatic.      Right Ear: External ear normal.      Left Ear: External ear normal.      Nose: Nose normal.      Mouth/Throat:      Pharynx: No oropharyngeal exudate.   Eyes:      General: No scleral icterus.        Right eye: No discharge.         Left eye: No discharge.      Conjunctiva/sclera: Conjunctivae normal.      Pupils: Pupils are equal, round, and reactive to light.   Neck:      Thyroid: No thyromegaly.      Vascular: No JVD.      Trachea: No tracheal deviation.   Cardiovascular:      Rate and Rhythm: Normal rate and regular rhythm.      Heart sounds: Normal heart sounds. No murmur heard.     No friction rub. No gallop.   Pulmonary:      Effort: Pulmonary effort is normal. No respiratory distress.      Breath sounds: Normal breath sounds. No stridor. No wheezing or rales.   Chest:      Chest  wall: No tenderness.   Abdominal:      General: Bowel sounds are normal. There is no distension.      Palpations: Abdomen is soft. There is no mass.      Tenderness: There is no abdominal tenderness. There is no guarding or rebound.   Musculoskeletal:         General: No tenderness. Normal range of motion.      Cervical back: Normal range of motion and neck supple.   Lymphadenopathy:      Cervical: No cervical adenopathy.   Skin:     General: Skin is warm and dry.      Coloration: Skin is not pale.      Findings: No erythema or rash.   Neurological:      Mental Status: She is alert and oriented to person, place, and time.      Cranial Nerves: No cranial nerve deficit.      Motor: No abnormal muscle tone.      Coordination: Coordination normal.      Deep Tendon Reflexes: Reflexes are normal and symmetric.   Psychiatric:         Behavior: Behavior normal.         Thought Content: Thought content normal.         Judgment: Judgment normal.            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  Dispense: 180 capsule; Refill: 0    2. Mixed hyperlipidemia  -     Lipid Panel; Future; Expected date: 12/05/2023  -     Comprehensive Metabolic Panel; Future; Expected date: 12/05/2023    3. Leg cramps  -     magnesium oxide 400 mg magnesium Tab; Take 1 tablet by mouth every evening.  Dispense: 90 tablet; Refill: 0    4. Screen for colon cancer  -     Case Request Endoscopy: COLONOSCOPY                 Follow up in about 6 months (around 6/5/2024), or if symptoms worsen or fail to improve.

## 2023-12-06 ENCOUNTER — LAB VISIT (OUTPATIENT)
Dept: LAB | Facility: HOSPITAL | Age: 73
End: 2023-12-06
Attending: FAMILY MEDICINE
Payer: MEDICARE

## 2023-12-06 DIAGNOSIS — E78.2 MIXED HYPERLIPIDEMIA: ICD-10-CM

## 2023-12-06 LAB
ALBUMIN SERPL BCP-MCNC: 3.6 G/DL (ref 3.5–5.2)
ALP SERPL-CCNC: 94 U/L (ref 55–135)
ALT SERPL W/O P-5'-P-CCNC: 18 U/L (ref 10–44)
ANION GAP SERPL CALC-SCNC: 8 MMOL/L (ref 8–16)
AST SERPL-CCNC: 22 U/L (ref 10–40)
BILIRUB SERPL-MCNC: 0.7 MG/DL (ref 0.1–1)
BUN SERPL-MCNC: 10 MG/DL (ref 8–23)
CALCIUM SERPL-MCNC: 9.1 MG/DL (ref 8.7–10.5)
CHLORIDE SERPL-SCNC: 109 MMOL/L (ref 95–110)
CHOLEST SERPL-MCNC: 208 MG/DL (ref 120–199)
CHOLEST/HDLC SERPL: 4.4 {RATIO} (ref 2–5)
CO2 SERPL-SCNC: 27 MMOL/L (ref 23–29)
CREAT SERPL-MCNC: 0.7 MG/DL (ref 0.5–1.4)
EST. GFR  (NO RACE VARIABLE): >60 ML/MIN/1.73 M^2
GLUCOSE SERPL-MCNC: 90 MG/DL (ref 70–110)
HDLC SERPL-MCNC: 47 MG/DL (ref 40–75)
HDLC SERPL: 22.6 % (ref 20–50)
LDLC SERPL CALC-MCNC: 132.2 MG/DL (ref 63–159)
NONHDLC SERPL-MCNC: 161 MG/DL
POTASSIUM SERPL-SCNC: 3.7 MMOL/L (ref 3.5–5.1)
PROT SERPL-MCNC: 6.9 G/DL (ref 6–8.4)
SODIUM SERPL-SCNC: 144 MMOL/L (ref 136–145)
TRIGL SERPL-MCNC: 144 MG/DL (ref 30–150)

## 2023-12-06 PROCEDURE — 80061 LIPID PANEL: CPT | Performed by: FAMILY MEDICINE

## 2023-12-06 PROCEDURE — 80053 COMPREHEN METABOLIC PANEL: CPT | Performed by: FAMILY MEDICINE

## 2023-12-06 PROCEDURE — 36415 COLL VENOUS BLD VENIPUNCTURE: CPT | Mod: PO | Performed by: FAMILY MEDICINE

## 2023-12-08 NOTE — PROGRESS NOTES
Please notify the patient.   Elevated total cholesterol.  Diet and physical activity to improve cholesterol.   Normal kidney and liver function.  Normal blood sugar.

## 2023-12-15 ENCOUNTER — TELEPHONE (OUTPATIENT)
Dept: FAMILY MEDICINE | Facility: CLINIC | Age: 73
End: 2023-12-15
Payer: MEDICARE

## 2023-12-15 NOTE — TELEPHONE ENCOUNTER
----- Message from Quentin Hoff MD sent at 12/8/2023  8:19 AM CST -----  Please notify the patient.   Elevated total cholesterol.  Diet and physical activity to improve cholesterol.   Normal kidney and liver function.  Normal blood sugar.

## 2024-01-23 DIAGNOSIS — T78.40XS ALLERGY, SEQUELA: ICD-10-CM

## 2024-01-23 RX ORDER — CETIRIZINE HYDROCHLORIDE 10 MG/1
10 TABLET ORAL
Qty: 90 TABLET | Refills: 3 | Status: SHIPPED | OUTPATIENT
Start: 2024-01-23 | End: 2024-06-05

## 2024-01-23 NOTE — TELEPHONE ENCOUNTER
Care Due:                  Date            Visit Type   Department     Provider  --------------------------------------------------------------------------------                                EP -                              PRIMARY      [facility]  Last Visit: 12-      CARE ([facility])   MYRA Hoff                              EP -                              PRIMARY      [facility]  Next Visit: 06-      CARE ([facility])   MYRA Hoff                                                            Last  Test          Frequency    Reason                     Performed    Due Date  --------------------------------------------------------------------------------    Mg Level....  12 months..  magnesium................  Not Found    Overdue    [employer] Embedded Care Due Messages. Reference number: [id number].   1/23/2024 2:40:27 PM CST

## 2024-01-23 NOTE — TELEPHONE ENCOUNTER
Refill Decision Note   Amrita Yoder  is requesting a refill authorization.  Brief Assessment and Rationale for Refill:  Approve     Medication Therapy Plan:         Comments:     Note composed:5:55 PM 01/23/2024

## 2024-01-26 ENCOUNTER — PATIENT OUTREACH (OUTPATIENT)
Dept: ADMINISTRATIVE | Facility: HOSPITAL | Age: 74
End: 2024-01-26
Payer: MEDICARE

## 2024-01-26 NOTE — PROGRESS NOTES
01/26/2024 Gap report audit performed. Care Everywhere updates requested and reviewed  Overdue HM topic chart audit and/or requested. LINKS triggered and reconciled. Media reviewed : Colon Cancer screening

## 2024-03-21 ENCOUNTER — TELEPHONE (OUTPATIENT)
Dept: FAMILY MEDICINE | Facility: CLINIC | Age: 74
End: 2024-03-21
Payer: MEDICARE

## 2024-03-21 DIAGNOSIS — Z12.31 SCREENING MAMMOGRAM FOR BREAST CANCER: Primary | ICD-10-CM

## 2024-04-03 ENCOUNTER — HOSPITAL ENCOUNTER (OUTPATIENT)
Dept: RADIOLOGY | Facility: HOSPITAL | Age: 74
Discharge: HOME | End: 2024-04-03
Attending: FAMILY MEDICINE
Payer: MEDICARE

## 2024-04-03 DIAGNOSIS — Z12.31 SCREENING MAMMOGRAM FOR BREAST CANCER: ICD-10-CM

## 2024-04-03 PROCEDURE — 77063 BREAST TOMOSYNTHESIS BI: CPT | Mod: TC

## 2024-04-03 PROCEDURE — 77063 BREAST TOMOSYNTHESIS BI: CPT | Mod: 26,,, | Performed by: RADIOLOGY

## 2024-04-03 PROCEDURE — 77067 SCR MAMMO BI INCL CAD: CPT | Mod: 26,,, | Performed by: RADIOLOGY

## 2024-04-16 ENCOUNTER — OFFICE VISIT (OUTPATIENT)
Dept: OBSTETRICS AND GYNECOLOGY | Facility: CLINIC | Age: 74
End: 2024-04-16
Payer: MEDICAID

## 2024-04-16 VITALS — WEIGHT: 141.75 LBS | BODY MASS INDEX: 25.93 KG/M2

## 2024-04-16 DIAGNOSIS — N95.0 POST-MENOPAUSAL BLEEDING: Primary | ICD-10-CM

## 2024-04-16 PROCEDURE — 3008F BODY MASS INDEX DOCD: CPT | Mod: CPTII,,, | Performed by: OBSTETRICS & GYNECOLOGY

## 2024-04-16 PROCEDURE — 1159F MED LIST DOCD IN RCRD: CPT | Mod: CPTII,,, | Performed by: OBSTETRICS & GYNECOLOGY

## 2024-04-16 PROCEDURE — 1126F AMNT PAIN NOTED NONE PRSNT: CPT | Mod: CPTII,,, | Performed by: OBSTETRICS & GYNECOLOGY

## 2024-04-16 PROCEDURE — 3288F FALL RISK ASSESSMENT DOCD: CPT | Mod: CPTII,,, | Performed by: OBSTETRICS & GYNECOLOGY

## 2024-04-16 PROCEDURE — 1101F PT FALLS ASSESS-DOCD LE1/YR: CPT | Mod: CPTII,,, | Performed by: OBSTETRICS & GYNECOLOGY

## 2024-04-16 PROCEDURE — 99999 PR PBB SHADOW E&M-EST. PATIENT-LVL II: CPT | Mod: PBBFAC,,, | Performed by: OBSTETRICS & GYNECOLOGY

## 2024-04-16 PROCEDURE — 99214 OFFICE O/P EST MOD 30 MIN: CPT | Mod: S$PBB,,, | Performed by: OBSTETRICS & GYNECOLOGY

## 2024-04-16 PROCEDURE — 1160F RVW MEDS BY RX/DR IN RCRD: CPT | Mod: CPTII,,, | Performed by: OBSTETRICS & GYNECOLOGY

## 2024-04-16 NOTE — PROGRESS NOTES
73 y.o.   OB History          2    Para   2    Term   2            AB        Living             SAB        IAB        Ectopic        Multiple        Live Births                   Comlaining of: vaginal bleeding  Pt had this last year, embx x 2 were negative  Pt comes today stating that she had more bleeding in nov/dec  No pain, but passed more blood  Gi and gu good        ROS:  GENERAL: No fever, chills, fatigability or weight loss.  SKIN: No rashes, itching or changes in color or texture of skin.  HEAD: No headaches or recent head trauma.  EYES: Visual acuity fine. No photophobia, ocular pain or diplopia.  EARS: Denies ear pain, discharge or vertigo.  NOSE: No loss of smell, no epistaxis or postnasal drip.  MOUTH & THROAT: No hoarseness or change in voice. No excessive gum bleeding.  NODES: Denies swollen glands.  CHEST: Denies SERRANO, cyanosis, wheezing, cough and sputum production.  CARDIOVASCULAR: Denies chest pain, PND, orthopnea or reduced exercise tolerance.  ABDOMEN: Appetite fine. No weight loss. Denies diarrhea, abdominal pain, hematemesis or blood in stool.  URINARY: No flank pain, dysuria or hematuria.  PERIPHERAL VASCULAR: No claudication or cyanosis.  MUSCULOSKELETAL: No joint stiffness or swelling. Denies back pain.  NEUROLOGIC: No history of seizures, paralysis, alteration of gait or coordination      PE: Wt 64.3 kg (141 lb 12.1 oz)   LMP  (LMP Unknown)   BMI 25.93 kg/m²    Exam def  Rev'd the ultrasound she had with primary care md last year  Revd the embx she had and pap  In light of these issues feel hysteroscopy with dnc and poss polyp removal indicated  Discussed procedure in detail ,  along with risks, including uterine injury, need for hysterectomy and other general risks  Pt voices understanding   She wants to proceed  Pt is healthy with no contraindications to surgery    A/P post menopausal bleeding  Proceed with outpatient dnc, with hysteroscopy   I spent a total of 30  minutes on the day of the visit.This includes face to face time and non-face to face time preparing to see the patient (eg, review of tests), obtaining and/or reviewing separately obtained history, documenting clinical information in the electronic or other health record, independently interpreting results and communicating results to the patient/family/caregiver, or care coordinator

## 2024-04-18 ENCOUNTER — TELEPHONE (OUTPATIENT)
Dept: OBSTETRICS AND GYNECOLOGY | Facility: CLINIC | Age: 74
End: 2024-04-18
Payer: MEDICARE

## 2024-04-18 NOTE — TELEPHONE ENCOUNTER
Needs hysteroscopy with dnc,   Post menopause bleeding  Perhaps add to 6th?  Or on a Tuesday at 7 ???  I don't want to wait too long, ??

## 2024-04-23 ENCOUNTER — TELEPHONE (OUTPATIENT)
Dept: OBSTETRICS AND GYNECOLOGY | Facility: CLINIC | Age: 74
End: 2024-04-23
Payer: MEDICARE

## 2024-04-23 ENCOUNTER — TELEPHONE (OUTPATIENT)
Dept: FAMILY MEDICINE | Facility: CLINIC | Age: 74
End: 2024-04-23
Payer: MEDICARE

## 2024-04-23 NOTE — TELEPHONE ENCOUNTER
----- Message from Quentin Hoff MD sent at 4/4/2024  7:55 AM CDT -----  Greetings ,I was able to review your mammogram results and it was normal.  Please let us know if you have any additional questions.

## 2024-04-24 DIAGNOSIS — N95.0 PMB (POSTMENOPAUSAL BLEEDING): Primary | ICD-10-CM

## 2024-04-24 NOTE — TELEPHONE ENCOUNTER
Pt speaks english-called and confirmed that she is able to do the surgery with Dr Wiedemann on Monday 5/6/24. Pt agreed to date but stated the provider told her that he would call the daughter and explain the details for surgery. Informed pt that I would remind the doctor to do so. Pt verbalized understanding.

## 2024-05-03 DIAGNOSIS — I10 ESSENTIAL HYPERTENSION: ICD-10-CM

## 2024-05-03 DIAGNOSIS — E78.5 DYSLIPIDEMIA: ICD-10-CM

## 2024-05-03 RX ORDER — VALSARTAN 40 MG/1
40 TABLET ORAL
Qty: 90 TABLET | Refills: 2 | Status: SHIPPED | OUTPATIENT
Start: 2024-05-03

## 2024-05-03 RX ORDER — ATORVASTATIN CALCIUM 20 MG/1
20 TABLET, FILM COATED ORAL NIGHTLY
Qty: 90 TABLET | Refills: 2 | Status: SHIPPED | OUTPATIENT
Start: 2024-05-03

## 2024-05-03 NOTE — TELEPHONE ENCOUNTER
Refill Decision Note   Amrita Yoder  is requesting a refill authorization.  Brief Assessment and Rationale for Refill:  Approve     Medication Therapy Plan:         Comments:     Note composed:1:37 PM 05/03/2024

## 2024-05-03 NOTE — TELEPHONE ENCOUNTER
----- Message from Kendra Alexander sent at 5/3/2024 10:14 AM CDT -----  Type:  Call back     Who Called:pt     Does the patient know what this is regarding?:Prescriptions   Would the patient rather a call back or a response via [facility]? Call   Best Call Back Number:[phone]  Additional Information: =     Pt is needing a refill but does not know the name of the prescriptions

## 2024-05-03 NOTE — TELEPHONE ENCOUNTER
----- Message from Og Hamilton MA sent at 5/3/2024 10:20 AM CDT -----    ----- Message -----  From: Kendra Alexander  Sent: 5/3/2024  10:15 AM CDT  To: Kavya BARKER Staff    Type:  Call back     Who Called:pt     Does the patient know what this is regarding?:Prescriptions   Would the patient rather a call back or a response via [facility]? Call   Best Call Back Number:[phone]  Additional Information: =     Pt is needing a refill but does not know the name of the prescriptions

## 2024-05-03 NOTE — TELEPHONE ENCOUNTER
----- Message from Og Hamilton MA sent at 5/3/2024 10:20 AM CDT -----    ----- Message -----  From: Kendra Alexander  Sent: 5/3/2024  10:15 AM CDT  To: Kavya BARKER Staff    Type:  Call back     Who Called:pt     Does the patient know what this is regarding?:Prescriptions   Would the patient rather a call back or a response via [facility]? Call   Best Call Back Number:[phone]  Additional Information: =     Pt is needing a refill but does not know the name of the prescriptions

## 2024-05-03 NOTE — TELEPHONE ENCOUNTER
Care Due:                  Date            Visit Type   Department     Provider  --------------------------------------------------------------------------------                                EP -                              PRIMARY      [facility]  Last Visit: 12-      CARE ([facility])   MYRA Hoff                              EP -                              PRIMARY      [facility]  Next Visit: 06-      CARE ([facility])   MYRA Hoff                                                            Last  Test          Frequency    Reason                     Performed    Due Date  --------------------------------------------------------------------------------    Mg Level....  12 months..  magnesium................  Not Found    Overdue    [employer] Embedded Care Due Messages. Reference number: [id number].   5/03/2024 10:08:24 AM CDT

## 2024-05-05 NOTE — H&P
Pt for dnc for  bleeeding        73 y.o.   OB History            2    Para   2    Term   2            AB        Living               SAB        IAB        Ectopic        Multiple        Live Births                     Comlaining of: vaginal bleeding  Pt had this last year, embx x 2 were negative  Pt comes today stating that she had more bleeding in nov/dec  No pain, but passed more blood  Gi and gu good           ROS:  GENERAL: No fever, chills, fatigability or weight loss.  SKIN: No rashes, itching or changes in color or texture of skin.  HEAD: No headaches or recent head trauma.  EYES: Visual acuity fine. No photophobia, ocular pain or diplopia.  EARS: Denies ear pain, discharge or vertigo.  NOSE: No loss of smell, no epistaxis or postnasal drip.  MOUTH & THROAT: No hoarseness or change in voice. No excessive gum bleeding.  NODES: Denies swollen glands.  CHEST: Denies SERRANO, cyanosis, wheezing, cough and sputum production.  CARDIOVASCULAR: Denies chest pain, PND, orthopnea or reduced exercise tolerance.  ABDOMEN: Appetite fine. No weight loss. Denies diarrhea, abdominal pain, hematemesis or blood in stool.  URINARY: No flank pain, dysuria or hematuria.  PERIPHERAL VASCULAR: No claudication or cyanosis.  MUSCULOSKELETAL: No joint stiffness or swelling. Denies back pain.  NEUROLOGIC: No history of seizures, paralysis, alteration of gait or coordination        PE: Wt 64.3 kg (141 lb 12.1 oz)   LMP  (LMP Unknown)   BMI 25.93 kg/m²    Exam def  Rev'd the ultrasound she had with primary care md last year  Revd the embx she had and pap  In light of these issues feel hysteroscopy with dnc and poss polyp removal indicated  Discussed procedure in detail ,  along with risks, including uterine injury, need for hysterectomy and other general risks  Pt voices understanding   She wants to proceed  Pt is healthy with no contraindications to surgery     A/P post menopausal bleeding  Proceed with dnc/,   hysteroscopy

## 2024-05-06 ENCOUNTER — ANESTHESIA (OUTPATIENT)
Dept: SURGERY | Facility: HOSPITAL | Age: 74
End: 2024-05-06
Payer: MEDICARE

## 2024-05-06 ENCOUNTER — HOSPITAL ENCOUNTER (OUTPATIENT)
Facility: HOSPITAL | Age: 74
Discharge: HOME | End: 2024-05-06
Attending: OBSTETRICS & GYNECOLOGY | Admitting: OBSTETRICS & GYNECOLOGY
Payer: MEDICARE

## 2024-05-06 ENCOUNTER — ANESTHESIA EVENT (OUTPATIENT)
Dept: SURGERY | Facility: HOSPITAL | Age: 74
End: 2024-05-06
Payer: MEDICARE

## 2024-05-06 DIAGNOSIS — N95.0 POST-MENOPAUSAL BLEEDING: Primary | ICD-10-CM

## 2024-05-06 PROCEDURE — 37000009 HC ANESTHESIA EA ADD 15 MINS: Performed by: OBSTETRICS & GYNECOLOGY

## 2024-05-06 PROCEDURE — 71000033 HC RECOVERY, INTIAL HOUR: Performed by: OBSTETRICS & GYNECOLOGY

## 2024-05-06 PROCEDURE — 25000003 PHARM REV CODE 250: Performed by: NURSE ANESTHETIST, CERTIFIED REGISTERED

## 2024-05-06 PROCEDURE — 71000015 HC POSTOP RECOV 1ST HR: Performed by: OBSTETRICS & GYNECOLOGY

## 2024-05-06 PROCEDURE — D9220A PRA ANESTHESIA: Mod: ANES,,, | Performed by: UROLOGY

## 2024-05-06 PROCEDURE — 36000706: Performed by: OBSTETRICS & GYNECOLOGY

## 2024-05-06 PROCEDURE — C1782 MORCELLATOR: HCPCS | Performed by: OBSTETRICS & GYNECOLOGY

## 2024-05-06 PROCEDURE — 71000016 HC POSTOP RECOV ADDL HR: Performed by: OBSTETRICS & GYNECOLOGY

## 2024-05-06 PROCEDURE — 58561 HYSTEROSCOPY REMOVE MYOMA: CPT | Mod: ,,, | Performed by: OBSTETRICS & GYNECOLOGY

## 2024-05-06 PROCEDURE — 63600175 PHARM REV CODE 636 W HCPCS: Performed by: NURSE ANESTHETIST, CERTIFIED REGISTERED

## 2024-05-06 PROCEDURE — 37000008 HC ANESTHESIA 1ST 15 MINUTES: Performed by: OBSTETRICS & GYNECOLOGY

## 2024-05-06 PROCEDURE — 88305 TISSUE EXAM BY PATHOLOGIST: CPT | Mod: 26,,, | Performed by: PATHOLOGY

## 2024-05-06 PROCEDURE — 63600175 PHARM REV CODE 636 W HCPCS: Performed by: OBSTETRICS & GYNECOLOGY

## 2024-05-06 PROCEDURE — 36000707: Performed by: OBSTETRICS & GYNECOLOGY

## 2024-05-06 PROCEDURE — D9220A PRA ANESTHESIA: Mod: CRNA,,, | Performed by: NURSE ANESTHETIST, CERTIFIED REGISTERED

## 2024-05-06 PROCEDURE — 88305 TISSUE EXAM BY PATHOLOGIST: CPT | Mod: 59 | Performed by: PATHOLOGY

## 2024-05-06 PROCEDURE — 25000003 PHARM REV CODE 250: Performed by: UROLOGY

## 2024-05-06 RX ORDER — LIDOCAINE HYDROCHLORIDE 10 MG/ML
1 INJECTION, SOLUTION EPIDURAL; INFILTRATION; INTRACAUDAL; PERINEURAL ONCE
Status: DISCONTINUED | OUTPATIENT
Start: 2024-05-06 | End: 2024-05-06 | Stop reason: HOSPADM

## 2024-05-06 RX ORDER — ONDANSETRON HYDROCHLORIDE 2 MG/ML
INJECTION, SOLUTION INTRAVENOUS
Status: DISCONTINUED | OUTPATIENT
Start: 2024-05-06 | End: 2024-05-06

## 2024-05-06 RX ORDER — LIDOCAINE HYDROCHLORIDE 20 MG/ML
INJECTION, SOLUTION EPIDURAL; INFILTRATION; INTRACAUDAL; PERINEURAL
Status: DISCONTINUED | OUTPATIENT
Start: 2024-05-06 | End: 2024-05-06

## 2024-05-06 RX ORDER — ONDANSETRON HYDROCHLORIDE 2 MG/ML
4 INJECTION, SOLUTION INTRAVENOUS DAILY PRN
Status: DISCONTINUED | OUTPATIENT
Start: 2024-05-06 | End: 2024-05-06 | Stop reason: HOSPADM

## 2024-05-06 RX ORDER — PROPOFOL 10 MG/ML
VIAL (ML) INTRAVENOUS
Status: DISCONTINUED | OUTPATIENT
Start: 2024-05-06 | End: 2024-05-06

## 2024-05-06 RX ORDER — DIPHENHYDRAMINE HYDROCHLORIDE 50 MG/ML
25 INJECTION INTRAMUSCULAR; INTRAVENOUS EVERY 4 HOURS PRN
Status: DISCONTINUED | OUTPATIENT
Start: 2024-05-06 | End: 2024-05-06 | Stop reason: HOSPADM

## 2024-05-06 RX ORDER — CEFAZOLIN SODIUM 2 G/50ML
2 SOLUTION INTRAVENOUS
Status: COMPLETED | OUTPATIENT
Start: 2024-05-06 | End: 2024-05-06

## 2024-05-06 RX ORDER — ONDANSETRON 8 MG/1
8 TABLET, ORALLY DISINTEGRATING ORAL EVERY 8 HOURS PRN
Status: DISCONTINUED | OUTPATIENT
Start: 2024-05-06 | End: 2024-05-06 | Stop reason: HOSPADM

## 2024-05-06 RX ORDER — DEXAMETHASONE SODIUM PHOSPHATE 4 MG/ML
INJECTION, SOLUTION INTRA-ARTICULAR; INTRALESIONAL; INTRAMUSCULAR; INTRAVENOUS; SOFT TISSUE
Status: DISCONTINUED | OUTPATIENT
Start: 2024-05-06 | End: 2024-05-06

## 2024-05-06 RX ORDER — FENTANYL CITRATE 50 UG/ML
INJECTION, SOLUTION INTRAMUSCULAR; INTRAVENOUS
Status: DISCONTINUED | OUTPATIENT
Start: 2024-05-06 | End: 2024-05-06

## 2024-05-06 RX ORDER — SODIUM CHLORIDE, SODIUM LACTATE, POTASSIUM CHLORIDE, CALCIUM CHLORIDE 600; 310; 30; 20 MG/100ML; MG/100ML; MG/100ML; MG/100ML
INJECTION, SOLUTION INTRAVENOUS CONTINUOUS
Status: DISCONTINUED | OUTPATIENT
Start: 2024-05-06 | End: 2024-05-06 | Stop reason: HOSPADM

## 2024-05-06 RX ORDER — MIDAZOLAM HYDROCHLORIDE 1 MG/ML
INJECTION INTRAMUSCULAR; INTRAVENOUS
Status: DISCONTINUED | OUTPATIENT
Start: 2024-05-06 | End: 2024-05-06

## 2024-05-06 RX ORDER — DIPHENHYDRAMINE HCL 25 MG
25 CAPSULE ORAL EVERY 4 HOURS PRN
Status: DISCONTINUED | OUTPATIENT
Start: 2024-05-06 | End: 2024-05-06 | Stop reason: HOSPADM

## 2024-05-06 RX ORDER — ACETAMINOPHEN 10 MG/ML
INJECTION, SOLUTION INTRAVENOUS
Status: DISCONTINUED | OUTPATIENT
Start: 2024-05-06 | End: 2024-05-06

## 2024-05-06 RX ORDER — HYDROCODONE BITARTRATE AND ACETAMINOPHEN 5; 325 MG/1; MG/1
1 TABLET ORAL EVERY 4 HOURS PRN
Status: DISCONTINUED | OUTPATIENT
Start: 2024-05-06 | End: 2024-05-06 | Stop reason: HOSPADM

## 2024-05-06 RX ORDER — HYDROMORPHONE HYDROCHLORIDE 2 MG/ML
0.2 INJECTION, SOLUTION INTRAMUSCULAR; INTRAVENOUS; SUBCUTANEOUS EVERY 5 MIN PRN
Status: DISCONTINUED | OUTPATIENT
Start: 2024-05-06 | End: 2024-05-06 | Stop reason: HOSPADM

## 2024-05-06 RX ORDER — OXYCODONE HYDROCHLORIDE 5 MG/1
10 TABLET ORAL EVERY 4 HOURS PRN
Status: DISCONTINUED | OUTPATIENT
Start: 2024-05-06 | End: 2024-05-06 | Stop reason: HOSPADM

## 2024-05-06 RX ORDER — PROCHLORPERAZINE EDISYLATE 5 MG/ML
5 INJECTION INTRAMUSCULAR; INTRAVENOUS EVERY 30 MIN PRN
Status: DISCONTINUED | OUTPATIENT
Start: 2024-05-06 | End: 2024-05-06 | Stop reason: HOSPADM

## 2024-05-06 RX ORDER — OXYCODONE HYDROCHLORIDE 5 MG/1
5 TABLET ORAL
Status: DISCONTINUED | OUTPATIENT
Start: 2024-05-06 | End: 2024-05-06 | Stop reason: HOSPADM

## 2024-05-06 RX ADMIN — LIDOCAINE HYDROCHLORIDE 80 MG: 20 INJECTION, SOLUTION EPIDURAL; INFILTRATION; INTRACAUDAL; PERINEURAL at 11:05

## 2024-05-06 RX ADMIN — MIDAZOLAM HYDROCHLORIDE 2 MG: 1 INJECTION, SOLUTION INTRAMUSCULAR; INTRAVENOUS at 11:05

## 2024-05-06 RX ADMIN — Medication 50 MG: at 11:05

## 2024-05-06 RX ADMIN — ONDANSETRON 4 MG: 2 INJECTION, SOLUTION INTRAMUSCULAR; INTRAVENOUS at 11:05

## 2024-05-06 RX ADMIN — FENTANYL CITRATE 25 MCG: 50 INJECTION INTRAMUSCULAR; INTRAVENOUS at 11:05

## 2024-05-06 RX ADMIN — ACETAMINOPHEN 1000 MG: 10 INJECTION, SOLUTION INTRAVENOUS at 11:05

## 2024-05-06 RX ADMIN — HYPROMELLOSE 2910 2 DROP: 5 SOLUTION OPHTHALMIC at 11:05

## 2024-05-06 RX ADMIN — GLYCOPYRROLATE 0.2 MG: 0.2 INJECTION, SOLUTION INTRAMUSCULAR; INTRAVITREAL at 11:05

## 2024-05-06 RX ADMIN — DEXAMETHASONE SODIUM PHOSPHATE 4 MG: 4 INJECTION, SOLUTION INTRA-ARTICULAR; INTRALESIONAL; INTRAMUSCULAR; INTRAVENOUS; SOFT TISSUE at 11:05

## 2024-05-06 RX ADMIN — CEFAZOLIN SODIUM 2 G: 2 SOLUTION INTRAVENOUS at 11:05

## 2024-05-06 RX ADMIN — SODIUM CHLORIDE, SODIUM LACTATE, POTASSIUM CHLORIDE, AND CALCIUM CHLORIDE: .6; .31; .03; .02 INJECTION, SOLUTION INTRAVENOUS at 10:05

## 2024-05-06 RX ADMIN — Medication 100 MG: at 11:05

## 2024-05-06 RX ADMIN — OXYCODONE 5 MG: 5 TABLET ORAL at 12:05

## 2024-05-06 NOTE — PLAN OF CARE
Patient has met PACU discharge criteria, VSS on room air, pain well controlled. Released from PACU per protocol.

## 2024-05-06 NOTE — BRIEF OP NOTE
May 6  Dx, post menopuase bleeding  Procedure, hysteroscopy, dnc, myomectomy with novasure  Ebl min  No complications  [facility]  -[facility]  - myomectomy specimen ,

## 2024-05-06 NOTE — TRANSFER OF CARE
"Anesthesia Transfer of Care Note    Patient: Amrita Yoder    Procedure(s) Performed: Procedure(s) (LRB):  HYSTEROSCOPY, WITH DILATION AND CURETTAGE OF UTERUS (N/A)    Patient location: PACU    Anesthesia Type: MAC    Transport from OR: Transported from OR on 6-10 L/min O2 by face mask with adequate spontaneous ventilation    Post pain: adequate analgesia    Post assessment: no apparent anesthetic complications    Post vital signs: stable    Level of consciousness: responds to stimulation    Nausea/Vomiting: no nausea/vomiting    Complications: none    Transfer of care protocol was followed      Last vitals: Visit Vitals  /66   Temp 36.8 °C (98.2 °F) (Temporal)   Resp 16   Ht 5' 2" (1.575 m)   Wt 62.6 kg (138 lb)   LMP  (LMP Unknown)   SpO2 96%   Breastfeeding No   BMI 25.24 kg/m²     "

## 2024-05-06 NOTE — ANESTHESIA POSTPROCEDURE EVALUATION
Anesthesia Post Evaluation    Patient: Amrita Yoder    Procedure(s) Performed: Procedure(s) (LRB):  HYSTEROSCOPY, WITH DILATION AND CURETTAGE OF UTERUS (N/A)  MYOMECTOMY (N/A)    Final Anesthesia Type: general      Patient location during evaluation: PACU  Patient participation: Yes- Able to Participate  Level of consciousness: awake and alert and oriented  Post-procedure vital signs: reviewed and stable  Pain management: adequate  Airway patency: patent    PONV status at discharge: No PONV  Anesthetic complications: no      Cardiovascular status: hemodynamically stable  Respiratory status: unassisted  Hydration status: euvolemic  Follow-up not needed.              Vitals Value Taken Time   /83 05/06/24 1219   Temp 36.5 °C (97.7 °F) 05/06/24 1215   Pulse 66 05/06/24 1220   Resp 22 05/06/24 1219   SpO2 96 % 05/06/24 1220   Vitals shown include unfiled device data.      Event Time   Out of Recovery 12:19:47         Pain/Juan Score: Pain Rating Prior to Med Admin: 2 (5/6/2024 12:10 PM)  Pain Rating Post Med Admin: 2 (5/6/2024 12:14 PM)  Juan Score: 10 (5/6/2024 12:15 PM)

## 2024-05-06 NOTE — ANESTHESIA PREPROCEDURE EVALUATION
05/06/2024  Amrita Yoder is a 73 y.o., female with AUB for D+C to r/o endometrial CA    Hx of anesthetics in past without complications  NPO>8 hours  No food or drug allergies  Amenable to proceed with scheduled procedure      Procedure: HYSTEROSCOPY, WITH DILATION AND CURETTAGE OF UTERUS (N/A)         Patient Active Problem List   Diagnosis    Impingement syndrome of right shoulder    Trigger finger of right hand    Degenerative arthritis of finger    Carpal tunnel syndrome on both sides    [place] nodes    Essential hypertension    Trigger middle finger of left hand    Osteoarthritis of finger    Decreased ROM of right shoulder    Decreased strength    Decreased activities of daily living (ADL)    Overweight (BMI 25.0-29.9)    Flu-like symptoms    Chronic right shoulder pain    Pharyngitis    Acute otitis externa of left ear    Upper respiratory tract infection    Pain of left upper extremity    Weakness    Stiffness due to immobility    Major depressive disorder, recurrent, mild    Peripheral vascular disease, unspecified       Past Medical History:   Diagnosis Date    Hyperlipidemia     Hypertension        ECHO: No results found for this or any previous visit.      Body mass index is 25.24 kg/m².    Tobacco Use: Low Risk  (5/6/2024)    Patient History     Smoking Tobacco Use: Never     Smokeless Tobacco Use: Never     Passive Exposure: Not on file       Social History     Substance and Sexual Activity   Drug Use No        Alcohol Use: Not on file       Review of patient's allergies indicates:  No Known Allergies      Airway:  No value filed.         Pre-op Assessment    I have reviewed the Patient Summary Reports.     I have reviewed the Nursing Notes. I have reviewed the NPO Status.   I have reviewed the Medications.     Review of Systems  Anesthesia Hx:  No problems with previous Anesthesia    History of prior surgery of interest to airway management or planning:          Denies Family Hx of Anesthesia complications.    Denies Personal Hx of Anesthesia complications.                    Hematology/Oncology:       -- Anemia:                                  Cardiovascular:     Hypertension          PVD hyperlipidemia     Functional Capacity low / < 4 METS                   Hypertension         Musculoskeletal:  Arthritis               Psych:  Psychiatric History anxiety                 Physical Exam  General: Well nourished, Cooperative, Alert and Oriented    Airway:  Mallampati: I / I  Mouth Opening: Normal  TM Distance: Normal  Neck ROM: Normal ROM    Dental:  Intact, Periodontal disease        Anesthesia Plan  Type of Anesthesia, risks & benefits discussed:    Anesthesia Type: Gen Supraglottic Airway  Intra-op Monitoring Plan: Standard ASA Monitors  Post Op Pain Control Plan: multimodal analgesia and IV/PO Opioids PRN  Induction:  IV  Informed Consent: Informed consent signed with the Patient and all parties understand the risks and agree with anesthesia plan.  All questions answered. Patient consented to blood products? No  ASA Score: 3    Ready For Surgery From Anesthesia Perspective.     .

## 2024-05-07 VITALS
RESPIRATION RATE: 17 BRPM | TEMPERATURE: 98 F | DIASTOLIC BLOOD PRESSURE: 82 MMHG | WEIGHT: 138 LBS | OXYGEN SATURATION: 97 % | BODY MASS INDEX: 25.4 KG/M2 | HEIGHT: 62 IN | HEART RATE: 71 BPM | SYSTOLIC BLOOD PRESSURE: 139 MMHG

## 2024-05-07 LAB
FINAL PATHOLOGIC DIAGNOSIS: NORMAL
GROSS: NORMAL
Lab: NORMAL

## 2024-05-07 NOTE — OP NOTE
May 6  Dx, post menopuase bleeding  Procedure, hysteroscopy, dnc, myomectomy with novasure  Ebl min  No complications  [facility]  -dnc speciemen  - myomectomy specimen ,     The patient was placed under general endotracheal anesthesia prepped and draped in lithotomy.  Bimanual exam showed no masses the weighted speculum was placed and hysteroscopy was done.  The hysteroscopy showed what is believed to be a fibroid emanating from the right lateral endometrium.  There was another area of fluffy endometrial tissue very small in nature near the same area.  Gentle sharp curettage was done yielding a minimal amount of tissue and this was submitted for pathology.  Then under direct hysteroscopic visualization the fibroid was morcellated approximately just over 3/4 the diameter.  This was done with no complications and no active bleeding.  Care was taken not to shave to deep on the fibroid for risk of hemorrhage.  This tissue was also submitted.  The patient tolerated the procedure well there were no complications blood loss was minimal and the patient was taken to recovery in good condition.

## 2024-05-08 NOTE — DISCHARGE SUMMARY
Discharge Summary      Admission date: 5/6/2024  Discharge date: 5/6/2024     Principle Diagnosis: post menopause bleeding  Secondary Diagnosis: same  Discharge Diagnosis: same    Procedures Performed: dnc with hysteroscopy    Hospital Course:  Pt is a 73 y.o. admitted for the above  See op note      Discharge Disposition: Home or Self Care    Patient Instructions:   Discharge Medication List as of 5/6/2024 12:51 PM        START taking these medications    Details   ibuprofen (ADVIL,MOTRIN) 600 MG tablet Take 1 tablet (600 mg total) by mouth every 6 (six) hours as needed for Pain., Starting Mon 5/6/2024, Normal      oxyCODONE-acetaminophen (PERCOCET) 5-325 mg per tablet Take 1 tablet by mouth every 6 (six) hours as needed for Pain., Starting Mon 5/6/2024, Normal           CONTINUE these medications which have NOT CHANGED    Details   valsartan (DIOVAN) 40 MG tablet Take 1 tablet by mouth once daily, Starting Fri 5/3/2024, Normal      atorvastatin (LIPITOR) 20 MG tablet Take 1 tablet by mouth nightly, Starting Fri 5/3/2024, Normal      azelastine (ASTELIN) 137 mcg (0.1 %) nasal spray 1 spray (137 mcg total) by Nasal route 2 (two) times daily., Starting Mon 6/5/2023, Until Tue 6/4/2024, Normal      cetirizine (ZYRTEC) 10 MG tablet Take 1 tablet by mouth once daily, Starting Tue 1/23/2024, Normal      COVID [id number],,andu,,PF, (SPIKEVAX [id number],12Y UP,,PF,) 50 mcg/0.5 mL injection Inject into the muscle., Starting Wed 12/6/2023, Normal      diphenhydramine-calamine (CALAMINE MEDICATED) 1-8 % Lotn Apply topically 2 (two) times daily as needed., Starting Tue 10/31/2023, Normal      gabapentin (NEURONTIN) 100 MG capsule Take 1 capsule (100 mg total) by mouth 2 (two) times daily., Starting Tue 12/5/2023, Until Mon 3/4/2024, Normal      magnesium oxide 400 mg magnesium Tab Take 1 tablet by mouth every evening., Starting Tue 12/5/2023, Normal      montelukast (SINGULAIR) 10 mg tablet TAKE 1 TABLET BY MOUTH ONCE DAILY  IN THE EVENING, Starting Sat 11/25/2023, Normal      multivitamin capsule Take 1 capsule by mouth once daily., Historical Med

## 2024-06-05 ENCOUNTER — OFFICE VISIT (OUTPATIENT)
Dept: FAMILY MEDICINE | Facility: CLINIC | Age: 74
End: 2024-06-05
Payer: MEDICARE

## 2024-06-05 VITALS
SYSTOLIC BLOOD PRESSURE: 108 MMHG | HEIGHT: 62 IN | BODY MASS INDEX: 25.58 KG/M2 | WEIGHT: 139 LBS | OXYGEN SATURATION: 97 % | DIASTOLIC BLOOD PRESSURE: 70 MMHG | HEART RATE: 68 BPM

## 2024-06-05 DIAGNOSIS — M25.552 PAIN OF LEFT HIP: ICD-10-CM

## 2024-06-05 DIAGNOSIS — F33.0 MAJOR DEPRESSIVE DISORDER, RECURRENT, MILD: ICD-10-CM

## 2024-06-05 DIAGNOSIS — J30.1 SEASONAL ALLERGIC RHINITIS DUE TO POLLEN: ICD-10-CM

## 2024-06-05 DIAGNOSIS — I10 ESSENTIAL HYPERTENSION: Primary | ICD-10-CM

## 2024-06-05 DIAGNOSIS — Z78.0 ASYMPTOMATIC MENOPAUSE: ICD-10-CM

## 2024-06-05 DIAGNOSIS — Z12.11 SCREEN FOR COLON CANCER: ICD-10-CM

## 2024-06-05 DIAGNOSIS — I73.9 PERIPHERAL VASCULAR DISEASE, UNSPECIFIED: ICD-10-CM

## 2024-06-05 PROCEDURE — 99999 PR PBB SHADOW E&M-EST. PATIENT-LVL IV: CPT | Mod: PBBFAC,,, | Performed by: FAMILY MEDICINE

## 2024-06-05 PROCEDURE — 3074F SYST BP LT 130 MM HG: CPT | Mod: CPTII,S$GLB,, | Performed by: FAMILY MEDICINE

## 2024-06-05 PROCEDURE — 3008F BODY MASS INDEX DOCD: CPT | Mod: CPTII,S$GLB,, | Performed by: FAMILY MEDICINE

## 2024-06-05 PROCEDURE — 99215 OFFICE O/P EST HI 40 MIN: CPT | Mod: S$GLB,,, | Performed by: FAMILY MEDICINE

## 2024-06-05 PROCEDURE — 4010F ACE/ARB THERAPY RXD/TAKEN: CPT | Mod: CPTII,S$GLB,, | Performed by: FAMILY MEDICINE

## 2024-06-05 PROCEDURE — 1160F RVW MEDS BY RX/DR IN RCRD: CPT | Mod: CPTII,S$GLB,, | Performed by: FAMILY MEDICINE

## 2024-06-05 PROCEDURE — 3288F FALL RISK ASSESSMENT DOCD: CPT | Mod: CPTII,S$GLB,, | Performed by: FAMILY MEDICINE

## 2024-06-05 PROCEDURE — 3078F DIAST BP <80 MM HG: CPT | Mod: CPTII,S$GLB,, | Performed by: FAMILY MEDICINE

## 2024-06-05 PROCEDURE — 1101F PT FALLS ASSESS-DOCD LE1/YR: CPT | Mod: CPTII,S$GLB,, | Performed by: FAMILY MEDICINE

## 2024-06-05 PROCEDURE — 1159F MED LIST DOCD IN RCRD: CPT | Mod: CPTII,S$GLB,, | Performed by: FAMILY MEDICINE

## 2024-06-05 RX ORDER — AZELASTINE 1 MG/ML
1 SPRAY, METERED NASAL 2 TIMES DAILY
Qty: 30 ML | Refills: 3 | Status: SHIPPED | OUTPATIENT
Start: 2024-06-05 | End: 2025-06-05

## 2024-06-05 NOTE — PROGRESS NOTES
Subjective     Patient ID: Amrita Yoder is a 74 y.o. female.    Chief Complaint: Leg Pain and Hypertension    74 years old female who came to the clinic for pressure check.  Blood pressure today was stable.  No chest pain, palpitation, orthopnea or PND.  Patient with left hip decreased motion associated with pain sometimes.  She is requesting refill of the medicine for allergies.  She is due for her colonoscopy.  No flare ups of depression.  Patient requesting medical certification for handicap.  She has not able to walk long distances without pain.    Hypertension  This is a chronic problem. The current episode started more than 1 year ago. The problem is unchanged. Pertinent negatives include no chest pain, orthopnea, palpitations, peripheral edema or PND. There are no associated agents to hypertension. Risk factors for coronary artery disease include post-menopausal state. Past treatments include angiotensin blockers. The current treatment provides significant improvement. There are no compliance problems.  There is no history of angina. There is no history of chronic renal disease, a hypertension causing med or a thyroid problem.   Hip Pain   The incident occurred more than 1 week ago. The incident occurred at home. There was no injury mechanism. The pain is present in the left hip. The pain is at a severity of 4/10. The pain has been Fluctuating since onset. Associated symptoms include a loss of motion. Pertinent negatives include no numbness or tingling. She reports no foreign bodies present. Nothing aggravates the symptoms. She has tried nothing for the symptoms. The treatment provided no relief.     Review of Systems   Constitutional: Negative.    HENT: Negative.     Eyes: Negative.    Respiratory: Negative.     Cardiovascular:  Negative for chest pain, palpitations, orthopnea, leg swelling, PND and claudication.   Gastrointestinal: Negative.    Genitourinary: Negative.    Musculoskeletal: Negative.   Positive for leg pain.   Neurological: Negative.  Negative for tingling and numbness.   Psychiatric/Behavioral: Negative.            Objective     Physical Exam  Constitutional:       General: She is not in acute distress.     Appearance: She is well-developed. She is not diaphoretic.   HENT:      Head: Normocephalic and atraumatic.      Right Ear: External ear normal.      Left Ear: External ear normal.      Nose: Nose normal.      Mouth/Throat:      Pharynx: No oropharyngeal exudate.   Eyes:      General: No scleral icterus.        Right eye: No discharge.         Left eye: No discharge.      Conjunctiva/sclera: Conjunctivae normal.      Pupils: Pupils are equal, round, and reactive to light.   Neck:      Thyroid: No thyromegaly.      Vascular: No JVD.      Trachea: No tracheal deviation.   Cardiovascular:      Rate and Rhythm: Normal rate and regular rhythm.      Heart sounds: Normal heart sounds. No murmur heard.     No friction rub. No gallop.   Pulmonary:      Effort: Pulmonary effort is normal. No respiratory distress.      Breath sounds: Normal breath sounds. No stridor. No wheezing or rales.   Chest:      Chest wall: No tenderness.   Abdominal:      General: Bowel sounds are normal. There is no distension.      Palpations: Abdomen is soft. There is no mass.      Tenderness: There is no abdominal tenderness. There is no guarding or rebound.   Musculoskeletal:      Cervical back: Normal range of motion and neck supple.      Left hip: Tenderness present. Decreased range of motion.   Lymphadenopathy:      Cervical: No cervical adenopathy.   Skin:     General: Skin is warm and dry.      Coloration: Skin is not pale.      Findings: No erythema or rash.   Neurological:      Mental Status: She is alert and oriented to person, place, and time.      Cranial Nerves: No cranial nerve deficit.      Motor: No abnormal muscle tone.      Coordination: Coordination normal.      Deep Tendon Reflexes: Reflexes are normal and  symmetric.   Psychiatric:         Behavior: Behavior normal.         Thought Content: Thought content normal.         Judgment: Judgment normal.            Assessment and Plan     1. Essential hypertension    2. Major depressive disorder, recurrent, mild    3. Peripheral vascular disease, unspecified    4. Seasonal allergic rhinitis due to pollen  -     azelastine (ASTELIN) 137 mcg (0.1 %) nasal spray; 1 spray (137 mcg total) by Nasal route 2 (two) times daily.  Dispense: 30 mL; Refill: 3    5. Pain of left hip  -     X-Ray Hip 2 or 3 views Left with Pelvis when performed; Future; Expected date: 06/05/2024  -     Ambulatory referral/consult to Orthopedics; Future; Expected date: 06/12/2024    6. Asymptomatic menopause  -     DXA Bone Density Axial Skeleton 1 or more sites; Future; Expected date: 06/05/2024    7. Screen for colon cancer  -     Ambulatory referral/consult to Endo Procedure ; Future; Expected date: 06/06/2024        I spent a total of 40 minutes on the day of the visit.This includes face to face time and non-face to face time preparing to see the patient (eg, review of tests), obtaining and/or reviewing separately obtained history, documenting clinical information in the electronic or other health record, independently interpreting results and communicating results to the patient/family/caregiver, or care coordinator.          Follow up in about 6 months (around 12/5/2024), or if symptoms worsen or fail to improve.

## 2024-06-10 ENCOUNTER — PATIENT OUTREACH (OUTPATIENT)
Dept: ADMINISTRATIVE | Facility: HOSPITAL | Age: 74
End: 2024-06-10
Payer: MEDICARE

## 2024-06-10 ENCOUNTER — HOSPITAL ENCOUNTER (OUTPATIENT)
Dept: RADIOLOGY | Facility: HOSPITAL | Age: 74
Discharge: HOME | End: 2024-06-10
Attending: FAMILY MEDICINE
Payer: MEDICARE

## 2024-06-10 DIAGNOSIS — Z78.0 ASYMPTOMATIC MENOPAUSE: ICD-10-CM

## 2024-06-10 DIAGNOSIS — M25.552 PAIN OF LEFT HIP: ICD-10-CM

## 2024-06-10 PROCEDURE — 73502 X-RAY EXAM HIP UNI 2-3 VIEWS: CPT | Mod: TC,FY,LT

## 2024-06-10 PROCEDURE — 77080 DXA BONE DENSITY AXIAL: CPT | Mod: 26,,, | Performed by: RADIOLOGY

## 2024-06-10 PROCEDURE — 77080 DXA BONE DENSITY AXIAL: CPT | Mod: TC

## 2024-06-10 PROCEDURE — 73502 X-RAY EXAM HIP UNI 2-3 VIEWS: CPT | Mod: 26,LT,, | Performed by: RADIOLOGY

## 2024-06-12 NOTE — PROGRESS NOTES
Please notify the patient.    Osteopenia noted or mild weakening of the bones.  No osteoporosis.  Calcium 600 mg and vitamin-D 400 IU twice a day with weight-bearing exercises are recommended.

## 2024-06-13 ENCOUNTER — TELEPHONE (OUTPATIENT)
Dept: ENDOSCOPY | Facility: HOSPITAL | Age: 74
End: 2024-06-13

## 2024-06-13 DIAGNOSIS — Z12.11 SCREEN FOR COLON CANCER: Primary | ICD-10-CM

## 2024-06-13 NOTE — TELEPHONE ENCOUNTER
Spoke to pt about scheduling colonoscopy. Pt wanting only [place] location. Will schedule for PAT when August schedule is available.

## 2024-06-20 ENCOUNTER — TELEPHONE (OUTPATIENT)
Dept: ENDOSCOPY | Facility: HOSPITAL | Age: 74
End: 2024-06-20

## 2024-06-20 ENCOUNTER — CLINICAL SUPPORT (OUTPATIENT)
Dept: ENDOSCOPY | Facility: HOSPITAL | Age: 74
End: 2024-06-20
Attending: FAMILY MEDICINE
Payer: MEDICARE

## 2024-06-20 DIAGNOSIS — Z12.11 SCREEN FOR COLON CANCER: ICD-10-CM

## 2024-06-20 NOTE — PLAN OF CARE
Contacted the patient With  ID# [id number] to schedule an endoscopy procedure(s) Colonoscopy. The patient did not answer the call and left a voice message requesting a call back.

## 2024-06-20 NOTE — TELEPHONE ENCOUNTER
Contacted the patient With  ID# [id number] to schedule an endoscopy procedure(s) Colonoscopy. The patient did not answer the call and left a voice message requesting a call back.

## 2024-06-26 NOTE — PROGRESS NOTES
Subjective:     Amrita Yoder     Chief Complaint   Patient presents with    Left Hip - Pain     HPI    Amrita is a 74 y.o. female coming in today for left hip pain that began about 2 year(s) ago, referred by Dr. Hoff. Pt. describes the pain as a 9/10 achy pain that does radiate to her thigh. There was not a fall/injury/ or trauma associated with the onset of symptoms. The pain is better with nothing and worse with mornings, sitting too long, walking. She notes a hard time getting in and out of the car. Pt. Denies any other musculoskeletal complaints at this time. Pt was recently diagnosed with osteopenia- has started calcium and vitamin D.     Joint instability? no  Mechanical locking/clicking? + pinching  Affecting ADL's? yes  Affecting sleep? no    Occupation: retired    This encounter was done using a telephonic . The patient was given the opportunity to ask questions and everything was answered to her satisfaction. She voiced understanding of diagnosis and plan.   ID : [id number]     Review of Systems   Constitutional:  Negative for chills and fever.   Musculoskeletal:  Positive for joint pain. Negative for back pain, falls, myalgias and neck pain.   Neurological:  Negative for dizziness, tingling, focal weakness, weakness and headaches.     PAST MEDICAL HISTORY:   Past Medical History:   Diagnosis Date    Hyperlipidemia     Hypertension      PAST SURGICAL HISTORY:   Past Surgical History:   Procedure Laterality Date    CARPAL TUNNEL RELEASE  05/25/2020    Dr. Marie    CATARACT EXTRACTION W/  INTRAOCULAR LENS IMPLANT Bilateral     CYSTOSCOPY N/A 03/23/2022    Procedure: CYSTOSCOPY;  Surgeon: Usha Mario MD;  Location: [facility] OR;  Service: Urology;  Laterality: N/A;    HYSTERECTOMY      HYSTEROSCOPY WITH DILATION AND CURETTAGE OF UTERUS N/A 5/6/2024    Procedure: HYSTEROSCOPY, WITH DILATION AND CURETTAGE OF UTERUS;  Surgeon: Wiedemann, Michael A., MD;  Location: [facility] OR;   Service: OB/GYN;  Laterality: N/A;    MYOMECTOMY N/A 2024    Procedure: MYOMECTOMY;  Surgeon: Wiedemann, Michael A., MD;  Location: [facility] OR;  Service: OB/GYN;  Laterality: N/A;    TRIGGER FINGER RELEASE Left 2018    Procedure: RELEASE, TRIGGER FINGER left long;  Surgeon: Zoe Winkler MD;  Location: [facility] OR;  Service: Orthopedics;  Laterality: Left;  stretcher, supine, hand pan 1 and pan 2     FAMILY HISTORY:   Family History   Adopted: Yes     SOCIAL HISTORY:   Social History     Socioeconomic History    Marital status: Single   Tobacco Use    Smoking status: Never    Smokeless tobacco: Never   Substance and Sexual Activity    Alcohol use: No    Drug use: No     MEDICATIONS:   Current Outpatient Medications:     cetirizine (ZYRTEC) 10 MG tablet, Take 10 mg by mouth once daily., Disp: , Rfl:     atorvastatin (LIPITOR) 20 MG tablet, Take 1 tablet by mouth nightly, Disp: 90 tablet, Rfl: 2    azelastine (ASTELIN) 137 mcg (0.1 %) nasal spray, 1 spray (137 mcg total) by Nasal route 2 (two) times daily., Disp: 30 mL, Rfl: 3    celecoxib (CELEBREX) 200 MG capsule, Take 1 capsule (200 mg total) by mouth 2 (two) times daily., Disp: 60 capsule, Rfl: 0    gabapentin (NEURONTIN) 100 MG capsule, Take 1 capsule (100 mg total) by mouth 2 (two) times daily. (Patient not taking: Reported on 2024), Disp: 180 capsule, Rfl: 0    magnesium oxide 400 mg magnesium Tab, Take 1 tablet by mouth every evening., Disp: 90 tablet, Rfl: 0    valsartan (DIOVAN) 40 MG tablet, Take 1 tablet by mouth once daily, Disp: 90 tablet, Rfl: 2    ALLERGIES: Review of patient's allergies indicates:  No Known Allergies    IMAGIN. X-ray ordered due to left hip pain. (AP pelvis and frogleg lateral  left views) taken 6/10/24.   2. X-ray images were reviewed personally by me and then directly with patient.  3. FINDINGS: There is DJD and impingement change   4. IMPRESSION: No pathology or irregularities appreciated.     Objective:      VITAL SIGNS: /83   LMP  (LMP Unknown)    General    Nursing note and vitals reviewed.  Constitutional: She is oriented to person, place, and time. She appears well-developed and well-nourished.   HENT:   Head: Normocephalic and atraumatic.   no nasal discharge, no external ear redness or discharge   Eyes:   EOM is full and smooth  No eye redness or discharge   Neck: Neck supple. No tracheal deviation present.   Cardiovascular:  Normal rate.            2+ Radial pulse bilaterally  2+ Dorsalis Pedis pulse bilaterally  No LE edema appreciated   Pulmonary/Chest: Effort normal. No respiratory distress.   Abdominal: She exhibits no distension.   No rigidity   Neurological: She is alert and oriented to person, place, and time. She exhibits normal muscle tone. Coordination normal.   See details below   Psychiatric: She has a normal mood and affect. Her behavior is normal.           MUSCULOSKELETAL EXAM  HIP: left HIP  The affected hip is compared to the contralateral hip.    Observation:    There is no edema, erythema, or ecchymosis in the lumbosacral region.   There is no Trendelenburg sign on either side  No obvious pelvic obliquity while standing.    No thoracolumbar scoliosis observed.    No midline skin abnormalities.  No atrophy noted in the lower limbs.  Gait: Left antalgic with Neutral ankle mechanics and Neutral medial arch    ROM (* = with pain):  Passive hip flexion to 100° on left* and 120° on right  Passive hip internal rotation to 10° on left* and 25° on right  Passive hip external rotation to 15° on left* and 35° on right   Passive hip abduction to 25° on left* and 45° on right    Tenderness To Palpation:  No tenderness at the ASIS, AIIS, PSIS, PIIS, iliac crest, pubic bones, ischial tuberosity.  No tenderness throughout the lumbar spine, iliolumbar region, or posterior pelvis.  No tenderness throughout the sacrum or piriformis  No tenderness over the greater trochanteric bursa or greater/lesser  trochanters.  No tenderness at the glut attachments on the greater trochanter  No tenderness over proximal IT band or hip flexor musculature.    Strength Testing (* = with pain):  Hip flexion - 5/5 on left and 5/5 on right  Hip extension - 5/5 on left and 5/5 on right  Hip adduction - 5/5 on left and 5/5 on right  Hip abduction - 5/5 on left and 5/5 on right  Knee flexion - 5/5 on left and 5/5 on right  Knee extension - 5/5 on left and 5/5 on right    Special Tests:  Standing Trendelenburg test - negative    Seated straight leg raise - negative  Supine straight leg raise - negative  Hamstring flexibility symmetric    Log roll - positive  ZAKIA -positive  FADIR-  positive  Scour test - positive  No pain with posterior hip capsule compression    Negative heel tap on left    ASIS compression test - negative  SI drawer test - negative   Thigh thrust test - negative     Piriformis test (Bonnet's) - negative  Ely's test - negative  Quadriceps flexibility symmetric.  Jamin test - negative  Kurt compression test - negative    Fulcrum test - negative    Neurovascular Exam:  2+ femoral, DP, and PT pulses BL.  No skin changes, no abnormal hair distribution.  Sensation intact to light touch throughout the obturator and medial/lateral/posterior femoral cutaneous nerves.  2+/4 reflexes at L4 and S1 dermatomes  Capillary refill intact to <2 seconds in all lower limb digits.    Assessment:      Encounter Diagnoses   Name Primary?    Pain of left hip Yes    Primary osteoarthritis of left hip     Primary osteoarthritis of right hip         Plan:   1. Chronic left hip pain likely secondary to underlying DJD changes as noted on prior x-ray.  Asymptomatic right hip DJD changes.  - Rx given for Celebrex 200 mg po BID x 7 days, then  to qday for 7 days, if pain improves, then prn for pain control. Pt. Advised to avoid all other NSAIDS while on this medication.  - discussed option of ultrasound-guided left hip intra-articular CSI as  next step if symptoms persist  - discussed with patient the only permanent treatment for left hip arthritis would be a total hip arthroplasty, however patient would like to avoid this if possible.    -  X-ray images of left hip taken 6/10/24 (AP pelvis and frogleg lateral  left views) showed There is moderate DJD and impingement change. Images were personally reviewed with patient.    2. Follow-up in 3 weeks for reevaluation    3. Patient agreeable to today's plan and all questions were answered    This note is dictated using the M*Modal Fluency Direct word recognition program. There are word recognition mistakes that are occasionally missed on review.

## 2024-06-27 ENCOUNTER — OFFICE VISIT (OUTPATIENT)
Dept: SPORTS MEDICINE | Facility: CLINIC | Age: 74
End: 2024-06-27
Payer: MEDICARE

## 2024-06-27 VITALS — DIASTOLIC BLOOD PRESSURE: 83 MMHG | SYSTOLIC BLOOD PRESSURE: 136 MMHG

## 2024-06-27 DIAGNOSIS — M25.552 PAIN OF LEFT HIP: Primary | ICD-10-CM

## 2024-06-27 DIAGNOSIS — M16.12 PRIMARY OSTEOARTHRITIS OF LEFT HIP: ICD-10-CM

## 2024-06-27 DIAGNOSIS — M16.11 PRIMARY OSTEOARTHRITIS OF RIGHT HIP: ICD-10-CM

## 2024-06-27 PROCEDURE — 99999 PR PBB SHADOW E&M-EST. PATIENT-LVL II: CPT | Mod: PBBFAC,,, | Performed by: NEUROMUSCULOSKELETAL MEDICINE & OMM

## 2024-06-27 RX ORDER — CELECOXIB 200 MG/1
200 CAPSULE ORAL 2 TIMES DAILY
Qty: 60 CAPSULE | Refills: 0 | Status: SHIPPED | OUTPATIENT
Start: 2024-06-27 | End: 2024-07-27

## 2024-06-27 RX ORDER — CETIRIZINE HYDROCHLORIDE 10 MG/1
10 TABLET ORAL DAILY
COMMUNITY

## 2024-07-16 NOTE — PROGRESS NOTES
"Subjective:     Amrita Yoder     Chief Complaint   Patient presents with    Left Hip - Pain     HPI    Amrita is a 74 y.o. female coming in today for left hip pain. Since last visit the pain has Improved. Pt reports less pain but c/o "pulling" at her lateral thigh and swelling in her knee over the past 2 weeks. Pt is compliant with HEP. She is taking celebrex once daily with good relief. The pain is better with nothing and worse with mornings, sitting too long, walking.  Pt. describes the pain as a 4/10 achy pain that does not radiate. There has not been any new a fall/injury/ or traumas since last visit.  Pt. denies any new musculoskeletal complaints at this time. Pt. Denied having an interrupter today.     Office note from 6/27/24 reviewed    Joint instability? no  Mechanical locking/clicking? + pinching  Affecting ADL's? yes  Affecting sleep? no    Occupation: retired      PAST MEDICAL HISTORY:   Past Medical History:   Diagnosis Date    Hyperlipidemia     Hypertension      PAST SURGICAL HISTORY:   Past Surgical History:   Procedure Laterality Date    CARPAL TUNNEL RELEASE  05/25/2020    Dr. Marie    CATARACT EXTRACTION W/  INTRAOCULAR LENS IMPLANT Bilateral     CYSTOSCOPY N/A 03/23/2022    Procedure: CYSTOSCOPY;  Surgeon: Usha Mario MD;  Location: [facility] OR;  Service: Urology;  Laterality: N/A;    HYSTERECTOMY      HYSTEROSCOPY WITH DILATION AND CURETTAGE OF UTERUS N/A 5/6/2024    Procedure: HYSTEROSCOPY, WITH DILATION AND CURETTAGE OF UTERUS;  Surgeon: Wiedemann, Michael A., MD;  Location: [facility] OR;  Service: OB/GYN;  Laterality: N/A;    MYOMECTOMY N/A 5/6/2024    Procedure: MYOMECTOMY;  Surgeon: Wiedemann, Michael A., MD;  Location: [facility] OR;  Service: OB/GYN;  Laterality: N/A;    TRIGGER FINGER RELEASE Left 11/02/2018    Procedure: RELEASE, TRIGGER FINGER left long;  Surgeon: Zoe Winkler MD;  Location: [facility] OR;  Service: Orthopedics;  Laterality: Left;  stretcher, supine, hand pan 1 and pan " "2     FAMILY HISTORY:   Family History   Adopted: Yes     SOCIAL HISTORY:   Social History     Socioeconomic History    Marital status: Single   Tobacco Use    Smoking status: Never    Smokeless tobacco: Never   Substance and Sexual Activity    Alcohol use: No    Drug use: No     MEDICATIONS:   Current Outpatient Medications:     atorvastatin (LIPITOR) 20 MG tablet, Take 1 tablet by mouth nightly, Disp: 90 tablet, Rfl: 2    azelastine (ASTELIN) 137 mcg (0.1 %) nasal spray, 1 spray (137 mcg total) by Nasal route 2 (two) times daily., Disp: 30 mL, Rfl: 3    celecoxib (CELEBREX) 200 MG capsule, Take 1 capsule (200 mg total) by mouth 2 (two) times daily., Disp: 60 capsule, Rfl: 0    cetirizine (ZYRTEC) 10 MG tablet, Take 10 mg by mouth once daily., Disp: , Rfl:     magnesium oxide 400 mg magnesium Tab, Take 1 tablet by mouth every evening., Disp: 90 tablet, Rfl: 0    valsartan (DIOVAN) 40 MG tablet, Take 1 tablet by mouth once daily, Disp: 90 tablet, Rfl: 2    gabapentin (NEURONTIN) 100 MG capsule, Take 1 capsule (100 mg total) by mouth 2 (two) times daily. (Patient not taking: Reported on 6/5/2024), Disp: 180 capsule, Rfl: 0  No current facility-administered medications for this visit.    ALLERGIES: Review of patient's allergies indicates:  No Known Allergies    Objective:     VITAL SIGNS: /76   Ht 5' 2" (1.575 m)   Wt 63 kg (138 lb 14.2 oz)   LMP  (LMP Unknown)   BMI 25.40 kg/m²    General    Vitals reviewed.  Constitutional: She is oriented to person, place, and time. She appears well-developed and well-nourished.   Neurological: She is alert and oriented to person, place, and time.   Psychiatric: She has a normal mood and affect. Her behavior is normal.           MUSCULOSKELETAL EXAM  HIP: left HIP  The affected hip is compared to the contralateral hip.    Observation:    There is no edema, erythema, or ecchymosis in the lumbosacral region.   There is no Trendelenburg sign on either side  No obvious pelvic " obliquity while standing.    No thoracolumbar scoliosis observed.    No midline skin abnormalities.  No atrophy noted in the lower limbs.  Gait: Left antalgic with Neutral ankle mechanics and Neutral medial arch    ROM (* = with pain):  Passive hip flexion to 80° on left* with guarding and 120° on right  Passive hip internal rotation to 10° on left* and 25° on right  Passive hip external rotation to 15° on left* and 35° on right   Passive hip abduction to 15° on left* and 45° on right    Tenderness To Palpation:  No tenderness at the ASIS, AIIS, PSIS, PIIS, iliac crest, pubic bones, ischial tuberosity.  No tenderness throughout the lumbar spine, iliolumbar region, or posterior pelvis.  No tenderness throughout the sacrum or piriformis  No tenderness over the greater trochanteric bursa or greater/lesser trochanters.  No tenderness at the glut attachments on the greater trochanter  No tenderness over proximal IT band or hip flexor musculature.    Strength Testing (* = with pain):  Hip flexion - 5/5 on left* and 5/5 on right  Hip extension - 5/5 on left and 5/5 on right  Hip adduction - 5/5 on left and 5/5 on right  Hip abduction - 5/5 on left and 5/5 on right  Knee flexion - 5/5 on left and 5/5 on right  Knee extension - 5/5 on left and 5/5 on right    Special Tests:  Standing Trendelenburg test - negative    Seated straight leg raise - negative  Supine straight leg raise - negative  Hamstring flexibility symmetric    Log roll - positive  ZAKIA -positive  FADIR-  positive  Scour test - positive  No pain with posterior hip capsule compression    Negative heel tap on left    ASIS compression test - negative  SI drawer test - negative   Thigh thrust test - negative     Piriformis test (Bonnet's) - negative  Ely's test - negative  Quadriceps flexibility symmetric.  Jamin test - negative  Kurt compression test - negative    Fulcrum test - negative    Neurovascular Exam:  2+ femoral, DP, and PT pulses BL.  No skin changes,  no abnormal hair distribution.  Sensation intact to light touch throughout the obturator and medial/lateral/posterior femoral cutaneous nerves.  2+/4 reflexes at L4 and S1 dermatomes  Capillary refill intact to <2 seconds in all lower limb digits.      left KNEE EXAMINATION   Affected side is compared to contralateral knee     Observation:  No edema, erythema, ecchymosis, or effusion noted.  No muscle atrophy of the thighs and calves noted.  No obvious bony deformities noted.   No Genu valgus/varum noted.  No recurvatum noted.    No tibial internal/external torsion.    Gait: Left antalgic with Neutral ankle mechanics and Neutral medial arch    Tenderness:  Patella - none    Lateral joint line - none  Quad tendon - none   Medial joint line - +  Patellar tendon - none  Medial plica - none  Tibial tubercle - none   Lateral plica - none  Pes anserine - none   MCL prox - none  Distal ITB - none   MCL distal - none  MFC - none    LCL prox - none  LFC - none    LCL distal - none  Tibia - none    Fibula - none    No obvious bursae, plicae, popliteal cysts, or tendon derangement palpated.          ROM (* = with pain):   Active extension to 0°* on left without hyperextension, lag, crepitus, or patellar J sign.   Active extension to 0° on right without hyperextension, lag, crepitus, or patellar J sign.  Active flexion to 135°* on left and 135° on right    Strength(* = with pain):  Knee Flexion - 5/5 on left and 5/5 on right  Knee Extension - 5/5 on left and 5/5 on right  Hip Flexion - 5/5 on left and 5/5 on right  Hip Extension - 5/5 on left and 5/5 on right  Ankle dorsiflexion - 5/5 on left and 5/5 on right  Ankle Plantarflexion - 5/5 on left and 5/5 on right    Patellofemoral Exam:   Patellar ballottement - negative  Bulge sign - negative  Patellar grind - negative    No patellar laxity with medial and lateral translation   No apprehension with medial and lateral patellar translation.     Meniscus Testing:     + pain with  terminal extension and flexion at medial joint line.    Ligament Testing:  Lachman's test - negative  No laxity with anterior drawer.  No laxity with posterior drawer.    No posterior sag sign.   No laxity with varus testing at 0 and 30 degrees.  No laxity with valgus testing at 0 and 30 degrees.    IT band testing:  Jamins test - negative   Noble Compression test - negative    Neurovascular Examination:   Sensation intact to light touch in the obturator, lateral/intermediate/medial/posterior femoral cutaneous, saphenous, and common peroneal nerves bilaterally.  Motor Function:    Fully intact motor function at hip, knee, foot and ankle.  Negative supine straight leg raise bilaterally.    Pulses intact at the DP and PT arteries bilaterally.    Capillary refill intact <2 seconds in all toes bilaterally.    Large Joint Aspiration/Injection  Hip joint, left    Performed by: GREY DE  Authorized by: GREY DE  Consent Done?: Yes (Verbal)  Indications: Pain  Site marked: The procedure site was marked   Timeout: Prior to procedure the correct patient, procedure, and site was verified     Location: Hip joint, left  Prep: Patient was prepped with Chlorhexidine and alcohol.  Skin anesthetic: Ethyl Chloride spray was used prior to skin puncture.  Ultrasound Guidance for needle placement: Yes  Needle size: 22 G, 3.5  Approach: Anterior  Procedure: After skin anesthetic was applied, the 22G, 3.5 needle was used to enter the left hip joint capsule under US guidance. A 3 cc mixture of 1 cc of 40 mg/ml triamcinolone acetonide and 2 cc of 0.2% Naropin was injected into the left hip joint.   Medications: 40 mg triamcinolone acetonide 40 mg/mL  Patient tolerance: Patient tolerated the procedure well with no immediate complications    Description of ultrasound utilization for needle guidance:    Ultrasound guidance was used for needle localization with Markado Edge 2, C1-5 MHz probe(s). Images were saved and stored  "for documentation. The left hip joint was visualized. Dynamic visualization of the 22g 3.5" needle(s) was continuous throughout the procedure and maintained good position and correct needle placement.      Triamcinolone:  NDC: [address]-6  LOT: [id number]  EXP: 2025       IMAGIN. X-ray ordered due to left knee. (AP bilateral standing, PA bilateral standing in flexion, bilateral merchants, and  bilateral lateral views) taken today.   2. X-ray images were reviewed personally by me and then directly with patient.  3. FINDINGS: X-ray images obtained demonstrate Mild DJD. The medial tibiofemoral joint space on the left side is slightly narrowed. No other significant joint space narrowing. No fracture or dislocation. No bone destruction identified   4. IMPRESSION: Kellgren-Ever grade 2 OA bilaterally      Assessment:      Encounter Diagnoses   Name Primary?    Left knee pain     Primary osteoarthritis of left hip Yes    Chronic left hip pain [M25.552, G89.29]         Plan:   1. Chronic left hip pain likely secondary to underlying DJD changes as noted on prior x-ray- only slight improvement with rx'ed NSAIDs.  Asymptomatic right hip DJD changes.  - Patient received an ultrasound guided corticosteroid injection of the left hip today (see details above).   - Continue Celebrex 200 mg po qday  prn for pain control. Pt. Advised to avoid all other NSAIDS while on this medication.  -  X-ray images of left hip taken 6/10/24 (AP pelvis and frogleg lateral  left views) showed There is moderate DJD and impingement change. Images were personally reviewed with patient.    2. Left knee pain secondary to mild DJD changes as noted on x-ray as well as referred pain from left hip OA  - Discussed conservative therapy of OA with NSAIDs as needed, future injection therapy (corticosteroid, viscosupplementation, and PRP), and Ice up to 20 minutes at a time  - ontinue Celebrex 200 mg po qday  prn for pain control. Pt. Advised to " avoid all other NSAIDS while on this medication.  - discussed option of diagnostic/therapeutic left knee ultrasound-guided CSI as next step if left knee pain persist  -  X-ray images of left knee taken today (AP, lateral, and oblique views) showed Kellgren-Ever grade 2 OA bilaterally. Images were personally reviewed with patient.    3. Follow-up as needed if pain deteriorates or new issue arises    4. Patient agreeable to today's plan and all questions were answered    This note is dictated using the M*Modal Fluency Direct word recognition program. There are word recognition mistakes that are occasionally missed on review.    Total time spent face-to face with patient counseling or coordinating care including prognosis, differential diagnosis, risks and benefits of treatment, instructions, compliance risk reductions as well as non-face-to-face time personally spent reviewing medial record, medical documentation, and coordination of care.     EST MINUTES X   72836 10-19    15149 20-29    99669 30-39    31000 40-54 x   NEW     56139 15-29    51575 30-44    73343 45-59    51944 60-74    PHONE      5-10    [id number] 11-20    67445 21-30

## 2024-07-17 ENCOUNTER — OFFICE VISIT (OUTPATIENT)
Dept: SPORTS MEDICINE | Facility: CLINIC | Age: 74
End: 2024-07-17
Payer: MEDICARE

## 2024-07-17 ENCOUNTER — HOSPITAL ENCOUNTER (OUTPATIENT)
Dept: RADIOLOGY | Facility: HOSPITAL | Age: 74
Discharge: HOME | End: 2024-07-17
Attending: NEUROMUSCULOSKELETAL MEDICINE & OMM
Payer: MEDICARE

## 2024-07-17 VITALS
BODY MASS INDEX: 25.55 KG/M2 | WEIGHT: 138.88 LBS | DIASTOLIC BLOOD PRESSURE: 76 MMHG | SYSTOLIC BLOOD PRESSURE: 117 MMHG | HEIGHT: 62 IN

## 2024-07-17 DIAGNOSIS — M17.12 PRIMARY OSTEOARTHRITIS OF LEFT KNEE: ICD-10-CM

## 2024-07-17 DIAGNOSIS — G89.29 CHRONIC LEFT HIP PAIN: ICD-10-CM

## 2024-07-17 DIAGNOSIS — M25.562 ACUTE PAIN OF LEFT KNEE: ICD-10-CM

## 2024-07-17 DIAGNOSIS — M25.562 LEFT KNEE PAIN: ICD-10-CM

## 2024-07-17 DIAGNOSIS — M25.552 CHRONIC LEFT HIP PAIN: ICD-10-CM

## 2024-07-17 DIAGNOSIS — M16.12 PRIMARY OSTEOARTHRITIS OF LEFT HIP: Primary | ICD-10-CM

## 2024-07-17 PROCEDURE — 4010F ACE/ARB THERAPY RXD/TAKEN: CPT | Mod: CPTII,S$GLB,, | Performed by: NEUROMUSCULOSKELETAL MEDICINE & OMM

## 2024-07-17 PROCEDURE — 1159F MED LIST DOCD IN RCRD: CPT | Mod: CPTII,S$GLB,, | Performed by: NEUROMUSCULOSKELETAL MEDICINE & OMM

## 2024-07-17 PROCEDURE — 73564 X-RAY EXAM KNEE 4 OR MORE: CPT | Mod: 26,50,, | Performed by: RADIOLOGY

## 2024-07-17 PROCEDURE — 1101F PT FALLS ASSESS-DOCD LE1/YR: CPT | Mod: CPTII,S$GLB,, | Performed by: NEUROMUSCULOSKELETAL MEDICINE & OMM

## 2024-07-17 PROCEDURE — 3008F BODY MASS INDEX DOCD: CPT | Mod: CPTII,S$GLB,, | Performed by: NEUROMUSCULOSKELETAL MEDICINE & OMM

## 2024-07-17 PROCEDURE — 3288F FALL RISK ASSESSMENT DOCD: CPT | Mod: CPTII,S$GLB,, | Performed by: NEUROMUSCULOSKELETAL MEDICINE & OMM

## 2024-07-17 PROCEDURE — 1160F RVW MEDS BY RX/DR IN RCRD: CPT | Mod: CPTII,S$GLB,, | Performed by: NEUROMUSCULOSKELETAL MEDICINE & OMM

## 2024-07-17 PROCEDURE — 73564 X-RAY EXAM KNEE 4 OR MORE: CPT | Mod: TC,50,PO

## 2024-07-17 PROCEDURE — 3074F SYST BP LT 130 MM HG: CPT | Mod: CPTII,S$GLB,, | Performed by: NEUROMUSCULOSKELETAL MEDICINE & OMM

## 2024-07-17 PROCEDURE — 99215 OFFICE O/P EST HI 40 MIN: CPT | Mod: 25,S$GLB,, | Performed by: NEUROMUSCULOSKELETAL MEDICINE & OMM

## 2024-07-17 PROCEDURE — 20611 DRAIN/INJ JOINT/BURSA W/US: CPT | Mod: LT,S$GLB,, | Performed by: NEUROMUSCULOSKELETAL MEDICINE & OMM

## 2024-07-17 PROCEDURE — 1125F AMNT PAIN NOTED PAIN PRSNT: CPT | Mod: CPTII,S$GLB,, | Performed by: NEUROMUSCULOSKELETAL MEDICINE & OMM

## 2024-07-17 PROCEDURE — 99999 PR PBB SHADOW E&M-EST. PATIENT-LVL III: CPT | Mod: PBBFAC,,, | Performed by: NEUROMUSCULOSKELETAL MEDICINE & OMM

## 2024-07-17 PROCEDURE — 3078F DIAST BP <80 MM HG: CPT | Mod: CPTII,S$GLB,, | Performed by: NEUROMUSCULOSKELETAL MEDICINE & OMM

## 2024-07-17 RX ORDER — TRIAMCINOLONE ACETONIDE 40 MG/ML
40 INJECTION, SUSPENSION INTRA-ARTICULAR; INTRAMUSCULAR
Status: COMPLETED | OUTPATIENT
Start: 2024-07-17 | End: 2024-07-17

## 2024-07-17 RX ADMIN — TRIAMCINOLONE ACETONIDE 40 MG: 40 INJECTION, SUSPENSION INTRA-ARTICULAR; INTRAMUSCULAR at 10:07

## 2024-12-06 ENCOUNTER — OFFICE VISIT (OUTPATIENT)
Dept: FAMILY MEDICINE | Facility: CLINIC | Age: 74
End: 2024-12-06
Payer: MEDICARE

## 2024-12-06 VITALS
OXYGEN SATURATION: 98 % | HEIGHT: 62 IN | DIASTOLIC BLOOD PRESSURE: 88 MMHG | BODY MASS INDEX: 25.76 KG/M2 | SYSTOLIC BLOOD PRESSURE: 112 MMHG | WEIGHT: 140 LBS | HEART RATE: 58 BPM

## 2024-12-06 DIAGNOSIS — J30.1 SEASONAL ALLERGIC RHINITIS DUE TO POLLEN: ICD-10-CM

## 2024-12-06 DIAGNOSIS — E78.5 DYSLIPIDEMIA: ICD-10-CM

## 2024-12-06 DIAGNOSIS — E78.5 HYPERLIPIDEMIA, UNSPECIFIED HYPERLIPIDEMIA TYPE: ICD-10-CM

## 2024-12-06 DIAGNOSIS — I10 ESSENTIAL HYPERTENSION: ICD-10-CM

## 2024-12-06 DIAGNOSIS — M85.80 SENILE OSTEOPENIA: ICD-10-CM

## 2024-12-06 DIAGNOSIS — M25.552 PAIN OF LEFT HIP: ICD-10-CM

## 2024-12-06 DIAGNOSIS — Z23 NEEDS FLU SHOT: Primary | ICD-10-CM

## 2024-12-06 PROCEDURE — 4010F ACE/ARB THERAPY RXD/TAKEN: CPT | Mod: CPTII,S$GLB,, | Performed by: FAMILY MEDICINE

## 2024-12-06 PROCEDURE — 3288F FALL RISK ASSESSMENT DOCD: CPT | Mod: CPTII,S$GLB,, | Performed by: FAMILY MEDICINE

## 2024-12-06 PROCEDURE — 99999 PR PBB SHADOW E&M-EST. PATIENT-LVL III: CPT | Mod: PBBFAC,,, | Performed by: FAMILY MEDICINE

## 2024-12-06 PROCEDURE — G0008 ADMIN INFLUENZA VIRUS VAC: HCPCS | Mod: S$GLB,,, | Performed by: FAMILY MEDICINE

## 2024-12-06 PROCEDURE — 1159F MED LIST DOCD IN RCRD: CPT | Mod: CPTII,S$GLB,, | Performed by: FAMILY MEDICINE

## 2024-12-06 PROCEDURE — 99215 OFFICE O/P EST HI 40 MIN: CPT | Mod: S$GLB,,, | Performed by: FAMILY MEDICINE

## 2024-12-06 PROCEDURE — 3079F DIAST BP 80-89 MM HG: CPT | Mod: CPTII,S$GLB,, | Performed by: FAMILY MEDICINE

## 2024-12-06 PROCEDURE — 1160F RVW MEDS BY RX/DR IN RCRD: CPT | Mod: CPTII,S$GLB,, | Performed by: FAMILY MEDICINE

## 2024-12-06 PROCEDURE — 3074F SYST BP LT 130 MM HG: CPT | Mod: CPTII,S$GLB,, | Performed by: FAMILY MEDICINE

## 2024-12-06 PROCEDURE — 90653 IIV ADJUVANT VACCINE IM: CPT | Mod: S$GLB,,, | Performed by: FAMILY MEDICINE

## 2024-12-06 PROCEDURE — 3008F BODY MASS INDEX DOCD: CPT | Mod: CPTII,S$GLB,, | Performed by: FAMILY MEDICINE

## 2024-12-06 PROCEDURE — 1101F PT FALLS ASSESS-DOCD LE1/YR: CPT | Mod: CPTII,S$GLB,, | Performed by: FAMILY MEDICINE

## 2024-12-06 RX ORDER — LEVOCETIRIZINE DIHYDROCHLORIDE 5 MG/1
5 TABLET, FILM COATED ORAL NIGHTLY
Qty: 30 TABLET | Refills: 0 | Status: SHIPPED | OUTPATIENT
Start: 2024-12-06 | End: 2025-01-05

## 2024-12-06 RX ORDER — ATORVASTATIN CALCIUM 20 MG/1
20 TABLET, FILM COATED ORAL NIGHTLY
Qty: 90 TABLET | Refills: 3 | Status: SHIPPED | OUTPATIENT
Start: 2024-12-06

## 2024-12-06 RX ORDER — VALSARTAN 40 MG/1
40 TABLET ORAL DAILY
Qty: 90 TABLET | Refills: 3 | Status: SHIPPED | OUTPATIENT
Start: 2024-12-06

## 2024-12-07 NOTE — PROGRESS NOTES
Subjective     Patient ID: Amrita Yoder is a 74 y.o. female.    Chief Complaint:  Hypertension and hyperlipidemia  74 years old female came to the clinic for blood pressure check.  Pressure today was borderline.  Patient with good compliance with cholesterol regimen.    Hypertension  This is a chronic problem. The current episode started more than 1 year ago. The problem is unchanged. The problem is controlled. Pertinent negatives include no chest pain, palpitations or peripheral edema. There are no associated agents to hypertension. Risk factors for coronary artery disease include post-menopausal state. Past treatments include angiotensin blockers. The current treatment provides significant improvement. There are no compliance problems.  There is no history of angina. There is no history of a hypertension causing med or a thyroid problem.   Hyperlipidemia  This is a chronic problem. The problem is controlled. Recent lipid tests were reviewed and are normal. Pertinent negatives include no chest pain or myalgias. Current antihyperlipidemic treatment includes statins. There are no compliance problems.  Risk factors for coronary artery disease include hypertension and post-menopausal.     Review of Systems   Constitutional: Negative.    HENT: Negative.     Eyes: Negative.    Respiratory: Negative.     Cardiovascular:  Negative for chest pain and palpitations.   Gastrointestinal: Negative.    Genitourinary: Negative.    Musculoskeletal: Negative.  Negative for myalgias.   Neurological: Negative.    Psychiatric/Behavioral: Negative.            Objective     Physical Exam  Constitutional:       General: She is not in acute distress.     Appearance: She is well-developed. She is not diaphoretic.   HENT:      Head: Normocephalic and atraumatic.      Right Ear: External ear normal.      Left Ear: External ear normal.      Nose: Nose normal.      Mouth/Throat:      Pharynx: No oropharyngeal exudate.   Eyes:      General:  No scleral icterus.        Right eye: No discharge.         Left eye: No discharge.      Conjunctiva/sclera: Conjunctivae normal.      Pupils: Pupils are equal, round, and reactive to light.   Neck:      Thyroid: No thyromegaly.      Vascular: No JVD.      Trachea: No tracheal deviation.   Cardiovascular:      Rate and Rhythm: Normal rate and regular rhythm.      Heart sounds: Normal heart sounds. No murmur heard.     No friction rub. No gallop.   Pulmonary:      Effort: Pulmonary effort is normal. No respiratory distress.      Breath sounds: Normal breath sounds. No stridor. No wheezing or rales.   Chest:      Chest wall: No tenderness.   Abdominal:      General: Bowel sounds are normal. There is no distension.      Palpations: Abdomen is soft. There is no mass.      Tenderness: There is no abdominal tenderness. There is no guarding or rebound.   Musculoskeletal:         General: No tenderness. Normal range of motion.      Cervical back: Normal range of motion and neck supple.   Lymphadenopathy:      Cervical: No cervical adenopathy.   Skin:     General: Skin is warm and dry.      Coloration: Skin is not pale.      Findings: No erythema or rash.   Neurological:      Mental Status: She is alert and oriented to person, place, and time.      Cranial Nerves: No cranial nerve deficit.      Motor: No abnormal muscle tone.      Coordination: Coordination normal.      Deep Tendon Reflexes: Reflexes are normal and symmetric.   Psychiatric:         Behavior: Behavior normal.         Thought Content: Thought content normal.         Judgment: Judgment normal.            Assessment and Plan     1. Needs flu shot  -     influenza (adjuvanted) (Fluad) 45 mcg/0.5 mL IM vaccine (> or = 64 yo) 0.5 mL    2. Essential hypertension  -     Urinalysis; Future  -     Comprehensive Metabolic Panel; Future; Expected date: 12/06/2024  -     Lipid Panel; Future; Expected date: 12/06/2024  -     TSH; Future; Expected date: 12/06/2024  -      CBC Auto Differential; Future; Expected date: 12/06/2024  -     valsartan (DIOVAN) 40 MG tablet; Take 1 tablet (40 mg total) by mouth once daily.  Dispense: 90 tablet; Refill: 3    3. Hyperlipidemia, unspecified hyperlipidemia type  -     Comprehensive Metabolic Panel; Future; Expected date: 12/06/2024  -     Lipid Panel; Future; Expected date: 12/06/2024    4. Senile osteopenia  -     Comprehensive Metabolic Panel; Future; Expected date: 12/06/2024    5. Pain of left hip  -     Ambulatory referral/consult to Sports Medicine; Future; Expected date: 12/13/2024    6. Dyslipidemia  -     atorvastatin (LIPITOR) 20 MG tablet; Take 1 tablet (20 mg total) by mouth every evening.  Dispense: 90 tablet; Refill: 3    7. Seasonal allergic rhinitis due to pollen  -     levocetirizine (XYZAL) 5 MG tablet; Take 1 tablet (5 mg total) by mouth every evening.  Dispense: 30 tablet; Refill: 0        I spent a total of 42 minutes on the day of the visit.This includes face to face time and non-face to face time preparing to see the patient (eg, review of tests), obtaining and/or reviewing separately obtained history, documenting clinical information in the electronic or other health record, independently interpreting results and communicating results to the patient/family/caregiver, or care coordinator.          Follow up in about 6 months (around 6/6/2025), or if symptoms worsen or fail to improve.

## 2024-12-09 ENCOUNTER — LAB VISIT (OUTPATIENT)
Dept: LAB | Facility: HOSPITAL | Age: 74
End: 2024-12-09
Attending: FAMILY MEDICINE
Payer: MEDICARE

## 2024-12-09 DIAGNOSIS — E78.5 HYPERLIPIDEMIA, UNSPECIFIED HYPERLIPIDEMIA TYPE: ICD-10-CM

## 2024-12-09 DIAGNOSIS — M85.80 SENILE OSTEOPENIA: ICD-10-CM

## 2024-12-09 DIAGNOSIS — I10 ESSENTIAL HYPERTENSION: ICD-10-CM

## 2024-12-09 LAB
ALBUMIN SERPL BCP-MCNC: 3.4 G/DL (ref 3.5–5.2)
ALP SERPL-CCNC: 78 U/L (ref 40–150)
ALT SERPL W/O P-5'-P-CCNC: 18 U/L (ref 10–44)
ANION GAP SERPL CALC-SCNC: 8 MMOL/L (ref 8–16)
AST SERPL-CCNC: 19 U/L (ref 10–40)
BASOPHILS # BLD AUTO: 0.05 K/UL (ref 0–0.2)
BASOPHILS NFR BLD: 0.7 % (ref 0–1.9)
BILIRUB SERPL-MCNC: 0.4 MG/DL (ref 0.1–1)
BUN SERPL-MCNC: 14 MG/DL (ref 8–23)
CALCIUM SERPL-MCNC: 8.9 MG/DL (ref 8.7–10.5)
CHLORIDE SERPL-SCNC: 109 MMOL/L (ref 95–110)
CHOLEST SERPL-MCNC: 199 MG/DL (ref 120–199)
CHOLEST/HDLC SERPL: 4.1 {RATIO} (ref 2–5)
CO2 SERPL-SCNC: 25 MMOL/L (ref 23–29)
CREAT SERPL-MCNC: 0.7 MG/DL (ref 0.5–1.4)
DIFFERENTIAL METHOD BLD: ABNORMAL
EOSINOPHIL # BLD AUTO: 0.2 K/UL (ref 0–0.5)
EOSINOPHIL NFR BLD: 2.7 % (ref 0–8)
ERYTHROCYTE [DISTWIDTH] IN BLOOD BY AUTOMATED COUNT: 11.8 % (ref 11.5–14.5)
EST. GFR  (NO RACE VARIABLE): >60 ML/MIN/1.73 M^2
GLUCOSE SERPL-MCNC: 80 MG/DL (ref 70–110)
HCT VFR BLD AUTO: 39.2 % (ref 37–48.5)
HDLC SERPL-MCNC: 49 MG/DL (ref 40–75)
HDLC SERPL: 24.6 % (ref 20–50)
HGB BLD-MCNC: 12.5 G/DL (ref 12–16)
IMM GRANULOCYTES # BLD AUTO: 0.01 K/UL (ref 0–0.04)
IMM GRANULOCYTES NFR BLD AUTO: 0.1 % (ref 0–0.5)
LDLC SERPL CALC-MCNC: 126.2 MG/DL (ref 63–159)
LYMPHOCYTES # BLD AUTO: 3.1 K/UL (ref 1–4.8)
LYMPHOCYTES NFR BLD: 45.8 % (ref 18–48)
MCH RBC QN AUTO: 30.9 PG (ref 27–31)
MCHC RBC AUTO-ENTMCNC: 31.9 G/DL (ref 32–36)
MCV RBC AUTO: 97 FL (ref 82–98)
MONOCYTES # BLD AUTO: 0.6 K/UL (ref 0.3–1)
MONOCYTES NFR BLD: 8.9 % (ref 4–15)
NEUTROPHILS # BLD AUTO: 2.8 K/UL (ref 1.8–7.7)
NEUTROPHILS NFR BLD: 41.8 % (ref 38–73)
NONHDLC SERPL-MCNC: 150 MG/DL
NRBC BLD-RTO: 0 /100 WBC
PLATELET # BLD AUTO: 282 K/UL (ref 150–450)
PMV BLD AUTO: 9.4 FL (ref 9.2–12.9)
POTASSIUM SERPL-SCNC: 3.6 MMOL/L (ref 3.5–5.1)
PROT SERPL-MCNC: 6.5 G/DL (ref 6–8.4)
RBC # BLD AUTO: 4.05 M/UL (ref 4–5.4)
SODIUM SERPL-SCNC: 142 MMOL/L (ref 136–145)
TRIGL SERPL-MCNC: 119 MG/DL (ref 30–150)
TSH SERPL DL<=0.005 MIU/L-ACNC: 1.84 UIU/ML (ref 0.4–4)
WBC # BLD AUTO: 6.72 K/UL (ref 3.9–12.7)

## 2024-12-09 PROCEDURE — 85025 COMPLETE CBC W/AUTO DIFF WBC: CPT | Performed by: FAMILY MEDICINE

## 2024-12-09 PROCEDURE — 84443 ASSAY THYROID STIM HORMONE: CPT | Performed by: FAMILY MEDICINE

## 2024-12-09 PROCEDURE — 80061 LIPID PANEL: CPT | Performed by: FAMILY MEDICINE

## 2024-12-09 PROCEDURE — 36415 COLL VENOUS BLD VENIPUNCTURE: CPT | Mod: PO | Performed by: FAMILY MEDICINE

## 2024-12-09 PROCEDURE — 80053 COMPREHEN METABOLIC PANEL: CPT | Performed by: FAMILY MEDICINE

## 2024-12-09 NOTE — PROGRESS NOTES
Please notify the patient.   Thyroid test was normal.   Normal cholesterol and triglycerides .  No anemia or infection.  Normal kidney and liver function.  Normal blood sugar.

## 2024-12-14 ENCOUNTER — OFFICE VISIT (OUTPATIENT)
Dept: SPORTS MEDICINE | Facility: CLINIC | Age: 74
End: 2024-12-14
Payer: MEDICARE

## 2024-12-14 VITALS
BODY MASS INDEX: 25.19 KG/M2 | SYSTOLIC BLOOD PRESSURE: 130 MMHG | HEART RATE: 55 BPM | WEIGHT: 136.88 LBS | DIASTOLIC BLOOD PRESSURE: 76 MMHG | HEIGHT: 62 IN

## 2024-12-14 DIAGNOSIS — M25.552 PAIN OF LEFT HIP: ICD-10-CM

## 2024-12-14 DIAGNOSIS — M16.12 PRIMARY OSTEOARTHRITIS OF LEFT HIP: Primary | ICD-10-CM

## 2024-12-14 PROCEDURE — 99214 OFFICE O/P EST MOD 30 MIN: CPT | Mod: 25,S$GLB,, | Performed by: NEUROMUSCULOSKELETAL MEDICINE & OMM

## 2024-12-14 PROCEDURE — 4010F ACE/ARB THERAPY RXD/TAKEN: CPT | Mod: CPTII,S$GLB,, | Performed by: NEUROMUSCULOSKELETAL MEDICINE & OMM

## 2024-12-14 PROCEDURE — 3078F DIAST BP <80 MM HG: CPT | Mod: CPTII,S$GLB,, | Performed by: NEUROMUSCULOSKELETAL MEDICINE & OMM

## 2024-12-14 PROCEDURE — 3288F FALL RISK ASSESSMENT DOCD: CPT | Mod: CPTII,S$GLB,, | Performed by: NEUROMUSCULOSKELETAL MEDICINE & OMM

## 2024-12-14 PROCEDURE — 1160F RVW MEDS BY RX/DR IN RCRD: CPT | Mod: CPTII,S$GLB,, | Performed by: NEUROMUSCULOSKELETAL MEDICINE & OMM

## 2024-12-14 PROCEDURE — 1101F PT FALLS ASSESS-DOCD LE1/YR: CPT | Mod: CPTII,S$GLB,, | Performed by: NEUROMUSCULOSKELETAL MEDICINE & OMM

## 2024-12-14 PROCEDURE — 20611 DRAIN/INJ JOINT/BURSA W/US: CPT | Mod: LT,S$GLB,, | Performed by: NEUROMUSCULOSKELETAL MEDICINE & OMM

## 2024-12-14 PROCEDURE — 99999 PR PBB SHADOW E&M-EST. PATIENT-LVL III: CPT | Mod: PBBFAC,,, | Performed by: NEUROMUSCULOSKELETAL MEDICINE & OMM

## 2024-12-14 PROCEDURE — 3008F BODY MASS INDEX DOCD: CPT | Mod: CPTII,S$GLB,, | Performed by: NEUROMUSCULOSKELETAL MEDICINE & OMM

## 2024-12-14 PROCEDURE — 1159F MED LIST DOCD IN RCRD: CPT | Mod: CPTII,S$GLB,, | Performed by: NEUROMUSCULOSKELETAL MEDICINE & OMM

## 2024-12-14 PROCEDURE — 1125F AMNT PAIN NOTED PAIN PRSNT: CPT | Mod: CPTII,S$GLB,, | Performed by: NEUROMUSCULOSKELETAL MEDICINE & OMM

## 2024-12-14 PROCEDURE — 3075F SYST BP GE 130 - 139MM HG: CPT | Mod: CPTII,S$GLB,, | Performed by: NEUROMUSCULOSKELETAL MEDICINE & OMM

## 2024-12-14 RX ORDER — TRIAMCINOLONE ACETONIDE 40 MG/ML
40 INJECTION, SUSPENSION INTRA-ARTICULAR; INTRAMUSCULAR
Status: COMPLETED | OUTPATIENT
Start: 2024-12-14 | End: 2024-12-14

## 2024-12-14 RX ADMIN — TRIAMCINOLONE ACETONIDE 40 MG: 40 INJECTION, SUSPENSION INTRA-ARTICULAR; INTRAMUSCULAR at 11:12

## 2024-12-14 NOTE — PROGRESS NOTES
Subjective:     Amrita Yoder     Chief Complaint   Patient presents with    Left Hip - Pain     HPI    Amrita is a 74 y.o. female coming in today for left hip pain. Since last visit the pain has Improved with CSI but deteriorated over the past month Pt reports pain in her posteriolateral hip and lateral thigh. Pt is compliant with HEP. She is not taking celebrex. The pain is better with CSI and worse with standing, walking, sitting.  Pt. describes the pain as a 9/10 achy pain that does not radiate. There has not been any new a fall/injury/ or traumas since last visit.  Pt. denies any new musculoskeletal complaints at this time. Pt. Denied having an interrupter today.     Office note from 7/17/24 reviewed    Joint instability? no  Mechanical locking/clicking? + pinching  Affecting ADL's? yes  Affecting sleep? no    Occupation: retired    Procedures reviewed:   7/17/24- CSI iaHip, left- good relief x 4 months    PAST MEDICAL HISTORY:   Past Medical History:   Diagnosis Date    Hyperlipidemia     Hypertension      PAST SURGICAL HISTORY:   Past Surgical History:   Procedure Laterality Date    CARPAL TUNNEL RELEASE  05/25/2020    Dr. Marie    CATARACT EXTRACTION W/  INTRAOCULAR LENS IMPLANT Bilateral     CYSTOSCOPY N/A 03/23/2022    Procedure: CYSTOSCOPY;  Surgeon: Usha Mario MD;  Location: [facility] OR;  Service: Urology;  Laterality: N/A;    HYSTERECTOMY      HYSTEROSCOPY WITH DILATION AND CURETTAGE OF UTERUS N/A 5/6/2024    Procedure: HYSTEROSCOPY, WITH DILATION AND CURETTAGE OF UTERUS;  Surgeon: Wiedemann, Michael A., MD;  Location: [facility] OR;  Service: OB/GYN;  Laterality: N/A;    MYOMECTOMY N/A 5/6/2024    Procedure: MYOMECTOMY;  Surgeon: Wiedemann, Michael A., MD;  Location: [facility] OR;  Service: OB/GYN;  Laterality: N/A;    TRIGGER FINGER RELEASE Left 11/02/2018    Procedure: RELEASE, TRIGGER FINGER left long;  Surgeon: Zoe Winkler MD;  Location: [facility] OR;  Service: Orthopedics;  Laterality: Left;   "stretcher, supine, hand pan 1 and pan 2     FAMILY HISTORY:   Family History   Adopted: Yes     SOCIAL HISTORY:   Social History     Socioeconomic History    Marital status: Single   Tobacco Use    Smoking status: Never    Smokeless tobacco: Never   Substance and Sexual Activity    Alcohol use: No    Drug use: No     MEDICATIONS:   Current Outpatient Medications:     atorvastatin (LIPITOR) 20 MG tablet, Take 1 tablet (20 mg total) by mouth every evening., Disp: 90 tablet, Rfl: 3    azelastine (ASTELIN) 137 mcg (0.1 %) nasal spray, 1 spray (137 mcg total) by Nasal route 2 (two) times daily., Disp: 30 mL, Rfl: 3    levocetirizine (XYZAL) 5 MG tablet, Take 1 tablet (5 mg total) by mouth every evening., Disp: 30 tablet, Rfl: 0    magnesium oxide 400 mg magnesium Tab, Take 1 tablet by mouth every evening., Disp: 90 tablet, Rfl: 0    valsartan (DIOVAN) 40 MG tablet, Take 1 tablet (40 mg total) by mouth once daily., Disp: 90 tablet, Rfl: 3    gabapentin (NEURONTIN) 100 MG capsule, Take 1 capsule (100 mg total) by mouth 2 (two) times daily. (Patient not taking: Reported on 6/5/2024), Disp: 180 capsule, Rfl: 0  No current facility-administered medications for this visit.    ALLERGIES: Review of patient's allergies indicates:  No Known Allergies    Objective:     VITAL SIGNS: /76   Pulse (!) 55   Ht 5' 2" (1.575 m)   Wt 62.1 kg (136 lb 14.5 oz)   LMP  (LMP Unknown)   BMI 25.04 kg/m²    General    Vitals reviewed.  Constitutional: She is oriented to person, place, and time. She appears well-developed and well-nourished.   Neurological: She is alert and oriented to person, place, and time.   Psychiatric: She has a normal mood and affect. Her behavior is normal.           MUSCULOSKELETAL EXAM  HIP: left HIP  The affected hip is compared to the contralateral hip.    Observation:    There is no edema, erythema, or ecchymosis in the lumbosacral region.   There is no Trendelenburg sign on either side  No obvious pelvic " obliquity while standing.    No thoracolumbar scoliosis observed.    No midline skin abnormalities.  No atrophy noted in the lower limbs.  Gait: Left antalgic with Neutral ankle mechanics and Neutral medial arch    ROM (* = with pain):  Passive hip flexion to 100° on left* with guarding and 120° on right  Passive hip internal rotation to 15° on left* and 25° on right  Passive hip external rotation to 25° on left* and 35° on right   Passive hip abduction to 25° on left* and 45° on right    Tenderness To Palpation:  No tenderness at the ASIS, AIIS, PSIS, PIIS, iliac crest, pubic bones, ischial tuberosity.  No tenderness throughout the lumbar spine, iliolumbar region, or posterior pelvis.  No tenderness throughout the sacrum or piriformis  No tenderness over the greater trochanteric bursa or greater/lesser trochanters.  No tenderness at the glut attachments on the greater trochanter  No tenderness over proximal IT band or hip flexor musculature.    Strength Testing (* = with pain):  Hip flexion - 5/5 on left* and 5/5 on right  Hip extension - 5/5 on left and 5/5 on right  Hip adduction - 5/5 on left and 5/5 on right  Hip abduction - 5/5 on left and 5/5 on right  Knee flexion - 5/5 on left and 5/5 on right  Knee extension - 5/5 on left and 5/5 on right    Special Tests:  Standing Trendelenburg test - negative    Seated straight leg raise - negative  Supine straight leg raise - negative  Hamstring flexibility symmetric    Log roll - positive  ZAKIA -positive  FADIR-  positive  Scour test - positive  No pain with posterior hip capsule compression    Negative heel tap on left    ASIS compression test - negative  SI drawer test - negative   Thigh thrust test - negative     Piriformis test (Bonnet's) - negative  Ely's test - negative  Quadriceps flexibility symmetric.  Jamin test - negative  Kurt compression test - negative    Fulcrum test - negative    Neurovascular Exam:  2+ femoral, DP, and PT pulses BL.  No skin changes,  "no abnormal hair distribution.  Sensation intact to light touch throughout the obturator and medial/lateral/posterior femoral cutaneous nerves.  2+/4 reflexes at L4 and S1 dermatomes  Capillary refill intact to <2 seconds in all lower limb digits.    Large Joint Aspiration/Injection  Hip joint, left    Performed by: GREY DE  Authorized by: GREY DE  Consent Done?: Yes (Verbal)  Indications: Pain  Site marked: The procedure site was marked   Timeout: Prior to procedure the correct patient, procedure, and site was verified     Location: Hip joint, left  Prep: Patient was prepped with Chlorhexidine and alcohol.  Skin anesthetic: Ethyl Chloride spray was used prior to skin puncture.  Ultrasound Guidance for needle placement: Yes  Needle size: 22 G, 3.5  Approach: Anterior  Procedure: After skin anesthetic was applied, the 22G, 3.5 needle was used to enter the left hip joint capsule under US guidance. A 3 cc mixture of 1 cc of 40 mg/ml triamcinolone acetonide and 2 cc of 0.2% Naropin was injected into the left hip joint.   Medications: 40 mg triamcinolone acetonide 40 mg/mL  Patient tolerance: Patient tolerated the procedure well with no immediate complications    Description of ultrasound utilization for needle guidance:    Ultrasound guidance was used for needle localization with [employer] Edge 2, C1-5 MHz probe(s). Images were saved and stored for documentation. The left hip joint was visualized. Dynamic visualization of the 22g 3.5" needle(s) was continuous throughout the procedure and maintained good position and correct needle placement.      Triamcinolone:  NDC: [address]-9  LOT: [id number]  EXP: 07/2026    Assessment:      Encounter Diagnoses   Name Primary?    Primary osteoarthritis of left hip Yes    Pain of left hip       Plan:   1. Chronic left hip pain likely secondary to underlying DJD changes as noted on prior x-ray- significant previous relief with prior left intra-articular CSI on " 07/17/2024.  Asymptomatic right hip DJD changes.  - Patient received a repeaty ultrasound guided corticosteroid injection of the left hip today (see details above).   - Continue Celebrex 200 mg po qday  prn for pain control. Pt. Advised to avoid all other NSAIDS while on this medication.  - discussed option of continued conservative treatment of her left hip OA with a left hip PRP vs. Repeat CSI injection versus referral to orthopedic surgery for TAMMIE consultation.  - EDUCATION FOR PRP:    Education provided on the benefits, adverse effects, likely course of treatment and improvement, and post-injection expectations from PRP.  It generally takes 1-3 treatments to have long standing resolution. We reviewed that initially after treatment, pain may become worse and this is an expected response. We instructed that improvement may be experienced within the first two weeks and that most of the patient's improvement will come between weeks 6 and 12. If there is no improvement, we would not repeat. If less than 90% improvement is experienced at 12 weeks, we would likely repeat.Amrita was given a PRP information handout.     After face to face conversation, Amrita expressed understanding that $400 vs. $800 is due at time of check in for each ACP vs. PRP procedure. Patient is aware that this procedure is not covered by insurance companies and does not qualify for financial assistance. Payment can be made via cash, check, debit or credit card.     -  X-ray images of left hip taken 6/10/24 (AP pelvis and frogleg lateral  left views) showed  moderate DJD and impingement change. Images were personally reviewed with patient.    2. Follow-up as needed if pain deteriorates or new issue arises    3. Patient agreeable to today's plan and all questions were answered    This note is dictated using the M*Modal Fluency Direct word recognition program. There are word recognition mistakes that are occasionally missed on  review.

## 2025-02-03 ENCOUNTER — TELEPHONE (OUTPATIENT)
Dept: SPORTS MEDICINE | Facility: CLINIC | Age: 75
End: 2025-02-03
Payer: MEDICARE

## 2025-02-03 DIAGNOSIS — M79.605 LEFT LEG PAIN: Primary | ICD-10-CM

## 2025-02-03 NOTE — TELEPHONE ENCOUNTER
"Pt's daughter states last night pt was in severe pain- describes "squeezing" and sharp pain in her hip and pain radiation down L LE. This does feel different that her prior hip pain. Daughter states pt was unable to lie down to sleep comfortably. Advised pt should be seen for follow up to determine if this is worsening hip OA vs lumbar spine pathology. Sched f/u appt with lumbar spine XR. Confirmed appt date, time and location and patient's daughter states understanding.       Norma Marks, MS, ATC, OTC  Clinical Assistant to Dr. Cris Enriquez    "

## 2025-02-03 NOTE — TELEPHONE ENCOUNTER
----- Message from Heidi sent at 2/3/2025  1:01 PM CST -----  Name of Caller: lionel Lakhani      Nature of Call: requesting a call back     Best Call Back Number: [phone]     Additional Information:  Amrita Yoder daughter Lesvia calling regarding Patient Advice for wanting to speak to the doctor about the injections that the PT has had in the pass and wanted to see about the pain of care moving forward and to talk about options for surgery. please call daughter to assist

## 2025-02-04 NOTE — PROGRESS NOTES
"  Subjective:     Amrita Yoder     No chief complaint on file.    GABY Crowe is a 74 y.o. female coming in today for left hip pain. Since last visit the pain has Improved with CSI but deteriorated recently with new onset radicular lowee extremity pain and "squeezing". ***  The pain is better with CSI and worse with standing, walking, sitting.  Pt. describes the pain as a 9/10 achy pain that does not radiate. There has not been any new a fall/injury/ or traumas since last visit.  Pt. denies any new musculoskeletal complaints at this time. Pt. Denied having an interrupter today.     Office note from 12/14/24 reviewed    Joint instability? no  Mechanical locking/clicking? + pinching  Affecting ADL's? yes  Affecting sleep? no    Occupation: retired    Procedures reviewed:   7/17/24- CSI iaHip, left- good relief x 4 months  12/14/24- CSI iaHip, left- *** relief    PAST MEDICAL HISTORY:   Past Medical History:   Diagnosis Date    Hyperlipidemia     Hypertension      PAST SURGICAL HISTORY:   Past Surgical History:   Procedure Laterality Date    CARPAL TUNNEL RELEASE  05/25/2020    Dr. Marie    CATARACT EXTRACTION W/  INTRAOCULAR LENS IMPLANT Bilateral     CYSTOSCOPY N/A 03/23/2022    Procedure: CYSTOSCOPY;  Surgeon: Usha Mario MD;  Location: [facility] OR;  Service: Urology;  Laterality: N/A;    HYSTERECTOMY      HYSTEROSCOPY WITH DILATION AND CURETTAGE OF UTERUS N/A 5/6/2024    Procedure: HYSTEROSCOPY, WITH DILATION AND CURETTAGE OF UTERUS;  Surgeon: Wiedemann, Michael A., MD;  Location: [facility] OR;  Service: OB/GYN;  Laterality: N/A;    MYOMECTOMY N/A 5/6/2024    Procedure: MYOMECTOMY;  Surgeon: Wiedemann, Michael A., MD;  Location: [facility] OR;  Service: OB/GYN;  Laterality: N/A;    TRIGGER FINGER RELEASE Left 11/02/2018    Procedure: RELEASE, TRIGGER FINGER left long;  Surgeon: Zoe Winkler MD;  Location: [facility] OR;  Service: Orthopedics;  Laterality: Left;  stretcher, supine, hand pan 1 and pan 2     FAMILY " HISTORY:   Family History   Adopted: Yes     SOCIAL HISTORY:   Social History     Socioeconomic History    Marital status: Single   Tobacco Use    Smoking status: Never    Smokeless tobacco: Never   Substance and Sexual Activity    Alcohol use: No    Drug use: No     MEDICATIONS:   Current Outpatient Medications:     atorvastatin (LIPITOR) 20 MG tablet, Take 1 tablet (20 mg total) by mouth every evening., Disp: 90 tablet, Rfl: 3    azelastine (ASTELIN) 137 mcg (0.1 %) nasal spray, 1 spray (137 mcg total) by Nasal route 2 (two) times daily., Disp: 30 mL, Rfl: 3    gabapentin (NEURONTIN) 100 MG capsule, Take 1 capsule (100 mg total) by mouth 2 (two) times daily. (Patient not taking: Reported on 6/5/2024), Disp: 180 capsule, Rfl: 0    levocetirizine (XYZAL) 5 MG tablet, Take 1 tablet (5 mg total) by mouth every evening., Disp: 30 tablet, Rfl: 0    magnesium oxide 400 mg magnesium Tab, Take 1 tablet by mouth every evening., Disp: 90 tablet, Rfl: 0    valsartan (DIOVAN) 40 MG tablet, Take 1 tablet (40 mg total) by mouth once daily., Disp: 90 tablet, Rfl: 3    ALLERGIES: Review of patient's allergies indicates:  No Known Allergies    Objective:     VITAL SIGNS: LMP  (LMP Unknown)    General    Vitals reviewed.  Constitutional: She is oriented to person, place, and time. She appears well-developed and well-nourished.   Neurological: She is alert and oriented to person, place, and time.   Psychiatric: She has a normal mood and affect. Her behavior is normal.           MUSCULOSKELETAL EXAM  HIP: left HIP  The affected hip is compared to the contralateral hip.    Observation:    There is no edema, erythema, or ecchymosis in the lumbosacral region.   There is no Trendelenburg sign on either side  No obvious pelvic obliquity while standing.    No thoracolumbar scoliosis observed.    No midline skin abnormalities.  No atrophy noted in the lower limbs.  Gait: Left antalgic with Neutral ankle mechanics and Neutral medial  arch    ROM (* = with pain):  Passive hip flexion to 100° on left* with guarding and 120° on right  Passive hip internal rotation to 15° on left* and 25° on right  Passive hip external rotation to 25° on left* and 35° on right   Passive hip abduction to 25° on left* and 45° on right    Tenderness To Palpation:  No tenderness at the ASIS, AIIS, PSIS, PIIS, iliac crest, pubic bones, ischial tuberosity.  No tenderness throughout the lumbar spine, iliolumbar region, or posterior pelvis.  No tenderness throughout the sacrum or piriformis  No tenderness over the greater trochanteric bursa or greater/lesser trochanters.  No tenderness at the glut attachments on the greater trochanter  No tenderness over proximal IT band or hip flexor musculature.    Strength Testing (* = with pain):  Hip flexion - 5/5 on left* and 5/5 on right  Hip extension - 5/5 on left and 5/5 on right  Hip adduction - 5/5 on left and 5/5 on right  Hip abduction - 5/5 on left and 5/5 on right  Knee flexion - 5/5 on left and 5/5 on right  Knee extension - 5/5 on left and 5/5 on right    Special Tests:  Standing Trendelenburg test - negative    Seated straight leg raise - negative  Supine straight leg raise - negative  Hamstring flexibility symmetric    Log roll - positive  ZAKIA -positive  FADIR-  positive  Scour test - positive  No pain with posterior hip capsule compression    Negative heel tap on left    ASIS compression test - negative  SI drawer test - negative   Thigh thrust test - negative     Piriformis test (Bonnet's) - negative  Ely's test - negative  Quadriceps flexibility symmetric.  Jamin test - negative  Kurt compression test - negative    Fulcrum test - negative    Neurovascular Exam:  2+ femoral, DP, and PT pulses BL.  No skin changes, no abnormal hair distribution.  Sensation intact to light touch throughout the obturator and medial/lateral/posterior femoral cutaneous nerves.  2+/4 reflexes at L4 and S1 dermatomes  Capillary refill  intact to <2 seconds in all lower limb digits.    IMAGIN. X-ray ordered due to low back pain. (AP, lateral, bilateral obliques, L5-S1 joint, flexion and extension views) taken today.   2. X-ray images were reviewed personally by me and then directly with patient.  3. FINDINGS: X-ray images obtained demonstrate no cortical irregularities, sclerosis, osteophyte formation, or subchonral cysts. There is no joint space narrowing.***  4. IMPRESSION: No pathology or irregularities appreciated.       Assessment:      No diagnosis found.     Plan:   1. Chronic left hip pain likely secondary to underlying DJD changes as noted on prior x-ray- significant previous relief with prior left intra-articular CSI on 2024.  Asymptomatic right hip DJD changes.  - Patient received a repeaty ultrasound guided corticosteroid injection of the left hip today (see details above).   - Continue Celebrex 200 mg po qday  prn for pain control. Pt. Advised to avoid all other NSAIDS while on this medication.  - discussed option of continued conservative treatment of her left hip OA with a left hip PRP vs. Repeat CSI injection versus referral to orthopedic surgery for TAMMIE consultation.  - EDUCATION FOR PRP:    Education provided on the benefits, adverse effects, likely course of treatment and improvement, and post-injection expectations from PRP.  It generally takes 1-3 treatments to have long standing resolution. We reviewed that initially after treatment, pain may become worse and this is an expected response. We instructed that improvement may be experienced within the first two weeks and that most of the patient's improvement will come between weeks 6 and 12. If there is no improvement, we would not repeat. If less than 90% improvement is experienced at 12 weeks, we would likely repeat.Amrita was given a PRP information handout.     After face to face conversation, Amrita expressed understanding that $400 vs. $800 is due at time of  check in for each ACP vs. PRP procedure. Patient is aware that this procedure is not covered by insurance companies and does not qualify for financial assistance. Payment can be made via cash, check, debit or credit card.     -  X-ray images of left hip taken 6/10/24 (AP pelvis and frogleg lateral  left views) showed  moderate DJD and impingement change. Images were personally reviewed with patient.  - X-ray images of lumbar spine taken today (AP, lateral, bilateral obliques, L5-S1 joint, flexion and extension views) showed ***. Images were personally reviewed with patient.    2. Follow-up as needed if pain deteriorates or new issue arises    3. Patient agreeable to today's plan and all questions were answered    This note is dictated using the M*Modal Fluency Direct word recognition program. There are word recognition mistakes that are occasionally missed on review.

## 2025-02-05 ENCOUNTER — HOSPITAL ENCOUNTER (OUTPATIENT)
Dept: RADIOLOGY | Facility: HOSPITAL | Age: 75
Discharge: HOME | End: 2025-02-05
Attending: NEUROMUSCULOSKELETAL MEDICINE & OMM
Payer: MEDICARE

## 2025-02-05 ENCOUNTER — OFFICE VISIT (OUTPATIENT)
Dept: SPORTS MEDICINE | Facility: CLINIC | Age: 75
End: 2025-02-05
Payer: MEDICARE

## 2025-02-05 VITALS — SYSTOLIC BLOOD PRESSURE: 139 MMHG | DIASTOLIC BLOOD PRESSURE: 85 MMHG | HEART RATE: 47 BPM

## 2025-02-05 DIAGNOSIS — M99.08 SOMATIC DYSFUNCTION OF RIB CAGE REGION: ICD-10-CM

## 2025-02-05 DIAGNOSIS — M16.12 PRIMARY OSTEOARTHRITIS OF LEFT HIP: Primary | ICD-10-CM

## 2025-02-05 DIAGNOSIS — M79.10 MYALGIA: ICD-10-CM

## 2025-02-05 DIAGNOSIS — M47.816 LUMBAR SPONDYLOSIS: ICD-10-CM

## 2025-02-05 DIAGNOSIS — M79.605 LEFT LEG PAIN: ICD-10-CM

## 2025-02-05 DIAGNOSIS — M99.05 SOMATIC DYSFUNCTION OF PELVIC REGION: ICD-10-CM

## 2025-02-05 DIAGNOSIS — M99.03 SOMATIC DYSFUNCTION OF LUMBAR REGION: ICD-10-CM

## 2025-02-05 DIAGNOSIS — M99.04 SACRAL REGION SOMATIC DYSFUNCTION: ICD-10-CM

## 2025-02-05 DIAGNOSIS — M99.06 SOMATIC DYSFUNCTION OF LOWER EXTREMITY: ICD-10-CM

## 2025-02-05 DIAGNOSIS — M99.02 SOMATIC DYSFUNCTION OF THORACIC REGION: ICD-10-CM

## 2025-02-05 PROCEDURE — 1101F PT FALLS ASSESS-DOCD LE1/YR: CPT | Mod: CPTII,S$GLB,, | Performed by: NEUROMUSCULOSKELETAL MEDICINE & OMM

## 2025-02-05 PROCEDURE — 1125F AMNT PAIN NOTED PAIN PRSNT: CPT | Mod: CPTII,S$GLB,, | Performed by: NEUROMUSCULOSKELETAL MEDICINE & OMM

## 2025-02-05 PROCEDURE — 72114 X-RAY EXAM L-S SPINE BENDING: CPT | Mod: 26,,, | Performed by: RADIOLOGY

## 2025-02-05 PROCEDURE — 3288F FALL RISK ASSESSMENT DOCD: CPT | Mod: CPTII,S$GLB,, | Performed by: NEUROMUSCULOSKELETAL MEDICINE & OMM

## 2025-02-05 PROCEDURE — 3079F DIAST BP 80-89 MM HG: CPT | Mod: CPTII,S$GLB,, | Performed by: NEUROMUSCULOSKELETAL MEDICINE & OMM

## 2025-02-05 PROCEDURE — 72114 X-RAY EXAM L-S SPINE BENDING: CPT | Mod: TC,PO

## 2025-02-05 PROCEDURE — 98927 OSTEOPATH MANJ 5-6 REGIONS: CPT | Mod: S$GLB,,, | Performed by: NEUROMUSCULOSKELETAL MEDICINE & OMM

## 2025-02-05 PROCEDURE — 99214 OFFICE O/P EST MOD 30 MIN: CPT | Mod: 25,S$GLB,, | Performed by: NEUROMUSCULOSKELETAL MEDICINE & OMM

## 2025-02-05 PROCEDURE — 1159F MED LIST DOCD IN RCRD: CPT | Mod: CPTII,S$GLB,, | Performed by: NEUROMUSCULOSKELETAL MEDICINE & OMM

## 2025-02-05 PROCEDURE — 1160F RVW MEDS BY RX/DR IN RCRD: CPT | Mod: CPTII,S$GLB,, | Performed by: NEUROMUSCULOSKELETAL MEDICINE & OMM

## 2025-02-05 PROCEDURE — 99999 PR PBB SHADOW E&M-EST. PATIENT-LVL III: CPT | Mod: PBBFAC,,, | Performed by: NEUROMUSCULOSKELETAL MEDICINE & OMM

## 2025-02-05 PROCEDURE — 3075F SYST BP GE 130 - 139MM HG: CPT | Mod: CPTII,S$GLB,, | Performed by: NEUROMUSCULOSKELETAL MEDICINE & OMM

## 2025-02-05 NOTE — PROGRESS NOTES
"  Subjective:     Amrita Yoder     Chief Complaint   Patient presents with    Left Hip - Pain     Pain    Amrita is a 74 y.o. female coming in today for left hip pain. Since last visit the pain has Improved with CSI. The pain is better with CSI and worse with sitting to standing and getting in and out of a vehicle.  Pt. describes the pain as a 3/10 cramping pain that does not radiate. There has been any new a injury since last visit. Patient admits that over the weekend she was exercising at home preforming crunches with her knees bent over the sofa and dancing. She notes that the pain came the following day where she describes a "hot" radiating pain that started out her left glute and radiated down into her lower leg. She admits to difficultly sleeping and pain that made her feel nauseous. Patient admits that the pain went away when she took advil. She denies any new musculoskeletal complaints at this time. Pt. Denied having an interrupter today. Prior to this past weekend patient denies any problems regarding her hip.   Of Note: Patient admits to a lack of appetite/ aversion to food and admits to inability to sleep at night. She states that she goes to bed around 4 am and wakes up at 8am writing poems.     Office note from 12/14/24 reviewed    Joint instability? No  Mechanical locking/clicking? No  Affecting ADL's? no  Affecting sleep? no    Occupation: retired    Procedures reviewed:   7/17/24- CSI iaHip, left- good relief x 4 months  12/14/24- CSI iaHip, left- 2 relief Continued relief     PAST MEDICAL HISTORY:   Past Medical History:   Diagnosis Date    Hyperlipidemia     Hypertension      PAST SURGICAL HISTORY:   Past Surgical History:   Procedure Laterality Date    CARPAL TUNNEL RELEASE  05/25/2020    Dr. Marie    CATARACT EXTRACTION W/  INTRAOCULAR LENS IMPLANT Bilateral     CYSTOSCOPY N/A 03/23/2022    Procedure: CYSTOSCOPY;  Surgeon: Usha Mario MD;  Location: [facility] OR;  Service: Urology;  " Laterality: N/A;    HYSTERECTOMY      HYSTEROSCOPY WITH DILATION AND CURETTAGE OF UTERUS N/A 5/6/2024    Procedure: HYSTEROSCOPY, WITH DILATION AND CURETTAGE OF UTERUS;  Surgeon: Wiedemann, Michael A., MD;  Location: [facility] OR;  Service: OB/GYN;  Laterality: N/A;    MYOMECTOMY N/A 5/6/2024    Procedure: MYOMECTOMY;  Surgeon: Wiedemann, Michael A., MD;  Location: [facility] OR;  Service: OB/GYN;  Laterality: N/A;    TRIGGER FINGER RELEASE Left 11/02/2018    Procedure: RELEASE, TRIGGER FINGER left long;  Surgeon: Zoe Winkler MD;  Location: [facility] OR;  Service: Orthopedics;  Laterality: Left;  stretcher, supine, hand pan 1 and pan 2     FAMILY HISTORY:   Family History   Adopted: Yes     SOCIAL HISTORY:   Social History     Socioeconomic History    Marital status: Single   Tobacco Use    Smoking status: Never    Smokeless tobacco: Never   Substance and Sexual Activity    Alcohol use: No    Drug use: No     MEDICATIONS:   Current Outpatient Medications:     atorvastatin (LIPITOR) 20 MG tablet, Take 1 tablet (20 mg total) by mouth every evening., Disp: 90 tablet, Rfl: 3    magnesium oxide 400 mg magnesium Tab, Take 1 tablet by mouth every evening., Disp: 90 tablet, Rfl: 0    valsartan (DIOVAN) 40 MG tablet, Take 1 tablet (40 mg total) by mouth once daily., Disp: 90 tablet, Rfl: 3    azelastine (ASTELIN) 137 mcg (0.1 %) nasal spray, 1 spray (137 mcg total) by Nasal route 2 (two) times daily. (Patient not taking: Reported on 2/5/2025), Disp: 30 mL, Rfl: 3    gabapentin (NEURONTIN) 100 MG capsule, Take 1 capsule (100 mg total) by mouth 2 (two) times daily. (Patient not taking: Reported on 6/5/2024), Disp: 180 capsule, Rfl: 0    levocetirizine (XYZAL) 5 MG tablet, Take 1 tablet (5 mg total) by mouth every evening., Disp: 30 tablet, Rfl: 0    ALLERGIES: Review of patient's allergies indicates:  No Known Allergies    Objective:     VITAL SIGNS: /85 (Patient Position: Sitting)   Pulse (!) 47   LMP  (LMP Unknown)     General    Vitals reviewed.  Constitutional: She is oriented to person, place, and time. She appears well-developed and well-nourished.   Neurological: She is alert and oriented to person, place, and time.   Psychiatric: She has a normal mood and affect. Her behavior is normal.           MUSCULOSKELETAL EXAM  HIP: left HIP  The affected hip is compared to the contralateral hip.    Observation:    There is no edema, erythema, or ecchymosis in the lumbosacral region.   There is no Trendelenburg sign on either side  No obvious pelvic obliquity while standing.    No thoracolumbar scoliosis observed.    No midline skin abnormalities.  No atrophy noted in the lower limbs.  Gait: Left antalgic with Neutral ankle mechanics and Neutral medial arch    ROM (* = with pain):  Passive hip flexion to 100° on left* with guarding and 120° on right  Passive hip internal rotation to 15° on left* and 25° on right  Passive hip external rotation to 25° on left* and 35° on right   Passive hip abduction to 25° on left* and 45° on right    Tenderness To Palpation:  No tenderness at the ASIS, AIIS, PSIS, PIIS, iliac crest, pubic bones, ischial tuberosity.  No tenderness throughout the lumbar spine, iliolumbar region, or posterior pelvis.  No tenderness throughout the sacrum   + left piriformis tednerness  No tenderness over the greater trochanteric bursa or greater/lesser trochanters.  No tenderness at the glut attachments on the greater trochanter  No tenderness over proximal IT band or hip flexor musculature.    Strength Testing (* = with pain):  Hip flexion - 5/5 on left* and 5/5 on right  Hip extension - 5/5 on left and 5/5 on right  Hip adduction - 5/5 on left and 5/5 on right  Hip abduction - 5/5 on left and 5/5 on right  Knee flexion - 5/5 on left and 5/5 on right  Knee extension - 5/5 on left and 5/5 on right    Special Tests:  Standing Trendelenburg test - negative    Seated straight leg raise - negative  Supine straight leg raise  - negative  Hamstring flexibility symmetric    Log roll - positive  ZAKIA -positive  FADIR-  positive  Scour test - positive  No pain with posterior hip capsule compression    Negative heel tap on left    ASIS compression test - negative  SI drawer test - negative   Thigh thrust test - negative     Piriformis test (Bonnet's) - negative  Ely's test - negative  Quadriceps flexibility symmetric.  Jamin test - negative  Kurt compression test - negative    Fulcrum test - negative    Neurovascular Exam:  2+ femoral, DP, and PT pulses BL.  No skin changes, no abnormal hair distribution.  Sensation intact to light touch throughout the obturator and medial/lateral/posterior femoral cutaneous nerves.  2+/4 reflexes at L4 and S1 dermatomes  Capillary refill intact to <2 seconds in all lower limb digits.    TART (Tissue texture abnormality, Asymmetry,  Restriction of motion and/or Tenderness) changes:     Thoracic Spine   T1 Neutral   T2 Neutral   T3 Neutral   T4 Neutral   T5 Neutral   T6 Neutral   T7 Neutral   T8 Neutral   T9 Neutral   T10 Neutral   T11 NS-left,R-right   T12 NS-left,R-right     Rib cage:R11-12 TTA on right     Lumbar Spine   L1 NS-left,R-right   L2 Neutral   L3 Neutral   L4 TTA RIGHT   L5 TTA RIGHT     Pelvis:  Innominate:Left superior shear  Pubic bone:Left superior pubic shear    Sacrum:Right unilateral extension    Lower extremity: Right posterior hip deep hip external rotator TTA      Key   F= Flexed   E = Extended   R = Rotated   S = Sidebent   TTA = tissue texture abnormality     IMAGIN. X-ray ordered due to low back pain. (AP, lateral, bilateral obliques, L5-S1 joint, flexion and extension views) taken today.   2. X-ray images were reviewed personally by me and then directly with patient.  3. FINDINGS: X-ray images obtained demonstrate partially visualized thoracolumbar levocurvature. Osteopenia. No definite acute fractures. Scattered variable sized lower thoracic and lumbar vertebral body endplate  osteophytes. No spondylolysis. Very minimal grade 1 retrolisthesis L2 on L3 and L3 on L4 and very minimal grade 1 anterolisthesis L4 on L5. Disc narrowing and vacuum phenomenon L5-S1 level. Other disc space levels without obvious narrowing. Facet arthropathic changes lower 2 levels. Above described spinal findings do not appear significantly changed to that noted on the above CT. Intact right and left SI joints and visualized hip joint spaces with preserved right and left femoral head contours.   4. IMPRESSION:  Lower lumbar spondylosis with associated facet arthropathy and L5-S1 DDD.       Assessment:      Encounter Diagnoses   Name Primary?    Primary osteoarthritis of left hip Yes    Lumbar spondylosis     Myalgia     Somatic dysfunction of thoracic region     Somatic dysfunction of rib cage region     Somatic dysfunction of lumbar region     Sacral region somatic dysfunction     Somatic dysfunction of pelvic region     Somatic dysfunction of lower extremity         Plan:   1. Chronic left hip pain likely secondary to underlying DJD changes as noted on prior x-ray- significant previous relief with prior left intra-articular CSI on 012/14/2024, but then deteriorated with increased exercises at home.  Asymptomatic right hip DJD changes.  Underlying lumbar spondylosis also noted on today's lumbar spine x-rays, but symptoms seem to be stemming from the left hip, primarily.  - OMT performed today to address associated biomechanical restrictions  and HEP started.   - Continue Celebrex 200 mg po qday  prn for pain control. Pt. Advised to avoid all other NSAIDS while on this medication.  - discussed option of continued conservative treatment of her left hip OA with a left hip PRP vs. Repeat CSI injection versus referral to orthopedic surgery for TAMMIE consultation. Pt. Would like to proceed with a ortho consult. Referral placed today.   - Referral to outpatient PT ([facility]) for increased stability and ROM  -  EDUCATION FOR PRP:    Education provided on the benefits, adverse effects, likely course of treatment and improvement, and post-injection expectations from PRP.  It generally takes 1-3 treatments to have long standing resolution. We reviewed that initially after treatment, pain may become worse and this is an expected response. We instructed that improvement may be experienced within the first two weeks and that most of the patient's improvement will come between weeks 6 and 12. If there is no improvement, we would not repeat. If less than 90% improvement is experienced at 12 weeks, we would likely repeat.Amrita was given a PRP information handout.     After face to face conversation, Amrita expressed understanding that $400 vs. $800 is due at time of check in for each ACP vs. PRP procedure. Patient is aware that this procedure is not covered by insurance companies and does not qualify for financial assistance. Payment can be made via cash, check, debit or credit card.     -  X-ray images of left hip taken 6/10/24 (AP pelvis and frogleg lateral  left views) showed  moderate DJD and impingement change. Images were personally reviewed with patient.  - X-ray images of lumbar spine taken today (AP, lateral, bilateral obliques, L5-S1 joint, flexion and extension views) showed  Lower lumbar spondylosis with associated facet arthropathy and L5-S1 DDD. Images were personally reviewed with patient.    2. OMT 5-6 regions. Oral consent obtained.  Reviewed benefits and potential side effects.   - OMT indicated today due to signs and symptoms as well as local and remote somatic dysfunction findings and their related neurokinetic, lymphatic, fascial and/or arteriovenous body connections.   - OMT techniques used: Myofascial Release, Muscle Energy, Balanced Ligamentous Tension, and Articulatory   - Treatment was tolerated well. Improvement noted in segmental mobility post-treatment in dysfunctional regions. There were no  adverse events and no complications immediately following treatment.     3. Pt. Given the following HEP:  A)  Pelvic clock exercises given to do from the 6-12 o'clock positions:10-15 reps, twice daily. Hand out of exercise also given.     The patient was taught a homegoing physical therapy regimen as described above. The patient demonstrated understanding of the exercises and proper technique of their execution.     4. Follow-up as needed if pain deteriorates or new issue arises    5. Patient agreeable to today's plan and all questions were answered    This note is dictated using the M*Modal Fluency Direct word recognition program. There are word recognition mistakes that are occasionally missed on review.

## 2025-02-17 ENCOUNTER — OFFICE VISIT (OUTPATIENT)
Dept: ORTHOPEDICS | Facility: CLINIC | Age: 75
End: 2025-02-17
Payer: MEDICARE

## 2025-02-17 DIAGNOSIS — M16.12 PRIMARY OSTEOARTHRITIS OF LEFT HIP: ICD-10-CM

## 2025-02-17 DIAGNOSIS — M70.62 TROCHANTERIC BURSITIS, LEFT HIP: Primary | ICD-10-CM

## 2025-02-17 PROCEDURE — 1159F MED LIST DOCD IN RCRD: CPT | Mod: CPTII,S$GLB,, | Performed by: NURSE PRACTITIONER

## 2025-02-17 PROCEDURE — 1160F RVW MEDS BY RX/DR IN RCRD: CPT | Mod: CPTII,S$GLB,, | Performed by: NURSE PRACTITIONER

## 2025-02-17 PROCEDURE — 1125F AMNT PAIN NOTED PAIN PRSNT: CPT | Mod: CPTII,S$GLB,, | Performed by: NURSE PRACTITIONER

## 2025-02-17 PROCEDURE — 4010F ACE/ARB THERAPY RXD/TAKEN: CPT | Mod: CPTII,S$GLB,, | Performed by: NURSE PRACTITIONER

## 2025-02-17 RX ORDER — TRIAMCINOLONE ACETONIDE 40 MG/ML
40 INJECTION, SUSPENSION INTRA-ARTICULAR; INTRAMUSCULAR
Status: COMPLETED | OUTPATIENT
Start: 2025-02-17 | End: 2025-02-17

## 2025-02-17 RX ADMIN — TRIAMCINOLONE ACETONIDE 40 MG: 40 INJECTION, SUSPENSION INTRA-ARTICULAR; INTRAMUSCULAR at 05:02

## 2025-02-17 NOTE — PROGRESS NOTES
CC: Pain of the Left Hip      HPI:   Pt with c/o left lateral hip pain. She has known djd of the left hip joint as well as the lumbar spine, but she reports that this pain is right over the bone to the outside of her thigh. She reports difficulty laying on her left side in bed and the pain is worse after holding her grandson and sitting still for extended periods. Nothing really helps the pain. It is an aching pain. She denies burning or radiating pain. She is ambulating without assistive device. There is not a limp      ROS  General: denies fever and chills  Resp: no c/o sob  CVS: no c/o cp  MSK: c/o left lateral thigh pain    PE  General: AAOx3, pleasant and cooperative  Resp: respirations even and unlabored  MSK: left hip exam  - [facility]  - straight leg raise  - pain with internal rotation  - pain with external rotation  + tenderness over the greater trochanger      Xray:  Reviewed xrays done at previous appts with Dr. Enriquez    Assessment:  Trochanteric bursitis, left hip  Left hip djd    Plan:  Cortisone injection left greater trochanter today  She states that her daughter is going to make her an appt with a surgeon to discuss left hip replacement. She was given my card and will call me for assistance if needed.  F/u with me as needed    Greater Trochanter Bursa Injection Procedure Note   Diagnosis: left greater trochanteric bursitis  Indications: left hip pain  Procedure Details: Verbal consent was obtained for the procedure. The injection site was identified and the skin was prepared with alcohol. The left hip was injected from a lateral approach with 1 ml of Kenalog and 2 ml Lidocaine under sterile technique using a 25 gauge 1 1/2 inch needle. The needle was removed and the area cleansed and dressed.  Complications:  Patient tolerated the procedure well.    she was advised to rest the leg today, using ice and Tylenol as needed for comfort and swelling. she may have an increase in discomfort tonight  followed by steady improvement over the next several days. It may take 1-3 weeks following the injection to get the full benefit of the medication.

## 2025-02-24 ENCOUNTER — PATIENT MESSAGE (OUTPATIENT)
Dept: ORTHOPEDICS | Facility: CLINIC | Age: 75
End: 2025-02-24
Payer: MEDICARE

## 2025-03-25 ENCOUNTER — TELEPHONE (OUTPATIENT)
Dept: ORTHOPEDICS | Facility: CLINIC | Age: 75
End: 2025-03-25
Payer: MEDICARE

## 2025-03-25 NOTE — TELEPHONE ENCOUNTER
I called and left a voice message for the patient's daughter. I scheduled the patient an appointment to come in to see Dr. Hull at his next available. I left my name and contact information for the patient to call back.     ----- Message from Hope sent at 3/25/2025  9:23 AM CDT -----  Regarding: Appt  Contact: Lesvia/daughter [phone]  Lesvia/lionel is requesting call back to schedule appt for Left Hip Replacement consult, please call daughter pt @[phone]

## 2025-04-09 DIAGNOSIS — J30.1 SEASONAL ALLERGIC RHINITIS DUE TO POLLEN: Primary | ICD-10-CM

## 2025-04-09 RX ORDER — AZELASTINE 1 MG/ML
1 SPRAY, METERED NASAL 2 TIMES DAILY
Qty: 30 ML | Refills: 1 | Status: SHIPPED | OUTPATIENT
Start: 2025-04-09 | End: 2026-04-09

## 2025-05-28 ENCOUNTER — HOSPITAL ENCOUNTER (OUTPATIENT)
Dept: RADIOLOGY | Facility: HOSPITAL | Age: 75
Discharge: HOME | End: 2025-05-28
Attending: FAMILY MEDICINE
Payer: MEDICARE

## 2025-05-28 DIAGNOSIS — Z12.31 ENCOUNTER FOR SCREENING MAMMOGRAM FOR BREAST CANCER: ICD-10-CM

## 2025-05-28 PROCEDURE — 77067 SCR MAMMO BI INCL CAD: CPT | Mod: TC

## 2025-06-02 ENCOUNTER — RESULTS FOLLOW-UP (OUTPATIENT)
Dept: FAMILY MEDICINE | Facility: CLINIC | Age: 75
End: 2025-06-02

## 2025-06-05 DIAGNOSIS — M16.12 PRIMARY OSTEOARTHRITIS OF LEFT HIP: Primary | ICD-10-CM

## 2025-06-10 ENCOUNTER — OFFICE VISIT (OUTPATIENT)
Dept: FAMILY MEDICINE | Facility: CLINIC | Age: 75
End: 2025-06-10
Payer: MEDICARE

## 2025-06-10 VITALS
OXYGEN SATURATION: 98 % | BODY MASS INDEX: 22.67 KG/M2 | SYSTOLIC BLOOD PRESSURE: 130 MMHG | HEART RATE: 51 BPM | DIASTOLIC BLOOD PRESSURE: 70 MMHG | HEIGHT: 62 IN | WEIGHT: 123.19 LBS

## 2025-06-10 DIAGNOSIS — L65.9 HAIR LOSS: ICD-10-CM

## 2025-06-10 DIAGNOSIS — J30.1 SEASONAL ALLERGIC RHINITIS DUE TO POLLEN: ICD-10-CM

## 2025-06-10 DIAGNOSIS — I10 ESSENTIAL HYPERTENSION: ICD-10-CM

## 2025-06-10 DIAGNOSIS — R63.4 WEIGHT LOSS: Primary | ICD-10-CM

## 2025-06-10 DIAGNOSIS — N20.0 RENAL STONES: ICD-10-CM

## 2025-06-10 DIAGNOSIS — R63.0 LOSS OF APPETITE: ICD-10-CM

## 2025-06-10 DIAGNOSIS — E78.5 DYSLIPIDEMIA: ICD-10-CM

## 2025-06-10 DIAGNOSIS — Z12.11 SCREEN FOR COLON CANCER: ICD-10-CM

## 2025-06-10 PROCEDURE — 4010F ACE/ARB THERAPY RXD/TAKEN: CPT | Mod: CPTII,S$GLB,, | Performed by: FAMILY MEDICINE

## 2025-06-10 PROCEDURE — 3075F SYST BP GE 130 - 139MM HG: CPT | Mod: CPTII,S$GLB,, | Performed by: FAMILY MEDICINE

## 2025-06-10 PROCEDURE — 1159F MED LIST DOCD IN RCRD: CPT | Mod: CPTII,S$GLB,, | Performed by: FAMILY MEDICINE

## 2025-06-10 PROCEDURE — 99215 OFFICE O/P EST HI 40 MIN: CPT | Mod: S$GLB,,, | Performed by: FAMILY MEDICINE

## 2025-06-10 PROCEDURE — 1101F PT FALLS ASSESS-DOCD LE1/YR: CPT | Mod: CPTII,S$GLB,, | Performed by: FAMILY MEDICINE

## 2025-06-10 PROCEDURE — 1160F RVW MEDS BY RX/DR IN RCRD: CPT | Mod: CPTII,S$GLB,, | Performed by: FAMILY MEDICINE

## 2025-06-10 PROCEDURE — 1126F AMNT PAIN NOTED NONE PRSNT: CPT | Mod: CPTII,S$GLB,, | Performed by: FAMILY MEDICINE

## 2025-06-10 PROCEDURE — 3008F BODY MASS INDEX DOCD: CPT | Mod: CPTII,S$GLB,, | Performed by: FAMILY MEDICINE

## 2025-06-10 PROCEDURE — 3288F FALL RISK ASSESSMENT DOCD: CPT | Mod: CPTII,S$GLB,, | Performed by: FAMILY MEDICINE

## 2025-06-10 PROCEDURE — 3078F DIAST BP <80 MM HG: CPT | Mod: CPTII,S$GLB,, | Performed by: FAMILY MEDICINE

## 2025-06-10 PROCEDURE — 99999 PR PBB SHADOW E&M-EST. PATIENT-LVL IV: CPT | Mod: PBBFAC,,, | Performed by: FAMILY MEDICINE

## 2025-06-10 RX ORDER — ATORVASTATIN CALCIUM 10 MG/1
10 TABLET, FILM COATED ORAL NIGHTLY
Qty: 90 TABLET | Refills: 3 | Status: SHIPPED | OUTPATIENT
Start: 2025-06-10

## 2025-06-10 RX ORDER — AZELASTINE 1 MG/ML
1 SPRAY, METERED NASAL 2 TIMES DAILY
Qty: 30 ML | Refills: 2 | Status: SHIPPED | OUTPATIENT
Start: 2025-06-10 | End: 2026-06-10

## 2025-06-10 NOTE — PROGRESS NOTES
Subjective     Patient ID: Amrita Yoder is a 75 y.o. female.    Chief Complaint: Eating Disorder    75 years old female came to the clinic with weight loss for the last 6 months.  She reports loss of appetite.  Patient losing about 16 lb.  Blood pressure today was stable.  She was not taking the cholesterol medicine.  Patient also reports losing hair associated with poor sleeping recently.  She reports 2 weeks ago passing a big kidney stone.  She was not able to saving for sampling.  Patient is due for her colonoscopy.    Hypertension  This is a chronic problem. The current episode started more than 1 year ago. The problem is unchanged. The problem is controlled. Pertinent negatives include no chest pain, orthopnea, palpitations or peripheral edema. Risk factors for coronary artery disease include post-menopausal state and sedentary lifestyle. Past treatments include angiotensin blockers. The current treatment provides significant improvement. Compliance problems include exercise.  There is no history of angina. There is no history of a hypertension causing med or a thyroid problem.     Review of Systems   Constitutional:  Positive for appetite change and unexpected weight change.   HENT: Negative.     Eyes: Negative.    Respiratory: Negative.     Cardiovascular:  Negative for chest pain, palpitations and orthopnea.   Gastrointestinal: Negative.    Genitourinary: Negative.    Musculoskeletal: Negative.    Neurological: Negative.    Psychiatric/Behavioral: Negative.            Objective     Physical Exam  Constitutional:       General: She is not in acute distress.     Appearance: She is well-developed. She is not diaphoretic.   HENT:      Head: Normocephalic and atraumatic.      Right Ear: External ear normal.      Left Ear: External ear normal.      Nose: Nose normal.      Mouth/Throat:      Pharynx: No oropharyngeal exudate.   Eyes:      General: No scleral icterus.        Right eye: No discharge.          Left eye: No discharge.      Conjunctiva/sclera: Conjunctivae normal.      Pupils: Pupils are equal, round, and reactive to light.   Neck:      Thyroid: No thyromegaly.      Vascular: No JVD.      Trachea: No tracheal deviation.   Cardiovascular:      Rate and Rhythm: Normal rate and regular rhythm.      Heart sounds: Normal heart sounds. No murmur heard.     No friction rub. No gallop.   Pulmonary:      Effort: Pulmonary effort is normal. No respiratory distress.      Breath sounds: Normal breath sounds. No stridor. No wheezing or rales.   Chest:      Chest wall: No tenderness.   Abdominal:      General: Bowel sounds are normal. There is no distension.      Palpations: Abdomen is soft. There is no mass.      Tenderness: There is no abdominal tenderness. There is no guarding or rebound.   Musculoskeletal:         General: No tenderness. Normal range of motion.      Cervical back: Normal range of motion and neck supple.   Lymphadenopathy:      Cervical: No cervical adenopathy.   Skin:     General: Skin is warm and dry.      Coloration: Skin is not pale.      Findings: No erythema or rash.   Neurological:      Mental Status: She is alert and oriented to person, place, and time.      Cranial Nerves: No cranial nerve deficit.      Motor: No abnormal muscle tone.      Coordination: Coordination normal.      Deep Tendon Reflexes: Reflexes are normal and symmetric.   Psychiatric:         Behavior: Behavior normal.         Thought Content: Thought content normal.         Judgment: Judgment normal.            Assessment and Plan     1. Weight loss  -     CT Chest Abdomen Pelvis With IV Contrast (XPD) Routine Oral Contrast; Future; Expected date: 06/10/2025  -     TSH; Future; Expected date: 06/10/2025    2. Loss of appetite    3. Essential hypertension  -     Urinalysis; Future  -     Comprehensive Metabolic Panel; Future; Expected date: 06/10/2025  -     Lipid Panel; Future; Expected date: 06/10/2025  -     TSH; Future;  Expected date: 06/10/2025  -     CBC Auto Differential; Future; Expected date: 06/10/2025    4. Renal stones  -     CT Chest Abdomen Pelvis With IV Contrast (XPD) Routine Oral Contrast; Future; Expected date: 06/10/2025  -     Urinalysis; Future  -     Comprehensive Metabolic Panel; Future; Expected date: 06/10/2025  -     CBC Auto Differential; Future; Expected date: 06/10/2025    5. Screen for colon cancer  -     Ambulatory referral/consult to Endo Procedure ; Future; Expected date: 06/11/2025    6. Seasonal allergic rhinitis due to pollen  -     azelastine (ASTELIN) 137 mcg (0.1 %) nasal spray; 1 spray (137 mcg total) by Nasal route 2 (two) times daily.  Dispense: 30 mL; Refill: 2    7. Hair loss  -     Ambulatory referral/consult to Dermatology; Future; Expected date: 06/17/2025    8. Dyslipidemia  -     atorvastatin (LIPITOR) 10 MG tablet; Take 1 tablet (10 mg total) by mouth every evening.  Dispense: 90 tablet; Refill: 3      Continue monitoring blood pressure at home, low sodium diet.   I spent a total of 40 minutes on the day of the visit.This includes face to face time and non-face to face time preparing to see the patient (eg, review of tests), obtaining and/or reviewing separately obtained history, documenting clinical information in the electronic or other health record, independently interpreting results and communicating results to the patient/family/caregiver, or care coordinator.          Follow up in about 6 months (around 12/10/2025), or if symptoms worsen or fail to improve.

## 2025-06-13 ENCOUNTER — TELEPHONE (OUTPATIENT)
Dept: ENDOSCOPY | Facility: HOSPITAL | Age: 75
End: 2025-06-13

## 2025-06-13 ENCOUNTER — CLINICAL SUPPORT (OUTPATIENT)
Dept: ENDOSCOPY | Facility: HOSPITAL | Age: 75
End: 2025-06-13
Attending: FAMILY MEDICINE
Payer: MEDICARE

## 2025-06-13 VITALS — BODY MASS INDEX: 22.63 KG/M2 | HEIGHT: 62 IN | WEIGHT: 123 LBS

## 2025-06-13 DIAGNOSIS — Z12.11 ENCOUNTER FOR SCREENING COLONOSCOPY: Primary | ICD-10-CM

## 2025-06-13 DIAGNOSIS — Z12.11 SCREEN FOR COLON CANCER: ICD-10-CM

## 2025-06-13 RX ORDER — SODIUM, POTASSIUM,MAG SULFATES 17.5-3.13G
1 SOLUTION, RECONSTITUTED, ORAL ORAL DAILY
Qty: 1 KIT | Refills: 0 | Status: SHIPPED | OUTPATIENT
Start: 2025-06-13 | End: 2025-06-15

## 2025-06-13 NOTE — PATIENT INSTRUCTIONS
.    Colonoscopy Procedure Prep Instructions      Date of procedure: 8/13/25 Arrive at: 9:00AM    Location of Department:   [facility] [address] [place] [address]   Stop in the hospital lobby on the 1st floor to get registered, then take the hospital elevators to the 2nd floor waiting room    As soon as possible:   your prep from pharmacy and over the counter DULCOLAX LAXATIVE TABLETS     On the day before your procedure   What You CAN do:   You may have clear liquids ONLY -see below for list.     Liquids That Are OK to Drink:   Water  Sports drinks (Gatorade, Power-Aid)  Coffee or tea (no cream or nondairy creamer)  Clear juices without pulp (apple, white grape)  Gelatin desserts (no fruit or toppings)  Clear soda (sprite, coke, ginger ale)  Chicken broth (until 12 midnight the night before procedure)      What You CANNOT do:   Do not EAT solid food, drink milk or anything   colored red.  Do not drink alcohol.  Do not take oral medications within 1 hour of starting   each dose of SUPREP.  No gum chewing or candy morning of procedure.      Note:   (Please disregard the insert instructions from pharmacy).  SUPREP Bowel Prep Kit is indicated for cleansing of the colon as a preparation for colonoscopy in adults.   Be sure to tell your doctor about all the medicines you take, including prescription and non-prescription medicines, vitamins, and herbal supplements. SUPREP Bowel Prep Kit may affect how other medicines work.  Medication taken by mouth may not be absorbed properly when taken within 1 hour before the start of each dose of SUPREP Bowel Prep Kit.    It is not uncommon to experience some abdominal cramping, nausea and/or vomiting when taking the prep. If you have nausea and/or vomiting while taking the prep, stop drinking for 20 to 30 minutes then continue.    How to take prep:    SUPREP Bowel Prep Kit is a (2-day) prep.   Both 6-ounce bottles are required for a complete  preparation for colonoscopy. Dilute the solution concentrate as directed prior to use. You must drink water with each dose of SUPREP, and additional water after each dose.    DOSE 1--Day Before Colonoscopy 8/12/25    Drink at least 6 to 8 glasses of clear liquids from time you wake up until you begin your prep and then continue until bedtime to avoid dehydration.     12:00 pm (NOON) Take four (4) Dulcolax (Bisacodyl) tablets with at least 8 ounces or more of clear liquids.      6:00 pm:    You must complete Steps 1 through 4 using one (1) 6-ounce bottle before going to bed as shown below:    Step 1-Pour ONE (1) 6-ounce bottle of SUPREP liquid into the mixing container.  Step 2-Add cool drinking water to the 16-ounce line on the container and mix.  Step 3-Drink ALL the liquid in the container.  Step 4-You must drink two (2) more 16-ounce containers of water over the next 1 hour.  IMPORTANT: If you experience preparation-related symptoms (for example, nausea, bloating, or cramping), stop, or slow the rate of drinking the additional water until your symptoms decrease.    DOSE 2--Day of the Colonoscopy 8/13/25 at 2-3 AM.    For this dose, repeat Steps 1 through 4 shown above using the other 6-ounce bottle.   You may continue drinking water/clear liquids until   4 hours before your colonoscopy or as directed by the scheduling nurse  6:00AM.    For information about your procedure, two (2) things to view prior to colonoscopy:  Please watch this informational video. It is important to watch this animated consent video prior to your arrival. If you haven't watched the video prior to arriving, you are required to watch it during admission which can causes delays.    Options for viewing:   Using a keyboard:  press and hold the control tab (Ctrl) and left mouse click to follow links.           Colonoscopy Instructional Video                                                                                   OR    Type link  address into your web browser's address bar:  [email or web address]      Educational Booklet with pictures:      Colonoscopy Prep - Liquid      Comments:   .     IMPORTANT INFORMATION TO KNOW BEFORE YOUR PROCEDURE    [facility] 2nd Floor         If your procedure requires the administration of anesthesia, it is necessary for a responsible adult to drive you home. (Medical Transportation, Uber, Lyft, Taxi, etc. may ONLY be used if a responsible adult is present to accompany you home.  The responsible adult CAN'T be the  of the service).      person must be available to return to pick you up within 15 minutes of being notified of discharge.       Please bring a picture ID, insurance card, & copayment      Take Medications as directed below:      If you begin taking any blood thinning medications, injectable weight loss/diabetes medications (other than insulin) , Adipex (Phentermine) , please contact the endoscopy scheduling department listed below as soon as possible.    If you are diabetic see the attached instruction sheet regarding your medication.     If you take HEART, BLOOD PRESSURE, SEIZURE, PAIN, LUNG (including inhalers/nebulizers), ANTI-REJECTION (transplant patients), or PSYCHIATRIC medications, please take at your regular times with a sip of water or as directed by the scheduling nurse.     Important contact information:    Endoscopy [phone] Hours of operation Monday-Friday 8:00-4:30pm.    Questions about insurance or financial obligations call [phone] or [phone].    If you have questions regarding the prep or need to reschedule, please call [phone]. After hours questions requiring immediate assistance, contact [facility] nurse line at [phone] or [phone].   NOTE:     On occasion, unforeseen circumstances may cause a delay in your procedure start time. We respect your time and appreciate your  patience during these circumstances.      Comments:

## 2025-06-19 NOTE — PROGRESS NOTES
Subjective:     HPI:   Amrita Yoder is a 75 y.o. female who presents for eval L hip via jorge alberto     History of Present Illness    CHIEF COMPLAINT:  - Left hip pain and arthritis    HPI:  Ms. Yoder presents for evaluation of hip pain. She reports pain in her hip and the outside of her leg, along with swelling in her knee for approximately one year. Pain is worst in the morning, making it difficult for her to get out of bed. She also experiences pain when walking. She describes intermittent numbness in the trunk of her leg. She receives injections every three months for her hip pain.    Her PCP recently noted weight loss and ordered labs, a CT, and a colonoscopy. The CT revealed an issue with her kidney, resulting in a referral to a urologist. The colonoscopy is scheduled for , but she has not completed the ordered labs.    She denies any major medical problems.    PREVIOUS TREATMENTS:  - Injections: Every three months, provided relief for hip pain    MEDICATIONS:  - Tylenol: For anti-inflammatory purposes         C/o lateral hip pain, radiation down AT to knee   Also groin/ant hip pain   X 1 year    Past surgical history: None    Hx DVT: None    Medications: Tylenol (500mg, PRN)    Injections:   2025 left hip GTB CSI with Noa Woods  2024 left hip iaCSI with Dr. Cris Enriquez    Physical Therapy: None.     Assistive Devices: None    Walkin - 5 blocks    Limitations:  General walking      Occupation: Retired - used to help her daughter around her     Social support: The patient stated that they live at home at a [facility] house ([facility]) . The patient stated that their daughter would be able to help take care of them if they were to have surgery.     Daughter here with 7mo son trudy JORGE:  The updated medical history is in the chart and has been reviewed. A review of systems is updated and there is no reported vision changes,  ear/nose/mouth/throat complaints,  chest pain, shortness of breath, abdominal pain, urological complaints, fevers or chills, psychiatric complaints. Musculoskeletal and neurologcial symptoms are as documented. All other systems are negative.      Objective:   Exam:  There were no vitals filed for this visit.  Body mass index is 24.86 kg/m².    Physical examination included assessment of the patient's general appearance with particular attention to development, nutrition, body habitus, attention to grooming, and any evidence of distress.  Constitutional: The patient is a well-developed, well-nourished patient in no acute distress.   Cardiovascular: Vascular examination included warmth and capillary refill as well inspection for edema and assessment of pedal pulses. Pulses are palpable and regular.  Musculoskeletal: Gait was assessed as to whether it was steady, non-antalgic, and/or required the use of an assist device. The patient was also asked to walk independently and get onto the examination table.  Skin: The skin was examined for any obvious rashes or lesions in the extremity.  Neurologic: Sensation is intact to light touch in the extremity. The patient has good coordination without hyperreflexia and is alert and oriented to person, place and time and has normal mood and affect.     All of the above were examined and found to be within normal limits except for the following pertinent clinical findings:    Physical Exam    Musculoskeletal: Antalgic gait with limp. Diffuse tenderness to light touch throughout leg. Full knee range of motion. No knee effusion. No limb length discrepancies.  MSK: Spine - Hip: Tenderness over greater trochanter. Hip flexion 0-70 degrees. Hip abduction 10 degrees. Hip adduction 10 degrees. Hip external rotation 10 degrees. Hip internal rotation 10 degrees.  Neurological: Sensation intact.  Cardiovascular: Normal pulses.  Skin: Skin intact over anterior hip.  IMAGING:  - XR Left Hip:  Last summer and today, show arthritis in the hip joint. Ms. Yoder has worn down all the cartilage covering the ball of the joint.           0 LLD  Some abd pannus but skin intact, ok for ant  Pain oop to exam, pain with light touch diffuse       Imaging:      HIP L ARTHRITIS         Indication:  Left hip pain  Exam Ordered: Radiographs include an anteroposterior pelvis, an anteroposterior and lateral view of the proximal femur including the hip joint.  Details of Examination: Exam shows evidence of joint space narrowing, osteophyte formation, and subchondral sclerosis, all consistent with degenerative arthritis of the hip.  No other significant findings are noted.  Impression:  Degenerative Arthritis, Left Hip    Tg3 L hip OA  Central OA, bone on bone    B knee XR mild degen changes 6/24      Assessment:       ICD-10-CM ICD-9-CM   1. Primary osteoarthritis of left hip  M16.12 715.15        Colonoscopy 8/13/25  HTN  Osteopenia dexa 6/10/24    PCP 6/10/25: workup for weight loss  CT abd/pelvis, TSH, colonoscopy   Alb 3.4 12/24  Labs not done   CT showed: Severe right-sided hydronephrosis with obstructing right distal ureteral stone. The absence of perinephric stranding and edema suggests that the hydronephrosis could be chronic although no recent priors available for comparison. Recommend follow-up with urology.   PCP referred to urology     Plan:     Assessment & Plan    OSTEOARTHRITIS OF LEFT HIP:   Discussed hip replacement as a potential future option if non-operative treatments are ineffective.    FOLLOW-UP:   Follow up after completing labs, urology consult, and colonoscopy.         The above findings were discussed with patient length. We discussed the risks of conservative versus surgical management of the patients hip arthritis. Conservative management consisting of anti-inflammatory medications, weight loss, physical therapy, and activity modification was discussed at length. At this point considering the  patient's level of activity, pain, and radiographic findings I recommend TAMMIE     The patient was given a handout with treatment strategies for hip and knee joint care prior to surgery from [employer], the [employer].   This included information regarding medications, injections, weight loss, exercise, braces, physical therapy, and alternative therapies.       Hybrid TAMMIE - need medical optimization in the setting of ongoing unexplained wt loss workup   -go get labs ordered by PCP today   -urology eval for hydronephrosis - appt 8/5/25  -colonoscopy 8/13/25    Potential plan for L TAMMIE pending medical optimization   F/u for pre-op visit when deemed medically optimized per PCP     TAMMIE info  \[facility] [place] [facility]\""  Hip HEP     Tentative TAMMIE date when luz maria back in town         No orders of the defined types were placed in this encounter.      This note was generated with the assistance of ambient listening technology. Verbal consent was obtained by the patient and accompanying visitor(s) for the recording of patient appointment to facilitate this note. I attest to having reviewed and edited the generated note for accuracy, though some syntax or spelling errors may persist. Please contact the author of this note for any clarification.            Past Medical History:   Diagnosis Date    Hyperlipidemia     Hypertension        Past Surgical History:   Procedure Laterality Date    CARPAL TUNNEL RELEASE  05/25/2020    Dr. Marie    CATARACT EXTRACTION W/  INTRAOCULAR LENS IMPLANT Bilateral     CYSTOSCOPY N/A 03/23/2022    Procedure: CYSTOSCOPY;  Surgeon: Usha Mario MD;  Location: [facility] OR;  Service: Urology;  Laterality: N/A;    HYSTERECTOMY      HYSTEROSCOPY WITH DILATION AND CURETTAGE OF UTERUS N/A 5/6/2024    Procedure: HYSTEROSCOPY, WITH DILATION AND CURETTAGE OF UTERUS;  Surgeon: Wiedemann, Michael A., MD;  Location: [facility] OR;  Service: OB/GYN;  Laterality: N/A;    MYOMECTOMY N/A 5/6/2024    Procedure:  MYOMECTOMY;  Surgeon: Wiedemann, Michael A., MD;  Location: [facility] OR;  Service: OB/GYN;  Laterality: N/A;    TRIGGER FINGER RELEASE Left 11/02/2018    Procedure: RELEASE, TRIGGER FINGER left long;  Surgeon: Zoe Winkler MD;  Location: [facility] OR;  Service: Orthopedics;  Laterality: Left;  stretcher, supine, hand pan 1 and pan 2       Family History   Adopted: Yes       Social History     Socioeconomic History    Marital status: Single   Tobacco Use    Smoking status: Never    Smokeless tobacco: Never   Substance and Sexual Activity    Alcohol use: No    Drug use: No                    oral

## 2025-06-20 ENCOUNTER — HOSPITAL ENCOUNTER (OUTPATIENT)
Dept: RADIOLOGY | Facility: HOSPITAL | Age: 75
Discharge: HOME | End: 2025-06-20
Attending: FAMILY MEDICINE
Payer: MEDICARE

## 2025-06-20 DIAGNOSIS — R63.4 WEIGHT LOSS: ICD-10-CM

## 2025-06-20 DIAGNOSIS — N20.0 RENAL STONES: ICD-10-CM

## 2025-06-20 PROCEDURE — 71260 CT THORAX DX C+: CPT | Mod: TC

## 2025-06-20 PROCEDURE — 74177 CT ABD & PELVIS W/CONTRAST: CPT | Mod: 26,,, | Performed by: RADIOLOGY

## 2025-06-20 PROCEDURE — 71260 CT THORAX DX C+: CPT | Mod: 26,,, | Performed by: RADIOLOGY

## 2025-06-20 PROCEDURE — 25500020 PHARM REV CODE 255: Performed by: FAMILY MEDICINE

## 2025-06-20 PROCEDURE — A9698 NON-RAD CONTRAST MATERIALNOC: HCPCS | Performed by: FAMILY MEDICINE

## 2025-06-20 RX ADMIN — IOHEXOL 1000 ML: 9 SOLUTION ORAL at 09:06

## 2025-06-20 RX ADMIN — IOHEXOL 75 ML: 350 INJECTION, SOLUTION INTRAVENOUS at 10:06

## 2025-06-23 DIAGNOSIS — N13.30 HYDRONEPHROSIS, UNSPECIFIED HYDRONEPHROSIS TYPE: ICD-10-CM

## 2025-06-23 DIAGNOSIS — N20.0 RENAL STONE: Primary | ICD-10-CM

## 2025-06-24 ENCOUNTER — RESULTS FOLLOW-UP (OUTPATIENT)
Dept: FAMILY MEDICINE | Facility: CLINIC | Age: 75
End: 2025-06-24

## 2025-06-26 ENCOUNTER — HOSPITAL ENCOUNTER (OUTPATIENT)
Dept: RADIOLOGY | Facility: HOSPITAL | Age: 75
Discharge: HOME | End: 2025-06-26
Attending: ORTHOPAEDIC SURGERY
Payer: MEDICARE

## 2025-06-26 ENCOUNTER — OFFICE VISIT (OUTPATIENT)
Dept: ORTHOPEDICS | Facility: CLINIC | Age: 75
End: 2025-06-26
Payer: MEDICARE

## 2025-06-26 VITALS — HEIGHT: 59 IN | BODY MASS INDEX: 25.15 KG/M2 | WEIGHT: 124.75 LBS

## 2025-06-26 DIAGNOSIS — M16.12 PRIMARY OSTEOARTHRITIS OF LEFT HIP: Primary | ICD-10-CM

## 2025-06-26 DIAGNOSIS — M16.12 PRIMARY OSTEOARTHRITIS OF LEFT HIP: ICD-10-CM

## 2025-06-26 PROCEDURE — 1159F MED LIST DOCD IN RCRD: CPT | Mod: CPTII,S$GLB,, | Performed by: ORTHOPAEDIC SURGERY

## 2025-06-26 PROCEDURE — 99214 OFFICE O/P EST MOD 30 MIN: CPT | Mod: S$GLB,,, | Performed by: ORTHOPAEDIC SURGERY

## 2025-06-26 PROCEDURE — 1125F AMNT PAIN NOTED PAIN PRSNT: CPT | Mod: CPTII,S$GLB,, | Performed by: ORTHOPAEDIC SURGERY

## 2025-06-26 PROCEDURE — 73502 X-RAY EXAM HIP UNI 2-3 VIEWS: CPT | Mod: TC,LT

## 2025-06-26 PROCEDURE — 3288F FALL RISK ASSESSMENT DOCD: CPT | Mod: CPTII,S$GLB,, | Performed by: ORTHOPAEDIC SURGERY

## 2025-06-26 PROCEDURE — 3008F BODY MASS INDEX DOCD: CPT | Mod: CPTII,S$GLB,, | Performed by: ORTHOPAEDIC SURGERY

## 2025-06-26 PROCEDURE — 1101F PT FALLS ASSESS-DOCD LE1/YR: CPT | Mod: CPTII,S$GLB,, | Performed by: ORTHOPAEDIC SURGERY

## 2025-06-26 PROCEDURE — 73502 X-RAY EXAM HIP UNI 2-3 VIEWS: CPT | Mod: 26,LT,, | Performed by: RADIOLOGY

## 2025-06-26 PROCEDURE — 99999 PR PBB SHADOW E&M-EST. PATIENT-LVL II: CPT | Mod: PBBFAC,,, | Performed by: ORTHOPAEDIC SURGERY

## 2025-06-26 PROCEDURE — 4010F ACE/ARB THERAPY RXD/TAKEN: CPT | Mod: CPTII,S$GLB,, | Performed by: ORTHOPAEDIC SURGERY

## 2025-06-27 ENCOUNTER — PATIENT OUTREACH (OUTPATIENT)
Dept: ADMINISTRATIVE | Facility: HOSPITAL | Age: 75
End: 2025-06-27
Payer: MEDICARE

## 2025-06-27 NOTE — PROGRESS NOTES
06/27/2025  VB chart audit performed. Care Everywhere updates requested and reviewed  Overdue HM topic chart audit and/or requested. LINKS triggered and reconciled. Media reviewed.

## 2025-07-02 ENCOUNTER — APPOINTMENT (OUTPATIENT)
Dept: LAB | Facility: HOSPITAL | Age: 75
End: 2025-07-02
Attending: FAMILY MEDICINE
Payer: MEDICARE

## 2025-07-11 ENCOUNTER — PATIENT OUTREACH (OUTPATIENT)
Dept: ADMINISTRATIVE | Facility: HOSPITAL | Age: 75
End: 2025-07-11
Payer: MEDICARE

## 2025-08-05 ENCOUNTER — OFFICE VISIT (OUTPATIENT)
Dept: UROLOGY | Facility: CLINIC | Age: 75
End: 2025-08-05
Payer: MEDICARE

## 2025-08-05 ENCOUNTER — HOSPITAL ENCOUNTER (OUTPATIENT)
Dept: CARDIOLOGY | Facility: CLINIC | Age: 75
Discharge: HOME | End: 2025-08-05
Payer: MEDICARE

## 2025-08-05 VITALS
BODY MASS INDEX: 24.88 KG/M2 | WEIGHT: 123.44 LBS | SYSTOLIC BLOOD PRESSURE: 125 MMHG | HEART RATE: 48 BPM | HEIGHT: 59 IN | DIASTOLIC BLOOD PRESSURE: 76 MMHG

## 2025-08-05 DIAGNOSIS — N20.1 URETERAL STONE: Primary | ICD-10-CM

## 2025-08-05 DIAGNOSIS — N20.0 RENAL STONE: Primary | ICD-10-CM

## 2025-08-05 DIAGNOSIS — I10 ESSENTIAL HYPERTENSION: ICD-10-CM

## 2025-08-05 DIAGNOSIS — N20.0 RENAL STONE: ICD-10-CM

## 2025-08-05 DIAGNOSIS — N13.30 HYDRONEPHROSIS, UNSPECIFIED HYDRONEPHROSIS TYPE: ICD-10-CM

## 2025-08-05 LAB
OHS QRS DURATION: 86 MS
OHS QTC CALCULATION: 387 MS

## 2025-08-05 PROCEDURE — 3288F FALL RISK ASSESSMENT DOCD: CPT | Mod: CPTII,S$GLB,, | Performed by: UROLOGY

## 2025-08-05 PROCEDURE — 1126F AMNT PAIN NOTED NONE PRSNT: CPT | Mod: CPTII,S$GLB,, | Performed by: UROLOGY

## 2025-08-05 PROCEDURE — 93010 ELECTROCARDIOGRAM REPORT: CPT | Mod: S$GLB,,, | Performed by: INTERNAL MEDICINE

## 2025-08-05 PROCEDURE — 3074F SYST BP LT 130 MM HG: CPT | Mod: CPTII,S$GLB,, | Performed by: UROLOGY

## 2025-08-05 PROCEDURE — 99999 PR PBB SHADOW E&M-EST. PATIENT-LVL III: CPT | Mod: PBBFAC,,, | Performed by: UROLOGY

## 2025-08-05 PROCEDURE — 87086 URINE CULTURE/COLONY COUNT: CPT | Performed by: UROLOGY

## 2025-08-05 PROCEDURE — 1159F MED LIST DOCD IN RCRD: CPT | Mod: CPTII,S$GLB,, | Performed by: UROLOGY

## 2025-08-05 PROCEDURE — 4010F ACE/ARB THERAPY RXD/TAKEN: CPT | Mod: CPTII,S$GLB,, | Performed by: UROLOGY

## 2025-08-05 PROCEDURE — 93005 ELECTROCARDIOGRAM TRACING: CPT | Mod: S$GLB,,, | Performed by: UROLOGY

## 2025-08-05 PROCEDURE — 3008F BODY MASS INDEX DOCD: CPT | Mod: CPTII,S$GLB,, | Performed by: UROLOGY

## 2025-08-05 PROCEDURE — 3078F DIAST BP <80 MM HG: CPT | Mod: CPTII,S$GLB,, | Performed by: UROLOGY

## 2025-08-05 PROCEDURE — 1101F PT FALLS ASSESS-DOCD LE1/YR: CPT | Mod: CPTII,S$GLB,, | Performed by: UROLOGY

## 2025-08-05 PROCEDURE — 99204 OFFICE O/P NEW MOD 45 MIN: CPT | Mod: S$GLB,,, | Performed by: UROLOGY

## 2025-08-05 NOTE — PROGRESS NOTES
Urology - [facility]  Clinic Note    SUBJECTIVE:     Chief Complaint: Right kidney stone    History of Present Illness:  Amrita Yoder is a 75 y.o. female who presents with left renal stone seen on CT scan.     Pt reports having CT scan done as part of a workup for back pain. CT CAP performed on 6/20/2025 and showed a 12mm stone in the distal ureter as well as a small stone in her right lower pole. She has a history of bladder stones s/p stone removal and cysto approx March of 2022. Stone analysis includes 70% Calcium phosphate (apatite), 20% Calcium oxalate monohydrate, 10% Calcium oxalate dihydrate.     Patient complains of right flank pain with radiation to the groin. Onset of symptoms was gradual starting 1 month ago with stable course since that time. Patient describes the pain as colicky, dull, and pressure-like, intermittent, occurring about every 12 hours a day and lasting a couple  seconds per episode and rated as transient and intermittent. The patient has had no nausea and no diaphoresis. There has been no fever or chills. The patient is not complaining of dysuria or frequency.     She would like to get this stone removed soon as she is planning to undergo a hip replacement.    PATIENT HISTORY:  Past Medical History:   Diagnosis Date    Hyperlipidemia     Hypertension      Past Surgical History:   Procedure Laterality Date    CARPAL TUNNEL RELEASE  05/25/2020    Dr. Marie    CATARACT EXTRACTION W/  INTRAOCULAR LENS IMPLANT Bilateral     CYSTOSCOPY N/A 03/23/2022    Procedure: CYSTOSCOPY;  Surgeon: Usha Mario MD;  Location: [facility] OR;  Service: Urology;  Laterality: N/A;    HYSTERECTOMY      HYSTEROSCOPY WITH DILATION AND CURETTAGE OF UTERUS N/A 5/6/2024    Procedure: HYSTEROSCOPY, WITH DILATION AND CURETTAGE OF UTERUS;  Surgeon: Wiedemann, Michael A., MD;  Location: [facility] OR;  Service: OB/GYN;  Laterality: N/A;    MYOMECTOMY N/A 5/6/2024    Procedure: MYOMECTOMY;  Surgeon: Wiedemann,  "Samy BOOKER MD;  Location: [facility] OR;  Service: OB/GYN;  Laterality: N/A;    TRIGGER FINGER RELEASE Left 11/02/2018    Procedure: RELEASE, TRIGGER FINGER left long;  Surgeon: Zoe Winkler MD;  Location: [facility] OR;  Service: Orthopedics;  Laterality: Left;  stretcher, supine, hand pan 1 and pan 2     Family History   Adopted: Yes     Social History[1]    Medications:  Encounter Medications[2]  Allergies:  Patient has no known allergies.    Review of Systems:  Review of Systems   Constitutional: Negative.    HENT: Negative.     Eyes: Negative.    Cardiovascular: Negative.    Gastrointestinal: Negative.    Genitourinary:  Positive for flank pain. Negative for dysuria, frequency, hematuria and urgency.   Skin: Negative.    Neurological: Negative.      OBJECTIVE:     Estimated body mass index is 24.94 kg/m² as calculated from the following:    Height as of this encounter: 4' 11" (1.499 m).    Weight as of this encounter: 56 kg (123 lb 7.3 oz).    Vital Signs (Most Recent)  Pulse: (!) 48 (08/05/25 1409)  BP: 125/76 (08/05/25 1409)    Physical Exam  HENT:      Head: Normocephalic.      Mouth/Throat:      Mouth: Mucous membranes are moist.   Eyes:      Extraocular Movements: Extraocular movements intact.   Cardiovascular:      Rate and Rhythm: Normal rate.   Pulmonary:      Effort: Pulmonary effort is normal. No respiratory distress.   Abdominal:      General: Abdomen is flat.   Musculoskeletal:         General: Normal range of motion.      Cervical back: Normal range of motion.   Skin:     General: Skin is warm.   Neurological:      General: No focal deficit present.      Mental Status: She is alert.   Psychiatric:         Mood and Affect: Mood normal.     Labs:  Lab Results   Component Value Date    BUN 16 07/02/2025    CREATININE 0.7 07/02/2025    WBC 8.17 07/02/2025    HGB 12.9 07/02/2025    HCT 41.1 07/02/2025     07/02/2025    AST 24 07/02/2025    ALT 20 07/02/2025    ALKPHOS 75 07/02/2025    ALBUMIN 3.9 " 07/02/2025      Imaging:  CT CAP 6/20/2025  EXAMINATION:  CT CHEST ABDOMEN PELVIS WITH IV CONTRAST (XPD)     CLINICAL HISTORY:  Weight loss, unintended; Abnormal weight loss     TECHNIQUE:  Low dose axial images, sagittal and coronal reformations were obtained from the thoracic inlet to the pubic symphysis following the IV administration of 75 mL of Omnipaque 350 and the oral administration of 1000 mL of Omnipaque 9.     COMPARISON:  02/23/2022     FINDINGS:  Soft tissue structures at the base of the neck show no significant abnormalities.  No abnormal mediastinal or axillary lymph node enlargement.     Thoracic aorta is normal in caliber with minimal atherosclerosis.  Major aortic branch vessels are patent.  Heart is not enlarged.  No abnormal volume of pericardial fluid.  There are mild coronary artery calcifications.  No central pulmonary thromboembolus.     The trachea and central airways are patent.  No confluent airspace consolidation, mass, effusion or pneumothorax.  Small area of mild pleural thickening versus atelectasis at the left lung base.     Thoracic esophagus is normal in caliber and course.     Liver is homogeneous in attenuation with no focal lesions.  Gallbladder shows no radiopaque stones or inflammatory changes.  Portal vein is patent.  No biliary ductal dilatation.     Stomach, spleen, pancreas and adrenal glands show no significant abnormalities.     There is severe right-sided hydronephrosis with a 1.3 cm distal ureteral stone.  There is a mildly delayed nephrogram on the right.  No perinephric stranding.  There is also a an additional 4 mm right lower pole renal stone.  There is mild left-sided hydronephrosis with no etiology identified.  There is a 2.0 x 2.0 cm hyperdense focus within the uterus with adjacent calcification.  Adnexal structures show no significant abnormalities.  No free fluid.     The small and large bowel show no acute inflammation or obstruction.  There are scattered  colonic diverticuli.  No acute diverticulitis.  Appendix is normal.  Stool throughout the colon could relate to constipation.  No free air or ascites.     Abdominal aorta is normal in caliber with mild atherosclerosis.     No abnormal lymph node enlargement.     Osseous structures show no acute abnormalities.  There are degenerative changes of the lower lumbar spine with grade 1 anterolisthesis of L4 on L5.     Small fat containing umbilical hernia.     Impression:     Severe right-sided hydronephrosis with obstructing right distal ureteral stone.  The absence of perinephric stranding and edema suggests that the hydronephrosis could be chronic although no recent priors available for comparison.  Recommend follow-up with urology.  Mild left-sided hydronephrosis, etiology uncertain.     Diverticulosis.     Hyperdense lesion within the uterus with adjacent calcifications may represent a submucosal fibroid.  Please see surgical and pathology report from prior dilatation and curettage of the uterus.     This report was flagged in Epic as abnormal.        Electronically signed by:Eyal Huitron MD  Date:                                            06/23/2025  Time:                                           14:30    ASSESSMENT     1. Ureteral stone    2. Renal stone    3. Hydronephrosis, unspecified hydronephrosis type    4. Essential hypertension        PLAN:   Amrita was seen today for nephrolithiasis.    Diagnoses and all orders for this visit:    Ureteral stone    Renal stone  -     Ambulatory referral/consult to Urology  -     Urine Culture High Risk ($$)  -     IN OFFICE EKG 12-LEAD (to Muse)    Hydronephrosis, unspecified hydronephrosis type  -     Ambulatory referral/consult to Urology  -     Urine Culture High Risk ($$)  -     IN OFFICE EKG 12-LEAD (to Muse)    Essential hypertension     The patient is scheduled for right ureteroscopy. The risks and benefits of ureteroscopy were discussed with the patient in  detail.  Consent was obtained.  The risks include but are not limited to burning with urination, bleeding, infection, pain, incomplete fragmentation of the stone, need for further procedures, injury to the kidney, ureter, bladder and need for open surgery.  The patient was informed that they may require a ureteral stent and that stents can cause irritative voiding symptoms.  They also understand that ureteral stents are temporary and must be removed or exchanged in a timely fashion as they can calcify and make more stones and become difficult to remove. Alternative treatments were also discussed with the patient in detail to include ESWL, percutaneous treatment of the stone, open surgery or observation. Patient understands these risks and has agreed to proceed with surgery.    -Plan to undergo right ureteroscopy with laser lithotripsy. Plan for next Wednesday 8/13/2025  -Urine for culture   - EKG ordered  - Chest imaging done in June of this year. No abnormalities noted.  Marc Yates MD     Letter to Quentin Hoff MD, Quentin Hoff*     Dr. Pappas saw and evaluated patient and agrees with treatment, evaluation and plan.             [1]   Social History  Socioeconomic History    Marital status: Single   Tobacco Use    Smoking status: Never    Smokeless tobacco: Never   Substance and Sexual Activity    Alcohol use: No    Drug use: No   [2]   Outpatient Encounter Medications as of 8/5/2025   Medication Sig Dispense Refill    atorvastatin (LIPITOR) 10 MG tablet Take 1 tablet (10 mg total) by mouth every evening. 90 tablet 3    azelastine (ASTELIN) 137 mcg (0.1 %) nasal spray 1 spray (137 mcg total) by Nasal route 2 (two) times daily. 30 mL 2    valsartan (DIOVAN) 40 MG tablet Take 1 tablet (40 mg total) by mouth once daily. 90 tablet 3     No facility-administered encounter medications on file as of 8/5/2025.

## 2025-08-07 LAB — BACTERIA UR CULT: NORMAL

## 2025-08-11 ENCOUNTER — TELEPHONE (OUTPATIENT)
Dept: ENDOSCOPY | Facility: HOSPITAL | Age: 75
End: 2025-08-11
Payer: MEDICARE

## 2025-08-12 ENCOUNTER — OFFICE VISIT (OUTPATIENT)
Dept: FAMILY MEDICINE | Facility: CLINIC | Age: 75
End: 2025-08-12
Payer: MEDICARE

## 2025-08-12 ENCOUNTER — LAB VISIT (OUTPATIENT)
Dept: LAB | Facility: HOSPITAL | Age: 75
End: 2025-08-12
Attending: NURSE PRACTITIONER
Payer: MEDICARE

## 2025-08-12 VITALS
WEIGHT: 124.75 LBS | BODY MASS INDEX: 25.15 KG/M2 | OXYGEN SATURATION: 98 % | HEIGHT: 59 IN | HEART RATE: 55 BPM | SYSTOLIC BLOOD PRESSURE: 122 MMHG | DIASTOLIC BLOOD PRESSURE: 72 MMHG

## 2025-08-12 DIAGNOSIS — I10 ESSENTIAL HYPERTENSION: ICD-10-CM

## 2025-08-12 DIAGNOSIS — E78.5 DYSLIPIDEMIA: ICD-10-CM

## 2025-08-12 DIAGNOSIS — N13.30 HYDRONEPHROSIS, UNSPECIFIED HYDRONEPHROSIS TYPE: ICD-10-CM

## 2025-08-12 DIAGNOSIS — E66.3 OVERWEIGHT WITH BODY MASS INDEX (BMI) OF 25 TO 25.9 IN ADULT: ICD-10-CM

## 2025-08-12 DIAGNOSIS — Z01.818 PREOP EXAMINATION: ICD-10-CM

## 2025-08-12 DIAGNOSIS — N20.0 RENAL STONE: ICD-10-CM

## 2025-08-12 DIAGNOSIS — Z01.818 PREOP EXAMINATION: Primary | ICD-10-CM

## 2025-08-12 LAB
ABSOLUTE EOSINOPHIL (OHS): 0.13 K/UL
ABSOLUTE MONOCYTE (OHS): 0.5 K/UL (ref 0.3–1)
ABSOLUTE NEUTROPHIL COUNT (OHS): 2.84 K/UL (ref 1.8–7.7)
ALBUMIN SERPL BCP-MCNC: 3.8 G/DL (ref 3.5–5.2)
ALP SERPL-CCNC: 82 UNIT/L (ref 40–150)
ALT SERPL W/O P-5'-P-CCNC: 19 UNIT/L (ref 0–55)
AMORPH CRY UR QL COMP ASSIST: ABNORMAL
ANION GAP (OHS): 8 MMOL/L (ref 8–16)
AST SERPL-CCNC: 24 UNIT/L (ref 0–50)
BASOPHILS # BLD AUTO: 0.05 K/UL
BASOPHILS NFR BLD AUTO: 0.8 %
BILIRUB SERPL-MCNC: 0.4 MG/DL (ref 0.1–1)
BILIRUB UR QL STRIP.AUTO: NEGATIVE
BUN SERPL-MCNC: 10 MG/DL (ref 8–23)
CALCIUM SERPL-MCNC: 8.9 MG/DL (ref 8.7–10.5)
CHLORIDE SERPL-SCNC: 106 MMOL/L (ref 95–110)
CLARITY UR: ABNORMAL
CO2 SERPL-SCNC: 27 MMOL/L (ref 23–29)
COLOR UR AUTO: YELLOW
CREAT SERPL-MCNC: 0.7 MG/DL (ref 0.5–1.4)
ERYTHROCYTE [DISTWIDTH] IN BLOOD BY AUTOMATED COUNT: 11.8 % (ref 11.5–14.5)
GFR SERPLBLD CREATININE-BSD FMLA CKD-EPI: >60 ML/MIN/1.73/M2
GLUCOSE SERPL-MCNC: 78 MG/DL (ref 70–110)
GLUCOSE UR QL STRIP: NEGATIVE
HCT VFR BLD AUTO: 41.4 % (ref 37–48.5)
HGB BLD-MCNC: 13.2 GM/DL (ref 12–16)
HGB UR QL STRIP: ABNORMAL
IMM GRANULOCYTES # BLD AUTO: 0.01 K/UL (ref 0–0.04)
IMM GRANULOCYTES NFR BLD AUTO: 0.2 % (ref 0–0.5)
KETONES UR QL STRIP: NEGATIVE
LEUKOCYTE ESTERASE UR QL STRIP: ABNORMAL
LYMPHOCYTES # BLD AUTO: 3.03 K/UL (ref 1–4.8)
MCH RBC QN AUTO: 30.7 PG (ref 27–31)
MCHC RBC AUTO-ENTMCNC: 31.9 G/DL (ref 32–36)
MCV RBC AUTO: 96 FL (ref 82–98)
MICROSCOPIC COMMENT: ABNORMAL
NITRITE UR QL STRIP: NEGATIVE
NUCLEATED RBC (/100WBC) (OHS): 0 /100 WBC
PH UR STRIP: 7 [PH]
PLATELET # BLD AUTO: 285 K/UL (ref 150–450)
PMV BLD AUTO: 9.5 FL (ref 9.2–12.9)
POTASSIUM SERPL-SCNC: 4.4 MMOL/L (ref 3.5–5.1)
PROT SERPL-MCNC: 7.3 GM/DL (ref 6–8.4)
PROT UR QL STRIP: NEGATIVE
RBC # BLD AUTO: 4.3 M/UL (ref 4–5.4)
RBC #/AREA URNS AUTO: 8 /HPF (ref 0–4)
RELATIVE EOSINOPHIL (OHS): 2 %
RELATIVE LYMPHOCYTE (OHS): 46.2 % (ref 18–48)
RELATIVE MONOCYTE (OHS): 7.6 % (ref 4–15)
RELATIVE NEUTROPHIL (OHS): 43.2 % (ref 38–73)
SODIUM SERPL-SCNC: 141 MMOL/L (ref 136–145)
SP GR UR STRIP: 1.01
SQUAMOUS #/AREA URNS AUTO: 1 /HPF
UROBILINOGEN UR STRIP-ACNC: NEGATIVE EU/DL
WBC # BLD AUTO: 6.56 K/UL (ref 3.9–12.7)
WBC #/AREA URNS AUTO: 10 /HPF (ref 0–5)

## 2025-08-12 PROCEDURE — 99999 PR PBB SHADOW E&M-EST. PATIENT-LVL III: CPT | Mod: PBBFAC,,, | Performed by: NURSE PRACTITIONER

## 2025-08-12 PROCEDURE — 3078F DIAST BP <80 MM HG: CPT | Mod: CPTII,S$GLB,, | Performed by: NURSE PRACTITIONER

## 2025-08-12 PROCEDURE — 81003 URINALYSIS AUTO W/O SCOPE: CPT

## 2025-08-12 PROCEDURE — 99215 OFFICE O/P EST HI 40 MIN: CPT | Mod: S$GLB,,, | Performed by: NURSE PRACTITIONER

## 2025-08-12 PROCEDURE — 1101F PT FALLS ASSESS-DOCD LE1/YR: CPT | Mod: CPTII,S$GLB,, | Performed by: NURSE PRACTITIONER

## 2025-08-12 PROCEDURE — 36415 COLL VENOUS BLD VENIPUNCTURE: CPT | Mod: PO

## 2025-08-12 PROCEDURE — 4010F ACE/ARB THERAPY RXD/TAKEN: CPT | Mod: CPTII,S$GLB,, | Performed by: NURSE PRACTITIONER

## 2025-08-12 PROCEDURE — 3074F SYST BP LT 130 MM HG: CPT | Mod: CPTII,S$GLB,, | Performed by: NURSE PRACTITIONER

## 2025-08-12 PROCEDURE — 3288F FALL RISK ASSESSMENT DOCD: CPT | Mod: CPTII,S$GLB,, | Performed by: NURSE PRACTITIONER

## 2025-08-12 PROCEDURE — 85025 COMPLETE CBC W/AUTO DIFF WBC: CPT

## 2025-08-12 PROCEDURE — 1125F AMNT PAIN NOTED PAIN PRSNT: CPT | Mod: CPTII,S$GLB,, | Performed by: NURSE PRACTITIONER

## 2025-08-12 PROCEDURE — 80053 COMPREHEN METABOLIC PANEL: CPT

## 2025-08-12 PROCEDURE — 3008F BODY MASS INDEX DOCD: CPT | Mod: CPTII,S$GLB,, | Performed by: NURSE PRACTITIONER

## 2025-08-13 ENCOUNTER — ANESTHESIA EVENT (OUTPATIENT)
Dept: ENDOSCOPY | Facility: HOSPITAL | Age: 75
End: 2025-08-13
Payer: MEDICARE

## 2025-08-13 ENCOUNTER — HOSPITAL ENCOUNTER (OUTPATIENT)
Facility: HOSPITAL | Age: 75
Discharge: HOME | End: 2025-08-13
Attending: INTERNAL MEDICINE | Admitting: INTERNAL MEDICINE
Payer: MEDICARE

## 2025-08-13 ENCOUNTER — ANESTHESIA (OUTPATIENT)
Dept: ENDOSCOPY | Facility: HOSPITAL | Age: 75
End: 2025-08-13
Payer: MEDICARE

## 2025-08-13 VITALS
RESPIRATION RATE: 11 BRPM | BODY MASS INDEX: 23.74 KG/M2 | DIASTOLIC BLOOD PRESSURE: 64 MMHG | WEIGHT: 129 LBS | SYSTOLIC BLOOD PRESSURE: 112 MMHG | HEART RATE: 47 BPM | OXYGEN SATURATION: 99 % | TEMPERATURE: 99 F | HEIGHT: 62 IN

## 2025-08-13 DIAGNOSIS — Z12.11 COLON CANCER SCREENING: ICD-10-CM

## 2025-08-13 DIAGNOSIS — Z12.11 SCREEN FOR COLON CANCER: ICD-10-CM

## 2025-08-13 PROCEDURE — 27201089 HC SNARE, DISP (ANY): Performed by: INTERNAL MEDICINE

## 2025-08-13 PROCEDURE — 45385 COLONOSCOPY W/LESION REMOVAL: CPT | Mod: PT | Performed by: INTERNAL MEDICINE

## 2025-08-13 PROCEDURE — 25000003 PHARM REV CODE 250: Performed by: NURSE ANESTHETIST, CERTIFIED REGISTERED

## 2025-08-13 PROCEDURE — 37000008 HC ANESTHESIA 1ST 15 MINUTES: Performed by: INTERNAL MEDICINE

## 2025-08-13 PROCEDURE — 37000009 HC ANESTHESIA EA ADD 15 MINS: Performed by: INTERNAL MEDICINE

## 2025-08-13 PROCEDURE — 45385 COLONOSCOPY W/LESION REMOVAL: CPT | Mod: PT,,, | Performed by: INTERNAL MEDICINE

## 2025-08-13 PROCEDURE — 88305 TISSUE EXAM BY PATHOLOGIST: CPT | Mod: TC | Performed by: INTERNAL MEDICINE

## 2025-08-13 PROCEDURE — 63600175 PHARM REV CODE 636 W HCPCS: Performed by: NURSE ANESTHETIST, CERTIFIED REGISTERED

## 2025-08-13 PROCEDURE — 88305 TISSUE EXAM BY PATHOLOGIST: CPT | Mod: 26,,, | Performed by: PATHOLOGY

## 2025-08-13 RX ORDER — LIDOCAINE HYDROCHLORIDE 20 MG/ML
INJECTION INTRAVENOUS
Status: DISCONTINUED | OUTPATIENT
Start: 2025-08-13 | End: 2025-08-13

## 2025-08-13 RX ORDER — SODIUM CHLORIDE 9 MG/ML
INJECTION, SOLUTION INTRAVENOUS CONTINUOUS
Status: DISCONTINUED | OUTPATIENT
Start: 2025-08-13 | End: 2025-08-13 | Stop reason: HOSPADM

## 2025-08-13 RX ORDER — PROPOFOL 10 MG/ML
VIAL (ML) INTRAVENOUS
Status: DISCONTINUED | OUTPATIENT
Start: 2025-08-13 | End: 2025-08-13

## 2025-08-13 RX ORDER — PROPOFOL 10 MG/ML
VIAL (ML) INTRAVENOUS CONTINUOUS PRN
Status: DISCONTINUED | OUTPATIENT
Start: 2025-08-13 | End: 2025-08-13

## 2025-08-13 RX ADMIN — PROPOFOL 70 MG: 10 INJECTION, EMULSION INTRAVENOUS at 09:08

## 2025-08-13 RX ADMIN — SODIUM CHLORIDE: 0.9 INJECTION, SOLUTION INTRAVENOUS at 08:08

## 2025-08-13 RX ADMIN — LIDOCAINE HYDROCHLORIDE 100 MG: 20 INJECTION, SOLUTION INTRAVENOUS at 09:08

## 2025-08-13 RX ADMIN — PROPOFOL 150 MCG/KG/MIN: 10 INJECTION, EMULSION INTRAVENOUS at 09:08

## 2025-08-14 LAB
ESTROGEN SERPL-MCNC: NORMAL PG/ML
INSULIN SERPL-ACNC: NORMAL U[IU]/ML
LAB AP CLINICAL INFORMATION: NORMAL
LAB AP GROSS DESCRIPTION: NORMAL
LAB AP PERFORMING LOCATION(S): NORMAL
LAB AP REPORT FOOTNOTES: NORMAL

## 2025-08-15 PROBLEM — E66.3 OVERWEIGHT (BMI 25.0-29.9): Status: RESOLVED | Noted: 2019-03-29 | Resolved: 2025-08-15

## 2025-08-15 PROBLEM — R68.89 FLU-LIKE SYMPTOMS: Status: RESOLVED | Noted: 2019-03-29 | Resolved: 2025-08-15

## 2025-08-15 PROBLEM — J02.9 PHARYNGITIS: Status: RESOLVED | Noted: 2019-03-29 | Resolved: 2025-08-15

## 2025-08-15 PROBLEM — J06.9 UPPER RESPIRATORY TRACT INFECTION: Status: RESOLVED | Noted: 2019-03-29 | Resolved: 2025-08-15

## 2025-08-15 PROBLEM — H60.502 ACUTE OTITIS EXTERNA OF LEFT EAR: Status: RESOLVED | Noted: 2019-03-29 | Resolved: 2025-08-15

## 2025-08-19 ENCOUNTER — TELEPHONE (OUTPATIENT)
Dept: UROLOGY | Facility: CLINIC | Age: 75
End: 2025-08-19
Payer: MEDICARE

## 2025-08-20 ENCOUNTER — HOSPITAL ENCOUNTER (OUTPATIENT)
Facility: HOSPITAL | Age: 75
LOS: 1 days | Discharge: HOME | End: 2025-08-21
Attending: UROLOGY | Admitting: UROLOGY
Payer: MEDICARE

## 2025-08-20 ENCOUNTER — ANESTHESIA (OUTPATIENT)
Dept: SURGERY | Facility: HOSPITAL | Age: 75
End: 2025-08-20
Payer: MEDICARE

## 2025-08-20 ENCOUNTER — ANESTHESIA EVENT (OUTPATIENT)
Dept: SURGERY | Facility: HOSPITAL | Age: 75
End: 2025-08-20
Payer: MEDICARE

## 2025-08-20 DIAGNOSIS — N20.1 RIGHT URETERAL STONE: Primary | ICD-10-CM

## 2025-08-20 LAB
INR PPP: 1 (ref 0.8–1.2)
PROTHROMBIN TIME: 11 SECONDS (ref 9–12.5)

## 2025-08-20 PROCEDURE — 85610 PROTHROMBIN TIME: CPT

## 2025-08-20 PROCEDURE — 99223 1ST HOSP IP/OBS HIGH 75: CPT | Mod: ,,, | Performed by: FAMILY MEDICINE

## 2025-08-20 PROCEDURE — 71000044 HC DOSC ROUTINE RECOVERY FIRST HOUR: Performed by: UROLOGY

## 2025-08-20 PROCEDURE — C1758 CATHETER, URETERAL: HCPCS | Performed by: UROLOGY

## 2025-08-20 PROCEDURE — C1769 GUIDE WIRE: HCPCS | Performed by: UROLOGY

## 2025-08-20 PROCEDURE — 36000706: Performed by: UROLOGY

## 2025-08-20 PROCEDURE — 71000015 HC POSTOP RECOV 1ST HR: Performed by: UROLOGY

## 2025-08-20 PROCEDURE — 52351 CYSTOURETERO & OR PYELOSCOPE: CPT | Mod: ,,, | Performed by: UROLOGY

## 2025-08-20 PROCEDURE — 51610 INJECTION FOR BLADDER X-RAY: CPT | Mod: 51,,, | Performed by: UROLOGY

## 2025-08-20 PROCEDURE — 36000707: Performed by: UROLOGY

## 2025-08-20 PROCEDURE — 63600175 PHARM REV CODE 636 W HCPCS

## 2025-08-20 PROCEDURE — 37000009 HC ANESTHESIA EA ADD 15 MINS: Performed by: UROLOGY

## 2025-08-20 PROCEDURE — 71000016 HC POSTOP RECOV ADDL HR: Performed by: UROLOGY

## 2025-08-20 PROCEDURE — 25000003 PHARM REV CODE 250

## 2025-08-20 PROCEDURE — 25500020 PHARM REV CODE 255: Performed by: UROLOGY

## 2025-08-20 PROCEDURE — 74450 X-RAY URETHRA/BLADDER: CPT | Mod: 26,,, | Performed by: UROLOGY

## 2025-08-20 PROCEDURE — 37000008 HC ANESTHESIA 1ST 15 MINUTES: Performed by: UROLOGY

## 2025-08-20 PROCEDURE — 11000001 HC ACUTE MED/SURG PRIVATE ROOM

## 2025-08-20 PROCEDURE — 25000003 PHARM REV CODE 250: Performed by: UROLOGY

## 2025-08-20 RX ORDER — PHENYLEPHRINE HYDROCHLORIDE 10 MG/ML
INJECTION INTRAVENOUS
Status: DISCONTINUED | OUTPATIENT
Start: 2025-08-20 | End: 2025-08-20

## 2025-08-20 RX ORDER — LIDOCAINE HYDROCHLORIDE 20 MG/ML
JELLY TOPICAL
Status: DISCONTINUED | OUTPATIENT
Start: 2025-08-20 | End: 2025-08-20 | Stop reason: HOSPADM

## 2025-08-20 RX ORDER — ATORVASTATIN CALCIUM 10 MG/1
10 TABLET, FILM COATED ORAL NIGHTLY
Status: DISCONTINUED | OUTPATIENT
Start: 2025-08-20 | End: 2025-08-21 | Stop reason: HOSPADM

## 2025-08-20 RX ORDER — OXYCODONE HYDROCHLORIDE 5 MG/1
5 TABLET ORAL EVERY 6 HOURS PRN
Refills: 0 | Status: DISCONTINUED | OUTPATIENT
Start: 2025-08-20 | End: 2025-08-21 | Stop reason: HOSPADM

## 2025-08-20 RX ORDER — SODIUM CHLORIDE 0.9 % (FLUSH) 0.9 %
10 SYRINGE (ML) INJECTION
Status: DISCONTINUED | OUTPATIENT
Start: 2025-08-20 | End: 2025-08-21 | Stop reason: HOSPADM

## 2025-08-20 RX ORDER — PROPOFOL 10 MG/ML
VIAL (ML) INTRAVENOUS
Status: DISCONTINUED | OUTPATIENT
Start: 2025-08-20 | End: 2025-08-20

## 2025-08-20 RX ORDER — TALC
6 POWDER (GRAM) TOPICAL NIGHTLY PRN
Status: DISCONTINUED | OUTPATIENT
Start: 2025-08-20 | End: 2025-08-21 | Stop reason: HOSPADM

## 2025-08-20 RX ORDER — FENTANYL CITRATE 50 UG/ML
25 INJECTION, SOLUTION INTRAMUSCULAR; INTRAVENOUS EVERY 5 MIN PRN
Status: DISCONTINUED | OUTPATIENT
Start: 2025-08-20 | End: 2025-08-20 | Stop reason: HOSPADM

## 2025-08-20 RX ORDER — ONDANSETRON HYDROCHLORIDE 2 MG/ML
4 INJECTION, SOLUTION INTRAVENOUS DAILY PRN
Status: DISCONTINUED | OUTPATIENT
Start: 2025-08-20 | End: 2025-08-20 | Stop reason: HOSPADM

## 2025-08-20 RX ORDER — DEXAMETHASONE SODIUM PHOSPHATE 4 MG/ML
INJECTION, SOLUTION INTRA-ARTICULAR; INTRALESIONAL; INTRAMUSCULAR; INTRAVENOUS; SOFT TISSUE
Status: DISCONTINUED | OUTPATIENT
Start: 2025-08-20 | End: 2025-08-20

## 2025-08-20 RX ORDER — ONDANSETRON HYDROCHLORIDE 2 MG/ML
INJECTION, SOLUTION INTRAVENOUS
Status: DISCONTINUED | OUTPATIENT
Start: 2025-08-20 | End: 2025-08-20

## 2025-08-20 RX ORDER — LIDOCAINE HYDROCHLORIDE 20 MG/ML
INJECTION INTRAVENOUS
Status: DISCONTINUED | OUTPATIENT
Start: 2025-08-20 | End: 2025-08-20

## 2025-08-20 RX ORDER — CEFAZOLIN 2 G/1
2 INJECTION, POWDER, FOR SOLUTION INTRAMUSCULAR; INTRAVENOUS
Status: COMPLETED | OUTPATIENT
Start: 2025-08-20 | End: 2025-08-20

## 2025-08-20 RX ORDER — HYDROMORPHONE HYDROCHLORIDE 1 MG/ML
0.2 INJECTION, SOLUTION INTRAMUSCULAR; INTRAVENOUS; SUBCUTANEOUS EVERY 5 MIN PRN
Status: DISCONTINUED | OUTPATIENT
Start: 2025-08-20 | End: 2025-08-20 | Stop reason: HOSPADM

## 2025-08-20 RX ORDER — GLUCAGON 1 MG
1 KIT INJECTION
Status: DISCONTINUED | OUTPATIENT
Start: 2025-08-20 | End: 2025-08-20 | Stop reason: HOSPADM

## 2025-08-20 RX ORDER — FENTANYL CITRATE 50 UG/ML
INJECTION, SOLUTION INTRAMUSCULAR; INTRAVENOUS
Status: DISCONTINUED | OUTPATIENT
Start: 2025-08-20 | End: 2025-08-20

## 2025-08-20 RX ORDER — HALOPERIDOL LACTATE 5 MG/ML
0.5 INJECTION, SOLUTION INTRAMUSCULAR EVERY 10 MIN PRN
Status: DISCONTINUED | OUTPATIENT
Start: 2025-08-20 | End: 2025-08-20 | Stop reason: HOSPADM

## 2025-08-20 RX ORDER — OXYCODONE HYDROCHLORIDE 5 MG/1
5 TABLET ORAL
Status: DISCONTINUED | OUTPATIENT
Start: 2025-08-20 | End: 2025-08-20 | Stop reason: HOSPADM

## 2025-08-20 RX ORDER — ROCURONIUM BROMIDE 10 MG/ML
INJECTION, SOLUTION INTRAVENOUS
Status: DISCONTINUED | OUTPATIENT
Start: 2025-08-20 | End: 2025-08-20

## 2025-08-20 RX ORDER — ONDANSETRON HYDROCHLORIDE 2 MG/ML
4 INJECTION, SOLUTION INTRAVENOUS EVERY 6 HOURS PRN
Status: DISCONTINUED | OUTPATIENT
Start: 2025-08-20 | End: 2025-08-21 | Stop reason: HOSPADM

## 2025-08-20 RX ADMIN — PROPOFOL 100 MG: 10 INJECTION, EMULSION INTRAVENOUS at 12:08

## 2025-08-20 RX ADMIN — PHENYLEPHRINE HYDROCHLORIDE 200 MCG: 10 INJECTION INTRAVENOUS at 12:08

## 2025-08-20 RX ADMIN — ONDANSETRON 4 MG: 2 INJECTION INTRAMUSCULAR; INTRAVENOUS at 12:08

## 2025-08-20 RX ADMIN — DEXAMETHASONE SODIUM PHOSPHATE 4 MG: 4 INJECTION, SOLUTION INTRAMUSCULAR; INTRAVENOUS at 12:08

## 2025-08-20 RX ADMIN — SUGAMMADEX 200 MG: 100 INJECTION, SOLUTION INTRAVENOUS at 01:08

## 2025-08-20 RX ADMIN — ATORVASTATIN CALCIUM 10 MG: 10 TABLET, FILM COATED ORAL at 08:08

## 2025-08-20 RX ADMIN — LIDOCAINE HYDROCHLORIDE 100 MG: 20 INJECTION INTRAVENOUS at 12:08

## 2025-08-20 RX ADMIN — PHENYLEPHRINE HYDROCHLORIDE 100 MCG: 10 INJECTION INTRAVENOUS at 12:08

## 2025-08-20 RX ADMIN — FENTANYL CITRATE 100 MCG: 50 INJECTION, SOLUTION INTRAMUSCULAR; INTRAVENOUS at 12:08

## 2025-08-20 RX ADMIN — CEFAZOLIN 2 G: 2 INJECTION, POWDER, FOR SOLUTION INTRAMUSCULAR; INTRAVENOUS at 12:08

## 2025-08-20 RX ADMIN — SODIUM CHLORIDE: 0.9 INJECTION, SOLUTION INTRAVENOUS at 12:08

## 2025-08-20 RX ADMIN — ROCURONIUM BROMIDE 40 MG: 10 INJECTION, SOLUTION INTRAVENOUS at 12:08

## 2025-08-20 RX ADMIN — GLYCOPYRROLATE 0.2 MG: 0.2 INJECTION, SOLUTION INTRAMUSCULAR; INTRAVENOUS at 12:08

## 2025-08-21 VITALS
WEIGHT: 129 LBS | TEMPERATURE: 97 F | SYSTOLIC BLOOD PRESSURE: 155 MMHG | HEIGHT: 62 IN | BODY MASS INDEX: 23.74 KG/M2 | HEART RATE: 43 BPM | DIASTOLIC BLOOD PRESSURE: 74 MMHG | RESPIRATION RATE: 17 BRPM | OXYGEN SATURATION: 94 %

## 2025-08-21 LAB
ABSOLUTE EOSINOPHIL (OHS): 0 K/UL
ABSOLUTE MONOCYTE (OHS): 0.37 K/UL (ref 0.3–1)
ABSOLUTE NEUTROPHIL COUNT (OHS): 7.77 K/UL (ref 1.8–7.7)
ANION GAP (OHS): 9 MMOL/L (ref 8–16)
BASOPHILS # BLD AUTO: 0.01 K/UL
BASOPHILS NFR BLD AUTO: 0.1 %
BUN SERPL-MCNC: 22 MG/DL (ref 8–23)
CALCIUM SERPL-MCNC: 9.1 MG/DL (ref 8.7–10.5)
CHLORIDE SERPL-SCNC: 108 MMOL/L (ref 95–110)
CO2 SERPL-SCNC: 25 MMOL/L (ref 23–29)
CREAT SERPL-MCNC: 0.8 MG/DL (ref 0.5–1.4)
ERYTHROCYTE [DISTWIDTH] IN BLOOD BY AUTOMATED COUNT: 11.3 % (ref 11.5–14.5)
GFR SERPLBLD CREATININE-BSD FMLA CKD-EPI: >60 ML/MIN/1.73/M2
GLUCOSE SERPL-MCNC: 108 MG/DL (ref 70–110)
HCT VFR BLD AUTO: 35.8 % (ref 37–48.5)
HGB BLD-MCNC: 12 GM/DL (ref 12–16)
IMM GRANULOCYTES # BLD AUTO: 0.03 K/UL (ref 0–0.04)
IMM GRANULOCYTES NFR BLD AUTO: 0.3 % (ref 0–0.5)
LYMPHOCYTES # BLD AUTO: 2.01 K/UL (ref 1–4.8)
MCH RBC QN AUTO: 30.9 PG (ref 27–31)
MCHC RBC AUTO-ENTMCNC: 33.5 G/DL (ref 32–36)
MCV RBC AUTO: 92 FL (ref 82–98)
NUCLEATED RBC (/100WBC) (OHS): 0 /100 WBC
PLATELET # BLD AUTO: 262 K/UL (ref 150–450)
PMV BLD AUTO: 9 FL (ref 9.2–12.9)
POTASSIUM SERPL-SCNC: 4.1 MMOL/L (ref 3.5–5.1)
RBC # BLD AUTO: 3.88 M/UL (ref 4–5.4)
RELATIVE EOSINOPHIL (OHS): 0 %
RELATIVE LYMPHOCYTE (OHS): 19.7 % (ref 18–48)
RELATIVE MONOCYTE (OHS): 3.6 % (ref 4–15)
RELATIVE NEUTROPHIL (OHS): 76.3 % (ref 38–73)
SODIUM SERPL-SCNC: 142 MMOL/L (ref 136–145)
WBC # BLD AUTO: 10.19 K/UL (ref 3.9–12.7)

## 2025-08-21 PROCEDURE — 63600175 PHARM REV CODE 636 W HCPCS: Performed by: RADIOLOGY

## 2025-08-21 PROCEDURE — 36415 COLL VENOUS BLD VENIPUNCTURE: CPT

## 2025-08-21 PROCEDURE — 80048 BASIC METABOLIC PNL TOTAL CA: CPT

## 2025-08-21 PROCEDURE — 85025 COMPLETE CBC W/AUTO DIFF WBC: CPT

## 2025-08-21 RX ORDER — LIDOCAINE HYDROCHLORIDE 10 MG/ML
INJECTION, SOLUTION INFILTRATION; PERINEURAL
Status: COMPLETED | OUTPATIENT
Start: 2025-08-21 | End: 2025-08-21

## 2025-08-21 RX ORDER — FENTANYL CITRATE 50 UG/ML
INJECTION, SOLUTION INTRAMUSCULAR; INTRAVENOUS
Status: COMPLETED | OUTPATIENT
Start: 2025-08-21 | End: 2025-08-21

## 2025-08-21 RX ORDER — MIDAZOLAM HYDROCHLORIDE 1 MG/ML
INJECTION, SOLUTION INTRAMUSCULAR; INTRAVENOUS
Status: COMPLETED | OUTPATIENT
Start: 2025-08-21 | End: 2025-08-21

## 2025-08-21 RX ORDER — CEFTRIAXONE 1 G/1
INJECTION, POWDER, FOR SOLUTION INTRAMUSCULAR; INTRAVENOUS
Status: COMPLETED | OUTPATIENT
Start: 2025-08-21 | End: 2025-08-21

## 2025-08-21 RX ADMIN — FENTANYL CITRATE 25 MCG: 0.05 INJECTION, SOLUTION INTRAMUSCULAR; INTRAVENOUS at 09:08

## 2025-08-21 RX ADMIN — MIDAZOLAM 0.5 MG: 1 INJECTION INTRAMUSCULAR; INTRAVENOUS at 09:08

## 2025-08-21 RX ADMIN — LIDOCAINE HYDROCHLORIDE 5 ML: 10 INJECTION, SOLUTION INFILTRATION; PERINEURAL at 09:08

## 2025-08-21 RX ADMIN — CEFTRIAXONE SODIUM 1 G: 1 INJECTION, POWDER, FOR SOLUTION INTRAMUSCULAR; INTRAVENOUS at 09:08

## 2025-09-03 ENCOUNTER — TELEPHONE (OUTPATIENT)
Dept: UROLOGY | Facility: CLINIC | Age: 75
End: 2025-09-03
Payer: MEDICARE

## 2025-09-03 ENCOUNTER — OFFICE VISIT (OUTPATIENT)
Dept: UROLOGY | Facility: CLINIC | Age: 75
End: 2025-09-03
Payer: MEDICARE

## 2025-09-03 VITALS
HEIGHT: 62 IN | SYSTOLIC BLOOD PRESSURE: 136 MMHG | WEIGHT: 126.13 LBS | HEART RATE: 72 BPM | BODY MASS INDEX: 23.21 KG/M2 | DIASTOLIC BLOOD PRESSURE: 83 MMHG

## 2025-09-03 DIAGNOSIS — N20.1 URETERAL STONE: Primary | ICD-10-CM

## 2025-09-03 LAB
BILIRUBIN, UA POC OHS: NEGATIVE
BLOOD, UA POC OHS: ABNORMAL
CLARITY, UA POC OHS: CLEAR
COLOR, UA POC OHS: YELLOW
GLUCOSE, UA POC OHS: NEGATIVE
KETONES, UA POC OHS: NEGATIVE
LEUKOCYTES, UA POC OHS: ABNORMAL
NITRITE, UA POC OHS: NEGATIVE
PH, UA POC OHS: 5.5
PROTEIN, UA POC OHS: NEGATIVE
SPECIFIC GRAVITY, UA POC OHS: <=1.005
UROBILINOGEN, UA POC OHS: 0.2

## 2025-09-03 PROCEDURE — 3075F SYST BP GE 130 - 139MM HG: CPT | Mod: CPTII,S$GLB,,

## 2025-09-03 PROCEDURE — 1160F RVW MEDS BY RX/DR IN RCRD: CPT | Mod: CPTII,S$GLB,,

## 2025-09-03 PROCEDURE — 99213 OFFICE O/P EST LOW 20 MIN: CPT | Mod: S$GLB,,,

## 2025-09-03 PROCEDURE — 1126F AMNT PAIN NOTED NONE PRSNT: CPT | Mod: CPTII,S$GLB,,

## 2025-09-03 PROCEDURE — 1101F PT FALLS ASSESS-DOCD LE1/YR: CPT | Mod: CPTII,S$GLB,,

## 2025-09-03 PROCEDURE — 99999 PR PBB SHADOW E&M-EST. PATIENT-LVL III: CPT | Mod: PBBFAC,,,

## 2025-09-03 PROCEDURE — 4010F ACE/ARB THERAPY RXD/TAKEN: CPT | Mod: CPTII,S$GLB,,

## 2025-09-03 PROCEDURE — 1159F MED LIST DOCD IN RCRD: CPT | Mod: CPTII,S$GLB,,

## 2025-09-03 PROCEDURE — 87086 URINE CULTURE/COLONY COUNT: CPT

## 2025-09-03 PROCEDURE — 81003 URINALYSIS AUTO W/O SCOPE: CPT | Mod: QW,S$GLB,,

## 2025-09-03 PROCEDURE — 3288F FALL RISK ASSESSMENT DOCD: CPT | Mod: CPTII,S$GLB,,

## 2025-09-03 PROCEDURE — 3008F BODY MASS INDEX DOCD: CPT | Mod: CPTII,S$GLB,,

## 2025-09-03 PROCEDURE — 3079F DIAST BP 80-89 MM HG: CPT | Mod: CPTII,S$GLB,,

## 2025-09-04 LAB — BACTERIA UR CULT: NORMAL

## 2025-09-05 ENCOUNTER — TELEPHONE (OUTPATIENT)
Dept: UROLOGY | Facility: CLINIC | Age: 75
End: 2025-09-05
Payer: MEDICARE

## (undated) DEVICE — GLOVE PROTEXIS HYDROGEL SZ7.5

## (undated) DEVICE — BAG LINGEMAN DRAIN UROLOGY

## (undated) DEVICE — PANTIES FEMININE NAPKIN LG/XLG

## (undated) DEVICE — Device

## (undated) DEVICE — APPLICATOR CHLORAPREP ORN 26ML

## (undated) DEVICE — SEE MEDLINE ITEM 154981

## (undated) DEVICE — WIRE GD LUB STD 3CM .035 150CM

## (undated) DEVICE — TOURNIQUET SB QC SP 18X4IN

## (undated) DEVICE — DRAPE C-ARM MINI DISP

## (undated) DEVICE — BANDAGE ELASTIC 2X5 VELCRO ST

## (undated) DEVICE — GLOVE BIOGEL SKINSENSE PI 7.0

## (undated) DEVICE — BANDAGE KERLIX P/P 2.25IN STER

## (undated) DEVICE — UNDERGLOVE BIOGEL PI SZ 6.5 LF

## (undated) DEVICE — GLOVE SURGICAL LATEX SZ 6.5

## (undated) DEVICE — UNDERGLOVES BIOGEL PI SIZE 7.5

## (undated) DEVICE — PAD CNTOUR SUP-ABSRB POSTPRTM

## (undated) DEVICE — SET IRR URLGY 2LINE UNIV SPIKE

## (undated) DEVICE — CATH POLLACK OPEN-END FLEXI-TI

## (undated) DEVICE — GOWN POLY REINF X-LONG 2XL

## (undated) DEVICE — GUIDE WIRE MOTION .035 X 150CM

## (undated) DEVICE — SOL IRR NACL .9% 3000ML

## (undated) DEVICE — DRESSING N ADH OIL EMUL 3X3

## (undated) DEVICE — FORCEP BIPOLAR ADSON 1MM TIP

## (undated) DEVICE — GOWN POLY REINF BRTH SLV LG

## (undated) DEVICE — SYR 10CC LUER LOCK

## (undated) DEVICE — TRAY CYSTO BASIN OMC

## (undated) DEVICE — DRAPE SURG W/TWL 17 5/8X23

## (undated) DEVICE — SEE MEDLINE ITEM 157185

## (undated) DEVICE — TOWEL OR DISP STRL BLUE 4/PK

## (undated) DEVICE — UNDERGLOVES BIOGEL PI SZ 7 LF

## (undated) DEVICE — CORD BIPOLAR ELECTROSURGICAL

## (undated) DEVICE — OS LOCATOR

## (undated) DEVICE — BANDAGE CONFORM 3IN STRL

## (undated) DEVICE — PACK SURGERY START

## (undated) DEVICE — FORCEP STRAIGHT DISP

## (undated) DEVICE — GUIDEWIRE STR TIP HIWIRE 150CM

## (undated) DEVICE — SYR B-D DISP CONTROL 10CC100/C

## (undated) DEVICE — PAD UNDERPAD 30X30

## (undated) DEVICE — ADAPTER HOSE 10FT 8MM

## (undated) DEVICE — SYR 50CC LL

## (undated) DEVICE — DRAPE UINDERBUT GRAD PCH

## (undated) DEVICE — DEVICE MYOSURE REACH SYS

## (undated) DEVICE — NDL SAFETY 22G X 1.5 ECLIPSE

## (undated) DEVICE — PACK UPPER EXTREMITY BAPTIST

## (undated) DEVICE — SYR ONLY LUER LOCK 20CC

## (undated) DEVICE — GAUZE SPONGE 4X4 12PLY

## (undated) DEVICE — COVER OVERHEAD SURG LT BLUE

## (undated) DEVICE — WIRE GUIDE LUBRCTD ANGL 3CM

## (undated) DEVICE — CATH URETHRAL RED 16FR

## (undated) DEVICE — SOL NACL IRR 3000ML

## (undated) DEVICE — TUBING PRSS MON M/F LL 48IN

## (undated) DEVICE — SEE MEDLINE ITEM 147518

## (undated) DEVICE — PACK CYSTOSCOPY III SIRUS

## (undated) DEVICE — JELLY LUBRICANT STERILE 4 OZ

## (undated) DEVICE — SOL 9P NACL IRR PIC IL

## (undated) DEVICE — CONTAINER MULTIPURP W/LID 16OZ

## (undated) DEVICE — TOURNIQUET SB QC DP 18X4IN

## (undated) DEVICE — PAD PREP CUFFED NS 24X48IN

## (undated) DEVICE — SEE MEDLINE ITEM 157117

## (undated) DEVICE — SEE MEDLINE ITEM 146268

## (undated) DEVICE — SUT 4/0 18IN ETHILON BL P3

## (undated) DEVICE — DRESSING TELFA N ADH 3X8

## (undated) DEVICE — REAMER 10MM

## (undated) DEVICE — SCRUB 10% POVIDONE IODINE 4OZ

## (undated) DEVICE — REAMER MTP 10 MM CONVEX

## (undated) DEVICE — CORD BIPOLAR 12 FT STERILE

## (undated) DEVICE — SOL NS 1000CC

## (undated) DEVICE — SOL IRRI STRL WATER 1000ML